# Patient Record
Sex: FEMALE | Race: WHITE | NOT HISPANIC OR LATINO | Employment: OTHER | URBAN - METROPOLITAN AREA
[De-identification: names, ages, dates, MRNs, and addresses within clinical notes are randomized per-mention and may not be internally consistent; named-entity substitution may affect disease eponyms.]

---

## 2017-10-21 ENCOUNTER — APPOINTMENT (OUTPATIENT)
Dept: GENERAL RADIOLOGY | Facility: CLINIC | Age: 82
DRG: 871 | End: 2017-10-21
Attending: EMERGENCY MEDICINE
Payer: COMMERCIAL

## 2017-10-21 ENCOUNTER — HOSPITAL ENCOUNTER (INPATIENT)
Facility: CLINIC | Age: 82
LOS: 3 days | Discharge: HOME | DRG: 871 | End: 2017-10-24
Attending: EMERGENCY MEDICINE | Admitting: INTERNAL MEDICINE
Payer: COMMERCIAL

## 2017-10-21 DIAGNOSIS — E11.9 TYPE 2 DIABETES MELLITUS WITHOUT COMPLICATION, WITHOUT LONG-TERM CURRENT USE OF INSULIN (H): ICD-10-CM

## 2017-10-21 DIAGNOSIS — J18.9 PNEUMONIA: ICD-10-CM

## 2017-10-21 DIAGNOSIS — D50.9 IRON DEFICIENCY ANEMIA, UNSPECIFIED IRON DEFICIENCY ANEMIA TYPE: Primary | ICD-10-CM

## 2017-10-21 DIAGNOSIS — J18.9 COMMUNITY ACQUIRED PNEUMONIA, UNSPECIFIED LATERALITY: ICD-10-CM

## 2017-10-21 LAB
ALBUMIN SERPL-MCNC: 2.9 G/DL (ref 3.4–5)
ALBUMIN UR-MCNC: NEGATIVE MG/DL
ALP SERPL-CCNC: 64 U/L (ref 40–150)
ALT SERPL W P-5'-P-CCNC: 17 U/L (ref 0–50)
ANION GAP SERPL CALCULATED.3IONS-SCNC: 6 MMOL/L (ref 3–14)
APPEARANCE UR: CLEAR
AST SERPL W P-5'-P-CCNC: 22 U/L (ref 0–45)
BASE EXCESS BLDV CALC-SCNC: 0.6 MMOL/L
BASOPHILS # BLD AUTO: 0 10E9/L (ref 0–0.2)
BASOPHILS NFR BLD AUTO: 0 %
BILIRUB SERPL-MCNC: 0.3 MG/DL (ref 0.2–1.3)
BILIRUB UR QL STRIP: NEGATIVE
BUN SERPL-MCNC: 15 MG/DL (ref 7–30)
CALCIUM SERPL-MCNC: 7.9 MG/DL (ref 8.5–10.1)
CHLORIDE SERPL-SCNC: 100 MMOL/L (ref 94–109)
CO2 BLDCOV-SCNC: 21 MMOL/L (ref 21–28)
CO2 BLDCOV-SCNC: 24 MMOL/L (ref 21–28)
CO2 SERPL-SCNC: 26 MMOL/L (ref 20–32)
COLOR UR AUTO: YELLOW
CREAT SERPL-MCNC: 0.91 MG/DL (ref 0.52–1.04)
DIFFERENTIAL METHOD BLD: ABNORMAL
EOSINOPHIL # BLD AUTO: 0 10E9/L (ref 0–0.7)
EOSINOPHIL NFR BLD AUTO: 0.2 %
ERYTHROCYTE [DISTWIDTH] IN BLOOD BY AUTOMATED COUNT: 16 % (ref 10–15)
GFR SERPL CREATININE-BSD FRML MDRD: 59 ML/MIN/1.7M2
GLUCOSE SERPL-MCNC: 102 MG/DL (ref 70–99)
GLUCOSE UR STRIP-MCNC: NEGATIVE MG/DL
HCO3 BLDV-SCNC: 26 MMOL/L (ref 21–28)
HCT VFR BLD AUTO: 25.9 % (ref 35–47)
HGB BLD-MCNC: 7.9 G/DL (ref 11.7–15.7)
HGB UR QL STRIP: ABNORMAL
IMM GRANULOCYTES # BLD: 0 10E9/L (ref 0–0.4)
IMM GRANULOCYTES NFR BLD: 0.6 %
KETONES UR STRIP-MCNC: NEGATIVE MG/DL
LACTATE BLD-SCNC: 0.5 MMOL/L (ref 0.7–2.1)
LACTATE BLD-SCNC: 2 MMOL/L (ref 0.7–2)
LACTATE BLD-SCNC: 2 MMOL/L (ref 0.7–2.1)
LEUKOCYTE ESTERASE UR QL STRIP: NEGATIVE
LYMPHOCYTES # BLD AUTO: 0.4 10E9/L (ref 0.8–5.3)
LYMPHOCYTES NFR BLD AUTO: 7.4 %
MCH RBC QN AUTO: 28.8 PG (ref 26.5–33)
MCHC RBC AUTO-ENTMCNC: 30.5 G/DL (ref 31.5–36.5)
MCV RBC AUTO: 95 FL (ref 78–100)
MONOCYTES # BLD AUTO: 0.5 10E9/L (ref 0–1.3)
MONOCYTES NFR BLD AUTO: 8.7 %
NEUTROPHILS # BLD AUTO: 4.3 10E9/L (ref 1.6–8.3)
NEUTROPHILS NFR BLD AUTO: 83.1 %
NITRATE UR QL: NEGATIVE
NRBC # BLD AUTO: 0 10*3/UL
NRBC BLD AUTO-RTO: 0 /100
O2/TOTAL GAS SETTING VFR VENT: NORMAL %
PCO2 BLDV: 34 MM HG (ref 40–50)
PCO2 BLDV: 42 MM HG (ref 40–50)
PCO2 BLDV: 45 MM HG (ref 40–50)
PH BLDV: 7.37 PH (ref 7.32–7.43)
PH BLDV: 7.37 PH (ref 7.32–7.43)
PH BLDV: 7.41 PH (ref 7.32–7.43)
PH UR STRIP: 7 PH (ref 5–7)
PLATELET # BLD AUTO: 195 10E9/L (ref 150–450)
PO2 BLDV: 20 MM HG (ref 25–47)
PO2 BLDV: 25 MM HG (ref 25–47)
PO2 BLDV: 58 MM HG (ref 25–47)
POTASSIUM SERPL-SCNC: 3.9 MMOL/L (ref 3.4–5.3)
PROT SERPL-MCNC: 9.2 G/DL (ref 6.8–8.8)
RBC # BLD AUTO: 2.74 10E12/L (ref 3.8–5.2)
RBC #/AREA URNS AUTO: 9 /HPF (ref 0–2)
SAO2 % BLDV FROM PO2: 30 %
SAO2 % BLDV FROM PO2: 90 %
SODIUM SERPL-SCNC: 132 MMOL/L (ref 133–144)
SOURCE: ABNORMAL
SP GR UR STRIP: 1.01 (ref 1–1.03)
UROBILINOGEN UR STRIP-MCNC: 0 MG/DL (ref 0–2)
WBC # BLD AUTO: 5.2 10E9/L (ref 4–11)
WBC #/AREA URNS AUTO: 1 /HPF (ref 0–2)

## 2017-10-21 PROCEDURE — 36415 COLL VENOUS BLD VENIPUNCTURE: CPT | Performed by: EMERGENCY MEDICINE

## 2017-10-21 PROCEDURE — 96365 THER/PROPH/DIAG IV INF INIT: CPT

## 2017-10-21 PROCEDURE — 83605 ASSAY OF LACTIC ACID: CPT

## 2017-10-21 PROCEDURE — 82803 BLOOD GASES ANY COMBINATION: CPT

## 2017-10-21 PROCEDURE — 12000000 ZZH R&B MED SURG/OB

## 2017-10-21 PROCEDURE — 99223 1ST HOSP IP/OBS HIGH 75: CPT | Mod: AI | Performed by: INTERNAL MEDICINE

## 2017-10-21 PROCEDURE — 99207 ZZC CDG-CHARGE REQUIRED MANUAL ENTRY: CPT | Performed by: INTERNAL MEDICINE

## 2017-10-21 PROCEDURE — 93005 ELECTROCARDIOGRAM TRACING: CPT

## 2017-10-21 PROCEDURE — 87040 BLOOD CULTURE FOR BACTERIA: CPT | Performed by: EMERGENCY MEDICINE

## 2017-10-21 PROCEDURE — 96361 HYDRATE IV INFUSION ADD-ON: CPT

## 2017-10-21 PROCEDURE — 87086 URINE CULTURE/COLONY COUNT: CPT | Performed by: EMERGENCY MEDICINE

## 2017-10-21 PROCEDURE — 85025 COMPLETE CBC W/AUTO DIFF WBC: CPT | Performed by: EMERGENCY MEDICINE

## 2017-10-21 PROCEDURE — 96367 TX/PROPH/DG ADDL SEQ IV INF: CPT

## 2017-10-21 PROCEDURE — 82803 BLOOD GASES ANY COMBINATION: CPT | Performed by: EMERGENCY MEDICINE

## 2017-10-21 PROCEDURE — 99285 EMERGENCY DEPT VISIT HI MDM: CPT | Mod: 25

## 2017-10-21 PROCEDURE — 71020 XR CHEST 2 VW: CPT

## 2017-10-21 PROCEDURE — 80053 COMPREHEN METABOLIC PANEL: CPT | Performed by: EMERGENCY MEDICINE

## 2017-10-21 PROCEDURE — 25000132 ZZH RX MED GY IP 250 OP 250 PS 637: Performed by: EMERGENCY MEDICINE

## 2017-10-21 PROCEDURE — 81001 URINALYSIS AUTO W/SCOPE: CPT | Performed by: EMERGENCY MEDICINE

## 2017-10-21 PROCEDURE — 83605 ASSAY OF LACTIC ACID: CPT | Performed by: EMERGENCY MEDICINE

## 2017-10-21 PROCEDURE — 25000128 H RX IP 250 OP 636: Performed by: EMERGENCY MEDICINE

## 2017-10-21 RX ORDER — SODIUM CHLORIDE 9 MG/ML
1000 INJECTION, SOLUTION INTRAVENOUS CONTINUOUS
Status: DISCONTINUED | OUTPATIENT
Start: 2017-10-21 | End: 2017-10-22

## 2017-10-21 RX ORDER — ACETAMINOPHEN 325 MG/1
975 TABLET ORAL ONCE
Status: COMPLETED | OUTPATIENT
Start: 2017-10-21 | End: 2017-10-21

## 2017-10-21 RX ORDER — CEFTRIAXONE 1 G/1
1 INJECTION, POWDER, FOR SOLUTION INTRAMUSCULAR; INTRAVENOUS ONCE
Status: COMPLETED | OUTPATIENT
Start: 2017-10-21 | End: 2017-10-21

## 2017-10-21 RX ADMIN — ACETAMINOPHEN 975 MG: 325 TABLET, FILM COATED ORAL at 18:19

## 2017-10-21 RX ADMIN — AZITHROMYCIN MONOHYDRATE 500 MG: 500 INJECTION, POWDER, LYOPHILIZED, FOR SOLUTION INTRAVENOUS at 21:43

## 2017-10-21 RX ADMIN — CEFTRIAXONE SODIUM 1 G: 1 INJECTION, POWDER, FOR SOLUTION INTRAMUSCULAR; INTRAVENOUS at 20:59

## 2017-10-21 RX ADMIN — SODIUM CHLORIDE 1000 ML: 9 INJECTION, SOLUTION INTRAVENOUS at 18:19

## 2017-10-21 ASSESSMENT — ENCOUNTER SYMPTOMS
CHILLS: 1
APPETITE CHANGE: 1
FEVER: 1
COUGH: 1

## 2017-10-21 NOTE — ED NOTES
Bed: ED08  Expected date: 10/21/17  Expected time:   Means of arrival: Ambulance  Comments:  Naomie Lagunas

## 2017-10-21 NOTE — ED PROVIDER NOTES
History     Chief Complaint:  Altered Mental Status and Fatigue    HPI   History limited secondary to language barrier. Information obtained through family as :    Chelly Dave is a 82 year old female with a history of dementia and diabetes who presents to the Emergency Department for evaluation of altered mental status. Per families report, the patient developed a cough two days ago and woke up this morning with altered mental status, fatigue, chills, decreased appetite and a fever as high as 103 F. Family gave the patient Tylenol and cold medication without any relief and presented to the emergency department for further evaluation. Family denies any other concerns.     Allergies:  Lisinopril     Medications:    metoprolol (TOPROL-XL) 25 MG 24 hr tablet  saccharomyces boulardii (FLORASTOR) 250 MG capsule  ACETAMINOPHEN PO  Calcium Carb-Cholecalciferol (CALCIUM 500 +D) 500-400 MG-UNIT TABS  DOXEPIN HCL PO  lidocaine (LIDODERM) 5 % patch  Pilocarpine HCl (SALAGEN PO)  VITAMIN E NATURAL PO  Acetaminophen (TYLENOL PO)  polyethylene glycol 400 (BLINK TEARS) 0.25 % SOLN ophthalmic solution  AzaTHIOprine (IMURAN PO)  cycloSPORINE (RESTASIS) 0.05 % ophthalmic emulsion  FLUOXETINE HCL PO  darbepoetin aravind-polysorbate (ARANESP) 200 MCG/0.4ML injection  diclofenac (FLECTOR) 1.3 % patch  Cholecalciferol (VITAMIN D3 PO)  donepezil (ARICEPT) 5 MG tablet  aspirin 81 MG EC tablet  atorvastatin (LIPITOR) 20 MG tablet  PANtoprazole (PROTONIX) 40 MG enteric coated tablet  loratadine (CLARITIN) 10 MG tablet     Past Medical History:    Arthritis   Cataract   Diabetes mellitus (H)   Gastro-oesophageal reflux disease   Hyperlipidemia   Cellulitis  Syncope  Pneumonia  Chest pain    Past Surgical History:    Colonoscopy  Orthopedic surgery    Family History:    No past pertinent family history.    Social History:  Smoking Status: never smoker  Alcohol Use: no  Marital Status:   [5]     Review of Systems    Constitutional: Positive for appetite change, chills and fever.   Respiratory: Positive for cough.    All other systems reviewed and are negative.    Physical Exam   First Vitals:  BP: 146/83  Heart Rate: 97  Temp: 103.1  F (39.5  C)  Resp: (!) 36  SpO2: 98 %    Physical Exam   Constitutional: She is oriented to person, place, and time.   Elderly frail   HENT:   Head: Normocephalic and atraumatic.   Right Ear: External ear normal.   Mouth/Throat: Oropharynx is clear and moist.   Eyes: Conjunctivae and EOM are normal. Pupils are equal, round, and reactive to light.   Neck: Normal range of motion. Neck supple. No JVD present.   Cardiovascular: Normal rate, regular rhythm and normal heart sounds.    Pulmonary/Chest: Effort normal and breath sounds normal.   Abdominal: Soft. Bowel sounds are normal. She exhibits no distension. There is no tenderness. There is no rebound.   Musculoskeletal: Normal range of motion.   Lymphadenopathy:     She has no cervical adenopathy.   Neurological: She is alert and oriented to person, place, and time. She displays normal reflexes. No cranial nerve deficit. She exhibits normal muscle tone. Coordination normal.   Skin: Skin is warm and dry.   Psychiatric: She has a normal mood and affect. Her behavior is normal. Judgment normal.   Nursing note and vitals reviewed.      Emergency Department Course   ECG:  Indication: altered mental status  Time: 1852  Vent. Rate 97 bpm. WI interval 136. QRS duration 74. QT/QTc 346/439. P-R-T axis 61 2 52.   sinus rhythm. Normal ECG.     Imaging:  Radiographic findings were communicated with the patient who voiced understanding of the findings.    Chest XR:  IMPRESSION: Alveolar infiltrate laterally in the anterior segment of  the right upper lobe consistent with pneumonia. No pleural effusion.  No other infiltrates. Cardiomegaly is unchanged. As per radiology.     Laboratory:  CBC: WBC: 5.2, HGB: 7.9(L), PLT: 195  CMP: Glucose 102(H), (L), Calcium  7.9(L), Albumin 2.9(L), GFR 59(L), Protein total 9.2(H), o/w WNL (creatinine 0.91).    Blood gas venous: negative  1816 ISTAT gases lactate laina POCT: pO2 20(L), o/w WNL.  2106 ISTAT gases lactate laina POCT: pCO2 34(L), O2 58(H), Lactic acid 0.5 (L), o/w WNL.  UA with Microscopic: small urine blood, RBC 9(H), o/w WNL.  Urine culture aerobic bacterial: In process  Lactic acid whole blood: 2.0    Blood culture: No growth after one hour  Blood culture one site: No growth after one hour    Interventions:  1819 NS 1 L IV   Tylenol 975 mg PO  2059 Ceftriaxone 1 g IV    Emergency Department Course:  Nursing notes and vitals reviewed. I performed an exam of the patient as documented above.     The patient was sent for a chest xray while here in the emergency department, findings above.    Blood drawn. This was sent to the lab for further testing, results above.    The patient provided a urine sample here in the emergency department. This was sent for laboratory testing, findings above.    EKG obtained in the ED, see results above.     I reassessed the patient.     2118 I spoke with Dr. Edge regarding this patient.    Findings and plan explained to the Patient and daughter who consents to admission. Discussed the patient with Dr. Edge, who will admit the patient to a medical bed for further monitoring, evaluation, and treatment.    Impression & Plan      Medical Decision Making:  Chelly Dave is a 82 year old female who presents with confusion today and fever. Patient is slightly tachypneic. X-rays confirmed pneumonia. Patients confusion seemed to improve with fever curettement though the patient was still quite weak and family was requesting admission due to her generalized deconditioned state. This is not unreasonable at her age with pneumonia. There are no signs of sepsis or severe sepsis. blood cultures are pending. Due to her living at home community acquired pneumonia antibiotics were offered and admission was  granted through Dr. Edge.     Diagnosis:    ICD-10-CM    1. Pneumonia J18.9 Blood culture ONE site     Blood culture     Urine Culture Aerobic Bacterial     Sputum Culture Aerobic Bacterial     Gram stain     Basic metabolic panel     CBC with platelets     Ferritin     Iron and iron binding capacity       Disposition:  Admit    I, Kristel Guzman, am serving as a scribe on 10/21/2017 at 6:04 PM to personally document services performed by Haris Leigh MD based on my observations and the provider's statements to me.   Kristel Guzman  10/21/2017   [facility] EMERGENCY DEPARTMENT       Haris Leigh MD  10/23/17 0945

## 2017-10-21 NOTE — IP AVS SNAPSHOT
MRN:[id number]                      After Visit Summary   10/21/2017    Chelly Dave    MRN: [id number]           Thank you!     Thank you for choosing [facility] for your care. Our goal is always to provide you with excellent care. Hearing back from our patients is one way we can continue to improve our services. Please take a few minutes to complete the written survey that you may receive in the mail after you visit. If you would like to speak to someone directly about your visit please contact Patient Relations at [phone]. Thank you!          Patient Information     Date Of Birth          1935        Designated Caregiver       Most Recent Value    Caregiver    Will someone help with your care after discharge? yes    Name of designated caregiver Xiomara (daughter) Pt lives with son.     Phone number of caregiver [phone]    Caregiver address same as patients      About your hospital stay     You were admitted on:  October 21, 2017 You last received care in the:  [facility]    You were discharged on:  October 24, 2017        Reason for your hospital stay       You were hospitalized for pneumonia.  This has improved with antibiotics.  You will complete a course of oral antibiotics at home.  Your iron level is low so we have filled a daily supplement for this.                  Who to Call     For medical emergencies, please call 911.  For non-urgent questions about your medical care, please call your primary care provider or clinic, [phone]          Attending Provider     Provider Specialty    Haris Leigh MD Emergency Medicine    Pedro Edge MD Internal Medicine       Primary Care Provider Office Phone # Fax #    Brittany Heredia [phone]      After Care Instructions     Activity       Your activity upon discharge: activity as tolerated            Diet       Follow this diet upon discharge: Orders Placed This  "Encounter      Regular Diet Adult                  Follow-up Appointments     Follow-up and recommended labs and tests        Follow up with primary care provider at your appointment on Friday                  Additional Services     Home care nursing referral       RN skilled nursing visit. RN to assess respiratory and cardiac status and home safety.  RN to teach medication management.    Your provider has ordered home care nursing services. If you have not been contacted within 2 days of your discharge please call the inpatient department phone number at [phone] .                  Pending Results     Date and Time Order Name Status Description    10/21/2017 1759 Blood culture Preliminary     10/21/2017 1744 Blood culture ONE site Preliminary             Statement of Approval     Ordered          10/24/17 1111  I have reviewed and agree with all the recommendations and orders detailed in this document.  EFFECTIVE NOW     Approved and electronically signed by:  Landry Alejo MD             Admission Information     Date & Time Provider Department Dept. Phone    10/21/2017 Pedro Edge MD [facility] [phone]      Your Vitals Were     Blood Pressure Pulse Temperature Respirations Weight Pulse Oximetry    152/79 (BP Location: Right arm) 82 96.1  F (35.6  C) (Axillary) 22 58.1 kg (128 lb) 99%    BMI (Body Mass Index)                   25 kg/m2           MyChart Information     [employer] lets you send messages to your doctor, view your test results, renew your prescriptions, schedule appointments and more. To sign up, go to [email or web address]"t . Click on \"Log in\" on the left side of the screen, which will take you to the Welcome page. Then click on \"Sign up Now\" on the right side of the page.     You will be asked to enter the access code listed below, as well as some personal information. Please follow the directions to create your username and password.     Your access code " is: [email or web address]  Expires: 2018 11:16 AM     Your access code will  in 90 days. If you need help or a new code, please call your [facility] clinic or [phone].        Care EveryWhere ID     This is your Care EveryWhere ID. This could be used by other organizations to access your [facility] medical records  [id number]        Equal Access to Services     SILVINO AYON : Hadii aad ku hadasho Soomaali, waaxda luqadaha, qaybta kaalmada adeegyada, waxay idiin hayellen ademarilyn rossi laNemeavivien . So [facility] [phone].    ATENCIÓN: Si habla espmyla, tiene a dawson disposición servicios gratuitos de asistencia lingüística. Llame al [phone].    We comply with applicable federal civil rights laws and [place] laws. We do not discriminate on the basis of race, color, national origin, age, disability, sex, sexual orientation, or gender identity.               Review of your medicines      START taking        Dose / Directions    azithromycin 250 MG tablet   Commonly known as:  ZITHROMAX   Indication:  Community Acquired Pneumonia        Dose:  250 mg   Take 1 tablet (250 mg) by mouth every 24 hours for 2 days   Quantity:  2 tablet   Refills:  0       benzonatate 100 MG capsule   Commonly known as:  TESSALON        Dose:  100 mg   Take 1 capsule (100 mg) by mouth 3 times daily as needed for cough   Quantity:  21 capsule   Refills:  0       cefdinir 300 MG capsule   Commonly known as:  OMNICEF        Dose:  300 mg   Take 1 capsule (300 mg) by mouth 2 times daily for 5 days   Quantity:  10 capsule   Refills:  0       ferrous sulfate 325 (65 FE) MG tablet   Commonly known as:  IRON   Used for:  Iron deficiency anemia, unspecified iron deficiency anemia type        Dose:  325 mg   Take 1 tablet (325 mg) by mouth daily   Quantity:  100 tablet   Refills:  0         CONTINUE these medicines which may have CHANGED, or have new prescriptions. If we are uncertain of the size of tablets/capsules you have at home, strength may be  listed as something that might have changed.        Dose / Directions    donepezil 5 MG tablet   Commonly known as:  ARIcept   This may have changed:  when to take this   Used for:  Dementia        Dose:  5 mg   Take 1 tablet (5 mg) by mouth daily   Quantity:  30 tablet   Refills:  0         CONTINUE these medicines which have NOT CHANGED        Dose / Directions    aspirin 81 MG EC tablet        Dose:  81 mg   Take 81 mg by mouth daily.   Refills:  0       BLINK TEARS 0.25 % Soln ophthalmic solution   Generic drug:  polyethylene glycol 400        Dose:  1-2 drop   Place 1-2 drops into both eyes every 2 hours as needed for dry eyes   Refills:  0       calcium 500 +D 500-400 MG-UNIT Tabs   Generic drug:  Calcium Carb-Cholecalciferol        Dose:  1 tablet   Take 1 tablet by mouth daily   Refills:  0       cycloSPORINE 0.05 % ophthalmic emulsion   Commonly known as:  RESTASIS        Dose:  1 drop   Place 1 drop into both eyes 2 times daily   Refills:  0       darbepoetin aravind-polysorbate 200 MCG/0.4ML injection   Commonly known as:  ARANESP        Dose:  200 mcg   Inject 200 mcg Subcutaneous Every 2 months   Refills:  0       diclofenac 1.3 % Patch   Commonly known as:  FLECTOR        Dose:  1 patch   Place 1 patch onto the skin 2 times daily as needed for moderate pain   Refills:  0       DOXEPIN HCL PO        Dose:  10 mg   Take 10 mg by mouth At Bedtime   Refills:  0       FLUOXETINE HCL PO        Dose:  20 mg   Take 20 mg by mouth daily (Takes 2 x 10mg for a total of 20mg daily)   Refills:  0       IMURAN PO        Dose:  50 mg   Take 50 mg by mouth 2 times daily AM & 3PM   Refills:  0       lidocaine 5 % Patch   Commonly known as:  LIDODERM        Dose:  1 patch   Place 1 patch onto the skin daily as needed for moderate pain (12 hours on; 12 hours off. Patient applies to knees or back)   Refills:  0       LIPITOR 20 MG tablet   Generic drug:  atorvastatin        Dose:  20 mg   Take 20 mg by mouth every evening  (Takes half of a 40mg tablet for a total of 20mg in the evening)   Refills:  0       loratadine 10 MG tablet   Commonly known as:  CLARITIN        Dose:  10 mg   Take 10 mg by mouth daily.   Refills:  0       pantoprazole 40 MG EC tablet   Commonly known as:  PROTONIX        Dose:  40 mg   Take 40 mg by mouth every morning.   Refills:  0       SALAGEN PO        Dose:  5 mg   Take 5 mg by mouth 3 times daily AM, 1200 & HS   Refills:  0       * TYLENOL PO        Dose:  1000 mg   Take 1,000 mg by mouth 2 times daily   Refills:  0       * ACETAMINOPHEN PO        Dose:  1000 mg   Take 1,000 mg by mouth daily as needed for pain Patient occasionally takes an extra dose in addition to scheduled medication   Refills:  0       VITAMIN D3 PO        Dose:  1000 Units   Take 1,000 Units by mouth daily   Refills:  0       VITAMIN E NATURAL PO        Dose:  1000 Units   Take 1,000 Units by mouth 2 times daily AM & 3PM   Refills:  0       * Notice:  This list has 2 medication(s) that are the same as other medications prescribed for you. Read the directions carefully, and ask your doctor or other care provider to review them with you.         Where to get your medicines      These medications were sent to [facility] [address] [place] [address] [address] AT [facility] [address] [facility] [address]     Phone:  [phone]     azithromycin 250 MG tablet    benzonatate 100 MG capsule    cefdinir 300 MG capsule    ferrous sulfate 325 (65 FE) MG tablet               ANTIBIOTIC INSTRUCTION     You've Been Prescribed an Antibiotic - Now What?  Your healthcare team thinks that you or your loved one might have an infection. Some infections can be treated with antibiotics, which are powerful, life-saving drugs. Like all medications, antibiotics have side effects and should only be used when necessary. There are some important things you should know about your antibiotic treatment.      Your  healthcare team may run tests before you start taking an antibiotic.    Your team may take samples (e.g., from your blood, urine or other areas) to run tests to look for bacteria. These test can be important to determine if you need an antibiotic at all and, if you do, which antibiotic will work best.      Within a few days, your healthcare team might change or even stop your antibiotic.    Your team may start you on an antibiotic while they are working to find out what is making you sick.    Your team might change your antibiotic because test results show that a different antibiotic would be better to treat your infection.    In some cases, once your team has more information, they learn that you do not need an antibiotic at all. They may find out that you don't have an infection, or that the antibiotic you're taking won't work against your infection. For example, an infection caused by a virus can't be treated with antibiotics. Staying on an antibiotic when you don't need it is more likely to be harmful than helpful.      You may experience side effects from your antibiotic.    Like all medications, antibiotics have side effects. Some of these can be serious.    Let you healthcare team know if you have any known allergies when you are admitted to the hospital.    One significant side effect of nearly all antibiotics is the risk of severe and sometimes deadly diarrhea caused by Clostridium difficile (C. Difficile). This occurs when a person takes antibiotics because some good germs are destroyed. Antibiotic use allows C. diificile to take over, putting patients at high risk for this serious infection.    As a patient or caregiver, it is important to understand your or your loved one's antibiotic treatment. It is especially important for caregivers to speak up when patients can't speak for themselves. Here are some important questions to ask your healthcare team.    What infection is this antibiotic treating and how  do you know I have that infection?    What side effects might occur from this antibiotic?    How long will I need to take this antibiotic?    Is it safe to take this antibiotic with other medications or supplements (e.g., vitamins) that I am taking?     Are there any special directions I need to know about taking this antibiotic? For example, should I take it with food?    How will I be monitored to know whether my infection is responding to the antibiotic?    What tests may help to make sure the right antibiotic is prescribed for me?      Information provided by:  www.cdc.gov/getsmart  U.S. Department of Health and Human Services  Centers for disease Control and Prevention  National Center for Emerging and Zoonotic Infectious Diseases  Division of Healthcare Quality Promotion         Protect others around you: Learn how to safely use, store and throw away your medicines at www.disposemymeds.org.             Medication List: This is a list of all your medications and when to take them. Check marks below indicate your daily home schedule. Keep this list as a reference.      Medications           Morning Afternoon Evening Bedtime As Needed    aspirin 81 MG EC tablet   Take 81 mg by mouth daily.   Last time this was given:  81 mg on 10/24/2017  8:35 AM                                azithromycin 250 MG tablet   Commonly known as:  ZITHROMAX   Take 1 tablet (250 mg) by mouth every 24 hours for 2 days   Last time this was given:  250 mg on 10/24/2017  8:35 AM                                benzonatate 100 MG capsule   Commonly known as:  TESSALON   Take 1 capsule (100 mg) by mouth 3 times daily as needed for cough   Last time this was given:  100 mg on 10/23/2017  9:40 AM                                BLINK TEARS 0.25 % Soln ophthalmic solution   Place 1-2 drops into both eyes every 2 hours as needed for dry eyes   Generic drug:  polyethylene glycol 400                                calcium 500 +D 500-400 MG-UNIT Tabs    Take 1 tablet by mouth daily   Generic drug:  Calcium Carb-Cholecalciferol                                cefdinir 300 MG capsule   Commonly known as:  OMNICEF   Take 1 capsule (300 mg) by mouth 2 times daily for 5 days                                cycloSPORINE 0.05 % ophthalmic emulsion   Commonly known as:  RESTASIS   Place 1 drop into both eyes 2 times daily                                darbepoetin aravind-polysorbate 200 MCG/0.4ML injection   Commonly known as:  ARANESP   Inject 200 mcg Subcutaneous Every 2 months                                diclofenac 1.3 % Patch   Commonly known as:  FLECTOR   Place 1 patch onto the skin 2 times daily as needed for moderate pain                                donepezil 5 MG tablet   Commonly known as:  ARIcept   Take 1 tablet (5 mg) by mouth daily                                DOXEPIN HCL PO   Take 10 mg by mouth At Bedtime   Last time this was given:  10 mg on 10/23/2017  8:37 PM                                ferrous sulfate 325 (65 FE) MG tablet   Commonly known as:  IRON   Take 1 tablet (325 mg) by mouth daily   Last time this was given:  325 mg on 10/24/2017  8:35 AM                                FLUOXETINE HCL PO   Take 20 mg by mouth daily (Takes 2 x 10mg for a total of 20mg daily)   Last time this was given:  20 mg on 10/24/2017  8:35 AM                                IMURAN PO   Take 50 mg by mouth 2 times daily AM & 3PM                                lidocaine 5 % Patch   Commonly known as:  LIDODERM   Place 1 patch onto the skin daily as needed for moderate pain (12 hours on; 12 hours off. Patient applies to knees or back)                                LIPITOR 20 MG tablet   Take 20 mg by mouth every evening (Takes half of a 40mg tablet for a total of 20mg in the evening)   Last time this was given:  20 mg on 10/23/2017  8:37 PM   Generic drug:  atorvastatin                                loratadine 10 MG tablet   Commonly known as:  CLARITIN   Take 10 mg  by mouth daily.   Last time this was given:  10 mg on 10/24/2017  8:35 AM                                pantoprazole 40 MG EC tablet   Commonly known as:  PROTONIX   Take 40 mg by mouth every morning.   Last time this was given:  40 mg on 10/24/2017  8:35 AM                                SALAGEN PO   Take 5 mg by mouth 3 times daily AM, 1200 & HS                                * TYLENOL PO   Take 1,000 mg by mouth 2 times daily   Last time this was given:  1,000 mg on 10/24/2017  8:34 AM                                * ACETAMINOPHEN PO   Take 1,000 mg by mouth daily as needed for pain Patient occasionally takes an extra dose in addition to scheduled medication   Last time this was given:  1,000 mg on 10/24/2017  8:34 AM                                VITAMIN D3 PO   Take 1,000 Units by mouth daily                                VITAMIN E NATURAL PO   Take 1,000 Units by mouth 2 times daily AM & 3PM                                * Notice:  This list has 2 medication(s) that are the same as other medications prescribed for you. Read the directions carefully, and ask your doctor or other care provider to review them with you.

## 2017-10-21 NOTE — ED NOTES
Pt arrives to ed via ems after being called by daughter who she lives with for c/o increased confusion and weakness. Cough started Wednesday and got worse by Thursday. Tachy with RR in 40's &  temp of 101.5 via ems, sepsis protocol initiated and iv started with ivfs NS. Pt speaks little english and poor historian, alert to self only. Family on their way to ER. 2 sets of blood cx taken. Temp in er 103.1 oral

## 2017-10-21 NOTE — LETTER
Key Recommendations:  PNA action plan reviewed w/ pt and dtr, advised when to seek care if worsening. Discussed need for additional HC services, requested PT/OT consults while inpatient to assess need. Dtr will call to schedule follow-up appt.     Lillian To    AVS/Discharge Summary is the source of truth; this is a helpful guide for improved communication of patient story

## 2017-10-21 NOTE — LETTER
Transition Communication Hand-off for Care Transitions to Next Level of Care Provider    Name: Chelly Dave  MRN #: [id number]  Primary Care Provider: Brittany Heredia  Primary Care MD Name: Brittany Heredia  Primary Clinic: [facility] [address]  [facility] [address]  Primary Care Clinic Name: [facility]  Reason for Hospitalization:  Pneumonia [J18.9]  Admit Date/Time: 10/21/2017  5:26 PM  Discharge Date: 10/24/17  Payor Source: Payor: MEDICA / Plan: MEDICA [employer]NERS / Product Type: Indemnity /     Readmission Assessment Measure (MARII) Risk Score/category: Average    Reason for Communication Hand-off Referral: Admission diagnoses: PN  Fragility    Discharge Plan: D/c to home w/ [facility]       Concern for non-adherence with plan of care:   No  Discharge Needs Assessment:  Needs       Most Recent Value    Anticipated Changes Related to Illness none    Equipment Currently Used at Home cane, straight    Transportation Available car, family or friend will provide    Current Discharge Risk chronically ill    # of Referrals Placed by [facility], Communication hand-offs to next level of Care Providers          Already enrolled in Tele-monitoring program and name of program:  NA  Follow-up specialty is recommended: No    Follow-up plan:  Future Appointments  Date Time Provider Department Center   10/24/2017 4:00 PM Jennifer Vasquez, PT [facility]   10/25/2017 9:00 AM Dee Schultz, [facility]       Any outstanding tests or procedures:          Key Recommendations: PNA action plan reviewed w/ pt and dtr, advised when to seek care if worsening. No increased need for services in home, plan to continue w/ PCA 6.45 hours daily. Dtr will call to schedule follow-up appt.     Lillian To    AVS/Discharge Summary is the source of truth; this is a helpful guide for improved communication of patient story

## 2017-10-21 NOTE — IP AVS SNAPSHOT
[facility]    [address] [facility] [address]    Phone:  [phone]    Fax:  [phone]                                       After Visit Summary   10/21/2017    Chelly Dave    MRN: [id number]           After Visit Summary Signature Page     I have received my discharge instructions, and my questions have been answered. I have discussed any challenges I see with this plan with the nurse or doctor.    ..........................................................................................................................................  Patient/Patient Representative Signature      ..........................................................................................................................................  Patient Representative Print Name and Relationship to Patient    ..................................................               ................................................  Date                                            Time    ..........................................................................................................................................  Reviewed by Signature/Title    ...................................................              ..............................................  Date                                                            Time

## 2017-10-22 PROBLEM — J18.9 COMMUNITY ACQUIRED PNEUMONIA: Status: ACTIVE | Noted: 2017-10-22

## 2017-10-22 LAB
ANION GAP SERPL CALCULATED.3IONS-SCNC: 6 MMOL/L (ref 3–14)
BUN SERPL-MCNC: 11 MG/DL (ref 7–30)
CALCIUM SERPL-MCNC: 7.1 MG/DL (ref 8.5–10.1)
CHLORIDE SERPL-SCNC: 110 MMOL/L (ref 94–109)
CO2 SERPL-SCNC: 24 MMOL/L (ref 20–32)
CREAT SERPL-MCNC: 0.78 MG/DL (ref 0.52–1.04)
ERYTHROCYTE [DISTWIDTH] IN BLOOD BY AUTOMATED COUNT: 16.1 % (ref 10–15)
GFR SERPL CREATININE-BSD FRML MDRD: 71 ML/MIN/1.7M2
GLUCOSE SERPL-MCNC: 94 MG/DL (ref 70–99)
GRAM STN SPEC: NORMAL
HCT VFR BLD AUTO: 23.8 % (ref 35–47)
HGB BLD-MCNC: 7.5 G/DL (ref 11.7–15.7)
MCH RBC QN AUTO: 29.1 PG (ref 26.5–33)
MCHC RBC AUTO-ENTMCNC: 31.5 G/DL (ref 31.5–36.5)
MCV RBC AUTO: 92 FL (ref 78–100)
PLATELET # BLD AUTO: 186 10E9/L (ref 150–450)
POTASSIUM SERPL-SCNC: 3.8 MMOL/L (ref 3.4–5.3)
RBC # BLD AUTO: 2.58 10E12/L (ref 3.8–5.2)
SODIUM SERPL-SCNC: 140 MMOL/L (ref 133–144)
SPECIMEN SOURCE: NORMAL
WBC # BLD AUTO: 6 10E9/L (ref 4–11)

## 2017-10-22 PROCEDURE — 99233 SBSQ HOSP IP/OBS HIGH 50: CPT | Performed by: INTERNAL MEDICINE

## 2017-10-22 PROCEDURE — 80048 BASIC METABOLIC PNL TOTAL CA: CPT | Performed by: INTERNAL MEDICINE

## 2017-10-22 PROCEDURE — 25000132 ZZH RX MED GY IP 250 OP 250 PS 637: Performed by: INTERNAL MEDICINE

## 2017-10-22 PROCEDURE — 12000000 ZZH R&B MED SURG/OB

## 2017-10-22 PROCEDURE — 36416 COLLJ CAPILLARY BLOOD SPEC: CPT | Performed by: INTERNAL MEDICINE

## 2017-10-22 PROCEDURE — 87205 SMEAR GRAM STAIN: CPT | Performed by: INTERNAL MEDICINE

## 2017-10-22 PROCEDURE — 25000128 H RX IP 250 OP 636: Performed by: INTERNAL MEDICINE

## 2017-10-22 PROCEDURE — 87070 CULTURE OTHR SPECIMN AEROBIC: CPT | Performed by: INTERNAL MEDICINE

## 2017-10-22 PROCEDURE — 25000128 H RX IP 250 OP 636: Performed by: EMERGENCY MEDICINE

## 2017-10-22 PROCEDURE — 85027 COMPLETE CBC AUTOMATED: CPT | Performed by: INTERNAL MEDICINE

## 2017-10-22 RX ORDER — NALOXONE HYDROCHLORIDE 0.4 MG/ML
.1-.4 INJECTION, SOLUTION INTRAMUSCULAR; INTRAVENOUS; SUBCUTANEOUS
Status: DISCONTINUED | OUTPATIENT
Start: 2017-10-22 | End: 2017-10-24 | Stop reason: HOSPADM

## 2017-10-22 RX ORDER — POTASSIUM CHLORIDE 1.5 G/1.58G
20-40 POWDER, FOR SOLUTION ORAL
Status: DISCONTINUED | OUTPATIENT
Start: 2017-10-22 | End: 2017-10-24 | Stop reason: HOSPADM

## 2017-10-22 RX ORDER — POTASSIUM CHLORIDE 29.8 MG/ML
20 INJECTION INTRAVENOUS
Status: DISCONTINUED | OUTPATIENT
Start: 2017-10-22 | End: 2017-10-24 | Stop reason: HOSPADM

## 2017-10-22 RX ORDER — DOXEPIN HYDROCHLORIDE 10 MG/1
10 CAPSULE ORAL AT BEDTIME
Status: DISCONTINUED | OUTPATIENT
Start: 2017-10-22 | End: 2017-10-24 | Stop reason: HOSPADM

## 2017-10-22 RX ORDER — ASPIRIN 81 MG/1
81 TABLET ORAL DAILY
Status: DISCONTINUED | OUTPATIENT
Start: 2017-10-22 | End: 2017-10-24 | Stop reason: HOSPADM

## 2017-10-22 RX ORDER — HYDROMORPHONE HYDROCHLORIDE 1 MG/ML
.3-.5 INJECTION, SOLUTION INTRAMUSCULAR; INTRAVENOUS; SUBCUTANEOUS
Status: DISCONTINUED | OUTPATIENT
Start: 2017-10-22 | End: 2017-10-24 | Stop reason: HOSPADM

## 2017-10-22 RX ORDER — CEFTRIAXONE SODIUM 1 G/50ML
1 INJECTION, SOLUTION INTRAVENOUS ONCE
Status: COMPLETED | OUTPATIENT
Start: 2017-10-22 | End: 2017-10-22

## 2017-10-22 RX ORDER — ONDANSETRON 2 MG/ML
4 INJECTION INTRAMUSCULAR; INTRAVENOUS EVERY 6 HOURS PRN
Status: DISCONTINUED | OUTPATIENT
Start: 2017-10-22 | End: 2017-10-24 | Stop reason: HOSPADM

## 2017-10-22 RX ORDER — CEFTRIAXONE SODIUM 2 G/50ML
2 INJECTION, SOLUTION INTRAVENOUS EVERY 24 HOURS
Status: DISCONTINUED | OUTPATIENT
Start: 2017-10-22 | End: 2017-10-24 | Stop reason: HOSPADM

## 2017-10-22 RX ORDER — SACCHAROMYCES BOULARDII 250 MG
250 CAPSULE ORAL 2 TIMES DAILY
Status: DISCONTINUED | OUTPATIENT
Start: 2017-10-22 | End: 2017-10-22 | Stop reason: CLARIF

## 2017-10-22 RX ORDER — ACETAMINOPHEN 500 MG
1000 TABLET ORAL EVERY 8 HOURS PRN
Status: DISCONTINUED | OUTPATIENT
Start: 2017-10-22 | End: 2017-10-24 | Stop reason: HOSPADM

## 2017-10-22 RX ORDER — METOCLOPRAMIDE HYDROCHLORIDE 5 MG/ML
5 INJECTION INTRAMUSCULAR; INTRAVENOUS EVERY 6 HOURS PRN
Status: DISCONTINUED | OUTPATIENT
Start: 2017-10-22 | End: 2017-10-24 | Stop reason: HOSPADM

## 2017-10-22 RX ORDER — MAGNESIUM SULFATE HEPTAHYDRATE 40 MG/ML
4 INJECTION, SOLUTION INTRAVENOUS EVERY 4 HOURS PRN
Status: DISCONTINUED | OUTPATIENT
Start: 2017-10-22 | End: 2017-10-24 | Stop reason: HOSPADM

## 2017-10-22 RX ORDER — SODIUM CHLORIDE 9 MG/ML
INJECTION, SOLUTION INTRAVENOUS CONTINUOUS
Status: DISCONTINUED | OUTPATIENT
Start: 2017-10-22 | End: 2017-10-23

## 2017-10-22 RX ORDER — ATORVASTATIN CALCIUM 20 MG/1
20 TABLET, FILM COATED ORAL EVERY EVENING
Status: DISCONTINUED | OUTPATIENT
Start: 2017-10-22 | End: 2017-10-24 | Stop reason: HOSPADM

## 2017-10-22 RX ORDER — ACETAMINOPHEN 500 MG
1000 TABLET ORAL 2 TIMES DAILY
Status: DISCONTINUED | OUTPATIENT
Start: 2017-10-22 | End: 2017-10-24 | Stop reason: HOSPADM

## 2017-10-22 RX ORDER — PROCHLORPERAZINE 25 MG
12.5 SUPPOSITORY, RECTAL RECTAL EVERY 12 HOURS PRN
Status: DISCONTINUED | OUTPATIENT
Start: 2017-10-22 | End: 2017-10-24 | Stop reason: HOSPADM

## 2017-10-22 RX ORDER — ONDANSETRON 4 MG/1
4 TABLET, ORALLY DISINTEGRATING ORAL EVERY 6 HOURS PRN
Status: DISCONTINUED | OUTPATIENT
Start: 2017-10-22 | End: 2017-10-24 | Stop reason: HOSPADM

## 2017-10-22 RX ORDER — PROCHLORPERAZINE MALEATE 5 MG
5 TABLET ORAL EVERY 6 HOURS PRN
Status: DISCONTINUED | OUTPATIENT
Start: 2017-10-22 | End: 2017-10-24 | Stop reason: HOSPADM

## 2017-10-22 RX ORDER — AZITHROMYCIN 250 MG/1
250 TABLET, FILM COATED ORAL EVERY 24 HOURS
Status: DISCONTINUED | OUTPATIENT
Start: 2017-10-22 | End: 2017-10-24 | Stop reason: HOSPADM

## 2017-10-22 RX ORDER — PANTOPRAZOLE SODIUM 40 MG/1
40 TABLET, DELAYED RELEASE ORAL EVERY MORNING
Status: DISCONTINUED | OUTPATIENT
Start: 2017-10-22 | End: 2017-10-24 | Stop reason: HOSPADM

## 2017-10-22 RX ORDER — POTASSIUM CL/LIDO/0.9 % NACL 10MEQ/0.1L
10 INTRAVENOUS SOLUTION, PIGGYBACK (ML) INTRAVENOUS
Status: DISCONTINUED | OUTPATIENT
Start: 2017-10-22 | End: 2017-10-24 | Stop reason: HOSPADM

## 2017-10-22 RX ORDER — LORATADINE 10 MG/1
10 TABLET ORAL DAILY
Status: DISCONTINUED | OUTPATIENT
Start: 2017-10-22 | End: 2017-10-24 | Stop reason: HOSPADM

## 2017-10-22 RX ORDER — METOPROLOL SUCCINATE 25 MG/1
25 TABLET, EXTENDED RELEASE ORAL DAILY
Status: DISCONTINUED | OUTPATIENT
Start: 2017-10-22 | End: 2017-10-22 | Stop reason: CLARIF

## 2017-10-22 RX ORDER — METOCLOPRAMIDE 5 MG/1
5 TABLET ORAL EVERY 6 HOURS PRN
Status: DISCONTINUED | OUTPATIENT
Start: 2017-10-22 | End: 2017-10-24 | Stop reason: HOSPADM

## 2017-10-22 RX ORDER — POTASSIUM CHLORIDE 7.45 MG/ML
10 INJECTION INTRAVENOUS
Status: DISCONTINUED | OUTPATIENT
Start: 2017-10-22 | End: 2017-10-24 | Stop reason: HOSPADM

## 2017-10-22 RX ORDER — POTASSIUM CHLORIDE 1500 MG/1
20-40 TABLET, EXTENDED RELEASE ORAL
Status: DISCONTINUED | OUTPATIENT
Start: 2017-10-22 | End: 2017-10-24 | Stop reason: HOSPADM

## 2017-10-22 RX ADMIN — AZITHROMYCIN 250 MG: 250 TABLET, FILM COATED ORAL at 08:54

## 2017-10-22 RX ADMIN — ASPIRIN 81 MG: 81 TABLET, COATED ORAL at 15:03

## 2017-10-22 RX ADMIN — CEFTRIAXONE SODIUM 1 G: 1 INJECTION, SOLUTION INTRAVENOUS at 02:20

## 2017-10-22 RX ADMIN — PANTOPRAZOLE SODIUM 40 MG: 40 TABLET, DELAYED RELEASE ORAL at 08:55

## 2017-10-22 RX ADMIN — CEFTRIAXONE SODIUM 2 G: 2 INJECTION, SOLUTION INTRAVENOUS at 22:02

## 2017-10-22 RX ADMIN — DOXEPIN HYDROCHLORIDE 10 MG: 10 CAPSULE ORAL at 21:56

## 2017-10-22 RX ADMIN — ATORVASTATIN CALCIUM 20 MG: 20 TABLET, FILM COATED ORAL at 19:19

## 2017-10-22 RX ADMIN — ACETAMINOPHEN 1000 MG: 500 TABLET, FILM COATED ORAL at 15:55

## 2017-10-22 RX ADMIN — FLUOXETINE 20 MG: 20 CAPSULE ORAL at 15:03

## 2017-10-22 RX ADMIN — ACETAMINOPHEN 1000 MG: 500 TABLET, FILM COATED ORAL at 08:54

## 2017-10-22 RX ADMIN — SODIUM CHLORIDE: 9 INJECTION, SOLUTION INTRAVENOUS at 19:17

## 2017-10-22 RX ADMIN — LORATADINE 10 MG: 10 TABLET ORAL at 15:03

## 2017-10-22 RX ADMIN — SODIUM CHLORIDE 1000 ML: 9 INJECTION, SOLUTION INTRAVENOUS at 00:56

## 2017-10-22 RX ADMIN — DOXEPIN HYDROCHLORIDE 10 MG: 10 CAPSULE ORAL at 02:20

## 2017-10-22 ASSESSMENT — ACTIVITIES OF DAILY LIVING (ADL)
FALL_HISTORY_WITHIN_LAST_SIX_MONTHS: YES
RETIRED_COMMUNICATION: 2-->DIFFICULTY UNDERSTANDING (NOT RELATED TO LANGUAGE BARRIER)
WHICH_OF_THE_ABOVE_FUNCTIONAL_RISKS_HAD_A_RECENT_ONSET_OR_CHANGE?: AMBULATION;TRANSFERRING
SWALLOWING: 0-->SWALLOWS FOODS/LIQUIDS WITHOUT DIFFICULTY
COGNITION: 1 - ATTENTION OR MEMORY DEFICITS
RETIRED_EATING: 0-->INDEPENDENT

## 2017-10-22 NOTE — PHARMACY-ADMISSION MEDICATION HISTORY
Admission medication history interview status for this patient is complete. See [facility] admission navigator for allergy information, prior to admission medications and immunization status.     Medication history interview source(s):Daughter  Medication history resources (including written lists, pill bottles, clinic record):None  Primary pharmacy:[facility] [address] [place] [address]    Changes made to PTA medication list:  Added:   Deleted: Metoprolol,   Florastor  Changed:   DAUGHTER WILL CALL US WITH NAME OF NEW IRON PILL    Actions taken by pharmacist (provider contacted, etc):CALLED DAUGHTER    Additional medication history information:None    Medication reconciliation/reorder completed by provider prior to medication history? Yes    Do you take OTC medications (eg tylenol, ibuprofen, fish oil, eye/ear drops, etc)? n(Y/N)    For patients on insulin therapy: N (Y/N)  Lantus/levemir/NPH/Mix 70/30 dose:   (Y/N) (see Med list for doses)   Sliding scale Novolog Y/N  If Yes, do you have a baseline novolog pre-meal dose:  units with meals  Patients eat three meals a day:   Y/N    How many episodes of hypoglycemia do you have per week: _______  How many missed doses do you have per week: ______  How many times do you check your blood glucose per day: _______   Any Barriers to therapy - Be specific :  cost of medications, comfortable with giving injections (if applicable), comfortable and confident with current diabetes regimen: Y/N ______________      Prior to Admission medications    Medication Sig Last Dose Taking? Auth Provider   darbepoetin aravind-polysorbate (ARANESP) 200 MCG/0.4ML injection Inject 200 mcg Subcutaneous Every 2 months Past Week at Unknown time Yes Unknown, Entered By History   ACETAMINOPHEN PO Take 1,000 mg by mouth daily as needed for pain Patient occasionally takes an extra dose in addition to scheduled medication Unknown at Unknown time  Unknown, Entered By History   Calcium Carb-Cholecalciferol (CALCIUM 500  +D) 500-400 MG-UNIT TABS Take 1 tablet by mouth daily Unknown at Unknown time  Unknown, Entered By History   DOXEPIN HCL PO Take 10 mg by mouth At Bedtime Unknown at Unknown time  Unknown, Entered By History   lidocaine (LIDODERM) 5 % patch Place 1 patch onto the skin daily as needed for moderate pain (12 hours on; 12 hours off. Patient applies to knees or back) Unknown at Unknown time  Unknown, Entered By History   Pilocarpine HCl (SALAGEN PO) Take 5 mg by mouth 3 times daily AM, 1200 & HS Unknown at Unknown time  Unknown, Entered By History   VITAMIN E NATURAL PO Take 1,000 Units by mouth 2 times daily AM & 3PM Unknown at Unknown time  Unknown, Entered By History   Acetaminophen (TYLENOL PO) Take 1,000 mg by mouth 2 times daily  Unknown at Unknown time  Unknown, Entered By History   polyethylene glycol 400 (BLINK TEARS) 0.25 % SOLN ophthalmic solution Place 1-2 drops into both eyes every 2 hours as needed for dry eyes Unknown at Unknown time  Unknown, Entered By History   AzaTHIOprine (IMURAN PO) Take 50 mg by mouth 2 times daily AM & 3PM Unknown at Unknown time  Unknown, Entered By History   cycloSPORINE (RESTASIS) 0.05 % ophthalmic emulsion Place 1 drop into both eyes 2 times daily Unknown at Unknown time  Unknown, Entered By History   FLUOXETINE HCL PO Take 20 mg by mouth daily (Takes 2 x 10mg for a total of 20mg daily) Unknown at Unknown time  Unknown, Entered By History   diclofenac (FLECTOR) 1.3 % patch Place 1 patch onto the skin 2 times daily as needed for moderate pain Unknown at Unknown time  Unknown, Entered By History   Cholecalciferol (VITAMIN D3 PO) Take 1,000 Units by mouth daily Unknown at Unknown time  Unknown, Entered By History   donepezil (ARICEPT) 5 MG tablet Take 1 tablet (5 mg) by mouth daily  Patient taking differently: Take 5 mg by mouth every evening  Unknown at Unknown time  Carisa Mosley MD   aspirin 81 MG EC tablet Take 81 mg by mouth daily. Unknown at Unknown time     atorvastatin  (LIPITOR) 20 MG tablet Take 20 mg by mouth every evening (Takes half of a 40mg tablet for a total of 20mg in the evening) Unknown at Unknown time  Unknown, Entered By History   PANtoprazole (PROTONIX) 40 MG enteric coated tablet Take 40 mg by mouth every morning. Unknown at Unknown time     loratadine (CLARITIN) 10 MG tablet Take 10 mg by mouth daily. Unknown at Unknown time  Reported, Patient

## 2017-10-22 NOTE — PLAN OF CARE
Problem: Patient Care Overview  Goal: Plan of Care/Patient Progress Review  Outcome: No Change  Pt oriented to self only. Pt calm and accepting of cares. Family reports some incontinence.  Assist of one. Uses cane at home, with assist from family.  Temp. 99.7. Resp 28. Did not Need supplemental O2.   IV fluids at 100 hr. IV antibiotics.   Has 6 children who help/make her decisions. There is not one spokesperson.

## 2017-10-22 NOTE — PLAN OF CARE
"Problem: Patient Care Overview  Goal: Plan of Care/Patient Progress Review  Outcome: Improving  Confused this am, found in room walking around, making bed and had removed IV. Reoriented, stated \"I didn't know I was in the hospital\".  New PIV placed, no-no brace on for protection. Bed alarm on for safety.  Patient has been cooperative since.  LS diminished, productive cough, sputum specimen sent.  O2 sats 100% on RA.  Incontinent of bladder at times, voiding adequate.  Up with cane and SBA.  BM x1.  Tolerating regular diet, good appetite. C/o left knee pain, ES Tylenol given x1.  Up in chair, ambulated in room.  Family visiting off/on.  Continue with IV Rocephin, PO Zithromax.      "

## 2017-10-22 NOTE — ED NOTES
[facility]  ED Nurse Handoff Report    Chelly Dave is a 82 year old female   ED Chief complaint: Altered Mental Status and Fatigue  . ED Diagnosis:   Final diagnoses:   None     Allergies:   Allergies   Allergen Reactions     Lisinopril        Code Status: Full Code  Activity level - Baseline/Home:  Stand with Assist. Activity Level - Current:   Stand with Assist. Lift room needed: No. Bariatric: No   Needed: No   Isolation: No. Infection: Not Applicable.     Vital Signs:   Vitals:    10/21/17 1930 10/21/17 1953 10/21/17 2030 10/21/17 2045   BP:   110/57 124/65   Resp:       Temp:  100.5  F (38.1  C)     TempSrc:  Temporal     SpO2: 98%  97% 98%       Cardiac Rhythm:  ,      Pain level:    Patient confused: No. Patient Falls Risk: Yes.   Elimination Status: Has voided   Patient Report - Initial Complaint: AMS, fatigue. Focused Assessment: Respiratory - Respiratory WDL:  WDL except; rhythm/pattern Rhythm/Pattern, Respiratory: tachypnea Cognitive/Perceptual/Neuro - Cognitive/Neuro/Behavioral WDL:  WDL except; orientation Orientation: disoriented to; time; situation  Tests Performed: labs, imaging. Abnormal Results:   Labs Ordered and Resulted from Time of ED Arrival Up to the Time of Departure from the ED   CBC WITH PLATELETS DIFFERENTIAL - Abnormal; Notable for the following:        Result Value    RBC Count 2.74 (*)     Hemoglobin 7.9 (*)     Hematocrit 25.9 (*)     MCHC 30.5 (*)     RDW 16.0 (*)     Absolute Lymphocytes 0.4 (*)     All other components within normal limits   COMPREHENSIVE METABOLIC PANEL - Abnormal; Notable for the following:     Sodium 132 (*)     Glucose 102 (*)     GFR Estimate 59 (*)     Calcium 7.9 (*)     Albumin 2.9 (*)     Protein Total 9.2 (*)     All other components within normal limits   ROUTINE UA WITH MICROSCOPIC - Abnormal; Notable for the following:     Blood Urine Small (*)     RBC Urine 9 (*)     All other components within normal limits   ISTAT   GASES LACTATE GEN POCT - Abnormal; Notable for the following:     PO2 Venous 20 (*)     All other components within normal limits   ISTAT  GASES LACTATE GEN POCT - Abnormal; Notable for the following:     PCO2 Venous 34 (*)     PO2 Venous 58 (*)     Lactic Acid 0.5 (*)     All other components within normal limits   BLOOD GAS VENOUS   LACTIC ACID WHOLE BLOOD   PULSE OXIMETRY NURSING   CARDIAC CONTINUOUS MONITORING   MEASURE URINE OUTPUT   PATIENT CARE ORDER   BLOOD CULTURE   BLOOD CULTURE   URINE CULTURE AEROBIC BACTERIAL     XR Chest 2 Views   Final Result   IMPRESSION: Alveolar infiltrate laterally in the anterior segment of   the right upper lobe consistent with pneumonia. No pleural effusion.   No other infiltrates. Cardiomegaly is unchanged.      SAMMY LOPEZ MD      .   Treatments provided: IVF, IV ABX  Family Comments: at bedside  OBS brochure/video discussed/provided to patient:  No  ED Medications:   Medications   0.9% sodium chloride BOLUS (1,000 mLs Intravenous New Bag 10/21/17 1819)     Followed by   0.9% sodium chloride infusion (not administered)   cefTRIAXone (ROCEPHIN) 1 g vial to attach to  mL bag for ADULTS or NS 50 mL bag for PEDS (1 g Intravenous New Bag 10/21/17 2059)   azithromycin (ZITHROMAX) 500 mg in NaCl 0.9 % 250 mL intermittent infusion (not administered)   acetaminophen (TYLENOL) tablet 975 mg (975 mg Oral Given 10/21/17 1819)     Drips infusing:  No  For the majority of the shift, the patient's behavior Yellow. Interventions performed were see above.     Severe Sepsis OR Septic Shock Diagnosis Present: No      ED Nurse Name/Phone Number: Maryann Muna,   9:13 PM    RECEIVING UNIT ED HANDOFF REVIEW    Above ED Nurse Handoff Report was reviewed: Yes  Reviewed by: Zainab Nice on October 21, 2017 at 11:44 PM

## 2017-10-22 NOTE — H&P
Hospitalist Admission Note([facility])    Name: Chelly Dave    MRN: [id number]          YOB: 1935    Age: 82 year old  Date of admission: 10/21/2017  Primary care provider: Naomie Ventura              Assessment:       Brief summary of admission assessment:Chelly Dave is a 82 year old  female with a significant past medical history of GERD,HLP,Severe pulmonary HTN Arthris and diabetes who presents with confusion,weakness , cough for the last few days     ED evaluation revealed tachypnea (40),febrile(103.1)and CXR infiltrate     Patient's presentation is consistent with Pneumonia with SIRS.Patient will  be admitted for further inpatient management to care of Hospitalist service.      Admission diagnoses:      #1.Community Acquired Pneumonia with SIRS -Alveolar infiltrate laterally in the anterior segment of the right upper lobe    #2.Mild hyponatremia     #3.Anemia -chronic , etiology unclear B12 and folate normal , iron studies not recorded                Plan/MDM:       > Admission Status: Will admit patient to hospitalist service as inpatient as patient likely need over two mid night stays in the hospital.     >Care plan:    -- iv ceftriaxone and azithromycin   -- culture done in ED , follow result   -- IVF hydration   -- oxygen as needed   -- iron studies , monitor hgb       >Supportive care:IV fluids continued  Oxygen titrated  Pain management: anxiolytics, oral narcotics and IV narcotics  Nausea and vomiting control measures  Respiratory therapy    >Diet:Diet advanced    >Activity:Advance activity as tolerated    >Education/Counseling :Discussed treatment plan with the patient    >Consults:none     >VTE prophylactic measures:prophylaxis against venous thromboembolism    >Therapies:Respiratory therapy      >Additional orders:      --Care plan discussed with the patient/family and agreed to care plan   --Patient will be transferred to care of hospitalist attending for  further evaluation and management as appropriate   --Old medical orders reviewed   --imaging result independently reviewed by me     (See orders placed for this visit by me )     - Home medication reviewed and will be continued as appropriate once pharmacy reconciliation is completed         Code Status/Disposition:     >Code Status:Full Code      >Disposition:anticipate discharge to home and Anticipate discharge in 2-3 days        Disclaimer: This note consists of symbols derived from keyboarding, dictation and/or voice recognition software. As a result, there may be errors in the script that have gone undetected. Please consider this when interpreting information found in this chart.             Chief Complaint:     Confusion and weakness      History is obtained from the patient          History of Present Illness:      This patient is a 82 year old  female with a significant past medical history of GERD,HLP,Severe pulmonary HTN Arthris and diabetes who presents with the following condition requiring a hospital admission:        ROEL Dave is a 82 year old female with a history of dementia and diabetes who presents to the Emergency Department for evaluation of altered mental status. Per families report, the patient developed a cough two days ago and woke up this morning with altered mental status, fatigue, chills, decreased appetite and a fever as high as 103 F. Family gave the patient Tylenol and cold medication without any relief and presented to the emergency department for further evaluation. Family denies any other concerns.   Confusion improved and patient denies chest pain bus has been having cough productive of brownish sputum             Past Medical History:     Past Medical History:   Diagnosis Date     Arthritis      Cataract      Diabetes mellitus (H)      Gastro-oesophageal reflux disease      Hyperlipidemia             Past Surgical History:     Past Surgical History:   Procedure  Laterality Date     COLONOSCOPY       ORTHOPEDIC SURGERY      right knee replacement             Social History:     Social History   Substance Use Topics     Smoking status: Never Smoker     Smokeless tobacco: Not on file     Alcohol use No             Family History:   Reviewed and non contributory        Allergies:     Allergies   Allergen Reactions     Lisinopril              Medications:        Prior to Admission medications    Medication Sig Last Dose Taking? Auth Provider   metoprolol (TOPROL-XL) 25 MG 24 hr tablet Take 1 tablet (25 mg) by mouth daily   Tad Martinez MD   saccharomyces boulardii (FLORASTOR) 250 MG capsule Take 1 capsule (250 mg) by mouth 2 times daily   Tad Martinez MD   ACETAMINOPHEN PO Take 1,000 mg by mouth daily as needed for pain Patient occasionally takes an extra dose in addition to scheduled medication   Unknown, Entered By History   Calcium Carb-Cholecalciferol (CALCIUM 500 +D) 500-400 MG-UNIT TABS Take 1 tablet by mouth daily   Unknown, Entered By History   DOXEPIN HCL PO Take 10 mg by mouth At Bedtime   Unknown, Entered By History   lidocaine (LIDODERM) 5 % patch Place 1 patch onto the skin daily as needed for moderate pain (12 hours on; 12 hours off. Patient applies to knees or back)   Unknown, Entered By History   Pilocarpine HCl (SALAGEN PO) Take 5 mg by mouth 3 times daily AM, 1200 & HS   Unknown, Entered By History   VITAMIN E NATURAL PO Take 1,000 Units by mouth 2 times daily AM & 3PM   Unknown, Entered By History   Acetaminophen (TYLENOL PO) Take 1,000 mg by mouth 2 times daily    Unknown, Entered By History   polyethylene glycol 400 (BLINK TEARS) 0.25 % SOLN ophthalmic solution Place 1-2 drops into both eyes every 2 hours as needed for dry eyes   Unknown, Entered By History   AzaTHIOprine (IMURAN PO) Take 50 mg by mouth 2 times daily AM & 3PM   Unknown, Entered By History   cycloSPORINE (RESTASIS) 0.05 % ophthalmic emulsion Place 1 drop into both eyes 2 times daily    Unknown, Entered By History   FLUOXETINE HCL PO Take 20 mg by mouth daily (Takes 2 x 10mg for a total of 20mg daily)   Unknown, Entered By History   darbepoetin aravind-polysorbate (ARANESP) 200 MCG/0.4ML injection Inject 200 mcg Subcutaneous Every 2 months   Unknown, Entered By History   diclofenac (FLECTOR) 1.3 % patch Place 1 patch onto the skin 2 times daily as needed for moderate pain   Unknown, Entered By History   Cholecalciferol (VITAMIN D3 PO) Take 1,000 Units by mouth daily   Unknown, Entered By History   donepezil (ARICEPT) 5 MG tablet Take 1 tablet (5 mg) by mouth daily  Patient taking differently: Take 5 mg by mouth every evening    Carisa Mosley MD   aspirin 81 MG EC tablet Take 81 mg by mouth daily.      atorvastatin (LIPITOR) 20 MG tablet Take 20 mg by mouth every evening (Takes half of a 40mg tablet for a total of 20mg in the evening)   Unknown, Entered By History   PANtoprazole (PROTONIX) 40 MG enteric coated tablet Take 40 mg by mouth every morning.      loratadine (CLARITIN) 10 MG tablet Take 10 mg by mouth daily.   Reported, Patient          Review of Systems:     A Comprehensive greater than 10 system review of systems was carried out.  Pertinent positives and negatives are noted above in HPI.  Otherwise negative for contributory information.           Physical Exam:     Vital signs were reviewed    Temp:  [100.5  F (38.1  C)-103.1  F (39.5  C)] 100.5  F (38.1  C)  Heart Rate:  [] 85  Resp:  [36] 36  BP: (106-153)/() 106/60  SpO2:  [96 %-98 %] 97 %        GEN: awake, alert, cooperative, no apparent distress, oriented x 3    NECK:Supple ,no mass or thyromegaly     HEENT:  Normocephalic/atraumatic, no scleral icterus, no nasal discharge, mouth moist.    CV: Regular rate and rhythm,systolic murmur , no JVD.  S1 + S2 noted, no S3 or S4.    LUNGS:  Clear to auscultation bilaterally without rales/rhonchi/wheezing/retractions.  Symmetric chest rise on inhalation noted.    ABD:  Active bowel  sounds, soft, non-tender/non-distended.  No rebound/guarding/rigidity.    EXT: No edema.  No cyanosis.  No joint synovitis noted.Lower extremity pulses are normal bilaterally and     LGS: No cervical or axillary lymphadenopathy     SKIN: Dry to touch, warm ,no exanthems noted in the visualized areas.    Neurologic:Grossly intact,non focal . No acute focal neurologic deficit     Psychaitric exam: Mood and affect normal            Data:       All laboratory and imaging data in the past 24 hours reviewed     Results for orders placed or performed during the hospital encounter of 10/21/17   XR Chest 2 Views    Narrative    CHEST TWO VIEWS  10/21/2017 8:25 PM     HISTORY: Fever    COMPARISON: 8/10/2016      Impression    IMPRESSION: Alveolar infiltrate laterally in the anterior segment of  the right upper lobe consistent with pneumonia. No pleural effusion.  No other infiltrates. Cardiomegaly is unchanged.    SAMMY LOPEZ MD   CBC with platelets differential   Result Value Ref Range    WBC 5.2 4.0 - 11.0 10e9/L    RBC Count 2.74 (L) 3.8 - 5.2 10e12/L    Hemoglobin 7.9 (L) 11.7 - 15.7 g/dL    Hematocrit 25.9 (L) 35.0 - 47.0 %    MCV 95 78 - 100 fl    MCH 28.8 26.5 - 33.0 pg    MCHC 30.5 (L) 31.5 - 36.5 g/dL    RDW 16.0 (H) 10.0 - 15.0 %    Platelet Count 195 150 - 450 10e9/L    Diff Method Automated Method     % Neutrophils 83.1 %    % Lymphocytes 7.4 %    % Monocytes 8.7 %    % Eosinophils 0.2 %    % Basophils 0.0 %    % Immature Granulocytes 0.6 %    Nucleated RBCs 0 0 /100    Absolute Neutrophil 4.3 1.6 - 8.3 10e9/L    Absolute Lymphocytes 0.4 (L) 0.8 - 5.3 10e9/L    Absolute Monocytes 0.5 0.0 - 1.3 10e9/L    Absolute Eosinophils 0.0 0.0 - 0.7 10e9/L    Absolute Basophils 0.0 0.0 - 0.2 10e9/L    Abs Immature Granulocytes 0.0 0 - 0.4 10e9/L    Absolute Nucleated RBC 0.0    Comprehensive metabolic panel   Result Value Ref Range    Sodium 132 (L) 133 - 144 mmol/L    Potassium 3.9 3.4 - 5.3 mmol/L    Chloride 100 94 -  109 mmol/L    Carbon Dioxide 26 20 - 32 mmol/L    Anion Gap 6 3 - 14 mmol/L    Glucose 102 (H) 70 - 99 mg/dL    Urea Nitrogen 15 7 - 30 mg/dL    Creatinine 0.91 0.52 - 1.04 mg/dL    GFR Estimate 59 (L) >60 mL/min/1.7m2    GFR Estimate If Black 72 >60 mL/min/1.7m2    Calcium 7.9 (L) 8.5 - 10.1 mg/dL    Bilirubin Total 0.3 0.2 - 1.3 mg/dL    Albumin 2.9 (L) 3.4 - 5.0 g/dL    Protein Total 9.2 (H) 6.8 - 8.8 g/dL    Alkaline Phosphatase 64 40 - 150 U/L    ALT 17 0 - 50 U/L    AST 22 0 - 45 U/L   UA with Microscopic   Result Value Ref Range    Color Urine Yellow     Appearance Urine Clear     Glucose Urine Negative NEG^Negative mg/dL    Bilirubin Urine Negative NEG^Negative    Ketones Urine Negative NEG^Negative mg/dL    Specific Gravity Urine 1.012 1.003 - 1.035    Blood Urine Small (A) NEG^Negative    pH Urine 7.0 5.0 - 7.0 pH    Protein Albumin Urine Negative NEG^Negative mg/dL    Urobilinogen mg/dL 0.0 0.0 - 2.0 mg/dL    Nitrite Urine Negative NEG^Negative    Leukocyte Esterase Urine Negative NEG^Negative    Source Catheterized Urine     WBC Urine 1 0 - 2 /HPF    RBC Urine 9 (H) 0 - 2 /HPF   Blood gas venous   Result Value Ref Range    Ph Venous 7.37 7.32 - 7.43 pH    PCO2 Venous 45 40 - 50 mm Hg    PO2 Venous 25 25 - 47 mm Hg    Bicarbonate Venous 26 21 - 28 mmol/L    Base Excess Venous 0.6 mmol/L    FIO2 Room Air    Lactic acid whole blood   Result Value Ref Range    Lactic Acid 2.0 0.7 - 2.0 mmol/L   EKG 12 lead   Result Value Ref Range    Interpretation ECG Click View Image link to view waveform and result    ISTAT gases lactate laina POCT   Result Value Ref Range    Ph Venous 7.37 7.32 - 7.43 pH    PCO2 Venous 42 40 - 50 mm Hg    PO2 Venous 20 (L) 25 - 47 mm Hg    Bicarbonate Venous 24 21 - 28 mmol/L    O2 Sat Venous 30 %    Lactic Acid 2.0 0.7 - 2.1 mmol/L   ISTAT gases lactate laina POCT   Result Value Ref Range    Ph Venous 7.41 7.32 - 7.43 pH    PCO2 Venous 34 (L) 40 - 50 mm Hg    PO2 Venous 58 (H) 25 - 47 mm Hg     Bicarbonate Venous 21 21 - 28 mmol/L    O2 Sat Venous 90 %    Lactic Acid 0.5 (L) 0.7 - 2.1 mmol/L   Blood culture ONE site   Result Value Ref Range    Specimen Description Blood Right Hand     Special Requests Aerobic and anaerobic bottles received     Culture Micro No growth after 1 hour    Blood culture   Result Value Ref Range    Specimen Description Blood Left PICC     Special Requests Aerobic and anaerobic bottles received     Culture Micro No growth after 1 hour             Recent Results (from the past 48 hour(s))   XR Chest 2 Views    Narrative    CHEST TWO VIEWS  10/21/2017 8:25 PM     HISTORY: Fever    COMPARISON: 8/10/2016      Impression    IMPRESSION: Alveolar infiltrate laterally in the anterior segment of  the right upper lobe consistent with pneumonia. No pleural effusion.  No other infiltrates. Cardiomegaly is unchanged.    SAMMY LOPEZ MD       EKG results: NSR    All imaging studies reviewed by me.         Patient`s old medical records reviewed and case discussed with the ED physician.    ED course-Reviewed

## 2017-10-22 NOTE — PROGRESS NOTES
[facility]    Hospitalist Progress Note    Date of Service (when I saw the patient): 10/22/2017    Assessment & Plan   Brief summary of admission assessment:Chelly Dave is a 82 year old  female with a significant past medical history of GERD,HLP,Severe pulmonary HTN Arthris and diabetes who presents with confusion,weakness , cough for the last few days . ED evaluation revealed tachypnea (40),febrile(103.1)and CXR infiltrate. Patient's presentation is consistent with Pneumonia with SIRS.Patient will  be admitted for further inpatient management to care of Hospitalist service.       Admission diagnoses:       1. Community Acquired Pneumonia with SIRS: Alveolar infiltrate laterally in the anterior segment of the right upper lobe. Will continue Ceftriaxone and Azithromycin.      2. Mild hyponatremia. Will continue IV fluid. Will monitor BMP     3. Anemia -chronic , etiology unclear B12 and folate normal , iron studies not recorded      DVT Prophylaxis: Pneumatic Compression Devices    Code Status: Full Code    Disposition: Expected discharge in 1-2 days if she continues to improve.     Chris Crook MD    Interval History   Patient seen and examined. She stated that she is feeling better. Cough and SOB improving. She has no nausea or vomiting. She has no fever. No abdominal pain.     -Data reviewed today: I reviewed all new labs and imaging results over the last 24 hours. I personally reviewed the chest x-ray image(s) showing Alveolar infiltrate laterally in the anterior segment of.    Physical Exam   Temp: 99.6  F (37.6  C) Temp src: Oral BP: 117/63   Heart Rate: 81 Resp: 24 SpO2: 98 % O2 Device: None (Room air)    Vitals:    10/22/17 0010   Weight: 58.1 kg (128 lb)     Vital Signs with Ranges  Temp:  [98.6  F (37  C)-103.1  F (39.5  C)] 99.6  F (37.6  C)  Heart Rate:  [] 81  Resp:  [22-36] 24  BP: (106-153)/() 117/63  SpO2:  [96 %-100 %] 98 %     GEN:  Alert, oriented x 3, appears  comfortable, NAD.  HEENT:  Normocephalic/atraumatic, no scleral icterus, no nasal discharge, mouth moist.  CV:  Regular rate and rhythm, no murmur or JVD.  S1 + S2 noted, no S3 or S4.  LUNGS:  Clear to auscultation bilaterally without rales/rhonchi/wheezing/retractions.  Symmetric chest rise on inhalation noted.  ABD:  Active bowel sounds, soft, non-tender/non-distended.  No rebound/guarding/rigidity.  EXT:  No edema or cyanosis.  Hands/feet warm to touch with good signs of peripheral perfusion.  No joint synovitis noted.  SKIN:  Dry to touch, no exanthems noted in the visualized areas.  NEURO:  Symmetric muscle strength, sensation to touch grossly intact.  No new focal deficits appreciated.    Medications     NaCl 100 mL/hr at 10/22/17 0345     NaCl 1,000 mL (10/22/17 0056)       acetaminophen (TYLENOL) tablet 1,000 mg  1,000 mg Oral BID     aspirin  81 mg Oral Daily     doxepin (SINEquan) capsule 10 mg  10 mg Oral At Bedtime     FLUoxetine (PROzac) capsule 20 mg  20 mg Oral Daily     loratadine  10 mg Oral Daily     saccharomyces boulardii  250 mg Oral BID     atorvastatin  20 mg Oral QPM     metoprolol  25 mg Oral Daily     pantoprazole  40 mg Oral QAM     cefTRIAXone  2 g Intravenous Q24H     azithromycin  250 mg Oral Q24H       Data     Recent Labs  Lab 10/22/17  0555 10/21/17  1816   WBC 6.0 5.2   HGB 7.5* 7.9*   MCV 92 95    195    132*   POTASSIUM 3.8 3.9   CHLORIDE 110* 100   CO2 24 26   BUN 11 15   CR 0.78 0.91   ANIONGAP 6 6   ROBSON 7.1* 7.9*   GLC 94 102*   ALBUMIN  --  2.9*   PROTTOTAL  --  9.2*   BILITOTAL  --  0.3   ALKPHOS  --  64   ALT  --  17   AST  --  22       Recent Results (from the past 24 hour(s))   XR Chest 2 Views    Narrative    CHEST TWO VIEWS  10/21/2017 8:25 PM     HISTORY: Fever    COMPARISON: 8/10/2016      Impression    IMPRESSION: Alveolar infiltrate laterally in the anterior segment of  the right upper lobe consistent with pneumonia. No pleural effusion.  No other  infiltrates. Cardiomegaly is unchanged.    SAMMY LOPEZ MD

## 2017-10-23 LAB
BACTERIA SPEC CULT: NO GROWTH
FERRITIN SERPL-MCNC: 79 NG/ML (ref 8–252)
HEMOCCULT STL QL: NEGATIVE
INTERPRETATION ECG - MUSE: NORMAL
IRON SATN MFR SERPL: 9 % (ref 15–46)
IRON SERPL-MCNC: 19 UG/DL (ref 35–180)
SPECIMEN SOURCE: NORMAL
TIBC SERPL-MCNC: 202 UG/DL (ref 240–430)

## 2017-10-23 PROCEDURE — 25000128 H RX IP 250 OP 636: Performed by: INTERNAL MEDICINE

## 2017-10-23 PROCEDURE — 83540 ASSAY OF IRON: CPT | Performed by: INTERNAL MEDICINE

## 2017-10-23 PROCEDURE — 99233 SBSQ HOSP IP/OBS HIGH 50: CPT | Performed by: INTERNAL MEDICINE

## 2017-10-23 PROCEDURE — 82728 ASSAY OF FERRITIN: CPT | Performed by: INTERNAL MEDICINE

## 2017-10-23 PROCEDURE — 25000132 ZZH RX MED GY IP 250 OP 250 PS 637: Performed by: INTERNAL MEDICINE

## 2017-10-23 PROCEDURE — 12000000 ZZH R&B MED SURG/OB

## 2017-10-23 PROCEDURE — 83550 IRON BINDING TEST: CPT | Performed by: INTERNAL MEDICINE

## 2017-10-23 PROCEDURE — 36415 COLL VENOUS BLD VENIPUNCTURE: CPT | Performed by: INTERNAL MEDICINE

## 2017-10-23 PROCEDURE — 82272 OCCULT BLD FECES 1-3 TESTS: CPT | Performed by: INTERNAL MEDICINE

## 2017-10-23 RX ORDER — FERROUS SULFATE 325(65) MG
325 TABLET ORAL DAILY
Status: DISCONTINUED | OUTPATIENT
Start: 2017-10-23 | End: 2017-10-24 | Stop reason: HOSPADM

## 2017-10-23 RX ORDER — BENZONATATE 100 MG/1
100 CAPSULE ORAL 3 TIMES DAILY PRN
Status: DISCONTINUED | OUTPATIENT
Start: 2017-10-23 | End: 2017-10-24 | Stop reason: HOSPADM

## 2017-10-23 RX ADMIN — AZITHROMYCIN 250 MG: 250 TABLET, FILM COATED ORAL at 08:07

## 2017-10-23 RX ADMIN — ATORVASTATIN CALCIUM 20 MG: 20 TABLET, FILM COATED ORAL at 20:37

## 2017-10-23 RX ADMIN — PANTOPRAZOLE SODIUM 40 MG: 40 TABLET, DELAYED RELEASE ORAL at 08:07

## 2017-10-23 RX ADMIN — FERROUS SULFATE TAB 325 MG (65 MG ELEMENTAL FE) 325 MG: 325 (65 FE) TAB at 16:16

## 2017-10-23 RX ADMIN — ASPIRIN 81 MG: 81 TABLET, COATED ORAL at 08:07

## 2017-10-23 RX ADMIN — ACETAMINOPHEN 1000 MG: 500 TABLET, FILM COATED ORAL at 08:07

## 2017-10-23 RX ADMIN — DOXEPIN HYDROCHLORIDE 10 MG: 10 CAPSULE ORAL at 20:37

## 2017-10-23 RX ADMIN — CEFTRIAXONE SODIUM 2 G: 2 INJECTION, SOLUTION INTRAVENOUS at 20:37

## 2017-10-23 RX ADMIN — LORATADINE 10 MG: 10 TABLET ORAL at 08:07

## 2017-10-23 RX ADMIN — BENZONATATE 100 MG: 100 CAPSULE ORAL at 09:40

## 2017-10-23 RX ADMIN — FLUOXETINE 20 MG: 20 CAPSULE ORAL at 08:07

## 2017-10-23 RX ADMIN — ACETAMINOPHEN 1000 MG: 500 TABLET, FILM COATED ORAL at 20:36

## 2017-10-23 NOTE — PLAN OF CARE
Problem: Patient Care Overview  Goal: Plan of Care/Patient Progress Review  Outcome: Improving  Alert to self and location.  Forgetful at times, but easily reoriented.  Continues on IV Rocephin and PO Zithromax.  Sputum culture and blood cultures pending.  Up with Ax1 et cane.  Remains on room air.  Afebrile.  Will continue to monitor.

## 2017-10-23 NOTE — PLAN OF CARE
Problem: Patient Care Overview  Goal: Plan of Care/Patient Progress Review  Outcome: Improving  Pt alert and oriented to self, sometimes place. Pleasant. VSS. Denies pain. Declines Tylenol. C/O coughing. Continue with Rocefin and Zithromax.  ml/hr.

## 2017-10-23 NOTE — CONSULTS
Care Transition Initial Assessment - RN    Reason For Consult: care coordination/care conference   Met with: Patient and called Dtr Cee to discuss further.    DATA   Active Problems:    Community acquired pneumonia       Cognitive Status: awake and alert.  Primary Care Clinic Name: [facility]  Primary Care MD Name: Brittany Heredia  Contact information and PCP information verified: Pt sees Brittany Heredia CNP at [facility], chart updated    Lives With: child(tram), adult  Living Arrangements: house     Description of Support System: Supportive, Involved   Who is your support system?: Children         Insurance concerns: No Insurance issues identified    ASSESSMENT  Patient currently receives the following services:  Pt has home care nurse visits (through [facility]) once a month for BP checks and occasional nail trims.         Identified issues/concerns regarding health management: PNA action plan reviewed w/ pt and dtr and copy given. Dtr reports that they assist pt with transportation, toileting, showers, bathing, ADLs, meals and medications. Message sent to Dr. Crook requesting PT/OT consults as pt may benefit from additional homecare services. She does not currently have home O2. She goes to [facility] for Aranesp injections every 2 months.       PLAN  Financial costs for the patient include Not discussed .  Patient given options and choices for discharge Yes .  Patient/family is agreeable to the plan?  Yes  Patient anticipates discharging to home w/ family .        Patient anticipates needs for home equipment: No  Discharge Planner   Discharge Plans in progress: yes  Barriers to discharge plan: weakness, decreased mobility.   Plan/Disposition: Home   Appointments: Dtr will call PCP to schedule hospital follow-up within 7 days of d/c.       Care  (CTS) will continue to follow as needed.    CM will continue to follow patient for any additional discharge needs.     Lillian  Zuleika RN, BSN, CTS   [facility]

## 2017-10-23 NOTE — PLAN OF CARE
Problem: Patient Care Overview  Goal: Plan of Care/Patient Progress Review  Pt is up with standby assist and cane.  Pt states it is her knees that give her problems with her ambulating.  Pt has been alert and oriented. Pt has been using her call light appropriate. Pt has been voiding.  Pt has a infrequent cough.

## 2017-10-23 NOTE — PROGRESS NOTES
[facility]    Hospitalist Progress Note    Date of Service (when I saw the patient): 10/23/2017    Assessment & Plan   Brief summary of admission assessment:Chelly Dave is a 82 year old  female with a significant past medical history of GERD,HLP,Severe pulmonary HTN Arthris and diabetes who presents with confusion,weakness , cough for the last few days . ED evaluation revealed tachypnea (40),febrile(103.1)and CXR infiltrate. Patient's presentation is consistent with Pneumonia with SIRS.Patient will  be admitted for further inpatient management to care of Hospitalist service.       Admission diagnoses:       1. Community Acquired Pneumonia with SIRS:   --Has low grade fever today. Has cough but shortness of breath improving.   --CXR showed alveolar infiltrate laterally in the anterior segment of the right upper lobe. Will continue Ceftriaxone and Azithromycin.      2. Mild hyponatremia. Improved with IV fluid. Will d/c IV fluid today.      3. Iron deficiency anemia -chronic (?nutritional versus GI loss)  --Serum iron 19(low), iron saturation index 202(low). Ferritin normal. Vit B12 and folic acid normal.   --Patient denies stool color change. Will check stool for hemoccult.   --Will check stool for hemoccult. May need outpatient follow up with GI once pneumonia resolves. Will start her on oral iron.      DVT Prophylaxis: Pneumatic Compression Devices    Code Status: Full Code    Disposition: Expected discharge in 1-2 days if she continues to improve.     Chris Crook MD    Interval History   Patient seen and examined. She stated that she is feeling better. Cough and SOB improving. She has no nausea or vomiting. She has no fever. No abdominal pain.     -Data reviewed today: I reviewed all new labs and imaging results over the last 24 hours. I personally reviewed the chest x-ray image(s) showing Alveolar infiltrate laterally in the anterior segment of.    Physical Exam   Temp: 99.5  F (37.5  C)  Temp src: Axillary BP: 165/81   Heart Rate: 88 Resp: 20 SpO2: 97 % O2 Device: None (Room air)    Vitals:    10/22/17 0010   Weight: 58.1 kg (128 lb)     Vital Signs with Ranges  Temp:  [96  F (35.6  C)-99.5  F (37.5  C)] 99.5  F (37.5  C)  Heart Rate:  [79-88] 88  Resp:  [20-22] 20  BP: (125-165)/(56-81) 165/81  SpO2:  [95 %-100 %] 97 %  I/O last 3 completed shifts:  In: 976 [P.O.:720; I.V.:256]  Out: 550 [Urine:550]  GEN:  Alert, oriented x 3, appears comfortable, NAD.  HEENT:  Normocephalic/atraumatic, no scleral icterus, no nasal discharge, mouth moist.  CV:  Regular rate and rhythm, no murmur or JVD.  S1 + S2 noted, no S3 or S4.  LUNGS:  Clear to auscultation bilaterally without rales/rhonchi/wheezing/retractions.  Symmetric chest rise on inhalation noted.  ABD:  Active bowel sounds, soft, non-tender/non-distended.  No rebound/guarding/rigidity.  EXT:  No edema or cyanosis.  Hands/feet warm to touch with good signs of peripheral perfusion.  No joint synovitis noted.  SKIN:  Dry to touch, no exanthems noted in the visualized areas.  NEURO:  Symmetric muscle strength, sensation to touch grossly intact.  No new focal deficits appreciated.    Medications     NaCl 100 mL/hr at 10/22/17 1917       acetaminophen (TYLENOL) tablet 1,000 mg  1,000 mg Oral BID     aspirin  81 mg Oral Daily     doxepin (SINEquan) capsule 10 mg  10 mg Oral At Bedtime     FLUoxetine (PROzac) capsule 20 mg  20 mg Oral Daily     loratadine  10 mg Oral Daily     atorvastatin  20 mg Oral QPM     pantoprazole  40 mg Oral QAM     cefTRIAXone  2 g Intravenous Q24H     azithromycin  250 mg Oral Q24H       Data     Recent Labs  Lab 10/22/17  0508 10/21/17  1816   WBC 6.0 5.2   HGB 7.5* 7.9*   MCV 92 95    195    132*   POTASSIUM 3.8 3.9   CHLORIDE 110* 100   CO2 24 26   BUN 11 15   CR 0.78 0.91   ANIONGAP 6 6   ROBSON 7.1* 7.9*   GLC 94 102*   ALBUMIN  --  2.9*   PROTTOTAL  --  9.2*   BILITOTAL  --  0.3   ALKPHOS  --  64   ALT  --  17   AST   --  22       No results found for this or any previous visit (from the past 24 hour(s)).

## 2017-10-24 ENCOUNTER — APPOINTMENT (OUTPATIENT)
Dept: OCCUPATIONAL THERAPY | Facility: CLINIC | Age: 82
DRG: 871 | End: 2017-10-24
Attending: INTERNAL MEDICINE
Payer: COMMERCIAL

## 2017-10-24 VITALS
OXYGEN SATURATION: 99 % | WEIGHT: 128 LBS | SYSTOLIC BLOOD PRESSURE: 152 MMHG | HEART RATE: 82 BPM | BODY MASS INDEX: 25 KG/M2 | TEMPERATURE: 96.1 F | DIASTOLIC BLOOD PRESSURE: 79 MMHG | RESPIRATION RATE: 22 BRPM

## 2017-10-24 LAB
ANION GAP SERPL CALCULATED.3IONS-SCNC: 7 MMOL/L (ref 3–14)
BACTERIA SPEC CULT: NORMAL
BASOPHILS # BLD AUTO: 0 10E9/L (ref 0–0.2)
BASOPHILS NFR BLD AUTO: 0.3 %
BUN SERPL-MCNC: 7 MG/DL (ref 7–30)
CALCIUM SERPL-MCNC: 8.1 MG/DL (ref 8.5–10.1)
CHLORIDE SERPL-SCNC: 108 MMOL/L (ref 94–109)
CO2 SERPL-SCNC: 25 MMOL/L (ref 20–32)
CREAT SERPL-MCNC: 0.75 MG/DL (ref 0.52–1.04)
DIFFERENTIAL METHOD BLD: ABNORMAL
EOSINOPHIL # BLD AUTO: 0.1 10E9/L (ref 0–0.7)
EOSINOPHIL NFR BLD AUTO: 3.1 %
ERYTHROCYTE [DISTWIDTH] IN BLOOD BY AUTOMATED COUNT: 16 % (ref 10–15)
GFR SERPL CREATININE-BSD FRML MDRD: 74 ML/MIN/1.7M2
GLUCOSE SERPL-MCNC: 93 MG/DL (ref 70–99)
HCT VFR BLD AUTO: 25.5 % (ref 35–47)
HGB BLD-MCNC: 7.9 G/DL (ref 11.7–15.7)
IMM GRANULOCYTES # BLD: 0 10E9/L (ref 0–0.4)
IMM GRANULOCYTES NFR BLD: 0.3 %
LACTATE BLD-SCNC: 0.7 MMOL/L (ref 0.7–2)
LYMPHOCYTES # BLD AUTO: 0.4 10E9/L (ref 0.8–5.3)
LYMPHOCYTES NFR BLD AUTO: 11.1 %
MAGNESIUM SERPL-MCNC: 2.2 MG/DL (ref 1.6–2.3)
MCH RBC QN AUTO: 28.4 PG (ref 26.5–33)
MCHC RBC AUTO-ENTMCNC: 31 G/DL (ref 31.5–36.5)
MCV RBC AUTO: 92 FL (ref 78–100)
MONOCYTES # BLD AUTO: 0.4 10E9/L (ref 0–1.3)
MONOCYTES NFR BLD AUTO: 12.5 %
NEUTROPHILS # BLD AUTO: 2.6 10E9/L (ref 1.6–8.3)
NEUTROPHILS NFR BLD AUTO: 72.7 %
NRBC # BLD AUTO: 0 10*3/UL
NRBC BLD AUTO-RTO: 0 /100
PLATELET # BLD AUTO: 229 10E9/L (ref 150–450)
POTASSIUM SERPL-SCNC: 3.4 MMOL/L (ref 3.4–5.3)
RBC # BLD AUTO: 2.78 10E12/L (ref 3.8–5.2)
SODIUM SERPL-SCNC: 140 MMOL/L (ref 133–144)
SPECIMEN SOURCE: NORMAL
WBC # BLD AUTO: 3.5 10E9/L (ref 4–11)

## 2017-10-24 PROCEDURE — 83735 ASSAY OF MAGNESIUM: CPT | Performed by: INTERNAL MEDICINE

## 2017-10-24 PROCEDURE — 36415 COLL VENOUS BLD VENIPUNCTURE: CPT | Performed by: INTERNAL MEDICINE

## 2017-10-24 PROCEDURE — 99239 HOSP IP/OBS DSCHRG MGMT >30: CPT | Performed by: INTERNAL MEDICINE

## 2017-10-24 PROCEDURE — 85025 COMPLETE CBC W/AUTO DIFF WBC: CPT | Performed by: INTERNAL MEDICINE

## 2017-10-24 PROCEDURE — 97535 SELF CARE MNGMENT TRAINING: CPT | Mod: GO | Performed by: REHABILITATION PRACTITIONER

## 2017-10-24 PROCEDURE — 83605 ASSAY OF LACTIC ACID: CPT | Performed by: INTERNAL MEDICINE

## 2017-10-24 PROCEDURE — 97165 OT EVAL LOW COMPLEX 30 MIN: CPT | Mod: GO | Performed by: REHABILITATION PRACTITIONER

## 2017-10-24 PROCEDURE — 40000133 ZZH STATISTIC OT WARD VISIT: Performed by: REHABILITATION PRACTITIONER

## 2017-10-24 PROCEDURE — 25000132 ZZH RX MED GY IP 250 OP 250 PS 637: Performed by: INTERNAL MEDICINE

## 2017-10-24 PROCEDURE — 80048 BASIC METABOLIC PNL TOTAL CA: CPT | Performed by: INTERNAL MEDICINE

## 2017-10-24 RX ORDER — BENZONATATE 100 MG/1
100 CAPSULE ORAL 3 TIMES DAILY PRN
Qty: 21 CAPSULE | Refills: 0 | Status: SHIPPED | OUTPATIENT
Start: 2017-10-24 | End: 2018-06-20

## 2017-10-24 RX ORDER — AZITHROMYCIN 250 MG/1
250 TABLET, FILM COATED ORAL EVERY 24 HOURS
Qty: 2 TABLET | Refills: 0 | Status: SHIPPED | OUTPATIENT
Start: 2017-10-24 | End: 2017-10-26

## 2017-10-24 RX ORDER — CEFDINIR 300 MG/1
300 CAPSULE ORAL 2 TIMES DAILY
Qty: 10 CAPSULE | Refills: 0 | Status: SHIPPED | OUTPATIENT
Start: 2017-10-24 | End: 2017-10-29

## 2017-10-24 RX ORDER — FERROUS SULFATE 325(65) MG
325 TABLET ORAL DAILY
Qty: 100 TABLET | Refills: 0 | Status: SHIPPED | OUTPATIENT
Start: 2017-10-24

## 2017-10-24 RX ADMIN — ACETAMINOPHEN 1000 MG: 500 TABLET, FILM COATED ORAL at 08:34

## 2017-10-24 RX ADMIN — FLUOXETINE 20 MG: 20 CAPSULE ORAL at 08:35

## 2017-10-24 RX ADMIN — LORATADINE 10 MG: 10 TABLET ORAL at 08:35

## 2017-10-24 RX ADMIN — PANTOPRAZOLE SODIUM 40 MG: 40 TABLET, DELAYED RELEASE ORAL at 08:35

## 2017-10-24 RX ADMIN — FERROUS SULFATE TAB 325 MG (65 MG ELEMENTAL FE) 325 MG: 325 (65 FE) TAB at 08:35

## 2017-10-24 RX ADMIN — AZITHROMYCIN 250 MG: 250 TABLET, FILM COATED ORAL at 08:35

## 2017-10-24 RX ADMIN — ASPIRIN 81 MG: 81 TABLET, COATED ORAL at 08:35

## 2017-10-24 NOTE — PROGRESS NOTES
10/24/17 0918   Quick Adds   Type of Visit Initial Occupational Therapy Evaluation   Living Environment   Lives With child(tram), adult   Living Arrangements house   Home Accessibility stairs to enter home   Number of Stairs to Enter Home 3   Stair Railings at Home inside, present on right side   Transportation Available car;family or friend will provide   Living Environment Comment Pt lives with Son.  She is able to stay on the main level.   Self-Care   Dominant Hand right   Usual Activity Tolerance moderate   Current Activity Tolerance fair   Regular Exercise no   Equipment Currently Used at Home cane, straight   Activity/Exercise/Self-Care Comment Pt is independent in ADLs at baseline.  Family does all cooking, driving and homemaking.   Functional Level Prior   Ambulation 1-->assistive equipment   Transferring 1-->assistive equipment   Toileting 1-->assistive equipment   Bathing 3-->assistive equipment and person   Dressing 2-->assistive person   Eating 0-->independent   Communication 0-->understands/communicates without difficulty   Swallowing 0-->swallows foods/liquids without difficulty   Cognition 1 - attention or memory deficits   Which of the above functional risks had a recent onset or change? ambulation;transferring;bathing;cognition       Present no   General Information   Onset of Illness/Injury or Date of Surgery - Date 10/21/17   Referring Physician Dr. Chris Crook   Patient/Family Goals Statement Return to home.   Additional Occupational Profile Info/Pertinent History of Current Problem Pt is an 82 year old  female with a significant past medical history of GERD,HLP,Severe pulmonary HTN Arthris and diabetes who presents with confusion,weakness , cough for the last few days . ED evaluation revealed tachypnea (40),febrile(103.1)and CXR infiltrate. Patient's presentation is consistent with Pneumonia with SIRS.   Precautions/Limitations fall precautions   Cognitive Status  Examination   Orientation person   Level of Consciousness alert;confused   Able to Follow Commands mild impairment   Personal Safety (Cognitive) decreased insight to deficits;decreased awareness, need for safety;decreased awareness, need for assist   Memory impaired   Attention Sustained attention impaired;Distractible during evaluation   Organization/Problem Solving Sequencing impaired;Problem solving impaired   Executive Function Impulsive;Working memory impaired, decreased storage of information for performing tasks;Cognitive flexibility impaired;Planning ability impaired;Self awareness/monitoring impaired   Sensory Examination   Sensory Quick Adds No deficits were identified   Pain Assessment   Patient Currently in Pain Yes, see Vital Sign flowsheet   Integumentary/Edema   Integumentary/Edema no deficits were identifed   Range of Motion (ROM)   ROM Quick Adds No deficits were identified   ROM Comment B UE AROM WFL   Strength   Manual Muscle Testing Quick Adds Other   Strength Comments B UE strength grossly 4/5.   Hand Strength   Hand Strength Comments WFL   Muscle Tone Assessment   Muscle Tone Quick Adds No deficits were identified   Coordination   Upper Extremity Coordination No deficits were identified   Transfer Skill: Bed to Chair/Chair to Bed   Level of [place]: Bed to Chair contact guard   Physical Assist/Nonphysical Assist: Bed to Chair verbal cues   Assistive Device - Transfer Skill Bed to Chair Chair to Bed Rehab Eval straight cane   Transfer Skill: Sit to Stand   Level of [place]: Sit/Stand contact guard   Physical Assist/Nonphysical Assist: Sit/Stand verbal cues   Assistive Device for Transfer: Sit/Stand straight cane   Transfer Skill: Toilet Transfer   Level of [place]: Toilet contact guard   Physical Assist/Nonphysical Assist: Toilet verbal cues   Assistive Device grab bars;straight cane   Upper Body Dressing   Level of [place]: Dress Upper Body stand-by assist   Physical  "Assist/Nonphysical Assist: Dress Upper Body set-up required   Lower Body Dressing   Level of [place]: Dress Lower Body minimum assist (75% patients effort)   Physical Assist/Nonphysical Assist: Dress Lower Body set-up required   Toileting   Level of [place]: Toilet stand-by assist   Grooming   Level of [place]: Grooming stand-by assist   Physical Assist/Nonphysical Assist: Grooming set-up required   Eating/Self Feeding   Level of [place]: Eating independent   Physical Assist/Nonphysical Assist: Eating set-up required   Activities of Daily Living Analysis   Impairments Contributing to Impaired Activities of Daily Living balance impaired;cognition impaired;pain;strength decreased   General Therapy Interventions   Planned Therapy Interventions ADL retraining;transfer training;cognition   Clinical Impression   Criteria for Skilled Therapeutic Interventions Met yes, treatment indicated   OT Diagnosis decreased ADL performance   Influenced by the following impairments pain, weakness, confusion   Assessment of Occupational Performance 1-3 Performance Deficits   Identified Performance Deficits Pt with decreased ability to manage dressing, bathing, toileting   Clinical Decision Making (Complexity) Low complexity   Therapy Frequency other (see comments)   Predicted Duration of Therapy Intervention (days/wks) 5x/week   Anticipated Discharge Disposition Home with Assist   Risks and Benefits of Treatment have been explained. Yes   Patient, Family & other staff in agreement with plan of care Yes   [facility] [employer]AC TM \"6 Clicks\"   2016, Trustees of [facility], under license to [employer].  All rights reserved.   6 Clicks Short Forms Daily Activity Inpatient Short Form   [facility] AM-PAC  \"6 Clicks\" Daily Activity Inpatient Short Form   1. Putting on and taking off regular lower body clothing? 3 - A Little   2. Bathing (including washing, rinsing, drying)? 3 - A Little   3. Toileting, " which includes using toilet, bedpan or urinal? 3 - A Little   4. Putting on and taking off regular upper body clothing? 4 - None   5. Taking care of personal grooming such as brushing teeth? 4 - None   6. Eating meals? 4 - None   Daily Activity Raw Score (Score out of 24.Lower scores equate to lower levels of function) 21   Total Evaluation Time   Total Evaluation Time (Minutes) 8

## 2017-10-24 NOTE — PROGRESS NOTES
ADAM (information only)    D: Call was received this morning by SW from agency representative from [facility] informing that pt has case management services through this agency, Krista Fraase ([phone]) is . Additionally pt has homemaking services ( 3.5 hrs/wk) and PCA services (6.75/hrs/day) through [facility] ([phone]) as well as [employer] services, and she attends adult day care program at [facility] 2 days/week.

## 2017-10-24 NOTE — PLAN OF CARE
Problem: OT General Care Plan  Goal: Aerobic Activity w/Stable CV Response (OT)  Aerobic Activity with Stable Cardiovascular Response (OT)   OT:  eval complete and treatment initiated. Pt is an 82 year old  female with a significant past medical history of GERD,HLP,Severe pulmonary HTN Arthris and diabetes who presents with confusion,weakness , cough for the last few days . ED evaluation revealed tachypnea (40),febrile(103.1)and CXR infiltrate. Patient's presentation is consistent with Pneumonia with SIRS.  She reported to live with Son in his single level home.  Additionally pt has homemaking services ( 3.5 hrs/wk) and PCA services (6.75/hrs/day) through [facility] ([phone]) as well as [employer] services, and she attends adult day care program at [facility] 2 days/week.      Discharge Planner OT   Patient plan for discharge: home with assist  Current status: Pt sitting up in chair upon arrival.  Pt alert, but oriented to self only and able to follow ~ 75% simple one step commands.  SBA needed for sit<>stand and SBA-CGA for functional mobility during ADLs with cueing for safety using SEC.  Toilet transfer/activity completed with SBA and Grooming/hygiene at sink with set-up and cueing to initiate and follow through.  Pt with decreased insight and self/safety awareness requiring frequent cues and redirection.  Barriers to return to prior living situation: cognition, weakness  Recommendations for discharge: home with 24/7 care  Rationale for recommendations: Pt unsafe to be alone secondary to decreased safety judgment and limited insight into deficits and amount of assist needed.     Occupational Therapy Discharge Summary     Reason for therapy discharge:    Discharged to home.     Progress towards therapy goal(s). See goals on Care Plan in [facility] electronic health record for goal details.  Goals not met.  Barriers to achieving goals:   discharge on same date as initial evaluation.     Therapy  recommendation(s):    No further therapy is recommended.             Entered by: Veronika Butler 10/24/2017 2:13 PM

## 2017-10-24 NOTE — PROGRESS NOTES
Spoke w/ [facility] [phone] to discuss pt services. Pt has a PCA in home 6.45 hrs/day with home making and chores through [facility]. Pt plans to d/c back to home w/ PCA as previous. No new concerns.     Lillian To RN, BSN, CTS   [facility]

## 2017-10-24 NOTE — PROGRESS NOTES
Transition Communication Hand-off for Care Transitions to Next Level of Care Provider    Name: Chelly Dave  MRN #: [id number]  Primary Care Provider: Brittany Heredia  Primary Care MD Name: Brittany Heredia  Primary Clinic: [facility] [address]  [facility] [address]  Primary Care Clinic Name: [facility]  Reason for Hospitalization:  Pneumonia [J18.9]  Admit Date/Time: 10/21/2017  5:26 PM  Discharge Date: 10/24/17  Payor Source: Payor: MEDICA / Plan: MEDICA [employer] NON/[employer] PARTNERS / Product Type: Indemnity /     Readmission Assessment Measure (MARII) Risk Score/category: Average    Reason for Communication Hand-off Referral: Admission diagnoses: PN  Fragility    Discharge Plan: D/c to home w/ HHA       Concern for non-adherence with plan of care:   No  Discharge Needs Assessment:  Needs       Most Recent Value    Anticipated Changes Related to Illness none    Equipment Currently Used at Home cane, straight    Transportation Available car, family or friend will provide    Current Discharge Risk chronically ill    # of Referrals Placed by [facility], Communication hand-offs to next level of Care Providers          Already enrolled in Tele-monitoring program and name of program:  NA  Follow-up specialty is recommended: No    Follow-up plan:  Future Appointments  Date Time Provider Department Center   10/24/2017 4:00 PM Jennifer Vasquez, PT [facility]   10/25/2017 9:00 AM Dee Schultz, [facility]       Any outstanding tests or procedures:          Key Recommendations: PNA action plan reviewed w/ pt and dtr, advised when to seek care if worsening. No increased need for services in home, plan to continue w/ PCA 6.45 hours daily. Dtr will call to schedule follow-up appt.     Lillian To    AVS/Discharge Summary is the source of truth; this is a helpful guide for improved communication of patient story

## 2017-10-24 NOTE — PLAN OF CARE
Problem: Patient Care Overview  Goal: Plan of Care/Patient Progress Review  Outcome: No Change  Lungs---diminished bilaterally, +cough--green/yellow in color, in droplet isolation,. Some dry nonprod coughing as well. On room air, using IS upto 500.    BS---+bs, +flatus, had small pepple of a stool sent down for occult blood, neg blood.  Diet--- on reg diet and tolerating. Only had tea and bread.   ---voiding well.   CMS---numbness in bilateral legs from shin down to feet,as her baseline. +pp, strong dorsi/planter flexion. scds on.   Pain---denies, no pain meds given except scheduled tylenol.   Activity---up with one assist and cane to the bathroom.   Plan---home with family at time of discharge. Care coordinater working with pt.   Pt has set off bed alarm numerous times, trying to get out of bed to use the bathroom. Not using call light despite many reminders. VPm started at 2230. Pt is looking for her family. Stating that they are looking for her and don't know where she is. Pt is knows date/month/year, place. Pt could not recall why she is in the hospital. Reorientation given, reassurance given.

## 2017-10-24 NOTE — PLAN OF CARE
Alert, oriented to self. IV SL. Ax1 w/cane to bathroom. VPM & bed alarm for impulsiveness and confusion. Denies pain or SOB. LS clear, on RA. Plan to DC home today with family.

## 2017-10-24 NOTE — DISCHARGE SUMMARY
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  ED evaluation revealed tachypnea (40),febrile(103.1) and CXR infiltrate. Patient's presentation is consistent with sepsis due to CAP.  She was given ceftriaxone and azithromycin along with IV fluids for a mild hyponatremia.  She had some intermittent confusion on top of her dementia likely from infectious encephalopathy that improved during her stay and was worse at night.  She is not requiring home oxygen.  Fevers have resolved.  She was found to have anemia into 7 range and iron studies showing some mild iron deficiency.  Started daily iron po supplement and can follow up with pcp.  A hemooccult was negative.  Patient will discharge home with family and complete a course of cefdinir and azithromycin.     Discharge Disposition:  Discharged to home     Allergies:  Allergies   Allergen Reactions     Lisinopril         Discharge Medications:   Current Discharge Medication List      START taking these medications    Details   benzonatate (TESSALON) 100 MG capsule Take 1 capsule (100 mg) by mouth 3 times daily as needed for cough  Qty: 21 capsule, Refills: 0    Associated Diagnoses: Community acquired pneumonia, unspecified laterality      ferrous sulfate (IRON) 325 (65  FE) MG tablet Take 1 tablet (325 mg) by mouth daily  Qty: 100 tablet, Refills: 0    Associated Diagnoses: Iron deficiency anemia, unspecified iron deficiency anemia type      azithromycin (ZITHROMAX) 250 MG tablet Take 1 tablet (250 mg) by mouth every 24 hours for 2 days  Qty: 2 tablet, Refills: 0    Associated Diagnoses: Community acquired pneumonia, unspecified laterality      cefdinir (OMNICEF) 300 MG capsule Take 1 capsule (300 mg) by mouth 2 times daily for 5 days  Qty: 10 capsule, Refills: 0    Associated Diagnoses: Community acquired pneumonia, unspecified laterality         CONTINUE these medications which have NOT CHANGED    Details   darbepoetin aravind-polysorbate (ARANESP) 200 MCG/0.4ML injection Inject 200 mcg Subcutaneous Every 2 months      !! ACETAMINOPHEN PO Take 1,000 mg by mouth daily as needed for pain Patient occasionally takes an extra dose in addition to scheduled medication      Calcium Carb-Cholecalciferol (CALCIUM 500 +D) 500-400 MG-UNIT TABS Take 1 tablet by mouth daily      DOXEPIN HCL PO Take 10 mg by mouth At Bedtime      lidocaine (LIDODERM) 5 % patch Place 1 patch onto the skin daily as needed for moderate pain (12 hours on; 12 hours off. Patient applies to knees or back)      Pilocarpine HCl (SALAGEN PO) Take 5 mg by mouth 3 times daily AM, 1200 & HS      VITAMIN E NATURAL PO Take 1,000 Units by mouth 2 times daily AM & 3PM      !! Acetaminophen (TYLENOL PO) Take 1,000 mg by mouth 2 times daily       polyethylene glycol 400 (BLINK TEARS) 0.25 % SOLN ophthalmic solution Place 1-2 drops into both eyes every 2 hours as needed for dry eyes      AzaTHIOprine (IMURAN PO) Take 50 mg by mouth 2 times daily AM & 3PM      cycloSPORINE (RESTASIS) 0.05 % ophthalmic emulsion Place 1 drop into both eyes 2 times daily      FLUOXETINE HCL PO Take 20 mg by mouth daily (Takes 2 x 10mg for a total of 20mg daily)      diclofenac (FLECTOR) 1.3 % patch Place 1 patch onto the skin 2 times daily as needed for  moderate pain      Cholecalciferol (VITAMIN D3 PO) Take 1,000 Units by mouth daily      donepezil (ARICEPT) 5 MG tablet Take 1 tablet (5 mg) by mouth daily  Qty: 30 tablet    Associated Diagnoses: Dementia      aspirin 81 MG EC tablet Take 81 mg by mouth daily.      atorvastatin (LIPITOR) 20 MG tablet Take 20 mg by mouth every evening (Takes half of a 40mg tablet for a total of 20mg in the evening)      PANtoprazole (PROTONIX) 40 MG enteric coated tablet Take 40 mg by mouth every morning.      loratadine (CLARITIN) 10 MG tablet Take 10 mg by mouth daily.       !! - Potential duplicate medications found. Please discuss with provider.           Condition on Discharge:  Discharge condition: Stable   Discharge vitals: Blood pressure 152/79, pulse 82, temperature 96.1  F (35.6  C), temperature source Axillary, resp. rate 22, weight 58.1 kg (128 lb), SpO2 99 %.   Code status on discharge: Full Code     History of Illness:  See detailed admission note for full details.    Physical Exam:  Blood pressure 152/79, pulse 82, temperature 96.1  F (35.6  C), temperature source Axillary, resp. rate 22, weight 58.1 kg (128 lb), SpO2 99 %.  Wt Readings from Last 1 Encounters:   10/22/17 58.1 kg (128 lb)     Constitutional: Awake, NAD  Eyes: sclera white   HEENT:  MMM  Respiratory:   lungs cta bilaterally, no crackles or wheeze  Cardiovascular: RRR.  No murmur   GI: non-tender, not distended, bowel sounds present  Skin: no rash or lesions, acyanotic  Musculoskeletal/extremities: atraumatic, no major deformities. No edema  Neurologic: A&O, speech clear, strength and light touch sensation grossly normal  Psychiatric: calm, cooperative, normal affect    Procedures other than Imaging:  None     Imaging:  Results for orders placed or performed during the hospital encounter of 10/21/17   XR Chest 2 Views    Narrative    CHEST TWO VIEWS  10/21/2017 8:25 PM     HISTORY: Fever    COMPARISON: 8/10/2016      Impression    IMPRESSION: Alveolar  infiltrate laterally in the anterior segment of  the right upper lobe consistent with pneumonia. No pleural effusion.  No other infiltrates. Cardiomegaly is unchanged.    SAMMY LOPEZ MD        Consultations:  No consultations were requested during this admission.       Recent Lab Results:    Recent Labs  Lab 10/21/17  2101 10/21/17  1810 10/21/17  1802   PHV 7.41 7.37 7.37   PO2V 58* 20* 25   PCO2V 34* 42 45   HCO3V 21 24 26       Recent Labs  Lab 10/24/17  0637 10/22/17  0555 10/21/17  1816   WBC 3.5* 6.0 5.2   HGB 7.9* 7.5* 7.9*   HCT 25.5* 23.8* 25.9*   MCV 92 92 95    186 195       Recent Labs  Lab 10/22/17  0940 10/21/17  1837 10/21/17  1810 10/21/17  1802   CULT Light growthNormal mimi No growth No growth after 3 days No growth after 3 days       Recent Labs  Lab 10/24/17  0637 10/22/17  0555 10/21/17  1816    140 132*   POTASSIUM 3.4 3.8 3.9   CHLORIDE 108 110* 100   CO2 25 24 26   ANIONGAP 7 6 6   GLC 93 94 102*   BUN 7 11 15   CR 0.75 0.78 0.91   GFRESTIMATED 74 71 59*   GFRESTBLACK 90 86 72   ROBSON 8.1* 7.1* 7.9*   MAG 2.2  --   --    PROTTOTAL  --   --  9.2*   ALBUMIN  --   --  2.9*   BILITOTAL  --   --  0.3   ALKPHOS  --   --  64   AST  --   --  22   ALT  --   --  17     Iron 19  TIBC 202  Iron sat index 9  Ferritin 79       Pending Results:    Unresulted Labs Ordered in the Past 30 Days of this Admission     Date and Time Order Name Status Description    10/21/2017 1759 Blood culture Preliminary     10/21/2017 1744 Blood culture ONE site Preliminary          These results will be followed up by patient's primary care provider.    Discharge Instructions and Follow-Up:     Discharge Procedure Orders  Home care nursing referral   Referral Type: Home Health Therapies & Aides     Reason for your hospital stay   Order Comments: You were hospitalized for pneumonia.  This has improved with antibiotics.  You will complete a course of oral antibiotics at home.  Your iron level is low so we have  filled a daily supplement for this.     Follow-up and recommended labs and tests    Order Comments: Follow up with primary care provider at your appointment on Friday     Activity   Order Comments: Your activity upon discharge: activity as tolerated   Order Specific Question Answer Comments   Is discharge order? Yes      Full Code     Diet   Order Comments: Follow this diet upon discharge: Orders Placed This Encounter     Regular Diet Adult   Order Specific Question Answer Comments   Is discharge order? Yes          Recommendations:  -finish course of azithromycin and cefdinir  -daily iron   -tessalon pearls prn  -f/u pcp in one week    ILandry, personally saw the patient today and spent greater than 30 minutes discharging this patient.      Landry Alejo MD

## 2017-10-24 NOTE — PLAN OF CARE
Problem: Patient Care Overview  Goal: Plan of Care/Patient Progress Review  Outcome: Adequate for Discharge Date Met:  10/24/17  Reviewed discharge instructions with pt and daughter, no further questions. Pt discharged to home.

## 2017-10-27 LAB
BACTERIA SPEC CULT: NO GROWTH
BACTERIA SPEC CULT: NO GROWTH
Lab: NORMAL
Lab: NORMAL
SPECIMEN SOURCE: NORMAL
SPECIMEN SOURCE: NORMAL

## 2018-06-20 ENCOUNTER — HOSPITAL ENCOUNTER (EMERGENCY)
Facility: CLINIC | Age: 83
Discharge: ANOTHER HOSPITAL | End: 2018-06-21
Attending: EMERGENCY MEDICINE | Admitting: EMERGENCY MEDICINE
Payer: COMMERCIAL

## 2018-06-20 ENCOUNTER — APPOINTMENT (OUTPATIENT)
Dept: CT IMAGING | Facility: CLINIC | Age: 83
End: 2018-06-20
Attending: EMERGENCY MEDICINE
Payer: COMMERCIAL

## 2018-06-20 DIAGNOSIS — K63.89 MESENTERIC MASS: ICD-10-CM

## 2018-06-20 DIAGNOSIS — K56.609 SMALL BOWEL OBSTRUCTION (H): ICD-10-CM

## 2018-06-20 LAB
ALBUMIN SERPL-MCNC: 3.3 G/DL (ref 3.4–5)
ALP SERPL-CCNC: 70 U/L (ref 40–150)
ALT SERPL W P-5'-P-CCNC: 16 U/L (ref 0–50)
ANION GAP SERPL CALCULATED.3IONS-SCNC: 8 MMOL/L (ref 3–14)
AST SERPL W P-5'-P-CCNC: 20 U/L (ref 0–45)
BASOPHILS # BLD AUTO: 0 10E9/L (ref 0–0.2)
BASOPHILS NFR BLD AUTO: 0.2 %
BILIRUB SERPL-MCNC: 0.2 MG/DL (ref 0.2–1.3)
BUN SERPL-MCNC: 23 MG/DL (ref 7–30)
CALCIUM SERPL-MCNC: 8.5 MG/DL (ref 8.5–10.1)
CHLORIDE SERPL-SCNC: 111 MMOL/L (ref 94–109)
CO2 SERPL-SCNC: 19 MMOL/L (ref 20–32)
CREAT SERPL-MCNC: 0.99 MG/DL (ref 0.52–1.04)
DIFFERENTIAL METHOD BLD: ABNORMAL
EOSINOPHIL # BLD AUTO: 0 10E9/L (ref 0–0.7)
EOSINOPHIL NFR BLD AUTO: 0.5 %
ERYTHROCYTE [DISTWIDTH] IN BLOOD BY AUTOMATED COUNT: 14.5 % (ref 10–15)
GFR SERPL CREATININE-BSD FRML MDRD: 54 ML/MIN/1.7M2
GLUCOSE SERPL-MCNC: 137 MG/DL (ref 70–99)
HCT VFR BLD AUTO: 33 % (ref 35–47)
HGB BLD-MCNC: 10.3 G/DL (ref 11.7–15.7)
IMM GRANULOCYTES # BLD: 0 10E9/L (ref 0–0.4)
IMM GRANULOCYTES NFR BLD: 0.3 %
LACTATE BLD-SCNC: 0.8 MMOL/L (ref 0.7–2)
LIPASE SERPL-CCNC: 220 U/L (ref 73–393)
LYMPHOCYTES # BLD AUTO: 0.5 10E9/L (ref 0.8–5.3)
LYMPHOCYTES NFR BLD AUTO: 7.3 %
MCH RBC QN AUTO: 31.6 PG (ref 26.5–33)
MCHC RBC AUTO-ENTMCNC: 31.2 G/DL (ref 31.5–36.5)
MCV RBC AUTO: 101 FL (ref 78–100)
MONOCYTES # BLD AUTO: 0.5 10E9/L (ref 0–1.3)
MONOCYTES NFR BLD AUTO: 7.4 %
NEUTROPHILS # BLD AUTO: 5.6 10E9/L (ref 1.6–8.3)
NEUTROPHILS NFR BLD AUTO: 84.3 %
NRBC # BLD AUTO: 0 10*3/UL
NRBC BLD AUTO-RTO: 0 /100
PLATELET # BLD AUTO: 225 10E9/L (ref 150–450)
POTASSIUM SERPL-SCNC: 3.5 MMOL/L (ref 3.4–5.3)
PROT SERPL-MCNC: 9.1 G/DL (ref 6.8–8.8)
RBC # BLD AUTO: 3.26 10E12/L (ref 3.8–5.2)
SODIUM SERPL-SCNC: 138 MMOL/L (ref 133–144)
TROPONIN I SERPL-MCNC: <0.015 UG/L (ref 0–0.04)
WBC # BLD AUTO: 6.6 10E9/L (ref 4–11)

## 2018-06-20 PROCEDURE — 93005 ELECTROCARDIOGRAM TRACING: CPT

## 2018-06-20 PROCEDURE — 84484 ASSAY OF TROPONIN QUANT: CPT | Performed by: EMERGENCY MEDICINE

## 2018-06-20 PROCEDURE — 99285 EMERGENCY DEPT VISIT HI MDM: CPT | Mod: 25

## 2018-06-20 PROCEDURE — 74177 CT ABD & PELVIS W/CONTRAST: CPT

## 2018-06-20 PROCEDURE — 96376 TX/PRO/DX INJ SAME DRUG ADON: CPT

## 2018-06-20 PROCEDURE — 25000128 H RX IP 250 OP 636: Performed by: EMERGENCY MEDICINE

## 2018-06-20 PROCEDURE — 80053 COMPREHEN METABOLIC PANEL: CPT | Performed by: EMERGENCY MEDICINE

## 2018-06-20 PROCEDURE — 83605 ASSAY OF LACTIC ACID: CPT | Performed by: EMERGENCY MEDICINE

## 2018-06-20 PROCEDURE — 85025 COMPLETE CBC W/AUTO DIFF WBC: CPT | Performed by: EMERGENCY MEDICINE

## 2018-06-20 PROCEDURE — 96374 THER/PROPH/DIAG INJ IV PUSH: CPT

## 2018-06-20 PROCEDURE — 83690 ASSAY OF LIPASE: CPT | Performed by: EMERGENCY MEDICINE

## 2018-06-20 RX ORDER — MEMANTINE HYDROCHLORIDE 5 MG/1
5 TABLET ORAL 2 TIMES DAILY
Status: ON HOLD | COMMUNITY
Start: 2018-05-08 | End: 2024-06-20

## 2018-06-20 RX ORDER — IOPAMIDOL 755 MG/ML
500 INJECTION, SOLUTION INTRAVASCULAR ONCE
Status: COMPLETED | OUTPATIENT
Start: 2018-06-20 | End: 2018-06-20

## 2018-06-20 RX ORDER — LORATADINE 10 MG/1
10 TABLET ORAL DAILY PRN
COMMUNITY
Start: 2018-05-08

## 2018-06-20 RX ORDER — ONDANSETRON 2 MG/ML
4 INJECTION INTRAMUSCULAR; INTRAVENOUS EVERY 30 MIN PRN
Status: DISCONTINUED | OUTPATIENT
Start: 2018-06-20 | End: 2018-06-21 | Stop reason: HOSPADM

## 2018-06-20 RX ORDER — LANCETS
EACH MISCELLANEOUS
COMMUNITY
Start: 2017-06-02

## 2018-06-20 RX ORDER — POLYETHYLENE GLYCOL 3350 17 G/17G
17 POWDER, FOR SOLUTION ORAL DAILY PRN
COMMUNITY
Start: 2018-03-26

## 2018-06-20 RX ORDER — MULTIVIT WITH MINERALS/LUTEIN
1 TABLET ORAL
Status: ON HOLD | COMMUNITY
Start: 2018-03-21 | End: 2024-06-20

## 2018-06-20 RX ORDER — ACETAMINOPHEN 500 MG
2 TABLET ORAL 2 TIMES DAILY
Status: ON HOLD | COMMUNITY
Start: 2016-08-18 | End: 2024-06-24

## 2018-06-20 RX ORDER — PANTOPRAZOLE SODIUM 40 MG/1
40 TABLET, DELAYED RELEASE ORAL DAILY
COMMUNITY
Start: 2018-04-13 | End: 2024-08-20

## 2018-06-20 RX ORDER — ATORVASTATIN CALCIUM 40 MG/1
20 TABLET, FILM COATED ORAL EVERY EVENING
Status: ON HOLD | COMMUNITY
Start: 2018-05-08 | End: 2024-06-25

## 2018-06-20 RX ORDER — INSULIN PUMP SYRINGE, 3 ML
EACH MISCELLANEOUS
COMMUNITY
Start: 2017-07-18

## 2018-06-20 RX ORDER — CYCLOSPORINE 0.5 MG/ML
EMULSION OPHTHALMIC
COMMUNITY
Start: 2016-12-14

## 2018-06-20 RX ADMIN — SODIUM CHLORIDE 57 ML: 9 INJECTION, SOLUTION INTRAVENOUS at 21:05

## 2018-06-20 RX ADMIN — IOPAMIDOL 70 ML: 755 INJECTION, SOLUTION INTRAVENOUS at 21:05

## 2018-06-20 RX ADMIN — ONDANSETRON 4 MG: 2 INJECTION INTRAMUSCULAR; INTRAVENOUS at 20:28

## 2018-06-20 RX ADMIN — ONDANSETRON 4 MG: 2 INJECTION INTRAMUSCULAR; INTRAVENOUS at 22:48

## 2018-06-20 ASSESSMENT — ENCOUNTER SYMPTOMS
ABDOMINAL PAIN: 1
DIARRHEA: 0
NAUSEA: 1
WEAKNESS: 1
VOMITING: 1

## 2018-06-21 ENCOUNTER — APPOINTMENT (OUTPATIENT)
Dept: GENERAL RADIOLOGY | Facility: CLINIC | Age: 83
DRG: 331 | End: 2018-06-21
Attending: SURGERY
Payer: COMMERCIAL

## 2018-06-21 ENCOUNTER — ANESTHESIA EVENT (OUTPATIENT)
Dept: SURGERY | Facility: CLINIC | Age: 83
DRG: 331 | End: 2018-06-21
Payer: COMMERCIAL

## 2018-06-21 ENCOUNTER — HOSPITAL ENCOUNTER (INPATIENT)
Facility: CLINIC | Age: 83
LOS: 8 days | Discharge: HOME HEALTH | DRG: 331 | End: 2018-06-29
Attending: SURGERY | Admitting: SURGERY
Payer: COMMERCIAL

## 2018-06-21 VITALS
SYSTOLIC BLOOD PRESSURE: 130 MMHG | DIASTOLIC BLOOD PRESSURE: 88 MMHG | BODY MASS INDEX: 27.09 KG/M2 | OXYGEN SATURATION: 96 % | HEART RATE: 94 BPM | RESPIRATION RATE: 15 BRPM | TEMPERATURE: 99.6 F | WEIGHT: 138 LBS | HEIGHT: 60 IN

## 2018-06-21 DIAGNOSIS — Z90.49 S/P SMALL BOWEL RESECTION: Primary | ICD-10-CM

## 2018-06-21 PROBLEM — K56.609 BOWEL OBSTRUCTION (H): Status: ACTIVE | Noted: 2018-06-21

## 2018-06-21 LAB
ALBUMIN SERPL-MCNC: 3 G/DL (ref 3.4–5)
ALP SERPL-CCNC: 66 U/L (ref 40–150)
ALT SERPL W P-5'-P-CCNC: 16 U/L (ref 0–50)
ANION GAP SERPL CALCULATED.3IONS-SCNC: 9 MMOL/L (ref 3–14)
AST SERPL W P-5'-P-CCNC: 19 U/L (ref 0–45)
BASOPHILS # BLD AUTO: 0 10E9/L (ref 0–0.2)
BASOPHILS NFR BLD AUTO: 0 %
BILIRUB SERPL-MCNC: 0.2 MG/DL (ref 0.2–1.3)
BUN SERPL-MCNC: 21 MG/DL (ref 7–30)
CALCIUM SERPL-MCNC: 8.4 MG/DL (ref 8.5–10.1)
CHLORIDE SERPL-SCNC: 111 MMOL/L (ref 94–109)
CO2 SERPL-SCNC: 20 MMOL/L (ref 20–32)
CREAT SERPL-MCNC: 0.83 MG/DL (ref 0.52–1.04)
DIFFERENTIAL METHOD BLD: ABNORMAL
EOSINOPHIL # BLD AUTO: 0.1 10E9/L (ref 0–0.7)
EOSINOPHIL NFR BLD AUTO: 2 %
ERYTHROCYTE [DISTWIDTH] IN BLOOD BY AUTOMATED COUNT: 14.9 % (ref 10–15)
GFR SERPL CREATININE-BSD FRML MDRD: 66 ML/MIN/1.7M2
GLUCOSE BLDC GLUCOMTR-MCNC: 107 MG/DL (ref 70–99)
GLUCOSE BLDC GLUCOMTR-MCNC: 115 MG/DL (ref 70–99)
GLUCOSE BLDC GLUCOMTR-MCNC: 140 MG/DL (ref 70–99)
GLUCOSE BLDC GLUCOMTR-MCNC: 62 MG/DL (ref 70–99)
GLUCOSE SERPL-MCNC: 114 MG/DL (ref 70–99)
HCT VFR BLD AUTO: 32 % (ref 35–47)
HGB BLD-MCNC: 10.1 G/DL (ref 11.7–15.7)
IMM GRANULOCYTES # BLD: 0 10E9/L (ref 0–0.4)
IMM GRANULOCYTES NFR BLD: 0 %
INTERPRETATION ECG - MUSE: NORMAL
LYMPHOCYTES # BLD AUTO: 0.4 10E9/L (ref 0.8–5.3)
LYMPHOCYTES NFR BLD AUTO: 16.1 %
MAGNESIUM SERPL-MCNC: 2.2 MG/DL (ref 1.6–2.3)
MCH RBC QN AUTO: 30.1 PG (ref 26.5–33)
MCHC RBC AUTO-ENTMCNC: 31.6 G/DL (ref 31.5–36.5)
MCV RBC AUTO: 96 FL (ref 78–100)
MONOCYTES # BLD AUTO: 0.2 10E9/L (ref 0–1.3)
MONOCYTES NFR BLD AUTO: 8 %
NEUTROPHILS # BLD AUTO: 1.8 10E9/L (ref 1.6–8.3)
NEUTROPHILS NFR BLD AUTO: 73.9 %
NRBC # BLD AUTO: 0 10*3/UL
NRBC BLD AUTO-RTO: 0 /100
PHOSPHATE SERPL-MCNC: 4.5 MG/DL (ref 2.5–4.5)
PLATELET # BLD AUTO: 212 10E9/L (ref 150–450)
POTASSIUM SERPL-SCNC: 4 MMOL/L (ref 3.4–5.3)
PROT SERPL-MCNC: 8.6 G/DL (ref 6.8–8.8)
RBC # BLD AUTO: 3.35 10E12/L (ref 3.8–5.2)
SODIUM SERPL-SCNC: 140 MMOL/L (ref 133–144)
WBC # BLD AUTO: 2.5 10E9/L (ref 4–11)

## 2018-06-21 PROCEDURE — 84100 ASSAY OF PHOSPHORUS: CPT | Performed by: STUDENT IN AN ORGANIZED HEALTH CARE EDUCATION/TRAINING PROGRAM

## 2018-06-21 PROCEDURE — 83735 ASSAY OF MAGNESIUM: CPT | Performed by: STUDENT IN AN ORGANIZED HEALTH CARE EDUCATION/TRAINING PROGRAM

## 2018-06-21 PROCEDURE — 74018 RADEX ABDOMEN 1 VIEW: CPT

## 2018-06-21 PROCEDURE — 40000141 ZZH STATISTIC PERIPHERAL IV START W/O US GUIDANCE

## 2018-06-21 PROCEDURE — 25800025 ZZH RX 258

## 2018-06-21 PROCEDURE — 25000128 H RX IP 250 OP 636: Performed by: STUDENT IN AN ORGANIZED HEALTH CARE EDUCATION/TRAINING PROGRAM

## 2018-06-21 PROCEDURE — 36415 COLL VENOUS BLD VENIPUNCTURE: CPT | Performed by: STUDENT IN AN ORGANIZED HEALTH CARE EDUCATION/TRAINING PROGRAM

## 2018-06-21 PROCEDURE — 80053 COMPREHEN METABOLIC PANEL: CPT | Performed by: STUDENT IN AN ORGANIZED HEALTH CARE EDUCATION/TRAINING PROGRAM

## 2018-06-21 PROCEDURE — 85025 COMPLETE CBC W/AUTO DIFF WBC: CPT | Performed by: STUDENT IN AN ORGANIZED HEALTH CARE EDUCATION/TRAINING PROGRAM

## 2018-06-21 PROCEDURE — 25000125 ZZHC RX 250: Performed by: STUDENT IN AN ORGANIZED HEALTH CARE EDUCATION/TRAINING PROGRAM

## 2018-06-21 PROCEDURE — 12000008 ZZH R&B INTERMEDIATE UMMC

## 2018-06-21 PROCEDURE — 25000132 ZZH RX MED GY IP 250 OP 250 PS 637: Performed by: STUDENT IN AN ORGANIZED HEALTH CARE EDUCATION/TRAINING PROGRAM

## 2018-06-21 PROCEDURE — 00000146 ZZHCL STATISTIC GLUCOSE BY METER IP

## 2018-06-21 PROCEDURE — 40000986 XR ABDOMEN PORT 1 VW

## 2018-06-21 RX ORDER — MAGNESIUM SULFATE HEPTAHYDRATE 40 MG/ML
2 INJECTION, SOLUTION INTRAVENOUS DAILY PRN
Status: DISCONTINUED | OUTPATIENT
Start: 2018-06-21 | End: 2018-06-29 | Stop reason: HOSPADM

## 2018-06-21 RX ORDER — ONDANSETRON 4 MG/1
4 TABLET, ORALLY DISINTEGRATING ORAL EVERY 6 HOURS PRN
Status: DISCONTINUED | OUTPATIENT
Start: 2018-06-21 | End: 2018-06-29 | Stop reason: HOSPADM

## 2018-06-21 RX ORDER — ONDANSETRON 2 MG/ML
4 INJECTION INTRAMUSCULAR; INTRAVENOUS EVERY 6 HOURS PRN
Status: DISCONTINUED | OUTPATIENT
Start: 2018-06-21 | End: 2018-06-29 | Stop reason: HOSPADM

## 2018-06-21 RX ORDER — POTASSIUM CHLORIDE 7.45 MG/ML
10 INJECTION INTRAVENOUS
Status: DISCONTINUED | OUTPATIENT
Start: 2018-06-21 | End: 2018-06-29 | Stop reason: HOSPADM

## 2018-06-21 RX ORDER — NALOXONE HYDROCHLORIDE 0.4 MG/ML
.1-.4 INJECTION, SOLUTION INTRAMUSCULAR; INTRAVENOUS; SUBCUTANEOUS
Status: DISCONTINUED | OUTPATIENT
Start: 2018-06-21 | End: 2018-06-25

## 2018-06-21 RX ORDER — CYCLOSPORINE 0.5 MG/ML
1 EMULSION OPHTHALMIC 2 TIMES DAILY
Status: DISCONTINUED | OUTPATIENT
Start: 2018-06-21 | End: 2018-06-29 | Stop reason: HOSPADM

## 2018-06-21 RX ORDER — DEXTROSE MONOHYDRATE 25 G/50ML
25-50 INJECTION, SOLUTION INTRAVENOUS
Status: DISCONTINUED | OUTPATIENT
Start: 2018-06-21 | End: 2018-06-29 | Stop reason: HOSPADM

## 2018-06-21 RX ORDER — POTASSIUM CL/LIDO/0.9 % NACL 10MEQ/0.1L
10 INTRAVENOUS SOLUTION, PIGGYBACK (ML) INTRAVENOUS
Status: DISCONTINUED | OUTPATIENT
Start: 2018-06-21 | End: 2018-06-29 | Stop reason: HOSPADM

## 2018-06-21 RX ORDER — DEXTROSE MONOHYDRATE 25 G/50ML
INJECTION, SOLUTION INTRAVENOUS
Status: COMPLETED
Start: 2018-06-21 | End: 2018-06-21

## 2018-06-21 RX ORDER — MAGNESIUM SULFATE HEPTAHYDRATE 40 MG/ML
4 INJECTION, SOLUTION INTRAVENOUS EVERY 4 HOURS PRN
Status: DISCONTINUED | OUTPATIENT
Start: 2018-06-21 | End: 2018-06-29 | Stop reason: HOSPADM

## 2018-06-21 RX ORDER — PROCHLORPERAZINE 25 MG
12.5 SUPPOSITORY, RECTAL RECTAL EVERY 12 HOURS PRN
Status: DISCONTINUED | OUTPATIENT
Start: 2018-06-21 | End: 2018-06-29 | Stop reason: HOSPADM

## 2018-06-21 RX ORDER — CEFAZOLIN SODIUM 1 G/3ML
1 INJECTION, POWDER, FOR SOLUTION INTRAMUSCULAR; INTRAVENOUS SEE ADMIN INSTRUCTIONS
Status: DISCONTINUED | OUTPATIENT
Start: 2018-06-21 | End: 2018-06-22 | Stop reason: HOSPADM

## 2018-06-21 RX ORDER — POTASSIUM CHLORIDE 1.5 G/1.58G
20-40 POWDER, FOR SOLUTION ORAL
Status: DISCONTINUED | OUTPATIENT
Start: 2018-06-21 | End: 2018-06-29 | Stop reason: HOSPADM

## 2018-06-21 RX ORDER — CEFAZOLIN SODIUM 2 G/100ML
2 INJECTION, SOLUTION INTRAVENOUS
Status: COMPLETED | OUTPATIENT
Start: 2018-06-21 | End: 2018-06-22

## 2018-06-21 RX ORDER — POTASSIUM CHLORIDE 29.8 MG/ML
20 INJECTION INTRAVENOUS
Status: DISCONTINUED | OUTPATIENT
Start: 2018-06-21 | End: 2018-06-21

## 2018-06-21 RX ORDER — LIDOCAINE 40 MG/G
CREAM TOPICAL
Status: DISCONTINUED | OUTPATIENT
Start: 2018-06-21 | End: 2018-06-29 | Stop reason: HOSPADM

## 2018-06-21 RX ORDER — PROCHLORPERAZINE MALEATE 5 MG
5 TABLET ORAL EVERY 6 HOURS PRN
Status: DISCONTINUED | OUTPATIENT
Start: 2018-06-21 | End: 2018-06-29 | Stop reason: HOSPADM

## 2018-06-21 RX ORDER — SODIUM CHLORIDE, SODIUM LACTATE, POTASSIUM CHLORIDE, CALCIUM CHLORIDE 600; 310; 30; 20 MG/100ML; MG/100ML; MG/100ML; MG/100ML
1000 INJECTION, SOLUTION INTRAVENOUS CONTINUOUS
Status: DISCONTINUED | OUTPATIENT
Start: 2018-06-21 | End: 2018-06-21

## 2018-06-21 RX ORDER — NICOTINE POLACRILEX 4 MG
15-30 LOZENGE BUCCAL
Status: DISCONTINUED | OUTPATIENT
Start: 2018-06-21 | End: 2018-06-29 | Stop reason: HOSPADM

## 2018-06-21 RX ORDER — POTASSIUM CHLORIDE 750 MG/1
20-40 TABLET, EXTENDED RELEASE ORAL
Status: DISCONTINUED | OUTPATIENT
Start: 2018-06-21 | End: 2018-06-29 | Stop reason: HOSPADM

## 2018-06-21 RX ADMIN — FAMOTIDINE 20 MG: 20 INJECTION, SOLUTION INTRAVENOUS at 03:35

## 2018-06-21 RX ADMIN — DEXTROSE MONOHYDRATE 25 ML: 25 INJECTION, SOLUTION INTRAVENOUS at 19:54

## 2018-06-21 RX ADMIN — DEXTROSE AND SODIUM CHLORIDE: 5; 900 INJECTION, SOLUTION INTRAVENOUS at 20:14

## 2018-06-21 RX ADMIN — SODIUM CHLORIDE, POTASSIUM CHLORIDE, SODIUM LACTATE AND CALCIUM CHLORIDE 1000 ML: 600; 310; 30; 20 INJECTION, SOLUTION INTRAVENOUS at 15:18

## 2018-06-21 RX ADMIN — SODIUM CHLORIDE, POTASSIUM CHLORIDE, SODIUM LACTATE AND CALCIUM CHLORIDE 1000 ML: 600; 310; 30; 20 INJECTION, SOLUTION INTRAVENOUS at 03:35

## 2018-06-21 RX ADMIN — CYCLOSPORINE 1 DROP: 0.5 EMULSION OPHTHALMIC at 09:16

## 2018-06-21 RX ADMIN — DEXTROSE MONOHYDRATE 25 ML: 500 INJECTION PARENTERAL at 19:54

## 2018-06-21 RX ADMIN — FLUOXETINE 20 MG: 20 CAPSULE ORAL at 09:15

## 2018-06-21 RX ADMIN — CYCLOSPORINE 1 DROP: 0.5 EMULSION OPHTHALMIC at 20:14

## 2018-06-21 ASSESSMENT — ACTIVITIES OF DAILY LIVING (ADL)
WHICH_OF_THE_ABOVE_FUNCTIONAL_RISKS_HAD_A_RECENT_ONSET_OR_CHANGE?: AMBULATION
RETIRED_EATING: 0-->INDEPENDENT
TOILETING: 0-->INDEPENDENT
AMBULATION: 1-->ASSISTIVE EQUIPMENT
FALL_HISTORY_WITHIN_LAST_SIX_MONTHS: NO
BATHING: 3-->ASSISTIVE EQUIPMENT AND PERSON
DRESS: 2-->ASSISTIVE PERSON
RETIRED_COMMUNICATION: 0-->UNDERSTANDS/COMMUNICATES WITHOUT DIFFICULTY
SWALLOWING: 0-->SWALLOWS FOODS/LIQUIDS WITHOUT DIFFICULTY
TRANSFERRING: 1-->ASSISTIVE EQUIPMENT
COGNITION: 1 - ATTENTION OR MEMORY DEFICITS

## 2018-06-21 NOTE — DISCHARGE SUMMARY
General Surgery Discharge Summary    Chelly Dave MRN# [id number]   YOB: 1935 Age: 83 year old     Date of Admission:  6/21/2018  Date of Discharge::  { :[id number]}  Admitting Physician:  Ahsan Nicolas MD  Discharge Physician:  [unfilled]  Primary Care Physician:        Brittany Heredia          Admission Diagnoses:   Bowel obstruction  Mesenteric Mass           Discharge Diagnosis:   There are no discharge diagnoses documented for the most recent discharge.   ***       Procedures:   6/22- Exploratory Laparotomy with Resection of Mesenteric Mass and associated bowel by Dr. Nicolas        Non-operative procedures:   6/22- Epidural Catheter Placement  6/25- PICC Line Placement          Consultations:   VASCULAR ACCESS CARE ADULT IP CONSULT  VASCULAR ACCESS CARE ADULT IP CONSULT  VASCULAR ACCESS CARE ADULT IP CONSULT  VASCULAR ACCESS CARE ADULT IP CONSULT  SWALLOW EVAL SPEECH PATH AT BEDSIDE IP CONSULT  VASCULAR ACCESS CARE ADULT IP CONSULT  VASCULAR ACCESS CARE ADULT IP CONSULT  VASCULAR ACCESS ADULT IP CONSULT  OCCUPATIONAL THERAPY ADULT IP CONSULT  PHYSICAL THERAPY ADULT IP CONSULT         Medications Prior to Admission:     Prescriptions Prior to Admission   Medication Sig Dispense Refill Last Dose     acetaminophen (TYLENOL) 500 MG tablet Take 2 tablets by mouth   6/20/2018 at Unknown time     aspirin 81 MG EC tablet Take 81 mg by mouth daily.   6/20/2018 at Unknown time     atorvastatin (LIPITOR) 40 MG tablet Take 20 mg by mouth   6/20/2018 at Unknown time     AzaTHIOprine (IMURAN PO) Take 50 mg by mouth 2 times daily AM & 3PM   6/20/2018 at Unknown time     blood glucose monitoring (NO BRAND SPECIFIED) test strip USE TO CHECK BLOOD SUGARS EVERY DAY   6/20/2018 at Unknown time     blood glucose monitoring (SOFTCLIX) lancets USE TO TEST BLOOD SUGARS DAILY   6/20/2018 at Unknown time     Blood Glucose Monitoring Suppl (FIFTY50 GLUCOSE METER 2.0) w/Device KIT Dispense meter, test  strips, lancets covered by pt ins. E11.9 NIDDM type II - Test 1 time/day   6/20/2018 at Unknown time     Calcium Carb-Cholecalciferol (CALCIUM 500 +D) 500-400 MG-UNIT TABS Take 1 tablet by mouth daily   6/20/2018 at Unknown time     calcium carbonate-vitamin D 500-400 MG-UNIT TABS per tablet Take 1 tablet by mouth   6/20/2018 at Unknown time     Cholecalciferol (VITAMIN D3) 1000 units CAPS Take 1,000 Units by mouth   6/20/2018 at Unknown time     cycloSPORINE (RESTASIS) 0.05 % ophthalmic emulsion Instill 1 drop into both eyes every 12 hours.   No further refills will be given unless you come in for your exam.   6/20/2018 at Unknown time     darbepoetin aravind-polysorbate (ARANESP) 200 MCG/0.4ML injection Inject 200 mcg Subcutaneous Every 2 months   6/20/2018 at Unknown time     diclofenac (FLECTOR) 1.3 % Patch Place 1 patch onto the skin   6/20/2018 at Unknown time     DOXEPIN HCL PO Take 10 mg by mouth At Bedtime   6/19/2018 at Unknown time     ferrous sulfate (IRON) 325 (65 FE) MG tablet Take 1 tablet (325 mg) by mouth daily 100 tablet 0 6/20/2018 at Unknown time     FLUoxetine (PROZAC) 20 MG capsule Take 20 mg by mouth   6/20/2018 at Unknown time     lidocaine (LIDODERM) 5 % patch Place 1 patch onto the skin daily as needed for moderate pain (12 hours on; 12 hours off. Patient applies to knees or back)   6/20/2018 at Unknown time     loratadine (CLARITIN) 10 MG tablet Take 10 mg by mouth   6/20/2018 at Unknown time     memantine (NAMENDA) 5 MG tablet Take 5 mg by mouth   6/20/2018 at Unknown time     pantoprazole (PROTONIX) 40 MG EC tablet Take 40 mg by mouth   6/20/2018 at Unknown time     Pilocarpine HCl (SALAGEN PO) Take 5 mg by mouth 3 times daily AM, 1200 & HS   6/20/2018 at Unknown time     polyethylene glycol (MIRALAX/GLYCOLAX) powder    6/20/2018 at Unknown time     polyethylene glycol 400 (BLINK TEARS) 0.25 % SOLN ophthalmic solution Place 1-2 drops into both eyes every 2 hours as needed for dry eyes    Unknown at Unknown time     study - aspirin vs placebo (IDS #5239) 1 tablet tablet Take 81 mg by mouth        vitamin E (TOCOPHEROL) 1000 UNIT capsule Take 1 capsule by mouth   6/20/2018 at Unknown time     VITAMIN E NATURAL PO Take 1,000 Units by mouth 2 times daily AM & 3PM   6/20/2018 at Unknown time            Discharge Medications:     Current Discharge Medication List      CONTINUE these medications which have NOT CHANGED    Details   acetaminophen (TYLENOL) 500 MG tablet Take 2 tablets by mouth      aspirin 81 MG EC tablet Take 81 mg by mouth daily.      atorvastatin (LIPITOR) 40 MG tablet Take 20 mg by mouth      AzaTHIOprine (IMURAN PO) Take 50 mg by mouth 2 times daily AM & 3PM      blood glucose monitoring (NO BRAND SPECIFIED) test strip USE TO CHECK BLOOD SUGARS EVERY DAY      blood glucose monitoring (SOFTCLIX) lancets USE TO TEST BLOOD SUGARS DAILY      Blood Glucose Monitoring Suppl (FIFTY50 GLUCOSE METER 2.0) w/Device KIT Dispense meter, test strips, lancets covered by pt ins. E11.9 NIDDM type II - Test 1 time/day      Calcium Carb-Cholecalciferol (CALCIUM 500 +D) 500-400 MG-UNIT TABS Take 1 tablet by mouth daily      calcium carbonate-vitamin D 500-400 MG-UNIT TABS per tablet Take 1 tablet by mouth      Cholecalciferol (VITAMIN D3) 1000 units CAPS Take 1,000 Units by mouth      cycloSPORINE (RESTASIS) 0.05 % ophthalmic emulsion Instill 1 drop into both eyes every 12 hours.   No further refills will be given unless you come in for your exam.      darbepoetin aravind-polysorbate (ARANESP) 200 MCG/0.4ML injection Inject 200 mcg Subcutaneous Every 2 months      diclofenac (FLECTOR) 1.3 % Patch Place 1 patch onto the skin      DOXEPIN HCL PO Take 10 mg by mouth At Bedtime      ferrous sulfate (IRON) 325 (65 FE) MG tablet Take 1 tablet (325 mg) by mouth daily  Qty: 100 tablet, Refills: 0    Associated Diagnoses: Iron deficiency anemia, unspecified iron deficiency anemia type      FLUoxetine (PROZAC) 20 MG  capsule Take 20 mg by mouth      lidocaine (LIDODERM) 5 % patch Place 1 patch onto the skin daily as needed for moderate pain (12 hours on; 12 hours off. Patient applies to knees or back)      loratadine (CLARITIN) 10 MG tablet Take 10 mg by mouth      memantine (NAMENDA) 5 MG tablet Take 5 mg by mouth      pantoprazole (PROTONIX) 40 MG EC tablet Take 40 mg by mouth      Pilocarpine HCl (SALAGEN PO) Take 5 mg by mouth 3 times daily AM, 1200 & HS      polyethylene glycol (MIRALAX/GLYCOLAX) powder       polyethylene glycol 400 (BLINK TEARS) 0.25 % SOLN ophthalmic solution Place 1-2 drops into both eyes every 2 hours as needed for dry eyes      study - aspirin vs placebo (IDS #5239) 1 tablet tablet Take 81 mg by mouth      !! vitamin E (TOCOPHEROL) 1000 UNIT capsule Take 1 capsule by mouth      !! VITAMIN E NATURAL PO Take 1,000 Units by mouth 2 times daily AM & 3PM       !! - Potential duplicate medications found. Please discuss with provider.                Day of Discharge Exam   /68 (BP Location: Right arm)  Pulse 71  Temp 97.8  F (36.6  C) (Oral)  Resp 18  Ht 1.524 m (5')  Wt 62.6 kg (137 lb 14.4 oz)  SpO2 98%  BMI 26.93 kg/m2    General:  Oriented ***, NAD  Cardio:   RRR  Chest:   Non labored breathing on RA  Abd:   Soft, non-distended, appropriately TTP, incision c/d/i  Ext:   WWP          Brief History of Illness:   Chelly Dave is a 83 year old female with a history of DMT2, Alzheimers, MDD, Sjogren's syndrome, HLD who presented with two days of multiple episodes of emesis, abdominal pain, distension, weakness and inability to tolerate food. She was found to have clinical and imaging consistent with small bowel obstruction. In addition the CT showed mesenteric mass. Patient was admitted for further evaluation and treatment.            Hospital Course:   The patient was admitted and her small bowel obstruction was managed conservatively with NG tube and fluid resuscitation. On HD#2 patient  underwent the above procedure and NG tube was placed. The patient tolerated the procedure well. There were no complications. Postoperatively the patient was transferred to the general floor for further care. On HD#3 patient was febrile and required straight catheterization, but was monitored and had no indications of acute infection, inaddition patient required a sitter due to delirium and frequent removal of lines. On HD#5 patient had still not had return of bowel function and had continued removal of lines requiring PICC placement. On HD#7 patients NG tube was removed due to passing clamp trial. On HD#8 the patient's diet was slowly advanced as bowel function returned. Pain was controlled with oral pain medication and the patient was able to ambulate and void without difficulty. The patient received appropriate education post operatively. On HD#9*** the patient was discharged to home with appropriate instructions and follow up. The patient acknowledged understanding and were in agreement with the plan.         Antibiotics Prescribed at Discharge:   None prescribed         Imaging Studies:     Results for orders placed or performed during the hospital encounter of 06/21/18   XR Abdomen Port 1 View    Narrative    Exam:  XR ABDOMEN PORT 1 VW, 6/21/2018 4:27 AM    History: Eval NG Tube placement;     Comparison:  CT abdomen/pelvis 6/20/2018.    Findings:  Single AP view of the abdomen. Gastric tube tip and  sidehole project over the stomach. Abnormally dilated small bowel in  the midabdomen. No pneumatosis or portal venous gas. Lower lumbar  predominant degenerative change.      Impression    Impression:    1. Gastric tube tip and sidehole project over the stomach body/fundus.  2. Abnormally dilated small bowel in the midabdomen.     I have personally reviewed the examination and initial interpretation  and I agree with the findings.    LAWRENCE MOYA MD   XR Abdomen Port 1 View    Narrative    XR ABDOMEN PORT 1 VW   6/21/2018 10:27 AM      HISTORY: patient removed NG tube and nurse replaced - confirm  placement of NG tube;     COMPARISON: 6/21/2018    FINDINGS: Gastric tube tip and sidehole project over the stomach.  Mildly dilated small bowel loops in the abdomen again noted. No  pneumatosis or portal venous gas. Density within the bladder likely  contrast from prior CT.      Impression    IMPRESSION: Gastric tube tip and sidehole project over the stomach.  Redemonstration of distended small bowel.    I have personally reviewed the examination and initial interpretation  and I agree with the findings.    KERRI GODINEZ MD   XR Abdomen Port 1 View    Narrative    Single view of the abdomen  6/23/2018 5:58 PM      HISTORY: Check NG placement    COMPARISON: 6/21/2018    FINDINGS: Gastric tube tip and sidehole sidehole projecting over the  left upper quadrant, likely body of the stomach. Mildly air distended  small bowel loops. Nonobstructive bowel gas pattern. Post surgical  changes in the right lower abdomen. Bibasilar atelectasis. The most  inferior portions of the abdomen were collimated out of the  field-of-view.      Impression    IMPRESSION:   1. Gastric tube tip and sidehole projecting over the left upper  abdomen, likely body of the stomach.  2. Redemonstration of mildly air distended small bowel loops.    I have personally reviewed the examination and initial interpretation  and I agree with the findings.    MONSERRAT DICKENS MD   XR Abdomen Port 1 View    Narrative    XR ABDOMEN PORT 1 VW  6/24/2018 1:49 AM    History:  verify NG placement; .     Comparison: Abdominal radiograph dated 6/23/2018    Findings:   Supine portable AP chest radiograph. NG tube with the tip and  side-port projects over stomach. Nonobstructive bowel gas pattern.       Impression    IMPRESSION:  NG tube with the tip and side-port projects over stomach.  Nonobstructive bowel gas pattern.    I have personally reviewed the examination and initial  interpretation  and I agree with the findings.    KAMILA MÁRQUEZ MD   XR Chest Port 1 View    Narrative    EXAM: XR CHEST PORT 1 VW  6/25/2018 11:10 AM     HISTORY:  RN placed PICC - verify tip placement;  status post  laparotomy exploratory.     COMPARISON: Chest radiograph 10/21/2017    FINDINGS: Single AP view of chest. Left-sided PICC line tip over the  cavoatrial junction. Gastric tube tip and sidehole over stomach.  Midline trachea. No pneumothorax. Small left pleural effusion. Mild  left basilar streaky opacities. Prominent aortic arch.      Impression    IMPRESSION:   1. Left-sided PICC line tip over the cavoatrial junction.  2. Small left pleural effusion and basilar atelectasis.    I have personally reviewed the examination and initial interpretation  and I agree with the findings.    RANGEL HANCOCK MD   XR Abdomen Port 1 View    Narrative    Exam: XR ABDOMEN PORT 1 VW, 6/25/2018 11:05 PM    Indication: NG Tube placement;     Comparison: Radiograph 6/24/2018    Findings:   Gastric tube projects over the stomach. Tube is looped back in itself  and is prone to kinking in this location. There are a few linear  branching hypodensities projecting over the liver hilum. No dilated  loop of bowel. Surgical sutures in the central abdomen. Degenerative  changes of the lower lumbar spine.      Impression    Impression:   1. Linear branching hyperdensities projecting over the liver may  represent pneumobilia or portal venous gas.  2. Gastric tube is looped back on itself and is prone to kinking in  this location.    [Result: Possible pneumobilia or portal venous gas]    Finding was identified on 6/26/2018 12:11 AM.     Dr. Dominguez was contacted by Dr. Moyer at 6/26/2018 12:16 AM and  verbalized understanding of the urgent finding.     I have personally reviewed the examination and initial interpretation  and I agree with the findings.    LIS RICHTER MD          Final Pathology Result:   Pending at time of  discharge         Discharge Instructions:     - No lifting greater than 15 pounds for 8 weeks after surgery.   - You may shower and get incisions wet starting 48 hrs after surgery but do not scrub incisions or submerge wounds (aka, bath, pool, hot tub, ect.) for 2 weeks. Remove outer dressing (if placed) after 48 hrs from surgery. Please return to clinic for staple removal.  - No driving while taking narcotic pain medications.   - If you develop constipation, take stool softeners such as colace or miralax but stop if you develop diarrhea. Wean yourself off of narcotics.   - Call your primary provider to touch base with them regarding your recent admission.   - If you develop any fever/chills, worsening pain, redness, swelling, or drainage from your wound please call (Clinic [phone]).          Follow-Up:     - Follow up with Dr. Nicolas in clinic in 1-2 week(s) after discharge. You should be called to make an appointment within 3 business days. If you are not contacted, call [phone] to make an appointment        Home Health Care:   {HOME HEALTH CARE:[id number]}           Discharge Disposition:   Discharged to home      Condition at discharge: Stable

## 2018-06-21 NOTE — PLAN OF CARE
"Problem: Patient Care Overview  Goal: Plan of Care/Patient Progress Review  Pt is alert and oriented x4, but forgetul. NG tube ws removed by patient, pt stated that \"  I was not supposed to remove it?\". Pt was reinforced that she needs NG for decompression so pt should not remove NG tube. NG tube gor replaced and placement got confirmed with x-ray, NG tube got connected to LIS with scant  Clear output.. Denied pain. Denied nausea.Up to bathroom with a walker and SBA.Daughter is at bed side.      "

## 2018-06-21 NOTE — ED NOTES
Bed: ED09  Expected date: 6/20/18  Expected time: 7:16 PM  Means of arrival: Ambulance  Comments:  BV3

## 2018-06-21 NOTE — H&P
Surgery Admission History and Physical     Chelly Dave MRN# [id number]   YOB: 1935 Age: 83 year old      Date of Admission:  6/21/2018    CC: Nausea and Vomiting    Assessment: Chelly Dave is an 83 year old year old female that presented with two days of recurrent non-bloody bilious emesis and diffuse abdominal, SBO and mesenteric mass on CT.    Plan:    - NPO  - LR @ 100cc/hr  - NG Tube ordered  - f/u CMP, CBC, AXR ordered    Discussed with [facility] Resident.  -------------------    HPI: Chelly Dave is a(n) 83 year old year old female that has been having multiple episodes of worsening non-bloody and bilious emesis. Patient states that she has been having some abdominal distension, bloating, decreased urination, weakness, flushing, dizziness, and has not been able to keep any fluids or solid food down without throwing up for the past two days. She became weak and called the ambulance and was taken to the ED. Upon further evaluation, CT demonstrated a SBO and unknown mesenteric mass.     ROS:  The remainder of the complete ROS was negative unless noted in the HPI.    Past Medical History:  Past Medical History:   Diagnosis Date     Arthritis      Cataract      Diabetes mellitus (H)      Gastro-oesophageal reflux disease      Hyperlipidemia        Past Surgical History:  Past Surgical History:   Procedure Laterality Date     COLONOSCOPY       ORTHOPEDIC SURGERY      right knee replacement       Allergies:     Allergies   Allergen Reactions     Lisinopril      Oxycodone      Other reaction(s): Confusion     Buffered Aspirin      Other reaction(s): GI Upset  81 mg works well for her       Medications:    Current Facility-Administered Medications on File Prior to Encounter:  [COMPLETED] 0.9% sodium chloride BOLUS   [COMPLETED] iopamidol (ISOVUE-370) solution 500 mL   [DISCONTINUED] ondansetron (ZOFRAN) injection 4 mg     Current Outpatient Prescriptions on File Prior to  Encounter:  acetaminophen (TYLENOL) 500 MG tablet Take 2 tablets by mouth   aspirin 81 MG EC tablet Take 81 mg by mouth daily.   atorvastatin (LIPITOR) 40 MG tablet Take 20 mg by mouth   AzaTHIOprine (IMURAN PO) Take 50 mg by mouth 2 times daily AM & 3PM   blood glucose monitoring (NO BRAND SPECIFIED) test strip USE TO CHECK BLOOD SUGARS EVERY DAY   blood glucose monitoring (SOFTCLIX) lancets USE TO TEST BLOOD SUGARS DAILY   Blood Glucose Monitoring Suppl (FIFTY50 GLUCOSE METER 2.0) w/Device KIT Dispense meter, test strips, lancets covered by pt ins. E11.9 NIDDM type II - Test 1 time/day   Calcium Carb-Cholecalciferol (CALCIUM 500 +D) 500-400 MG-UNIT TABS Take 1 tablet by mouth daily   calcium carbonate-vitamin D 500-400 MG-UNIT TABS per tablet Take 1 tablet by mouth   Cholecalciferol (VITAMIN D3) 1000 units CAPS Take 1,000 Units by mouth   cycloSPORINE (RESTASIS) 0.05 % ophthalmic emulsion Instill 1 drop into both eyes every 12 hours.   No further refills will be given unless you come in for your exam.   darbepoetin aravind-polysorbate (ARANESP) 200 MCG/0.4ML injection Inject 200 mcg Subcutaneous Every 2 months   diclofenac (FLECTOR) 1.3 % Patch Place 1 patch onto the skin   DOXEPIN HCL PO Take 10 mg by mouth At Bedtime   ferrous sulfate (IRON) 325 (65 FE) MG tablet Take 1 tablet (325 mg) by mouth daily   FLUoxetine (PROZAC) 20 MG capsule Take 20 mg by mouth   lidocaine (LIDODERM) 5 % patch Place 1 patch onto the skin daily as needed for moderate pain (12 hours on; 12 hours off. Patient applies to knees or back)   loratadine (CLARITIN) 10 MG tablet Take 10 mg by mouth   memantine (NAMENDA) 5 MG tablet Take 5 mg by mouth   pantoprazole (PROTONIX) 40 MG EC tablet Take 40 mg by mouth   Pilocarpine HCl (SALAGEN PO) Take 5 mg by mouth 3 times daily AM, 1200 & HS   polyethylene glycol (MIRALAX/GLYCOLAX) powder    polyethylene glycol 400 (BLINK TEARS) 0.25 % SOLN ophthalmic solution Place 1-2 drops into both eyes every 2  hours as needed for dry eyes   study - aspirin vs placebo (IDS #5239) 1 tablet tablet Take 81 mg by mouth   vitamin E (TOCOPHEROL) 1000 UNIT capsule Take 1 capsule by mouth   VITAMIN E NATURAL PO Take 1,000 Units by mouth 2 times daily AM & 3PM       Social History:  Social History   Substance Use Topics     Smoking status: Never Smoker     Smokeless tobacco: Never Used     Alcohol use No        Family History:  No family history on file.    Exam:  /77 (BP Location: Left arm)  Temp 96.8  F (36  C) (Oral)  Resp 16  Ht 1.524 m (5')  Wt 62.6 kg (137 lb 14.4 oz)  SpO2 97%  BMI 26.93 kg/m2  General: Alert, interactive, & in NAD  Resp: CTAB, no crackles or wheezes  Cardiac: Regular rate; extremities warm;   Abdomen: Diffusely tender, non-distended, bowel sounds present, no palpable masses  Extremities: No LE edema or obvious joint abnormalities  Skin: Warm and dry, no jaundice or rash    Lactic Acid (Collected at 2209): 0.8   CBC: WBC 6.6, HGB 10.3 (L),   CMP: CO2 19 (L), Cl 111 (H), GFR 54 (L), Protein 9.1 (H), albumin 3.3 (L), Glucose 137 (H) o/w WNL (Creatinine 0.99)  Lipase: 220  Troponin (Collected 2016): <0.015      Imaging:   Recent Results (from the past 24 hour(s))   CT Abdomen Pelvis w Contrast    Narrative    CT ABDOMEN AND PELVIS WITH CONTRAST 6/20/2018 9:13 PM     HISTORY: Upper abdominal pain, vomiting.     TECHNIQUE: Volumetric acquisition through abdomen and pelvis with IV  contrast.  70mL Isovue-370. Radiation dose for this scan was reduced  using automated exposure control, adjustment of the mA and/or kV  according to patient size, or iterative reconstruction technique.    COMPARISON: None.    FINDINGS: 1 cm hypodensity in the posterior aspect of the right  hepatic lobe is too small to accurately characterize, but of doubtful  clinical significance. Slightly contracted gallbladder. Common bile  duct is mildly dilated measuring approximately 1.3 cm. This has not  significantly changed  since a chest CT of 10/17/2011 and is therefore  of doubtful significance. Pancreas, spleen, adrenal glands and kidneys  demonstrate no worrisome findings. Atheromatous calcification of the  aorta without aneurysm.    Stomach is fluid-filled and mildly distended. Several mildly dilated  proximal to mid small bowel loops with an apparent transition in the  right lower abdomen laterally consistent with small bowel obstruction.  Exact cause is not determined. A few small bowel loops at the site of  transition may be slightly edematous. In the adjacent mesentery, there  is a partially calcified mesenteric mass or enlarged lymph node  measuring 3.5 x 1.9 x 4.7 cm. A few additional smaller mesenteric  lymph nodes adjacent to this.    Uterus is present. No suspicious pelvic masses. Colonic diverticula.  Negative appendix. No free air or ascites. Mild degenerative and  hypertrophic changes in the visualized spine. Mild loss of height of  T12, likely chronic.      Impression    IMPRESSION:  1. Mechanical small bowel obstruction.  2. Adjacent to the transition in a few small bowel loops may be  slightly edematous.   3. There is also adjacent indeterminate mesenteric mass which may be  due to adenopathy. Carcinoid, lymphoma or other malignancy cannot be  excluded. Additional smaller mesenteric nodes.  4. Recommend surgical consultation.    STEF BRONSON MD       EKG:  ECG taken at 2016, ECG read at 2020  Normal sinus rhythm  Normal ECG  No significant change from ECG dated 10/21/17.    Rate 80 bpm. IL interval 152. QRS duration 84. QT/QTc 398/459. P-R-T axes 59,-16,47.      Konrad Dominguez MD  General Surgery PGY-1  [phone]    3:01 AM, 6/21/2018

## 2018-06-21 NOTE — IP AVS SNAPSHOT
MRN:[id number]                      After Visit Summary   6/21/2018    Chelly Dave    MRN: [id number]           Thank you!     Thank you for choosing [facility] for your care. Our goal is always to provide you with excellent care. Hearing back from our patients is one way we can continue to improve our services. Please take a few minutes to complete the written survey that you may receive in the mail after you visit with us. Thank you!        Patient Information     Date Of Birth          1935        Designated Caregiver       Most Recent Value    Caregiver    Will someone help with your care after discharge? 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  For non-urgent questions about your medical care, please call your primary care provider or clinic, [phone]  For questions related to your surgery, please call your surgery clinic        Attending Provider     Provider Specialty    Ahsan Nicolas MD General Surgery       Primary Care Provider Office Phone # Fax #    Brittany Heredia [phone]       When to contact your care team       Call the emergency department if you have any of the following: increased pain, dizziness or fainting, vomiting, bloody vomit or stools, drainage from your abdominal wound, or fevers.                  After Care Instructions     Diet       Follow this diet upon discharge: Regular            Discharge Instructions       Activity  - No strenuous exercise for 4  weeks.  - No lifting, pushing, pulling more than 15 pounds for 4 weeks.   - Do not strain with bowel movements.  - Do not drive until you can press the brake pedal quickly and fully without pain.   - Do not operate a motor vehicle while taking narcotic pain medications.     Incisions  - You may shower and get incisions wet starting 48 hrs after surgery.  - Do not scrub incisions or submerge wounds (aka, bath, pool, hot tub, ect.) for 2 weeks.   - Remove wound dressing 48 hours after surgery.   - If purple dermabond glue was used, avoid applying any lotions or ointments.   - If steri-strips were used, they will fall off on their own.   - Leave incision open to air.  Cover with gauze only if needed for comfort or to protect clothing from drainage.     Medications  - Do not take any additional Tylenol (acetaminophen) while using Percocet or Vicodin.  - Do not take more than 4,000mg of Tylenol (acetaminophen) in any 24 hour period, as this can cause liver damage.  - Take stool softeners such as Senna while you are using narcotics, but stop if you develop diarrhea.   - Wean yourself off of narcotic pain medications.     Follow-Up:  - Call your primary care provider to touch base regarding your recent admission - also remember to address questions you had upon discharge such as basic lab tests and management of high blood pressure as outpatient.   - Call or return sooner than your regularly scheduled visit if you develop any of the following:  fever, uncontrolled pain, uncontrolled nausea or vomiting, as well as increased redness, swelling, or drainage from your wound                  Follow-up Appointments     Adult [facility] Follow-up and recommended labs and tests       Follow up with Dr. Nicolas at [facility], general surgery clinic within 1-2 weeks  to evaluate after surgery. No follow up labs or test are needed.    Appointments on [place] and/or [facility] (with [facility] or [facility]  provider or service). Call [phone] if you haven't heard regarding these appointments within 7 days of discharge.                  Additional Services     Home care nursing referral       [facility]  Phone: [phone]  Fax: [phone]    RN skilled nursing visit. RN to assess vital signs and weight, respiratory and cardiac status, pain level and activity tolerance, incision for signs/symptoms of infection, hydration, nutrition and bowel status and home safety.  RN to teach medication management.    PT/OT to eval and treat    Resumption of [facility] services.      Your provider has ordered home care nursing services. If you have not been contacted within 2 days of your discharge please call the inpatient department phone number at [phone] .                  Further instructions from your care team       __________________________________________________________  D/C'ing nurse: Please fax to following agencies at time of patient d/c.  ____________________________________________________________    [facility]  Fax: [phone]    Pending Results     Date and Time Order Name Status Description    6/22/2018 1428 Surgical pathology exam In process             Statement of Approval     Ordered          06/29/18 1242  I have reviewed and agree with all the recommendations and orders detailed in this document.  EFFECTIVE NOW     Approved and electronically signed by:  Veronica Goodman MD             Admission Information     Date & Time Provider Department Dept. Phone    6/21/2018 Ahsan Nicolas MD [address] [facility] [place] [phone]      Your Vitals Were     Blood Pressure Pulse Temperature Respirations Height Weight    166/72 (BP Location: Right arm) 68 98.9  F (37.2  C) (Oral) 16 1.524 m (5') 62.6 kg (137 lb 14.4 oz)    Pulse Oximetry BMI (Body Mass Index)                100% 26.93 kg/m2          MyChart Information     MyChart lets you send messages to your doctor, view  "your test results, renew your prescriptions, schedule appointments and more. To sign up, go to [email or web address] . Click on \"Log in\" on the left side of the screen, which will take you to the Welcome page. Then click on \"Sign up Now\" on the right side of the page.     You will be asked to enter the access code listed below, as well as some personal information. Please follow the directions to create your username and password.     Your access code is: [email or web address]  Expires: 2018 11:34 AM     Your access code will  in 90 days. If you need help or a new code, please call your [facility] clinic or [phone].        Care EveryWhere ID     This is your Care EveryWhere ID. This could be used by other organizations to access your [facility] medical records  [id number]        Equal Access to Services     [facility] : Shaun Zuniga, stephane beckham, evie ku, manfred sol . So [facility] [phone].    ATENCIÓN: Si habla español, tiene a dawson disposición servicios gratuitos de asistencia lingüística. James al [phone].    We comply with applicable federal civil rights laws and [place] laws. We do not discriminate on the basis of race, color, national origin, age, disability, sex, sexual orientation, or gender identity.               Review of your medicines      CONTINUE these medicines which have NOT CHANGED        Dose / Directions    acetaminophen 500 MG tablet   Commonly known as:  TYLENOL        Dose:  2 tablet   Take 2 tablets by mouth   Refills:  0       aspirin 81 MG EC tablet        Dose:  81 mg   Take 81 mg by mouth daily.   Refills:  0       atorvastatin 40 MG tablet   Commonly known as:  LIPITOR        Dose:  20 mg   Take 20 mg by mouth   Refills:  0       BLINK TEARS 0.25 % Soln ophthalmic solution   Generic drug:  polyethylene glycol 400        Dose:  1-2 drop   Place 1-2 drops into both eyes every 2 hours as needed for dry eyes "   Refills:  0       blood glucose monitoring lancets        USE TO TEST BLOOD SUGARS DAILY   Refills:  0       blood glucose monitoring test strip   Commonly known as:  no brand specified        USE TO CHECK BLOOD SUGARS EVERY DAY   Refills:  0       calcium 500 +D 500-400 MG-UNIT Tabs   Generic drug:  Calcium Carb-Cholecalciferol        Dose:  1 tablet   Take 1 tablet by mouth daily   Refills:  0       calcium carbonate-vitamin D 500-400 MG-UNIT Tabs per tablet        Dose:  1 tablet   Take 1 tablet by mouth   Refills:  0       cycloSPORINE 0.05 % ophthalmic emulsion   Commonly known as:  RESTASIS        Instill 1 drop into both eyes every 12 hours.   No further refills will be given unless you come in for your exam.   Refills:  0       darbepoetin aravind-polysorbate 200 MCG/0.4ML injection   Commonly known as:  ARANESP        Dose:  200 mcg   Inject 200 mcg Subcutaneous Every 2 months   Refills:  0       diclofenac 1.3 % Patch   Commonly known as:  FLECTOR        Dose:  1 patch   Place 1 patch onto the skin   Refills:  0       DOXEPIN HCL PO        Dose:  10 mg   Take 10 mg by mouth At Bedtime   Refills:  0       ferrous sulfate 325 (65 Fe) MG tablet   Commonly known as:  IRON   Used for:  Iron deficiency anemia, unspecified iron deficiency anemia type        Dose:  325 mg   Take 1 tablet (325 mg) by mouth daily   Quantity:  100 tablet   Refills:  0       FIFTY50 GLUCOSE METER 2.0 w/Device Kit        Dispense meter, test strips, lancets covered by pt ins. E11.9 NIDDM type II - Test 1 time/day   Refills:  0       FLUoxetine 20 MG capsule   Commonly known as:  PROzac        Dose:  20 mg   Take 20 mg by mouth   Refills:  0       IMURAN PO        Dose:  50 mg   Take 50 mg by mouth 2 times daily AM & 3PM   Refills:  0       lidocaine 5 % Patch   Commonly known as:  LIDODERM        Dose:  1 patch   Place 1 patch onto the skin daily as needed for moderate pain (12 hours on; 12 hours off. Patient applies to knees or back)    Refills:  0       loratadine 10 MG tablet   Commonly known as:  CLARITIN        Dose:  10 mg   Take 10 mg by mouth   Refills:  0       memantine 5 MG tablet   Commonly known as:  NAMENDA        Dose:  5 mg   Take 5 mg by mouth   Refills:  0       pantoprazole 40 MG EC tablet   Commonly known as:  PROTONIX        Dose:  40 mg   Take 40 mg by mouth   Refills:  0       polyethylene glycol powder   Commonly known as:  MIRALAX/GLYCOLAX        Refills:  0       SALAGEN PO        Dose:  5 mg   Take 5 mg by mouth 3 times daily AM, 1200 & HS   Refills:  0       study - aspirin vs placebo 1 tablet tablet   Commonly known as:  IDS #5239        Dose:  81 mg   Take 81 mg by mouth   Refills:  0       vitamin D3 1000 units Caps        Dose:  1000 Units   Take 1,000 Units by mouth   Refills:  0       * VITAMIN E NATURAL PO        Dose:  1000 Units   Take 1,000 Units by mouth 2 times daily AM & 3PM   Refills:  0       * vitamin E 1000 UNIT capsule   Commonly known as:  TOCOPHEROL        Dose:  1 capsule   Take 1 capsule by mouth   Refills:  0       * Notice:  This list has 2 medication(s) that are the same as other medications prescribed for you. Read the directions carefully, and ask your doctor or other care provider to review them with you.             Protect others around you: Learn how to safely use, store and throw away your medicines at www.disposemymeds.org.             Medication List: This is a list of all your medications and when to take them. Check marks below indicate your daily home schedule. Keep this list as a reference.      Medications           Morning Afternoon Evening Bedtime As Needed    acetaminophen 500 MG tablet   Commonly known as:  TYLENOL   Take 2 tablets by mouth                                aspirin 81 MG EC tablet   Take 81 mg by mouth daily.                                atorvastatin 40 MG tablet   Commonly known as:  LIPITOR   Take 20 mg by mouth                                BLINK TEARS 0.25  % Soln ophthalmic solution   Place 1-2 drops into both eyes every 2 hours as needed for dry eyes   Generic drug:  polyethylene glycol 400                                blood glucose monitoring lancets   USE TO TEST BLOOD SUGARS DAILY                                blood glucose monitoring test strip   Commonly known as:  no brand specified   USE TO CHECK BLOOD SUGARS EVERY DAY                                calcium 500 +D 500-400 MG-UNIT Tabs   Take 1 tablet by mouth daily   Generic drug:  Calcium Carb-Cholecalciferol                                calcium carbonate-vitamin D 500-400 MG-UNIT Tabs per tablet   Take 1 tablet by mouth                                cycloSPORINE 0.05 % ophthalmic emulsion   Commonly known as:  RESTASIS   Instill 1 drop into both eyes every 12 hours.   No further refills will be given unless you come in for your exam.   Last time this was given:  1 drop on 6/29/2018  9:08 AM                                darbepoetin aravind-polysorbate 200 MCG/0.4ML injection   Commonly known as:  ARANESP   Inject 200 mcg Subcutaneous Every 2 months   Last time this was given:  200 mcg on 6/28/2018  5:52 PM                                diclofenac 1.3 % Patch   Commonly known as:  FLECTOR   Place 1 patch onto the skin                                DOXEPIN HCL PO   Take 10 mg by mouth At Bedtime                                ferrous sulfate 325 (65 Fe) MG tablet   Commonly known as:  IRON   Take 1 tablet (325 mg) by mouth daily                                FIFTY50 GLUCOSE METER 2.0 w/Device Kit   Dispense meter, test strips, lancets covered by pt ins. E11.9 NIDDM type II - Test 1 time/day                                FLUoxetine 20 MG capsule   Commonly known as:  PROzac   Take 20 mg by mouth   Last time this was given:  20 mg on 6/29/2018  9:08 AM                                IMURAN PO   Take 50 mg by mouth 2 times daily AM & 3PM                                lidocaine 5 % Patch   Commonly known  as:  LIDODERM   Place 1 patch onto the skin daily as needed for moderate pain (12 hours on; 12 hours off. Patient applies to knees or back)                                loratadine 10 MG tablet   Commonly known as:  CLARITIN   Take 10 mg by mouth                                memantine 5 MG tablet   Commonly known as:  NAMENDA   Take 5 mg by mouth                                pantoprazole 40 MG EC tablet   Commonly known as:  PROTONIX   Take 40 mg by mouth                                polyethylene glycol powder   Commonly known as:  MIRALAX/GLYCOLAX                                SALAGEN PO   Take 5 mg by mouth 3 times daily AM, 1200 & HS                                study - aspirin vs placebo 1 tablet tablet   Commonly known as:  IDS #5239   Take 81 mg by mouth                                vitamin D3 1000 units Caps   Take 1,000 Units by mouth                                * VITAMIN E NATURAL PO   Take 1,000 Units by mouth 2 times daily AM & 3PM                                * vitamin E 1000 UNIT capsule   Commonly known as:  TOCOPHEROL   Take 1 capsule by mouth                                * Notice:  This list has 2 medication(s) that are the same as other medications prescribed for you. Read the directions carefully, and ask your doctor or other care provider to review them with you.

## 2018-06-21 NOTE — ED TRIAGE NOTES
Pt presents for evaluation of a 24 hour history of nausea and vomiting. Pt has had 6 episodes of emesis since it started. Family states pt has also been getting weaker since, has Alzheimer's and was starting to forget to walk earlier today, had a near syncopal episode and decreased urine output, prompting them to call 911. Pt also reports 1-2 episodes of diarrhea PTA. Pt c/o epigastric pain.

## 2018-06-21 NOTE — ED PROVIDER NOTES
History     Chief Complaint:  Nausea, Vomiting, & Diarrhea    HPI   Chelly Dave is a 83 year old female who presents to the emergency department today for evaluation of nausea/vomiting since yesterday. Family notes the patient has had 6 episodes of non-bloody vomiting. She has had epigastric abdominal pain and decreased urine output as well. The patient has been getting progressively weaker and had a near-syncopal episode today as well. No recent antibiotic use. No sick contacts of late, though she does go to an adult . The patient denies having any diarrhea.  No prior abdominal surgeries.    Allergies:  Lisinopril  Oxycodone  Buffered Aspirin      Medications:    Atorvastatin  Imuran  Flector  Doxepin  Prozac  Namenda  Protonix  Pilocarpine  Aspirin    Past Medical History:    GERD  Type 2 diabetes mellitus  Arthritis  Hyperlipidemia     Past Surgical History:    Right knee replacement    Family History:    History reviewed. No pertinent family history.      Social History:  The patient was accompanied to the ED by family.  Smoking Status: never  Smokeless Tobacco: never  Alcohol Use: no   Marital Status:   [5]     Review of Systems   Gastrointestinal: Positive for abdominal pain, nausea and vomiting. Negative for diarrhea.   Genitourinary: Positive for decreased urine volume.   Neurological: Positive for weakness.   All other systems reviewed and are negative.    Physical Exam     Patient Vitals for the past 24 hrs:   BP Temp Temp src Pulse Heart Rate Resp SpO2 Height Weight   06/20/18 2209 136/84 - - - 84 18 95 % - -   06/20/18 2026 141/73 - - - 78 17 98 % - -   06/20/18 1933 145/80 99.6  F (37.6  C) Oral 94 - 16 97 % 1.524 m (5') 62.6 kg (138 lb)      Physical Exam  General:                        Well-nourished                        Speaking in full sentences  Eyes:                        Conjunctiva without injection or scleral icterus                        PERRL  ENT:                         Moist mucous membranes                        Posterior oropharynx clear without erythema or exudate                        Nares patent                        Pinnae normal  Neck:                        Full ROM                        No stiffness appreciated  Resp:                        Lungs CTAB                        No crackles, wheezing or audible rubs                        Good air movement  CV:                                        Normal rate, regular rhythm                        S1 and S2 present                        No murmur, gallop or rub  GI:                        BS present                        Abdomen soft without distention                        Mild epigastric tenderness to palpation             No palpable masses                        No guarding or rebound tenderness  Skin:                        Warm, dry, well perfused                        No rashes or open wounds on exposed skin  MSK:                        Moves all extremities                        No focal deformities or swelling  Neuro:                        Alert                        Answers questions appropriately                        Moves all extremities equally                        Gait stable  Psych:                        Normal affect, normal mood         Emergency Department Course     ECG:  ECG taken at 2016, ECG read at 2020  Normal sinus rhythm  Normal ECG  No significant change from ECG dated 10/21/17.    Rate 80 bpm. AZ interval 152. QRS duration 84. QT/QTc 398/459. P-R-T axes 59,-16,47.     Imaging:  Radiology findings were communicated with the patient who voiced understanding of the findings.    CT Abdomen Pelvis w Contrast  1. Mechanical small bowel obstruction.  2. Adjacent to the transition in a few small bowel loops may be  slightly edematous.   3. There is also adjacent indeterminate mesenteric mass which may be  due to adenopathy. Carcinoid, lymphoma or other malignancy cannot be  excluded.  Additional smaller mesenteric nodes.  4. Recommend surgical consultation.  STEF BRONSON MD    Laboratory:  Laboratory findings were communicated with the patient who voiced understanding of the findings.  Lactic Acid (Collected at 2209): 0.8   CBC: WBC 6.6, HGB 10.3 (L),   CMP: CO2 19 (L), Cl 111 (H), GFR 54 (L), Protein 9.1 (H), albumin 3.3 (L), Glucose 137 (H) o/w WNL (Creatinine 0.99)  Lipase: 220  Troponin (Collected 2016): <0.015       Interventions:  2028 Zofran 4mg IV   2248 Zofran 4mg IV      Emergency Department Course:  Nursing notes and vitals reviewed.  I entered the room.  I performed an exam of the patient as documented above.     EKG obtained in the ED, see results above.      IV was inserted and blood was drawn for laboratory testing, results above.    The patient provided a urine sample here in the emergency department. This was sent for laboratory testing, findings above.     The patient was sent for CT Scan while in the emergency department, results above.     The patient received the above intervention(s).     2205 the patient was rechecked and updated the family regarding the results of the laboratory and imaging studies.      2210 I spoke with Dr. Chan of the general surgery service regarding patient's presentation, findings, and plan of care. Suggested transfer to [facility] for general surgery service.     2328 I spoke with Dr. Nicolas of the general surgery service from [facility] regarding patient's presentation, findings, and plan of care.     Rechecked the patient, findings and plan explained to the patient, who consents to transfer and admission. Discussed the patient with Dr. Nicolas at [facility], who will admit the patient to a bed for further monitoring, evaluation, and treatment. The patient was transferred via EMS to [facility].      Impression & Plan      Medical Decision Making:  Chelly Dave is an  83-year-old female presenting to the emergency department accompanied by her daughters for evaluation of nausea and vomiting. VS on presentation reveal elevated BP although otherwise are unremarkable. Given upper abdominal discomfort, advanced imaging was obtained. This is notable for a mechanical small bowel obstruction with a few small bowel loops with mild edema, as well as an adjacent mesenteric mass measuring 3.5 x 1.9 x 4.7 cm. Labs reveal normal WBC count and lactate. Symptoms in the ED are well-controlled with IV fluids and Zofran. Case initially discussed with Dr. Chan of general surgery who felt that given the associated mesenteric mass, patient will be better served at the [facility]. Patient and family members were updated regarding the results of the above studies. She will be transferred to the [facility] under the care of Dr. Nicolas for further treatment and care.      Diagnosis:    ICD-10-CM    1. Small bowel obstruction K56.609 Lactic acid whole blood   2. 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     Scribe Disclosure:  I, Kelvin Gray, am serving as a scribe on 6/20/2018 to document services personally performed by Haris Alex MD, based on my observations and the provider's statements to me.   [facility] EMERGENCY DEPARTMENT       Haris Alex MD  06/21/18 0010

## 2018-06-21 NOTE — LETTER
Transition Communication Hand-off for Care Transitions to Next Level of Care Provider    Name: Chelly Dave  : 1935  MRN #: [id number]  Primary Care Provider: Brittany Heredia     Primary Clinic: [facility] [address]  [facility] [address]     Reason for Hospitalization:  Small bowel obstruction [K56.609]  Admit Date/Time: 2018  1:32 AM  Discharge Date: 2018  Payor Source: Payor: MEDICA / Plan: MEDICA [employer] / Product Type: Indemnity /          Reason for Communication Hand-off Referral: Other continuity of care    Discharge Plan: Discharged to home with resumption of home services and skilled home RN/PT/OT      Discharge Needs Assessment:  Needs       Most Recent Value    Equipment Currently Used at Home cane, straight, walker, rolling, shower chair            Any outstanding tests or procedures:        Referrals     Future Labs/Procedures    Home care nursing referral     Comments:    [facility]  Phone: [phone]  Fax: [phone]    RN skilled nursing visit. RN to assess vital signs and weight, respiratory and cardiac status, pain level and activity tolerance, incision for signs/symptoms of infection, hydration, nutrition and bowel status and home safety.  RN to teach medication management.    PT/OT to eval and treat    Resumption of PCA and Homemaking services.      Your provider has ordered home care nursing services. If you have not been contacted within 2 days of your discharge please call the inpatient department phone number at [phone] .            Destiny Chacon RNCC  [phone]    AVS/Discharge Summary is the source of truth; this is a helpful guide for improved communication of patient story

## 2018-06-21 NOTE — IP AVS SNAPSHOT
[address] [address]    Phone:  [phone]                                       After Visit Summary   6/21/2018    Chelly Dave    MRN: [id number]           After Visit Summary Signature Page     I have received my discharge instructions, and my questions have been answered. I have discussed any challenges I see with this plan with the nurse or doctor.    ..........................................................................................................................................  Patient/Patient Representative Signature      ..........................................................................................................................................  Patient Representative Print Name and Relationship to Patient    ..................................................               ................................................  Date                                            Time    ..........................................................................................................................................  Reviewed by Signature/Title    ...................................................              ..............................................  Date                                                            Time

## 2018-06-21 NOTE — PROGRESS NOTES
"Surgery Progress Note  Chelly Dave  [id number]    S:  Patient is doing well and reports feeling better since last night. Had some intermittent nausea vomiting throughout the past week but none since hospital admission. Patient denies BM since admission, but daughter reports \"small wet diarrhea\" overnight. Daughter also reports same clinical picture and mass discovered in patient's son in 2009.    Per nursing report, patient was seen with NG tube out of nose. NG tube replaced and repeat AXR ordered to confirm position.    O:  Temp:  [96.8  F (36  C)-99.6  F (37.6  C)] 97.9  F (36.6  C)  Pulse:  [94] 94  Heart Rate:  [74-91] 74  Resp:  [15-20] 16  BP: (130-155)/(73-93) 138/76  SpO2:  [95 %-99 %] 98 %    I/O last 3 completed shifts:  In: 313.33 [I.V.:313.33]  Out: 200 [Urine:200]    Gen: A&Ox3, NAD. Interactive and responds appropriately with help from daughter. Appears mildly confused.  Resp: non-labored breathing on room air  Abd: soft, non-tender, non-distended on palpation. No previous surgical scars visible. No masses appreciated.  Ext: warm and well perfused.    BMP  Recent Labs  Lab 06/21/18  0730 06/20/18 2016    138   POTASSIUM 4.0 3.5   CHLORIDE 111* 111*   ROBSON 8.4* 8.5   CO2 20 19*   BUN 21 23   CR 0.83 0.99   * 137*     CBC  Recent Labs  Lab 06/21/18  0730 06/20/18 2016   WBC 2.5* 6.6   RBC 3.35* 3.26*   HGB 10.1* 10.3*   HCT 32.0* 33.0*   MCV 96 101*   MCH 30.1 31.6   MCHC 31.6 31.2*   RDW 14.9 14.5    225     INRNo lab results found in last 7 days.     Imaging  CT abdomen pelvis reviewed: mesenteric mass observed (approximately 3.5cm x 1.9cm x 4.7cm) with adjacent small bowel obstruction visible.    AXR for NG tube placement: tube and side hole appear within stomach and do not cross midline.          A/P: Chelly Dave is a 83 year old female who presented to [facility] with 2 days of recurrent non-bloody bilious emesis and abdominal discomfort with SBO and mesenteric mass " discovered on CT. She is currently well with stable vitals but does report intermittent history of abdominal discomfort and nausea/vomiting. Given the location of the mass and other patient factors, will consider biopsy/surgery in the future and proceed with conservative management at present.  - NPO  - NG tube for stomach decompression  - IV fluids:  mL/hr  - f/u CBC, BMP  - consider biopsy and/or surgery at a later date    Discussed with Dr. Fry, chief resident who will staff with attending Dr. Nicolas.    Veronica Goodman MD   PGY-1 Surgery Resident  Pager: [phone]

## 2018-06-21 NOTE — PLAN OF CARE
"Problem: Patient Care Overview  Goal: Plan of Care/Patient Progress Review  Pt called out to go to bathroom,nursing assistant noticed NG tube was out and notified writer. Pt stated \" I was not supposed to remove it?\". Pt was reinforced that she should not remove her NG tube.Veronica Arrington is notified and Md stated to out NG tube back. Ng tube is placed, awaiting x-ray confirmation.      "

## 2018-06-21 NOTE — PLAN OF CARE
Problem: Patient Care Overview  Goal: Plan of Care/Patient Progress Review  Outcome: No Change  /77 (BP Location: Left arm)  Temp 96.8  F (36  C) (Oral)  Resp 16  Ht 1.524 m (5')  Wt 62.6 kg (137 lb 14.4 oz)  SpO2 97%  BMI 26.93 kg/m2    Transfer from [facility], arrived to unit at 0130. Alert & oriented, forgetful. Daughter at bedside. Oriented to room and admission completed. Afeb, AVSS. O2 sats 97% on RA. Denies SOB. LS clear. Up w/ A-1 w/ cane (walker ordered), unsteady on feet. Denies pain. Voided x1. + BS, + gas, reported BM yesterday. Skin is intact. NG placed at 0400, Xray done, MD paged to verify placement. NG placed to LIS @ 0640 w/ minimal clear output. PIV w/ LR @ 100 mL/h. . Able to make needs known. Continue w/ POC.

## 2018-06-22 ENCOUNTER — ANESTHESIA (OUTPATIENT)
Dept: SURGERY | Facility: CLINIC | Age: 83
DRG: 331 | End: 2018-06-22
Payer: COMMERCIAL

## 2018-06-22 LAB
ABO + RH BLD: NORMAL
ABO + RH BLD: NORMAL
BASE DEFICIT BLDA-SCNC: 2.9 MMOL/L
BLD GP AB SCN SERPL QL: NORMAL
BLD PROD TYP BPU: NORMAL
BLOOD BANK CMNT PATIENT-IMP: NORMAL
CA-I BLD-MCNC: 4.4 MG/DL (ref 4.4–5.2)
GLUCOSE BLD-MCNC: 151 MG/DL (ref 70–99)
GLUCOSE BLDC GLUCOMTR-MCNC: 125 MG/DL (ref 70–99)
GLUCOSE BLDC GLUCOMTR-MCNC: 65 MG/DL (ref 70–99)
GLUCOSE BLDC GLUCOMTR-MCNC: 67 MG/DL (ref 70–99)
GLUCOSE BLDC GLUCOMTR-MCNC: 78 MG/DL (ref 70–99)
GLUCOSE BLDC GLUCOMTR-MCNC: 85 MG/DL (ref 70–99)
GLUCOSE BLDC GLUCOMTR-MCNC: 87 MG/DL (ref 70–99)
GLUCOSE BLDC GLUCOMTR-MCNC: 89 MG/DL (ref 70–99)
HCO3 BLD-SCNC: 21 MMOL/L (ref 21–28)
HGB BLD-MCNC: 9.2 G/DL (ref 11.7–15.7)
LACTATE BLD-SCNC: 0.5 MMOL/L (ref 0.7–2)
NUM BPU REQUESTED: 1
O2/TOTAL GAS SETTING VFR VENT: 76 %
PCO2 BLD: 30 MM HG (ref 35–45)
PH BLD: 7.44 PH (ref 7.35–7.45)
PO2 BLD: 327 MM HG (ref 80–105)
POTASSIUM BLD-SCNC: 3 MMOL/L (ref 3.4–5.3)
SODIUM BLD-SCNC: 140 MMOL/L (ref 133–144)
SPECIMEN EXP DATE BLD: NORMAL

## 2018-06-22 PROCEDURE — 40000014 ZZH STATISTIC ARTERIAL MONITORING DAILY

## 2018-06-22 PROCEDURE — 40000275 ZZH STATISTIC RCP TIME EA 10 MIN

## 2018-06-22 PROCEDURE — 25800025 ZZH RX 258: Performed by: STUDENT IN AN ORGANIZED HEALTH CARE EDUCATION/TRAINING PROGRAM

## 2018-06-22 PROCEDURE — 83605 ASSAY OF LACTIC ACID: CPT | Performed by: ANESTHESIOLOGY

## 2018-06-22 PROCEDURE — 88313 SPECIAL STAINS GROUP 2: CPT | Performed by: SURGERY

## 2018-06-22 PROCEDURE — 86923 COMPATIBILITY TEST ELECTRIC: CPT | Performed by: STUDENT IN AN ORGANIZED HEALTH CARE EDUCATION/TRAINING PROGRAM

## 2018-06-22 PROCEDURE — 88342 IMHCHEM/IMCYTCHM 1ST ANTB: CPT | Performed by: SURGERY

## 2018-06-22 PROCEDURE — 82330 ASSAY OF CALCIUM: CPT | Performed by: ANESTHESIOLOGY

## 2018-06-22 PROCEDURE — 25000128 H RX IP 250 OP 636: Performed by: ANESTHESIOLOGY

## 2018-06-22 PROCEDURE — 25000125 ZZHC RX 250: Performed by: STUDENT IN AN ORGANIZED HEALTH CARE EDUCATION/TRAINING PROGRAM

## 2018-06-22 PROCEDURE — 86900 BLOOD TYPING SEROLOGIC ABO: CPT | Performed by: STUDENT IN AN ORGANIZED HEALTH CARE EDUCATION/TRAINING PROGRAM

## 2018-06-22 PROCEDURE — 88341 IMHCHEM/IMCYTCHM EA ADD ANTB: CPT | Performed by: SURGERY

## 2018-06-22 PROCEDURE — 37000008 ZZH ANESTHESIA TECHNICAL FEE, 1ST 30 MIN: Performed by: SURGERY

## 2018-06-22 PROCEDURE — 37000009 ZZH ANESTHESIA TECHNICAL FEE, EACH ADDTL 15 MIN: Performed by: SURGERY

## 2018-06-22 PROCEDURE — 84132 ASSAY OF SERUM POTASSIUM: CPT | Performed by: ANESTHESIOLOGY

## 2018-06-22 PROCEDURE — 40000141 ZZH STATISTIC PERIPHERAL IV START W/O US GUIDANCE

## 2018-06-22 PROCEDURE — 0DB80ZZ EXCISION OF SMALL INTESTINE, OPEN APPROACH: ICD-10-PCS | Performed by: SURGERY

## 2018-06-22 PROCEDURE — 0DBA0ZZ EXCISION OF JEJUNUM, OPEN APPROACH: ICD-10-PCS | Performed by: SURGERY

## 2018-06-22 PROCEDURE — 36415 COLL VENOUS BLD VENIPUNCTURE: CPT | Performed by: STUDENT IN AN ORGANIZED HEALTH CARE EDUCATION/TRAINING PROGRAM

## 2018-06-22 PROCEDURE — 25000128 H RX IP 250 OP 636: Performed by: NURSE ANESTHETIST, CERTIFIED REGISTERED

## 2018-06-22 PROCEDURE — 25000132 ZZH RX MED GY IP 250 OP 250 PS 637: Performed by: STUDENT IN AN ORGANIZED HEALTH CARE EDUCATION/TRAINING PROGRAM

## 2018-06-22 PROCEDURE — 71000014 ZZH RECOVERY PHASE 1 LEVEL 2 FIRST HR: Performed by: SURGERY

## 2018-06-22 PROCEDURE — 82803 BLOOD GASES ANY COMBINATION: CPT | Performed by: ANESTHESIOLOGY

## 2018-06-22 PROCEDURE — 71000015 ZZH RECOVERY PHASE 1 LEVEL 2 EA ADDTL HR: Performed by: SURGERY

## 2018-06-22 PROCEDURE — 88307 TISSUE EXAM BY PATHOLOGIST: CPT | Performed by: SURGERY

## 2018-06-22 PROCEDURE — 00000146 ZZHCL STATISTIC GLUCOSE BY METER IP

## 2018-06-22 PROCEDURE — 36000057 ZZH SURGERY LEVEL 3 1ST 30 MIN - UMMC: Performed by: SURGERY

## 2018-06-22 PROCEDURE — 84295 ASSAY OF SERUM SODIUM: CPT | Performed by: ANESTHESIOLOGY

## 2018-06-22 PROCEDURE — 25000128 H RX IP 250 OP 636: Performed by: STUDENT IN AN ORGANIZED HEALTH CARE EDUCATION/TRAINING PROGRAM

## 2018-06-22 PROCEDURE — 86901 BLOOD TYPING SEROLOGIC RH(D): CPT | Performed by: STUDENT IN AN ORGANIZED HEALTH CARE EDUCATION/TRAINING PROGRAM

## 2018-06-22 PROCEDURE — 36000059 ZZH SURGERY LEVEL 3 EA 15 ADDTL MIN UMMC: Performed by: SURGERY

## 2018-06-22 PROCEDURE — 82947 ASSAY GLUCOSE BLOOD QUANT: CPT | Performed by: ANESTHESIOLOGY

## 2018-06-22 PROCEDURE — 25000564 ZZH DESFLURANE, EA 15 MIN: Performed by: SURGERY

## 2018-06-22 PROCEDURE — 0DBV0ZZ EXCISION OF MESENTERY, OPEN APPROACH: ICD-10-PCS | Performed by: SURGERY

## 2018-06-22 PROCEDURE — 88309 TISSUE EXAM BY PATHOLOGIST: CPT | Performed by: SURGERY

## 2018-06-22 PROCEDURE — 27210794 ZZH OR GENERAL SUPPLY STERILE: Performed by: SURGERY

## 2018-06-22 PROCEDURE — P9041 ALBUMIN (HUMAN),5%, 50ML: HCPCS | Performed by: NURSE ANESTHETIST, CERTIFIED REGISTERED

## 2018-06-22 PROCEDURE — C9399 UNCLASSIFIED DRUGS OR BIOLOG: HCPCS | Performed by: NURSE ANESTHETIST, CERTIFIED REGISTERED

## 2018-06-22 PROCEDURE — 25000125 ZZHC RX 250: Performed by: NURSE ANESTHETIST, CERTIFIED REGISTERED

## 2018-06-22 PROCEDURE — 12000003 ZZH R&B CRITICAL UMMC

## 2018-06-22 PROCEDURE — 40000171 ZZH STATISTIC PRE-PROCEDURE ASSESSMENT III: Performed by: SURGERY

## 2018-06-22 PROCEDURE — 86850 RBC ANTIBODY SCREEN: CPT | Performed by: STUDENT IN AN ORGANIZED HEALTH CARE EDUCATION/TRAINING PROGRAM

## 2018-06-22 RX ORDER — ALBUMIN, HUMAN INJ 5% 5 %
SOLUTION INTRAVENOUS CONTINUOUS PRN
Status: DISCONTINUED | OUTPATIENT
Start: 2018-06-22 | End: 2018-06-22

## 2018-06-22 RX ORDER — NALOXONE HYDROCHLORIDE 0.4 MG/ML
.1-.4 INJECTION, SOLUTION INTRAMUSCULAR; INTRAVENOUS; SUBCUTANEOUS
Status: DISCONTINUED | OUTPATIENT
Start: 2018-06-22 | End: 2018-06-22 | Stop reason: HOSPADM

## 2018-06-22 RX ORDER — LIDOCAINE HYDROCHLORIDE AND EPINEPHRINE 15; 5 MG/ML; UG/ML
INJECTION, SOLUTION EPIDURAL PRN
Status: DISCONTINUED | OUTPATIENT
Start: 2018-06-22 | End: 2018-06-22

## 2018-06-22 RX ORDER — FENTANYL CITRATE 50 UG/ML
25-50 INJECTION, SOLUTION INTRAMUSCULAR; INTRAVENOUS
Status: DISCONTINUED | OUTPATIENT
Start: 2018-06-22 | End: 2018-06-22 | Stop reason: HOSPADM

## 2018-06-22 RX ORDER — GLYCOPYRROLATE 0.2 MG/ML
INJECTION, SOLUTION INTRAMUSCULAR; INTRAVENOUS PRN
Status: DISCONTINUED | OUTPATIENT
Start: 2018-06-22 | End: 2018-06-22

## 2018-06-22 RX ORDER — NALOXONE HYDROCHLORIDE 0.4 MG/ML
.1-.4 INJECTION, SOLUTION INTRAMUSCULAR; INTRAVENOUS; SUBCUTANEOUS
Status: ACTIVE | OUTPATIENT
Start: 2018-06-22 | End: 2018-06-23

## 2018-06-22 RX ORDER — LIDOCAINE HYDROCHLORIDE 20 MG/ML
INJECTION, SOLUTION INFILTRATION; PERINEURAL PRN
Status: DISCONTINUED | OUTPATIENT
Start: 2018-06-22 | End: 2018-06-22

## 2018-06-22 RX ORDER — FENTANYL CITRATE 50 UG/ML
INJECTION, SOLUTION INTRAMUSCULAR; INTRAVENOUS PRN
Status: DISCONTINUED | OUTPATIENT
Start: 2018-06-22 | End: 2018-06-22

## 2018-06-22 RX ORDER — HYDROMORPHONE HYDROCHLORIDE 1 MG/ML
.3-.5 INJECTION, SOLUTION INTRAMUSCULAR; INTRAVENOUS; SUBCUTANEOUS EVERY 5 MIN PRN
Status: DISCONTINUED | OUTPATIENT
Start: 2018-06-22 | End: 2018-06-22 | Stop reason: HOSPADM

## 2018-06-22 RX ORDER — SODIUM CHLORIDE, SODIUM LACTATE, POTASSIUM CHLORIDE, CALCIUM CHLORIDE 600; 310; 30; 20 MG/100ML; MG/100ML; MG/100ML; MG/100ML
INJECTION, SOLUTION INTRAVENOUS CONTINUOUS PRN
Status: DISCONTINUED | OUTPATIENT
Start: 2018-06-22 | End: 2018-06-22

## 2018-06-22 RX ORDER — SODIUM CHLORIDE, SODIUM LACTATE, POTASSIUM CHLORIDE, CALCIUM CHLORIDE 600; 310; 30; 20 MG/100ML; MG/100ML; MG/100ML; MG/100ML
INJECTION, SOLUTION INTRAVENOUS CONTINUOUS
Status: DISCONTINUED | OUTPATIENT
Start: 2018-06-22 | End: 2018-06-22 | Stop reason: HOSPADM

## 2018-06-22 RX ORDER — NALOXONE HYDROCHLORIDE 0.4 MG/ML
.1-.4 INJECTION, SOLUTION INTRAMUSCULAR; INTRAVENOUS; SUBCUTANEOUS
Status: DISCONTINUED | OUTPATIENT
Start: 2018-06-22 | End: 2018-06-27

## 2018-06-22 RX ORDER — FLUMAZENIL 0.1 MG/ML
0.2 INJECTION, SOLUTION INTRAVENOUS
Status: DISCONTINUED | OUTPATIENT
Start: 2018-06-22 | End: 2018-06-22 | Stop reason: HOSPADM

## 2018-06-22 RX ORDER — ONDANSETRON 2 MG/ML
4 INJECTION INTRAMUSCULAR; INTRAVENOUS EVERY 30 MIN PRN
Status: DISCONTINUED | OUTPATIENT
Start: 2018-06-22 | End: 2018-06-22 | Stop reason: HOSPADM

## 2018-06-22 RX ORDER — NALBUPHINE HYDROCHLORIDE 10 MG/ML
2.5-5 INJECTION, SOLUTION INTRAMUSCULAR; INTRAVENOUS; SUBCUTANEOUS EVERY 6 HOURS PRN
Status: DISCONTINUED | OUTPATIENT
Start: 2018-06-22 | End: 2018-06-25

## 2018-06-22 RX ORDER — METOPROLOL TARTRATE 1 MG/ML
1-2 INJECTION, SOLUTION INTRAVENOUS EVERY 5 MIN PRN
Status: DISCONTINUED | OUTPATIENT
Start: 2018-06-22 | End: 2018-06-22 | Stop reason: HOSPADM

## 2018-06-22 RX ORDER — ONDANSETRON 2 MG/ML
INJECTION INTRAMUSCULAR; INTRAVENOUS PRN
Status: DISCONTINUED | OUTPATIENT
Start: 2018-06-22 | End: 2018-06-22

## 2018-06-22 RX ORDER — HYDRALAZINE HYDROCHLORIDE 20 MG/ML
2.5-5 INJECTION INTRAMUSCULAR; INTRAVENOUS EVERY 10 MIN PRN
Status: DISCONTINUED | OUTPATIENT
Start: 2018-06-22 | End: 2018-06-22 | Stop reason: HOSPADM

## 2018-06-22 RX ORDER — ONDANSETRON 4 MG/1
4 TABLET, ORALLY DISINTEGRATING ORAL EVERY 30 MIN PRN
Status: DISCONTINUED | OUTPATIENT
Start: 2018-06-22 | End: 2018-06-22 | Stop reason: HOSPADM

## 2018-06-22 RX ORDER — PROPOFOL 10 MG/ML
INJECTION, EMULSION INTRAVENOUS PRN
Status: DISCONTINUED | OUTPATIENT
Start: 2018-06-22 | End: 2018-06-22

## 2018-06-22 RX ADMIN — DEXTROSE MONOHYDRATE 25 ML: 25 INJECTION, SOLUTION INTRAVENOUS at 17:14

## 2018-06-22 RX ADMIN — SUGAMMADEX 140 MG: 100 INJECTION, SOLUTION INTRAVENOUS at 15:20

## 2018-06-22 RX ADMIN — FLUOXETINE 20 MG: 20 CAPSULE ORAL at 08:03

## 2018-06-22 RX ADMIN — ROCURONIUM BROMIDE 10 MG: 10 INJECTION INTRAVENOUS at 14:38

## 2018-06-22 RX ADMIN — GLYCOPYRROLATE 0.1 MG: 0.2 INJECTION, SOLUTION INTRAMUSCULAR; INTRAVENOUS at 13:43

## 2018-06-22 RX ADMIN — PHENYLEPHRINE HYDROCHLORIDE 50 MCG: 10 INJECTION, SOLUTION INTRAMUSCULAR; INTRAVENOUS; SUBCUTANEOUS at 14:29

## 2018-06-22 RX ADMIN — BUPIVACAINE HYDROCHLORIDE 8 ML/HR: 7.5 INJECTION, SOLUTION EPIDURAL; RETROBULBAR at 13:50

## 2018-06-22 RX ADMIN — PHENYLEPHRINE HYDROCHLORIDE 50 MCG: 10 INJECTION, SOLUTION INTRAMUSCULAR; INTRAVENOUS; SUBCUTANEOUS at 14:11

## 2018-06-22 RX ADMIN — CEFAZOLIN 1 G: 1 INJECTION, POWDER, FOR SOLUTION INTRAMUSCULAR; INTRAVENOUS at 15:15

## 2018-06-22 RX ADMIN — Medication 100 MG: at 12:57

## 2018-06-22 RX ADMIN — PHENYLEPHRINE HYDROCHLORIDE 100 MCG: 10 INJECTION, SOLUTION INTRAMUSCULAR; INTRAVENOUS; SUBCUTANEOUS at 14:38

## 2018-06-22 RX ADMIN — FENTANYL CITRATE 50 MCG: 50 INJECTION INTRAMUSCULAR; INTRAVENOUS at 16:27

## 2018-06-22 RX ADMIN — PHENYLEPHRINE HYDROCHLORIDE 0.1 MCG/KG/MIN: 10 INJECTION, SOLUTION INTRAMUSCULAR; INTRAVENOUS; SUBCUTANEOUS at 14:48

## 2018-06-22 RX ADMIN — PHENYLEPHRINE HYDROCHLORIDE 200 MCG: 10 INJECTION, SOLUTION INTRAMUSCULAR; INTRAVENOUS; SUBCUTANEOUS at 12:58

## 2018-06-22 RX ADMIN — LIDOCAINE HYDROCHLORIDE,EPINEPHRINE BITARTRATE 3 ML: 15; .005 INJECTION, SOLUTION EPIDURAL; INFILTRATION; INTRACAUDAL; PERINEURAL at 12:20

## 2018-06-22 RX ADMIN — PHENYLEPHRINE HYDROCHLORIDE 50 MCG: 10 INJECTION, SOLUTION INTRAMUSCULAR; INTRAVENOUS; SUBCUTANEOUS at 14:23

## 2018-06-22 RX ADMIN — DEXTROSE AND SODIUM CHLORIDE: 5; 900 INJECTION, SOLUTION INTRAVENOUS at 16:33

## 2018-06-22 RX ADMIN — FENTANYL CITRATE 25 MCG: 50 INJECTION INTRAMUSCULAR; INTRAVENOUS at 16:03

## 2018-06-22 RX ADMIN — FENTANYL CITRATE 25 MCG: 50 INJECTION INTRAMUSCULAR; INTRAVENOUS at 16:09

## 2018-06-22 RX ADMIN — CYCLOSPORINE 1 DROP: 0.5 EMULSION OPHTHALMIC at 08:03

## 2018-06-22 RX ADMIN — LIDOCAINE HYDROCHLORIDE 70 MG: 20 INJECTION, SOLUTION INFILTRATION; PERINEURAL at 12:57

## 2018-06-22 RX ADMIN — PROPOFOL 70 MG: 10 INJECTION, EMULSION INTRAVENOUS at 12:57

## 2018-06-22 RX ADMIN — FENTANYL CITRATE 50 MCG: 50 INJECTION, SOLUTION INTRAMUSCULAR; INTRAVENOUS at 13:19

## 2018-06-22 RX ADMIN — FENTANYL CITRATE 25 MCG: 50 INJECTION INTRAMUSCULAR; INTRAVENOUS at 12:07

## 2018-06-22 RX ADMIN — FAMOTIDINE 20 MG: 20 INJECTION, SOLUTION INTRAVENOUS at 04:16

## 2018-06-22 RX ADMIN — ROCURONIUM BROMIDE 10 MG: 10 INJECTION INTRAVENOUS at 14:00

## 2018-06-22 RX ADMIN — FENTANYL CITRATE 50 MCG: 50 INJECTION, SOLUTION INTRAMUSCULAR; INTRAVENOUS at 12:57

## 2018-06-22 RX ADMIN — ALBUMIN HUMAN: 0.05 INJECTION, SOLUTION INTRAVENOUS at 14:50

## 2018-06-22 RX ADMIN — GLYCOPYRROLATE 0.1 MG: 0.2 INJECTION, SOLUTION INTRAMUSCULAR; INTRAVENOUS at 13:41

## 2018-06-22 RX ADMIN — ONDANSETRON 4 MG: 2 INJECTION INTRAMUSCULAR; INTRAVENOUS at 15:05

## 2018-06-22 RX ADMIN — BUPIVACAINE HYDROCHLORIDE 2 ML: 2.5 INJECTION, SOLUTION EPIDURAL; INFILTRATION; INTRACAUDAL; PERINEURAL at 13:28

## 2018-06-22 RX ADMIN — PHENYLEPHRINE HYDROCHLORIDE 200 MCG: 10 INJECTION, SOLUTION INTRAMUSCULAR; INTRAVENOUS; SUBCUTANEOUS at 13:11

## 2018-06-22 RX ADMIN — CEFAZOLIN SODIUM 2 G: 2 INJECTION, SOLUTION INTRAVENOUS at 13:15

## 2018-06-22 RX ADMIN — SODIUM CHLORIDE, POTASSIUM CHLORIDE, SODIUM LACTATE AND CALCIUM CHLORIDE: 600; 310; 30; 20 INJECTION, SOLUTION INTRAVENOUS at 13:10

## 2018-06-22 RX ADMIN — PHENYLEPHRINE HYDROCHLORIDE 50 MCG: 10 INJECTION, SOLUTION INTRAMUSCULAR; INTRAVENOUS; SUBCUTANEOUS at 13:55

## 2018-06-22 RX ADMIN — ROCURONIUM BROMIDE 30 MG: 10 INJECTION INTRAVENOUS at 13:05

## 2018-06-22 RX ADMIN — BUPIVACAINE HYDROCHLORIDE 2 ML: 2.5 INJECTION, SOLUTION EPIDURAL; INFILTRATION; INTRACAUDAL; PERINEURAL at 13:24

## 2018-06-22 NOTE — ANESTHESIA PROCEDURE NOTES
Arterial Line Procedure Note  Staff:     Anesthesiologist:  LAURA ALLEN  Location: In OR After Induction  Procedure Start/Stop Times:     patient identified, IV checked, site marked, risks and benefits discussed, informed consent, monitors and equipment checked, pre-op evaluation and at physician/surgeon's request      Correct Patient: Yes      Correct Position: Yes      Correct Site: Yes      Correct Procedure: Yes      Correct Laterality:  Yes    Site Marked:  Yes  Line Placement:     Procedure:  Arterial Line    Arterial catheter insertion site: Axillary.    Insertion laterality:  Right    Skin Prep: Chloraprep      Patient Prep: patient draped, mask, sterile gloves, hat and hand hygiene      Local skin infiltration:  None    Ultrasound Guided?: Yes      Artery evaluated via ultrasound confirming patency.   Using realtime imaging, the artery was punctured and the needle was observed entering the artery.      A permanent image is NOT entered into the patient's record.      Catheter size:  20 gauge, 12 cm    Cath secured with: suture      Dressing:  Tegaderm    Complications:  None obvious    Arterial waveform: Yes      IBP within 10% of NIBP: Yes

## 2018-06-22 NOTE — ANESTHESIA PROCEDURE NOTES
Epidural Procedure Note    Staff:     Anesthesiologist:  ISIAH KRISHNAMURTHY    Resident/CRNA:  SEVEN JC    Procedure performed by resident/CRNA in the presence of a teaching physician    Location: Pre-op     Procedure start time:  6/22/2018 12:05 PM     Procedure end time:  6/22/2018 12:25 PM   Pre-procedure checklist:   patient identified, IV checked, site marked, risks and benefits discussed, informed consent, monitors and equipment checked, pre-op evaluation, at physician/surgeon's request and post-op pain management      Correct Patient: Yes      Correct Position: Yes      Correct Site: Yes      Correct Procedure: Yes      Correct Laterality:  Yes    Site Marked:  Yes  Procedure:     Procedure:  Epidural catheter    ASA:  3    Position:  Sitting    Sterile Prep: chloraprep, mask, sterile gloves and patient draped      Insertion site:  T9-10    Local skin infiltration:  1% lidocaine    amount (mL):  2    Approach:  Midline    Needle gauge (G):  17    Needle Length (in):  3.5    Block Needle Type:  Touhy    Injection Technique:  LORT saline    REGI at (cm):  5    Attempts:  1    Redirects:  1    Catheter gauge (G):  19    Catheter threaded easily: Yes      Threaded to cm at skin:  10    Paresthesias:  No    Aspiration negative for Heme or CSF: Yes      Test dose (mL):  2     Local anesthetic:  Lidocaine 1.5% w/ 1:200,000 epinephrine    Test dose time:  12:20    Test dose negative for signs of intravascular, subdural or intrathecal injection: Yes

## 2018-06-22 NOTE — PROGRESS NOTES
Informed Kori CONTRERAS, receiving RN on PCU 7B of blood sugar of 155. NO further questions regarding pt status at this time.

## 2018-06-22 NOTE — OP NOTE
Operative note: 2018   Chelly Dave  MR:[id number]   Pt :1935    Surgeon:Dr. Nicolas  Resident: Dr. Shireen Fry, Dr. Petros Brown,     Preoperative diagnosis:Mesenteric mass and small bowel obstruction.  Postoperative diagnosis: Mesenteric mass    Procedure: Exploratory laparotomy with resection of mesenteric mass and associated small bowel.    Indications:  Chelly Dave is a 83 year old female who presented with signs and symptoms consistent with a small bowel obstruction along with 3-4 months of more chronic abdominal pain.  A CT scan showed a mesenteric mass that is associated with a transition point in the small bowel.  It was determined that the patient would require excision of the mesenteric mass with likely removal of the overlying small bowel in order to relieve her symptoms.  This mass was concerning for malignancy based on its location and radiographic appearance.  Following a discussion of some of the risks and benefits with the patient's daughters (who serve as her medical power of ) she was consented for an exploratory laparotomy with excision of small bowel.    Description of the procedure: The patient was brought to the operating room and positioned supine on the operating room table. After induction of general endotracheal anesthesia the patient's abdomen was prepped and draped in the usual sterile fashion.    A timeout was called and the correct patient, procedure, and surgical site were identified. A 20 cm incision was made in the midline from the supraumbilical to infraumbilical region.  Dissection was carried down through the abdominal wall fascia and the abdomen was entered.  The fascia and peritoneum were opened throughout the course of the incision and there was no bowel injury noted.  We then proceeded to elevate the transverse colon and traced the transverse mesocolon down to the ligament of Treitz.  We ran the small bowel the ligament of Treitz to the  terminal ileum.  In the mid jejunum but well changed in caliber from mildly dilated to fully decompressed.  There were scattered a reas of fibrotic, dense tissue in the bowel wall as well as a large (5 x 8 cm) hyperemic mass at the base of the jejunal mesentery.  We identified proximal and distal margins on the small bowel between which all areas of fibrotic or abnormal bowel were present.  We began dissection by firing an 80 mm DAVID blue load stapler across the proximal bowel.  We then carried dissection down along the mesentery towards the mesenteric mass using a LigaSure.  We intermittently used 3-0 silk stick ties to address any areas of bleeding.     Careful dissection was utilized along the posterior margin of the mesenteric mass as large jejunal vessels were present.  We then proceeded to divide the mesentery to the distal margin which had been previously identified.  We then attempted to come across the bowel along the distal margin however a new staple load had not been placed in our 80 mm DAVID stapler.  When this was fired the bowel was cut but no staples deployed.  We then proceeded to oversew the distal limb of the small bowel with a 3-0 Vicryl suture.  The proximal portion was clamped with a Carmalt clamp.    The specimen including roughly 50 cm of small bowel along with associated mesentery and mesenteric mass were passed off to the circulating nurse.  We then proceeded with small bowel anastomosis.  Enterotomies were made along the antimesenteric side of the proximal and distal bowel.  An 80 mm DAVID's blue load stapler was passed down these enterotomies and fired.  He proceeded to take a reload of the 80 mm DAVID blue load and amputate the enterotomies and associated small bowel.  Prior to this we had taken a rim of mesentery which included a number of discrete masses and an area of small bowel which is devitalized by the prior dissection.  This was included as a new specimen labeled mesentery and new  margins.  We placed a 3-0 silk stitch in the crotch of the new anastomosis and carefully examined the connection for patency and hemostasis.  The bowel was then replaced into the abdomen and we irrigated thoroughly with 1 L of warmed normal saline.  Bowel was viable on final inspection.   We then proceeded to close the mesenteric defect which had been made by a resection with a 4-0 Vicryl suture.  Soft and instrument counts were correct ×2 and we proceeded to close the abdomen with 2 times 0 looped PDS suture.  The incision was irrigated and the skin reapproximated with staples.   The patient was transferred to the post operative recovery unit in stable condition. she tolerated the procedure well and there were no immediate complications noted.    EBL: 25    Specimens sent: jejunum and mesenteric mass, new margins and mesentery sent to pathology.    Drains: No drains left in place.    Urine Output/Fluids: See anesthesia record.    Dr. Nicolas was present or immediately available for the entire procedure.  Shireen Fry   General Surgery PGY6  Pager: [phone]      atteninding attestation:  I was present for entirety of procedure up to and including closure of fascia.

## 2018-06-22 NOTE — PLAN OF CARE
Problem: Bowel Obstruction (Adult)  Goal: Signs and Symptoms of Listed Potential Problems Will be Absent, Minimized or Managed (Bowel Obstruction)  Signs and symptoms of listed potential problems will be absent, minimized or managed by discharge/transition of care (reference Bowel Obstruction (Adult) CPG).  Outcome: No Change  /75 (BP Location: Right arm)  Temp 98  F (36.7  C) (Oral)  Resp 16  Ht 1.524 m (5')  Wt 62.6 kg (137 lb 14.4 oz)  SpO2 96%  BMI 26.93 kg/m2    Cared for pt [id number].  A&Ox4 but very forgetful. VSS on RA. Up with assist of 1 and walker. NG to LIS. Small amount of output. Denies pain or nausea. NPO. BG 62-MD notified. 25 mL IV dextrose given. BG recheck 107. D5NS started at 100 mL/hr through PIV. Soft mitts in place d/t pt forgetting to not touch NG and IV lines/tubes. Easily redirectable. Bed alarm on for safety. Will continue to monitor and follow POC.

## 2018-06-22 NOTE — ANESTHESIA PREPROCEDURE EVALUATION
Anesthesia Evaluation     . Pt has had prior anesthetic. Type of anesthetic: Slow to wake, post operative cognitive dysfunction.    History of anesthetic complications          ROS/MED HX    ENT/Pulmonary:       Neurologic:     (+)dementia,     Cardiovascular: Comment: Severe pulmonary HTN    (+) Dyslipidemia, ----. : . . fainting (syncope). :. . Previous cardiac testing Echodate:8/2016results:Interpretation Summary     The left ventricle is normal in size.  The visual ejection fraction is estimated at 60-65%.  Grade I left ventricular diastolic dysfunction is noted.  No regional wall motion abnormalities noted.  The right ventricle is moderately dilated.  The right ventricular systolic function is normal.  Severe (>55mmHg) pulmonary hypertension is present.  There is moderate (2+) tricuspid regurgitation.  ______________________________________________________________________________           Left Ventricle  The left ventricle is normal in size. There is normal left ventricular wall  thickness. The visual ejection fraction is estimated at 60-65%. Grade I left  ventricular diastolic dysfunction is noted. No regional wall motion  abnormalities noted.     Right Ventricle  The right ventricle is moderately dilated. The right ventricular systolic  function is normal.  Atria  Normal left atrial size. Right atrial size is normal. There is no color  Doppler evidence of an atrial shunt.     Mitral Valve  The mitral valve leaflets are mildly thickened. There is mild mitral annular  calcification. There is mild (1+) mitral regurgitation.     Tricuspid Valve  There is moderate (2+) tricuspid regurgitation. Normal IVC (1.5-2.5cm) with  >50% respiratory collapse; right atrial pressure is estimated at 5-10mmHg.  The right ventricular systolic pressure is approximated at 55.5 mmHg plus the  right atrial pressure. Severe (>55mmHg) pulmonary hypertension is present.     Aortic Valve  There is trivial trileaflet aortic sclerosis.  There is trace aortic  regurgitation.     Pulmonic Valve  There is trace pulmonic valvular regurgitation.     Vessels  Normal size aorta. The aortic root is normal size.  Pericardium  There is no pericardial effusion.date: results:ECG reviewed date: results: date: results:          METS/Exercise Tolerance:  1 - Eating, dressing   Hematologic:         Musculoskeletal:   (+) arthritis, , , -       GI/Hepatic:     (+) GERD Other GI/Hepatic Messenteric mass with bowel obstruction      Renal/Genitourinary:  - ROS Renal section negative       Endo:     (+) type II DM (diet controlled) .      Psychiatric:         Infectious Disease:  - neg infectious disease ROS       Malignancy:   (+) Malignancy History of GI          Other:    - neg other ROS               Procedure: Procedure(s):  Exploratory Laparotomy With Any Other Indicated Procedures   Anesthesia Block - Wound Class: I-Clean    HPI: Chelly Dave is a 83 year old female with GI mass causing obstruction and scheduled for above procedure.    PMHx/PSHx:  Past Medical History:   Diagnosis Date     Arthritis      Cataract      Diabetes mellitus (H)      Gastro-oesophageal reflux disease      Hyperlipidemia        Past Surgical History:   Procedure Laterality Date     COLONOSCOPY       ORTHOPEDIC SURGERY      right knee replacement         No current facility-administered medications on file prior to encounter.   Current Outpatient Prescriptions on File Prior to Encounter:  acetaminophen (TYLENOL) 500 MG tablet Take 2 tablets by mouth   aspirin 81 MG EC tablet Take 81 mg by mouth daily.   atorvastatin (LIPITOR) 40 MG tablet Take 20 mg by mouth   AzaTHIOprine (IMURAN PO) Take 50 mg by mouth 2 times daily AM & 3PM   blood glucose monitoring (NO BRAND SPECIFIED) test strip USE TO CHECK BLOOD SUGARS EVERY DAY   blood glucose monitoring (SOFTCLIX) lancets USE TO TEST BLOOD SUGARS DAILY   Blood Glucose Monitoring Suppl (FIFTY50 GLUCOSE METER 2.0) w/Device KIT Dispense  meter, test strips, lancets covered by pt ins. E11.9 NIDDM type II - Test 1 time/day   Calcium Carb-Cholecalciferol (CALCIUM 500 +D) 500-400 MG-UNIT TABS Take 1 tablet by mouth daily   calcium carbonate-vitamin D 500-400 MG-UNIT TABS per tablet Take 1 tablet by mouth   Cholecalciferol (VITAMIN D3) 1000 units CAPS Take 1,000 Units by mouth   cycloSPORINE (RESTASIS) 0.05 % ophthalmic emulsion Instill 1 drop into both eyes every 12 hours.   No further refills will be given unless you come in for your exam.   darbepoetin aravind-polysorbate (ARANESP) 200 MCG/0.4ML injection Inject 200 mcg Subcutaneous Every 2 months   diclofenac (FLECTOR) 1.3 % Patch Place 1 patch onto the skin   DOXEPIN HCL PO Take 10 mg by mouth At Bedtime   ferrous sulfate (IRON) 325 (65 FE) MG tablet Take 1 tablet (325 mg) by mouth daily   FLUoxetine (PROZAC) 20 MG capsule Take 20 mg by mouth   lidocaine (LIDODERM) 5 % patch Place 1 patch onto the skin daily as needed for moderate pain (12 hours on; 12 hours off. Patient applies to knees or back)   loratadine (CLARITIN) 10 MG tablet Take 10 mg by mouth   memantine (NAMENDA) 5 MG tablet Take 5 mg by mouth   pantoprazole (PROTONIX) 40 MG EC tablet Take 40 mg by mouth   Pilocarpine HCl (SALAGEN PO) Take 5 mg by mouth 3 times daily AM, 1200 & HS   polyethylene glycol (MIRALAX/GLYCOLAX) powder    polyethylene glycol 400 (BLINK TEARS) 0.25 % SOLN ophthalmic solution Place 1-2 drops into both eyes every 2 hours as needed for dry eyes   study - aspirin vs placebo (IDS #5239) 1 tablet tablet Take 81 mg by mouth   vitamin E (TOCOPHEROL) 1000 UNIT capsule Take 1 capsule by mouth   VITAMIN E NATURAL PO Take 1,000 Units by mouth 2 times daily AM & 3PM       Social Hx:   Social History   Substance Use Topics     Smoking status: Never Smoker     Smokeless tobacco: Never Used     Alcohol use No       Allergies:   Allergies   Allergen Reactions     Lisinopril      Oxycodone      Other reaction(s): Confusion      Buffered Aspirin      Other reaction(s): GI Upset  81 mg works well for her         NPO Status: Per ASA Guidelines    Labs:    Blood Bank:  Lab Results   Component Value Date    ABO AB 06/22/2018    RH Pos 06/22/2018    AS Neg 06/22/2018     BMP:  Recent Labs   Lab Test  06/21/18   0730   NA  140   POTASSIUM  4.0   CHLORIDE  111*   CO2  20   BUN  21   CR  0.83   GLC  114*   ROBSON  8.4*     CBC:   Recent Labs   Lab Test  06/21/18   0730   WBC  2.5*   RBC  3.35*   HGB  10.1*   HCT  32.0*   MCV  96   MCH  30.1   MCHC  31.6   RDW  14.9   PLT  212     Coags:  Recent Labs   Lab Test  10/17/11   2145   INR  1.20*         Physical Exam      Airway   Mallampati: III  TM distance: >3 FB  Neck ROM: full    Dental   (+) upper dentures and missing    Cardiovascular   Rhythm and rate: regular and normal      Pulmonary    breath sounds clear to auscultation                    Anesthesia Plan      History & Physical Review  History and physical reviewed and following examination; no interval change.    ASA Status:  3 .    NPO Status:  > 6 hours    Plan for General, ETT and RSI with Intravenous induction. Maintenance will be Balanced.    PONV prophylaxis:  Ondansetron (or other 5HT-3)  Additional equipment: 2nd IV and Arterial Line      Postoperative Care  Postoperative pain management:  IV analgesics and Neuraxial analgesia.      Consents  Anesthetic plan, risks, benefits and alternatives discussed with:  Patient..                History and physical assessed; Patient examined.   Risks and alternatives including post-operative cognitive dysfunction and aspiration presented and discussed. Patient and family agree. All questions answered.      Demario Julio MD  Staff Anesthesiologist  *[phone]

## 2018-06-22 NOTE — ANESTHESIA CARE TRANSFER NOTE
Patient: Chelly Dave    Procedure(s):  Exploratory Laparotomy with Resection of Messenteric Mass and associated small Bowel  Anesthesia Block - Wound Class: III-Contaminated    Diagnosis: Mesenteric Mass   Diagnosis Additional Information: No value filed.    Anesthesia Type:   No value filed.     Note:  Airway :Face Mask  Patient transferred to:PACU  Comments: Anesthesia Care Transfer Note    Patient: Chelly Dave    Transferred to: PACU    Patient vital signs: stable    Airway: none    Monitors placed. VSS. PIVs, cb patent. 8L 02 FM. Patient awake, comfortable. Report given and care transferred to RN.     Dee Costa CRNA   6/22/2018  Handoff Report: Identifed the Patient, Identified the Reponsible Provider, Reviewed the pertinent medical history, Discussed the surgical course, Reviewed Intra-OP anesthesia mangement and issues during anesthesia, Set expectations for post-procedure period and Allowed opportunity for questions and acknowledgement of understanding      Vitals: (Last set prior to Anesthesia Care Transfer)    CRNA VITALS  6/22/2018 1500 - 6/22/2018 1536      6/22/2018             Resp Rate (observed): (!)  7                Electronically Signed By: JAYME Pittman CRNA  June 22, 2018  3:36 PM

## 2018-06-22 NOTE — PROGRESS NOTES
Dr Odell at bedside tested dermatomes level and was T6-T10. Instructed RN to increase epidural to 8ml/hr

## 2018-06-22 NOTE — PROGRESS NOTES
Blood sugar recheck 65. Contacted Dr Cabrera. Wants pt to have half an of D50, recheck blood sugar in 15 min. MDA would like pt's blood sugar above 80. Will continue to assess.

## 2018-06-22 NOTE — PROGRESS NOTES
Patient seen and evaluated in preop.  Questions answered.  She is understanding and agreeable to proceed.

## 2018-06-22 NOTE — PROGRESS NOTES
Dr Cabrera at bedside. Will place sign out in pt's chart. Dermatomes tested again and go from T6-T10. Pain team contacted and discussed epidural status with Melo. OK with epidural going at 8ml/hr

## 2018-06-22 NOTE — PROGRESS NOTES
Alert but pleasantly confused at times. Pt left via stretcher down to the OR. PIV infusing MIVF. Last BG was 87. Had a bed bath this AM. Daughters at bedside.

## 2018-06-22 NOTE — PROVIDER NOTIFICATION
Notified Surg crosscover regarding BG of 62. Pt is NPO with NG to LIS. Awaiting orders to treat hypoglycemia.

## 2018-06-22 NOTE — PROGRESS NOTES
Subjective: Patient pulled out NG tube this morning. Pain well controlled, no complaints. Denies fevers, chills, CP, SOB, and N/V. Patient NPO for surgery. UOP adequate.     Objective:   /79  Temp 97.8  F (36.6  C) (Oral)  Resp 9  Ht 1.524 m (5')  Wt 62.6 kg (137 lb 14.4 oz)  SpO2 100%  BMI 26.93 kg/m2    PE:  Gen: Awake, alert, NAD   Resp: non-labored at rest  Abd: Soft, non-distended, NTTP  Ext: warm and well perfused    I/O last 3 completed shifts:  In: 1626.67 [I.V.:1626.67]  Out: 1225 [Urine:1175; Emesis/NG output:50] - Last 24 hours  NG put out 50 cc on 6/21    Labs/Imaging  Heme:  Recent Labs  Lab 06/22/18  1344 06/21/18  0730 06/20/18 2016   WBC  --  2.5* 6.6   HGB 9.2* 10.1* 10.3*   PLT  --  212 225     Chem:  Recent Labs  Lab 06/22/18  1344 06/21/18  0730 06/20/18 2016   POTASSIUM 3.0* 4.0 3.5   CR  --  0.83 0.99         A/P: Chelly Dave is a 83 year old female with a SBO and mesenteric mass. Plan for OR to evaluate mass.      NEURO Pain well controlled  Changes:  None    CV HDS.    PULM Aggressive pulmonary toilet and I/S.   FEN/GI Continue LR  Continue NPO .    No acute issues, good UOP    HEME Hgb as above.   No transfusions indicated at this time   ID Afebrile, no leukocytosis.    Antibiotics: None    ENDO No issues   ACTIVITY Up as tolerated    PPx SCDs, ambulation   DISPO Anticipate dispo home.       David Tom MD on 6/22/2018 at 2:50 PM  PGY-1, General Surgery

## 2018-06-22 NOTE — PROGRESS NOTES
Family unable to get ahold of in surgery lounge. Writer called 7B and family was seen on the floor but not in pt's room.

## 2018-06-22 NOTE — PROGRESS NOTES
"Pt reports that she feels well enough to go up to the floor. She states, \"that her pain is at at tolerable level and just wants to rest. \"  "

## 2018-06-22 NOTE — PLAN OF CARE
Problem: Bowel Obstruction (Adult)  Goal: Signs and Symptoms of Listed Potential Problems Will be Absent, Minimized or Managed (Bowel Obstruction)  Signs and symptoms of listed potential problems will be absent, minimized or managed by discharge/transition of care (reference Bowel Obstruction (Adult) CPG).   Outcome: No Change  Vitals:    06/21/18 1551 06/21/18 2015 06/21/18 2351 06/22/18 0406   BP: 137/50 152/75 156/67 154/64   BP Location: Right arm Right arm Right arm Right arm   Resp: 16 16 16 16   Temp: 97  F (36.1  C) 98  F (36.7  C) 98  F (36.7  C) 96.1  F (35.6  C)   TempSrc: Oral Oral Oral Oral   SpO2: 97% 96% 97% 99%   Weight:       Height:       Patient alert and oriented  x4 but forgetful.  Found patient at 05 with mitts off and NG tube out (pulled 100cc green drainage prior).  MD notified and are going to hold off putting NG back for now.  Patient denies nausea or pain.  Up to bathroom with assist of 1 and use of walker.  Bed alarm on.  Voiding adequate amts.  Lungs clear.  Has been NPO for OR today.  Glc 85/88.  Continue with POC.

## 2018-06-22 NOTE — PROGRESS NOTES
"CLINICAL NUTRITION SERVICES - ASSESSMENT NOTE     Nutrition Prescription    RECOMMENDATIONS FOR MDs/PROVIDERS TO ORDER:  Diet adv vs nutrition support within 2-3 days    Malnutrition Status:    Unable to determine due to pt in OR    Recommendations already ordered by Registered Dietitian (RD):  None at this time     Future/Additional Recommendations:  1. If in future pt requires nutrition support, rec place FT and start TF.  If TF contraindicated, recommend initaiting TPN  -- If start TF, please consult Registered Dietitian to Assess and Order TF per Medical Nutrition Therapy Protocol  -- If start CPN, please consult Pharmacy/Nutrition to start and manage TPN.  If expected to need TPN temporarily until able to adv diet/obtain enteral access, recommend use [employer] (3-in-1).  Could also consider short term PPN.    2. If able to advance diet, offer supplements and/or scheduled snacks.  If nausea persists consider antiemetics before meal times if appropriate.       REASON FOR ASSESSMENT  Chelly Dave is a/an 83 year old female assessed by the dietitian for Admission Nutrition Risk Screen for reduced oral intake over the last month    NUTRITION HISTORY  Patient admit with nausea/vomiting and abdominal discomfort over the past week.  Found to have SBO and mesenteric mass.    CURRENT NUTRITION ORDERS  Diet: NPO  Intake/Tolerance: NPO x 2 days since admit.      LABS  Labs reviewed    MEDICATIONS  Medications reviewed    ANTHROPOMETRICS  Height: 152.4 cm (5' 0\")  Most Recent Weight: 62.6 kg (137 lb 14.4 oz)    IBW: 45.5 kg   BMI: Overweight BMI 25-29.9  Weight History: Wt trending up over the past 8 months.  Pt wt 61.7 kg on 5/8 per Care Everywhere.  Wt Readings from Last 10 Encounters:   06/21/18 62.6 kg (137 lb 14.4 oz)   06/20/18 62.6 kg (138 lb)   10/22/17 58.1 kg (128 lb)   11/15/16 62.9 kg (138 lb 9.6 oz)   08/13/16 64.3 kg (141 lb 12.1 oz)   05/06/15 64.6 kg (142 lb 6.7 oz)   10/17/11 62.1 kg (137 lb)      Dosing " Weight: 50 kg (adjusted based on admit wt 62.6 kg and IBW)    ASSESSED NUTRITION NEEDS  Estimated Energy Needs: 2065-1642 kcals/day (25 - 30 kcals/kg)  Justification: Maintenance  Estimated Protein Needs: 50-60 grams protein/day (1 - 1.2 grams of pro/kg)  Justification: Hypercatabolism with acute illness  Estimated Fluid Needs: 7720-0254 mL/day (1 mL/kcal)   Justification: Maintenance, or other per provider pending fluid status    PHYSICAL FINDINGS  See malnutrition section below.    MALNUTRITION  % Intake: Unable to assess  % Weight Loss: None noted  Subcutaneous Fat Loss: Unable to assess  Muscle Loss: Unable to assess  Fluid Accumulation/Edema: None noted per chart review  Malnutrition Diagnosis: Unable to determine due to pt in OR    NUTRITION DIAGNOSIS  Inadequate oral intake related to no diet advancement as evidenced by NPO x 2 days     INTERVENTIONS  Implementation  Nutrition Education: Unable to complete due to pt in OR     Goals  Diet adv v nutrition support within 2-3 days.     Monitoring/Evaluation  Progress toward goals will be monitored and evaluated per protocol.     Huma Ramirez RD, LD   7B RD Pager: [phone]

## 2018-06-22 NOTE — PROGRESS NOTES
Epidural procedure complete.  Patient alert and responding appropriately.  Monitor to remain attached until transferred to OR.

## 2018-06-22 NOTE — ANESTHESIA POSTPROCEDURE EVALUATION
Patient: Chelly Garciar    Procedure(s):  Exploratory Laparotomy with Resection of Messenteric Mass and associated small Bowel  Anesthesia Block - Wound Class: III-Contaminated    Diagnosis:Mesenteric Mass   Diagnosis Additional Information: No value filed.    Anesthesia Type:  No value filed.    Note:  Anesthesia Post Evaluation    Patient location during evaluation: PACU  Patient participation: Able to fully participate in evaluation  Level of consciousness: awake and alert  Pain management: adequate  Airway patency: patent  Cardiovascular status: acceptable  Respiratory status: acceptable  Hydration status: acceptable  PONV: none     Anesthetic complications: None          Last vitals:  Vitals:    06/22/18 1730 06/22/18 1745 06/22/18 1751   BP: 135/68 127/77    Resp:  12    Temp:      SpO2: 100% 99% 100%         Electronically Signed By: Arely Cabrera MD  June 22, 2018  6:05 PM

## 2018-06-23 ENCOUNTER — APPOINTMENT (OUTPATIENT)
Dept: GENERAL RADIOLOGY | Facility: CLINIC | Age: 83
DRG: 331 | End: 2018-06-23
Attending: SURGERY
Payer: COMMERCIAL

## 2018-06-23 LAB
ALBUMIN UR-MCNC: 10 MG/DL
ANION GAP SERPL CALCULATED.3IONS-SCNC: 8 MMOL/L (ref 3–14)
APPEARANCE UR: CLEAR
BASOPHILS # BLD AUTO: 0 10E9/L (ref 0–0.2)
BASOPHILS NFR BLD AUTO: 0.2 %
BILIRUB UR QL STRIP: NEGATIVE
BUN SERPL-MCNC: 8 MG/DL (ref 7–30)
CALCIUM SERPL-MCNC: 7.2 MG/DL (ref 8.5–10.1)
CHLORIDE SERPL-SCNC: 113 MMOL/L (ref 94–109)
CO2 SERPL-SCNC: 22 MMOL/L (ref 20–32)
COLOR UR AUTO: ABNORMAL
CREAT SERPL-MCNC: 0.65 MG/DL (ref 0.52–1.04)
DIFFERENTIAL METHOD BLD: ABNORMAL
EOSINOPHIL # BLD AUTO: 0 10E9/L (ref 0–0.7)
EOSINOPHIL NFR BLD AUTO: 0 %
ERYTHROCYTE [DISTWIDTH] IN BLOOD BY AUTOMATED COUNT: 14.5 % (ref 10–15)
ERYTHROCYTE [DISTWIDTH] IN BLOOD BY AUTOMATED COUNT: 14.6 % (ref 10–15)
GFR SERPL CREATININE-BSD FRML MDRD: 88 ML/MIN/1.7M2
GLUCOSE SERPL-MCNC: 148 MG/DL (ref 70–99)
GLUCOSE UR STRIP-MCNC: NEGATIVE MG/DL
HCT VFR BLD AUTO: 27.8 % (ref 35–47)
HCT VFR BLD AUTO: 28.6 % (ref 35–47)
HGB BLD-MCNC: 8.7 G/DL (ref 11.7–15.7)
HGB BLD-MCNC: 9.1 G/DL (ref 11.7–15.7)
HGB UR QL STRIP: ABNORMAL
IMM GRANULOCYTES # BLD: 0 10E9/L (ref 0–0.4)
IMM GRANULOCYTES NFR BLD: 0.2 %
KETONES UR STRIP-MCNC: NEGATIVE MG/DL
LACTATE BLD-SCNC: 2.8 MMOL/L (ref 0.4–1.9)
LEUKOCYTE ESTERASE UR QL STRIP: NEGATIVE
LYMPHOCYTES # BLD AUTO: 0.5 10E9/L (ref 0.8–5.3)
LYMPHOCYTES NFR BLD AUTO: 10.6 %
MAGNESIUM SERPL-MCNC: 1.7 MG/DL (ref 1.6–2.3)
MCH RBC QN AUTO: 30 PG (ref 26.5–33)
MCH RBC QN AUTO: 30.5 PG (ref 26.5–33)
MCHC RBC AUTO-ENTMCNC: 31.3 G/DL (ref 31.5–36.5)
MCHC RBC AUTO-ENTMCNC: 31.8 G/DL (ref 31.5–36.5)
MCV RBC AUTO: 96 FL (ref 78–100)
MCV RBC AUTO: 96 FL (ref 78–100)
MONOCYTES # BLD AUTO: 0.4 10E9/L (ref 0–1.3)
MONOCYTES NFR BLD AUTO: 9 %
MUCOUS THREADS #/AREA URNS LPF: PRESENT /LPF
NEUTROPHILS # BLD AUTO: 3.8 10E9/L (ref 1.6–8.3)
NEUTROPHILS NFR BLD AUTO: 80 %
NITRATE UR QL: NEGATIVE
NRBC # BLD AUTO: 0 10*3/UL
NRBC BLD AUTO-RTO: 0 /100
PH UR STRIP: 6.5 PH (ref 5–7)
PLATELET # BLD AUTO: 146 10E9/L (ref 150–450)
PLATELET # BLD AUTO: 157 10E9/L (ref 150–450)
POTASSIUM SERPL-SCNC: 3 MMOL/L (ref 3.4–5.3)
POTASSIUM SERPL-SCNC: 3.1 MMOL/L (ref 3.4–5.3)
RBC # BLD AUTO: 2.9 10E12/L (ref 3.8–5.2)
RBC # BLD AUTO: 2.98 10E12/L (ref 3.8–5.2)
RBC #/AREA URNS AUTO: 14 /HPF (ref 0–2)
SODIUM SERPL-SCNC: 142 MMOL/L (ref 133–144)
SOURCE: ABNORMAL
SP GR UR STRIP: 1.01 (ref 1–1.03)
UROBILINOGEN UR STRIP-MCNC: NORMAL MG/DL (ref 0–2)
WBC # BLD AUTO: 3.9 10E9/L (ref 4–11)
WBC # BLD AUTO: 4.8 10E9/L (ref 4–11)
WBC #/AREA URNS AUTO: 2 /HPF (ref 0–5)

## 2018-06-23 PROCEDURE — 25000128 H RX IP 250 OP 636: Performed by: STUDENT IN AN ORGANIZED HEALTH CARE EDUCATION/TRAINING PROGRAM

## 2018-06-23 PROCEDURE — 40000986 XR ABDOMEN PORT 1 VW

## 2018-06-23 PROCEDURE — 25000128 H RX IP 250 OP 636: Performed by: SURGERY

## 2018-06-23 PROCEDURE — 87040 BLOOD CULTURE FOR BACTERIA: CPT | Performed by: STUDENT IN AN ORGANIZED HEALTH CARE EDUCATION/TRAINING PROGRAM

## 2018-06-23 PROCEDURE — 80053 COMPREHEN METABOLIC PANEL: CPT | Performed by: STUDENT IN AN ORGANIZED HEALTH CARE EDUCATION/TRAINING PROGRAM

## 2018-06-23 PROCEDURE — 25000128 H RX IP 250 OP 636: Performed by: FAMILY MEDICINE

## 2018-06-23 PROCEDURE — 25000125 ZZHC RX 250: Performed by: STUDENT IN AN ORGANIZED HEALTH CARE EDUCATION/TRAINING PROGRAM

## 2018-06-23 PROCEDURE — 25000132 ZZH RX MED GY IP 250 OP 250 PS 637: Performed by: STUDENT IN AN ORGANIZED HEALTH CARE EDUCATION/TRAINING PROGRAM

## 2018-06-23 PROCEDURE — 83605 ASSAY OF LACTIC ACID: CPT | Performed by: STUDENT IN AN ORGANIZED HEALTH CARE EDUCATION/TRAINING PROGRAM

## 2018-06-23 PROCEDURE — 12000003 ZZH R&B CRITICAL UMMC

## 2018-06-23 PROCEDURE — 36415 COLL VENOUS BLD VENIPUNCTURE: CPT | Performed by: STUDENT IN AN ORGANIZED HEALTH CARE EDUCATION/TRAINING PROGRAM

## 2018-06-23 PROCEDURE — 81001 URINALYSIS AUTO W/SCOPE: CPT | Performed by: STUDENT IN AN ORGANIZED HEALTH CARE EDUCATION/TRAINING PROGRAM

## 2018-06-23 PROCEDURE — 36415 COLL VENOUS BLD VENIPUNCTURE: CPT | Performed by: SURGERY

## 2018-06-23 PROCEDURE — 85027 COMPLETE CBC AUTOMATED: CPT | Performed by: SURGERY

## 2018-06-23 PROCEDURE — 80048 BASIC METABOLIC PNL TOTAL CA: CPT | Performed by: SURGERY

## 2018-06-23 PROCEDURE — 85025 COMPLETE CBC W/AUTO DIFF WBC: CPT | Performed by: STUDENT IN AN ORGANIZED HEALTH CARE EDUCATION/TRAINING PROGRAM

## 2018-06-23 PROCEDURE — 83735 ASSAY OF MAGNESIUM: CPT | Performed by: SURGERY

## 2018-06-23 PROCEDURE — 83605 ASSAY OF LACTIC ACID: CPT | Performed by: SURGERY

## 2018-06-23 PROCEDURE — 84132 ASSAY OF SERUM POTASSIUM: CPT | Performed by: SURGERY

## 2018-06-23 RX ORDER — HYDROMORPHONE HCL/0.9% NACL/PF 0.2MG/0.2
0.2 SYRINGE (ML) INTRAVENOUS
Status: DISCONTINUED | OUTPATIENT
Start: 2018-06-23 | End: 2018-06-29

## 2018-06-23 RX ORDER — HEPARIN SODIUM 5000 [USP'U]/.5ML
5000 INJECTION, SOLUTION INTRAVENOUS; SUBCUTANEOUS EVERY 12 HOURS
Status: DISCONTINUED | OUTPATIENT
Start: 2018-06-23 | End: 2018-06-29 | Stop reason: HOSPADM

## 2018-06-23 RX ADMIN — HEPARIN SODIUM 5000 UNITS: 5000 INJECTION, SOLUTION INTRAVENOUS; SUBCUTANEOUS at 21:36

## 2018-06-23 RX ADMIN — Medication 10 MEQ: at 14:13

## 2018-06-23 RX ADMIN — DEXTROSE AND SODIUM CHLORIDE: 5; 900 INJECTION, SOLUTION INTRAVENOUS at 01:51

## 2018-06-23 RX ADMIN — CYCLOSPORINE 1 DROP: 0.5 EMULSION OPHTHALMIC at 20:10

## 2018-06-23 RX ADMIN — Medication 0.2 MG: at 21:04

## 2018-06-23 RX ADMIN — SODIUM CHLORIDE 1000 ML: 9 INJECTION, SOLUTION INTRAVENOUS at 20:50

## 2018-06-23 RX ADMIN — BUPIVACAINE HYDROCHLORIDE: 7.5 INJECTION, SOLUTION EPIDURAL; RETROBULBAR at 21:55

## 2018-06-23 RX ADMIN — Medication 10 MEQ: at 12:49

## 2018-06-23 RX ADMIN — FAMOTIDINE 20 MG: 20 INJECTION, SOLUTION INTRAVENOUS at 05:35

## 2018-06-23 RX ADMIN — Medication 10 MEQ: at 15:38

## 2018-06-23 RX ADMIN — HEPARIN SODIUM 5000 UNITS: 5000 INJECTION, SOLUTION INTRAVENOUS; SUBCUTANEOUS at 11:12

## 2018-06-23 RX ADMIN — CYCLOSPORINE 1 DROP: 0.5 EMULSION OPHTHALMIC at 11:12

## 2018-06-23 RX ADMIN — FLUOXETINE 20 MG: 20 CAPSULE ORAL at 11:12

## 2018-06-23 RX ADMIN — Medication 10 MEQ: at 11:12

## 2018-06-23 RX ADMIN — MAGNESIUM SULFATE HEPTAHYDRATE 2 G: 40 INJECTION, SOLUTION INTRAVENOUS at 16:23

## 2018-06-23 RX ADMIN — DEXTROSE AND SODIUM CHLORIDE: 5; 900 INJECTION, SOLUTION INTRAVENOUS at 14:37

## 2018-06-23 NOTE — PLAN OF CARE
Pt S/P Exploratory Lap.Arrived at floor at 18:00 per cart.Upon arrival pt VSS,sleepy but easily aroused.No c/o pain.Has Midline abdominal incision,with old blood marked on dressing.No bowel sounds,pt NPO.NG to LIS.Pt has not voided since 1800.Scanned for 277 and st cath for 605.Cureently has a temp of 101.5.Message sent to MD.Pt alert to self,and has tried to pull on NG tube.Has mitts on and bed alarm on.Continue with POC.

## 2018-06-23 NOTE — PLAN OF CARE
Problem: Bowel Obstruction (Adult)  Goal: Signs and Symptoms of Listed Potential Problems Will be Absent, Minimized or Managed (Bowel Obstruction)  Signs and symptoms of listed potential problems will be absent, minimized or managed by discharge/transition of care (reference Bowel Obstruction (Adult) CPG).   Outcome: No Change  Alert and oriented to person only. Complained of pain in the abdomen. Epidural intact at site with small amount of old blood, infusing at 8 ml/hr. Mitts left on as patient is still confused to prevent pulling on tubes. Unable to void when she was got up to [facility]. Bladder scanned for 475 ml and st cath done at 0650 with 450 ml output. NG to LIS. Denies nausea. Dressing with scant old drainage. Capnography IPI 8-9 and EtCO2 35. Bed alarm on.

## 2018-06-23 NOTE — PROGRESS NOTES
Surgery Progress Note  Chelly Dave  [id number]    S:  Required straight cath overnight for 605 mls. Febrile to 101.5. Complaining of pain this morning. No flatus, no BM. Otherwise no acute overnight events. No N/V.    O:  Temp:  [96  F (35.6  C)-101.5  F (38.6  C)] (P) 98.3  F (36.8  C)  Pulse:  [78-80] 80  Heart Rate:  [67-86] (P) 78  Resp:  [9-24] (P) 24  BP: (100-177)/(52-90) (P) 137/59  MAP:  [80 mmHg-94 mmHg] 82 mmHg  Arterial Line BP: (119-146)/(48-59) 119/51  SpO2:  [96 %-100 %] 98 %    I/O last 3 completed shifts:  In: 1750 [I.V.:1500]  Out: 1900 [Urine:1725; Emesis/NG output:150; Blood:25]    Gen: Awake, interactive, NAD   Resp: non-labored breathing on room air  CV: RRR  Abd: soft, non-distended, incision CDI, appropriate TTP   Ext: warm and well perfused    BMP  Recent Labs  Lab 06/23/18  0704 06/22/18  1344 06/21/18  0730 06/20/18 2016    140 140 138   POTASSIUM 3.0* 3.0* 4.0 3.5   CHLORIDE 113*  --  111* 111*   ROBSON 7.2*  --  8.4* 8.5   CO2 22  --  20 19*   BUN 8  --  21 23   CR 0.65  --  0.83 0.99   * 151* 114* 137*     CBC  Recent Labs  Lab 06/23/18  0704 06/22/18  1344 06/21/18  0730 06/20/18 2016   WBC 3.9*  --  2.5* 6.6   RBC 2.90*  --  3.35* 3.26*   HGB 8.7* 9.2* 10.1* 10.3*   HCT 27.8*  --  32.0* 33.0*   MCV 96  --  96 101*   MCH 30.0  --  30.1 31.6   MCHC 31.3*  --  31.6 31.2*   RDW 14.5  --  14.9 14.5     --  212 225     INRNo lab results found in last 7 days.     Imaging  No new           A/P: Chelly Dave is a 83 year old female POD# 1 s/p ex lap and resection of mesenteric mass and 50 cm of small bowel   - continue NPO w/ NGT   - monitor for delerium   - await ROBF   - f/u pathology results   - continue current pain regimen   - start lovenox 30 mg BID if OK w/ anesthesia     Seen with chief resident who will discuss with attending Dr Reid.    Tio Lamb MD on 6/23/2018 at 9:36 AM  PGY-1

## 2018-06-23 NOTE — PLAN OF CARE
Problem: Patient Care Overview  Goal: Plan of Care/Patient Progress Review  Outcome: No Change  Alert and oriented to only person and very confused. Complained of pain this am and pain service gave bolus of pain medication through epidural. Otherwise pt has denied pain, until 1500. MD paged for additional pain medications. Epidural side is intact with a small amount of old blood on dressing, epidural is infusing at 8 ml/hr. Pt has the mitts on as of the start of my shift but mitts were able to be removed due to pt not pulling at tubes. Pt unable to void this shift, bladder scanned 322ml and straight cath 350 mls. Quiroz was placed at 1445. NG to low intermit suction, no out put from ng this shift. Denies N/V. Abdominal midline dressing has a scant amount of dressing. Capno in place.       Potassium and Mag needed to be replaced per protocol. Potassium is almost complete and mag needs to still be replaced. Will pass on to PM shift.

## 2018-06-23 NOTE — PROGRESS NOTES
REGIONAL ANESTHESIA PAIN SERVICE EPIDURAL NOTE  Chelly Dave is a 83 year old female POD #1 s/p LAPAROTOMY EXPLORATORY and placement of T9-10 epidural catheter for pain management.      SUBJECTIVE  Interval History: Overnight events: No acute events overnight. Patient reports poor pain control with epidural infusion and current analgesic medications (see below).  No noted weakness, paresthesias, circumoral numbness, metallic taste or tinnitus.  Patient not yet ambulating.  Currently without nausea.      Clinically Aligned Pain Assessment (CAPA):  Comfort (How is your pain?): Tolerable with discomfort  Change in Pain (Since your last medication/intervention?): About the same  Pain Control (How are your pain treatments working?):  Partially effective pain control  Functioning (Are you able to do activities to get better?) : Pain keeps me from doing most of what I need to do  Sleep (Does your pain management allow you to sleep or rest?): Awake with occasional pain    Pain Intensity using Numerical Rating Scale:    6/10 at rest and 9/10 with activity      Antithrombotic/Thrombolytic Therapy ordered:  Heparin      Medications related to Pain Management (Future)    Start     Dose/Rate Route Frequency Ordered Stop    06/22/18 1245  bupivacaine (MARCAINE) 0.0625 % in sodium chloride 0.9 % 250 mL EPIDURAL Infusion      8 mL/hr  EPIDURAL CONTINUOUS 06/22/18 1232      06/22/18 1231  nalbuphine (NUBAIN) injection 2.5-5 mg      2.5-5 mg Intravenous EVERY 6 HOURS PRN 06/22/18 1232      06/21/18 0252  lidocaine 1 % 1 mL      1 mL Other EVERY 1 HOUR PRN 06/21/18 0300      06/21/18 0252  lidocaine (LMX4) kit       Topical EVERY 1 HOUR PRN 06/21/18 0300             OBJECTIVE:  Lab Results:   Recent Labs   Lab Test  06/22/18   1344  06/21/18   0730   WBC   --   2.5*   RBC   --   3.35*   HGB  9.2*  10.1*   HCT   --   32.0*   MCV   --   96   MCH   --   30.1   MCHC   --   31.6   RDW   --   14.9   PLT   --   212       Lab Results    Component Value Date    INR 1.20 10/17/2011       Vitals:    Temp:  [35.6  C (96  F)-38.6  C (101.5  F)] 37.7  C (99.8  F)  Pulse:  [78-80] 80  Heart Rate:  [65-86] 81  Resp:  [9-24] 23  BP: (100-177)/(52-90) 130/63  MAP:  [80 mmHg-94 mmHg] 82 mmHg  Arterial Line BP: (119-146)/(48-59) 119/51  SpO2:  [96 %-100 %] 98 %  /63 (BP Location: Right arm)  Pulse 80  Temp 37.7  C (99.8  F) (Oral)  Resp 23  Ht 1.524 m (5')  Wt 62.6 kg (137 lb 14.4 oz)  SpO2 98%  BMI 26.93 kg/m2       Exam:   GEN: alert and no distress  NEURO/MSK: Extent of sensory block tested with No noted level: L) No noted level-No noted level, R) No noted level-No noted level  Strength LE 5/5  and overall symmetric  SKIN: Epidural catheter site with dressing c/d/i, no tenderness, erythema, heme, edema       ASSESSMENT/PLAN:    Patient receiving adequate analgesia with current mulitmodal plan including epidural catheter infusion bupivacaine 0.0625% at 8mL/hour.  Motor function adequate and poor adequate sensory block, currently meeting activity goals.  No evidence of adverse side effects related to local anesthetic.  States voiding without difficulty.      - continue current epidural infusion bupivacaine 0.0625% at 8mL/hour     - Bolus at 1000 with 5mL of 0.125% bupivacaine. Monitored with q5min BP for 30min and monitoring per nursing for signs of LAST. No noted signs of LAST within 10min of bolus. May receive bolus q12h per RAPS team with con    - antithrombotic/thrombolytic therapy with Heparin ordered . Plan to continue until day before removal. At that time RAPS team will note plan for catheter removal. Please contact RAPS (#[phone]) prior to any medication changes  - will continue to follow and adjust as needed    - discussed plan with attending anesthesiologist    Roe Etienne MD  [facility]  6/23/2018 6:46 AM    RAPS Contact Info (24 hour job code pager is the last 4 digits) For in-house use only:   [employer]  phone: [place] [phone], [place] [phone], Peds [phone], then enter call-back number.    Text: Use [employer] on the Intranet <Paging/Directory> tab and enter Jobcode ID.   If no call back at any time, contact the hospital  and ask for RAPS attending or backup

## 2018-06-23 NOTE — PLAN OF CARE
Problem: Bowel Obstruction (Adult)  Goal: Signs and Symptoms of Listed Potential Problems Will be Absent, Minimized or Managed (Bowel Obstruction)  Signs and symptoms of listed potential problems will be absent, minimized or managed by discharge/transition of care (reference Bowel Obstruction (Adult) CPG).   Patient AVSS. Confused this afternoon, pulled out iv and trying to pull at tubes. Mitts placed but patient able to remove. Bedside attendant placed. NGT secure in nose but is out 7 cm. NGT advanced 7cm per md verbal order and portable xray ordered to check placement. Quiroz patent with adequate urine output. Midline incision dry and intact. Up to bedside commode x 1 but no bm.

## 2018-06-24 ENCOUNTER — APPOINTMENT (OUTPATIENT)
Dept: GENERAL RADIOLOGY | Facility: CLINIC | Age: 83
DRG: 331 | End: 2018-06-24
Attending: SURGERY
Payer: COMMERCIAL

## 2018-06-24 LAB
ALBUMIN SERPL-MCNC: 2.6 G/DL (ref 3.4–5)
ALP SERPL-CCNC: 50 U/L (ref 40–150)
ALT SERPL W P-5'-P-CCNC: 13 U/L (ref 0–50)
ANION GAP SERPL CALCULATED.3IONS-SCNC: 5 MMOL/L (ref 3–14)
ANION GAP SERPL CALCULATED.3IONS-SCNC: 6 MMOL/L (ref 3–14)
AST SERPL W P-5'-P-CCNC: 22 U/L (ref 0–45)
BILIRUB SERPL-MCNC: 0.3 MG/DL (ref 0.2–1.3)
BUN SERPL-MCNC: 5 MG/DL (ref 7–30)
BUN SERPL-MCNC: 6 MG/DL (ref 7–30)
CALCIUM SERPL-MCNC: 7 MG/DL (ref 8.5–10.1)
CALCIUM SERPL-MCNC: 7.2 MG/DL (ref 8.5–10.1)
CHLORIDE SERPL-SCNC: 112 MMOL/L (ref 94–109)
CHLORIDE SERPL-SCNC: 114 MMOL/L (ref 94–109)
CO2 SERPL-SCNC: 22 MMOL/L (ref 20–32)
CO2 SERPL-SCNC: 24 MMOL/L (ref 20–32)
CREAT SERPL-MCNC: 0.61 MG/DL (ref 0.52–1.04)
CREAT SERPL-MCNC: 0.68 MG/DL (ref 0.52–1.04)
ERYTHROCYTE [DISTWIDTH] IN BLOOD BY AUTOMATED COUNT: 14.5 % (ref 10–15)
GFR SERPL CREATININE-BSD FRML MDRD: 82 ML/MIN/1.7M2
GFR SERPL CREATININE-BSD FRML MDRD: >90 ML/MIN/1.7M2
GLUCOSE SERPL-MCNC: 107 MG/DL (ref 70–99)
GLUCOSE SERPL-MCNC: 99 MG/DL (ref 70–99)
HCT VFR BLD AUTO: 27.4 % (ref 35–47)
HGB BLD-MCNC: 8.9 G/DL (ref 11.7–15.7)
LACTATE BLD-SCNC: 1.2 MMOL/L (ref 0.7–2)
MAGNESIUM SERPL-MCNC: 2.1 MG/DL (ref 1.6–2.3)
MCH RBC QN AUTO: 30.5 PG (ref 26.5–33)
MCHC RBC AUTO-ENTMCNC: 32.5 G/DL (ref 31.5–36.5)
MCV RBC AUTO: 94 FL (ref 78–100)
PLATELET # BLD AUTO: 151 10E9/L (ref 150–450)
POTASSIUM SERPL-SCNC: 3 MMOL/L (ref 3.4–5.3)
POTASSIUM SERPL-SCNC: 3.3 MMOL/L (ref 3.4–5.3)
POTASSIUM SERPL-SCNC: 3.7 MMOL/L (ref 3.4–5.3)
POTASSIUM SERPL-SCNC: 3.8 MMOL/L (ref 3.4–5.3)
PROT SERPL-MCNC: 7.2 G/DL (ref 6.8–8.8)
RBC # BLD AUTO: 2.92 10E12/L (ref 3.8–5.2)
SODIUM SERPL-SCNC: 141 MMOL/L (ref 133–144)
SODIUM SERPL-SCNC: 142 MMOL/L (ref 133–144)
WBC # BLD AUTO: 4.7 10E9/L (ref 4–11)

## 2018-06-24 PROCEDURE — 36415 COLL VENOUS BLD VENIPUNCTURE: CPT | Performed by: SURGERY

## 2018-06-24 PROCEDURE — 85027 COMPLETE CBC AUTOMATED: CPT | Performed by: STUDENT IN AN ORGANIZED HEALTH CARE EDUCATION/TRAINING PROGRAM

## 2018-06-24 PROCEDURE — 84132 ASSAY OF SERUM POTASSIUM: CPT | Performed by: SURGERY

## 2018-06-24 PROCEDURE — 40000556 ZZH STATISTIC PERIPHERAL IV START W US GUIDANCE

## 2018-06-24 PROCEDURE — 25000128 H RX IP 250 OP 636: Performed by: STUDENT IN AN ORGANIZED HEALTH CARE EDUCATION/TRAINING PROGRAM

## 2018-06-24 PROCEDURE — 25000132 ZZH RX MED GY IP 250 OP 250 PS 637: Performed by: STUDENT IN AN ORGANIZED HEALTH CARE EDUCATION/TRAINING PROGRAM

## 2018-06-24 PROCEDURE — 40000895 ZZH STATISTIC SLP IP EVAL DEFER

## 2018-06-24 PROCEDURE — 40000141 ZZH STATISTIC PERIPHERAL IV START W/O US GUIDANCE

## 2018-06-24 PROCEDURE — 25000125 ZZHC RX 250: Performed by: STUDENT IN AN ORGANIZED HEALTH CARE EDUCATION/TRAINING PROGRAM

## 2018-06-24 PROCEDURE — 83735 ASSAY OF MAGNESIUM: CPT | Performed by: SURGERY

## 2018-06-24 PROCEDURE — 25000128 H RX IP 250 OP 636: Performed by: SURGERY

## 2018-06-24 PROCEDURE — 25000128 H RX IP 250 OP 636: Performed by: FAMILY MEDICINE

## 2018-06-24 PROCEDURE — 80048 BASIC METABOLIC PNL TOTAL CA: CPT | Performed by: STUDENT IN AN ORGANIZED HEALTH CARE EDUCATION/TRAINING PROGRAM

## 2018-06-24 PROCEDURE — 12000003 ZZH R&B CRITICAL UMMC

## 2018-06-24 PROCEDURE — 74018 RADEX ABDOMEN 1 VIEW: CPT

## 2018-06-24 RX ORDER — HEPARIN SODIUM,PORCINE 10 UNIT/ML
2-5 VIAL (ML) INTRAVENOUS
Status: DISCONTINUED | OUTPATIENT
Start: 2018-06-24 | End: 2018-06-29 | Stop reason: HOSPADM

## 2018-06-24 RX ORDER — LIDOCAINE 40 MG/G
CREAM TOPICAL
Status: DISCONTINUED | OUTPATIENT
Start: 2018-06-24 | End: 2018-06-29 | Stop reason: HOSPADM

## 2018-06-24 RX ADMIN — Medication 10 MEQ: at 20:00

## 2018-06-24 RX ADMIN — HEPARIN SODIUM 5000 UNITS: 5000 INJECTION, SOLUTION INTRAVENOUS; SUBCUTANEOUS at 10:03

## 2018-06-24 RX ADMIN — Medication 10 MEQ: at 17:07

## 2018-06-24 RX ADMIN — CYCLOSPORINE 1 DROP: 0.5 EMULSION OPHTHALMIC at 19:59

## 2018-06-24 RX ADMIN — Medication 10 MEQ: at 11:34

## 2018-06-24 RX ADMIN — Medication 10 MEQ: at 00:23

## 2018-06-24 RX ADMIN — CYCLOSPORINE 1 DROP: 0.5 EMULSION OPHTHALMIC at 08:25

## 2018-06-24 RX ADMIN — DEXTROSE AND SODIUM CHLORIDE: 5; 900 INJECTION, SOLUTION INTRAVENOUS at 05:53

## 2018-06-24 RX ADMIN — Medication 10 MEQ: at 12:53

## 2018-06-24 RX ADMIN — Medication 0.2 MG: at 20:00

## 2018-06-24 RX ADMIN — Medication 10 MEQ: at 02:33

## 2018-06-24 RX ADMIN — FAMOTIDINE 20 MG: 20 INJECTION, SOLUTION INTRAVENOUS at 03:32

## 2018-06-24 RX ADMIN — HEPARIN SODIUM 5000 UNITS: 5000 INJECTION, SOLUTION INTRAVENOUS; SUBCUTANEOUS at 21:59

## 2018-06-24 RX ADMIN — Medication 10 MEQ: at 03:34

## 2018-06-24 RX ADMIN — Medication 10 MEQ: at 08:25

## 2018-06-24 RX ADMIN — Medication 10 MEQ: at 10:03

## 2018-06-24 RX ADMIN — FLUOXETINE 20 MG: 20 CAPSULE ORAL at 08:25

## 2018-06-24 RX ADMIN — Medication 10 MEQ: at 01:30

## 2018-06-24 RX ADMIN — DEXTROSE AND SODIUM CHLORIDE 1000 ML: 5; 900 INJECTION, SOLUTION INTRAVENOUS at 17:22

## 2018-06-24 NOTE — PROGRESS NOTES
Patient has had frequent piv replacement  7 so far / very difficult to keep in  Right arm  is very edematous from iv infiltrate. Recommend Picc line  Nurse to call patient's doctor

## 2018-06-24 NOTE — PLAN OF CARE
Problem: Patient Care Overview  Goal: Plan of Care/Patient Progress Review  Outcome: No Change  Alert and oriented to only person and very confused. Sitter at bedside due to confusion and pulling at NG and IVs, Mitts also in place. Denies pain at this time. Epidural running at 8ml/hr, site is intact with small amounts of old drainage on dressing.  Adequate [place] output, hypoactive bowel sounds, pt denies passing gas, and no bm this shift. NG to low intermit suction, no out put from ng this shift. Denies N/V. Abdominal midline dressing has a scant amount of dressing. Capno in place. Potassium replaced per protocol from the 0727 lab draw 3.3, still running at this time. Pt resting between cares. Multiple IVs have been pulled by pt or have infiltrated. MD paged for possible PICC orders. IV team not wanting to put in another PIV. New bruise to right antecubital due to infiltration of MIVF (D5 NS).       No Capno in place at this time.

## 2018-06-24 NOTE — PLAN OF CARE
Problem: Patient Care Overview  Goal: Plan of Care/Patient Progress Review  Outcome: No Change  Vital signs:  Temp: 98.6  F (37  C) Temp src: Axillary BP: 169/81   Heart Rate: 88 Resp: 18 SpO2: 94 % O2 Device: None (Room air)   Temp max of 101.5, HTN. Triggered septic protocol. Lactic acid 2.8. Yesenia Goodman notified around 2047, MD came to assess patient at bedside. 1 L NS bolus given. Urine sample collected. Blood cultures drawn. NG to LIS, no output. XR abdomen ordered to check for placement. NPO. MIVF infusing at 100 ml/hr. Potassium replaced per protocol. Abdominal pain managed with IV dilaudid prn x1 w/relief. LS diminished, no SOB, IS encouraged. Pt confused & pulling on lines, bedside attended at bedside. Quiroz with adequate urine output. Continue POC.

## 2018-06-24 NOTE — PROGRESS NOTES
[facility], [place]    Sepsis Evaluation Progress Note    Date of Service: 06/23/2018    I was called to see Chelly Dave due to abnormal vital signs triggering the Sepsis SIRS screening alert. She is not known to have an infection.     Physical Exam    Vital Signs:  Temp: 100  F (37.8  C) Temp src: Oral BP: 170/62 Pulse: 80 Heart Rate: 81 Resp: 20 SpO2: 97 % O2 Device: None (Room air) Oxygen Delivery: 2 LPM    Lab:  Lactic Acid   Date Value Ref Range Status   06/22/2018 0.5 (L) 0.7 - 2.0 mmol/L Final     Lactate for Sepsis Protocol   Date Value Ref Range Status   06/23/2018 2.8 (HH) 0.4 - 1.9 mmol/L Final     Comment:     Critical Value called to and read back by  AMY JHAVERI RN ON 7B AT 2045 ON 06/23/18 BY KS         The patient is at baseline mental status.    The rest of their physical exam is significant for clear lung fields bilaterally on auscultation, only mildly TTP on abdominal exam, warm skin, mentating at baseline.    Assessment and Plan    The SIRS and exam findings are likely due to possible UTI, there is no sign of sepsis at this time.    Disposition: The patient will remain on the current unit. We will continue to monitor this patient closely.    Veronica Goodman MD

## 2018-06-24 NOTE — PROGRESS NOTES
REGIONAL ANESTHESIA PAIN SERVICE EPIDURAL NOTE  Chelly Dave is a 83 year old female POD #3 s/p LAPAROTOMY EXPLORATORY and placement of T9-10 epidural catheter for pain management.       SUBJECTIVE    Interval History:  Patient is sleepy and confused this am.  Patient reports adequate pain control with epidural infusion and current analgesic medications.  She reports pain in her feet which was present prior to surgery.  She is unable to rate pain.  No noted weakness, paresthesias, circumoral numbness, metallic taste or tinnitus.  Currently without nausea and vomiting.                            Clinically Aligned Pain Assessment (CAPA):  Comfort (How is your pain?): Tolerable with discomfort  Change in Pain (Since your last medication/intervention?): About the same  Pain Control (How are your pain treatments working?):  Partially effective pain control  Functioning (Are you able to do activities to get better?) : Pain keeps me from doing most of what I need to do  Sleep (Does your pain management allow you to sleep or rest?): Awake with occasional pain          Antithrombotic/Thrombolytic Therapy ordered:  Heparin 5,000 units q 12 hours     OBJECTIVE:    Temp: 36.6  C (97.9  F) Temp src: Axillary BP: 164/73   Heart Rate: 80 Resp: 18 SpO2: 92 % O2 Device: None (Room air)        Exam:   GEN: alert and no distress  NEURO/MSK: Extent of sensory block tested with No noted level: L) No noted level-No noted level, R) No noted level-No noted level  Strength LE 5/5  and overall symmetric  SKIN: Epidural catheter site with dressing c/d/i, no tenderness, erythema, heme, edema         Lab Results   Component Value Date    WBC 4.7 06/24/2018    WBC 4.8 06/23/2018    WBC 3.9 (L) 06/23/2018    HGB 8.9 (L) 06/24/2018    HGB 9.1 (L) 06/23/2018    HGB 8.7 (L) 06/23/2018    HCT 27.4 (L) 06/24/2018    HCT 28.6 (L) 06/23/2018    HCT 27.8 (L) 06/23/2018     06/24/2018     (L) 06/23/2018     06/23/2018      06/24/2018     06/23/2018     06/23/2018    POTASSIUM 3.7 06/24/2018    POTASSIUM 3.3 (L) 06/24/2018    POTASSIUM 3.0 (L) 06/23/2018    CHLORIDE 114 (H) 06/24/2018    CHLORIDE 112 (H) 06/23/2018    CHLORIDE 113 (H) 06/23/2018    CO2 22 06/24/2018    CO2 24 06/23/2018    CO2 22 06/23/2018    BUN 5 (L) 06/24/2018    BUN 6 (L) 06/23/2018    BUN 8 06/23/2018    CR 0.61 06/24/2018    CR 0.68 06/23/2018    CR 0.65 06/23/2018     (H) 06/24/2018    GLC 99 06/23/2018     (H) 06/23/2018    DD 1.7 (H) 10/17/2011    TROPI <0.015 06/20/2018    TROPI  08/10/2016     <0.015  The 99th percentile for upper reference range is 0.045 ug/L.  Troponin values in   the range of 0.045 - 0.120 ug/L may be associated with risks of adverse   clinical events.      TROPI  05/05/2015     <0.015  The 99th percentile for upper reference range is 0.045 ug/L.  Troponin values in   the range of 0.045 - 0.120 ug/L may be associated with risks of adverse   clinical events.   Effective 7/30/2014, the reference range for this assay has changed to reflect   new instrumentation/methodology.      AST 22 06/23/2018    AST 19 06/21/2018    AST 20 06/20/2018    ALT 13 06/23/2018    ALT 16 06/21/2018    ALT 16 06/20/2018    ALKPHOS 50 06/23/2018    ALKPHOS 66 06/21/2018    ALKPHOS 70 06/20/2018    BILITOTAL 0.3 06/23/2018    BILITOTAL 0.2 06/21/2018    BILITOTAL 0.2 06/20/2018    INR 1.20 (H) 10/17/2011                  ASSESSMENT/PLAN:      Patient is POD #2 with T9-10 Epidural and is receiving adequate analgesia with current mulitmodal plan including epidural catheter infusion bupivacaine 0.0625% at 8mL/hour.  Patient is sleepy and confused this morning so difficulty to obtain hx.  She reports pain in her feet (chronic) and when asked about surgical pain states it is better than yesterday.  She is unable to rate pain.  No evidence of adverse side effects related to local anesthetic.  Denies n/v.      - continue current epidural  infusion bupivacaine 0.0625% at 8mL/hour   - antithrombotic/thrombolytic therapy: Heparin. Please contact RAPS (#[phone]) prior to any medication changes  - will continue to follow and adjust as needed  - to discuss plan with staff    Sharon Messer MD  Regional Anesthesia Pain Service  6/24/18 3:08 PM     RAPS Contact Info (24 hour job code pager is the last 4 digits) For in-house use only:   [employer] phone: [place] [phone], [place] [phone], [facility] [phone], then enter call-back number.    Text: Use [employer] on the Intranet <Paging/Directory> tab and enter Jobcode ID.   If no call back at any time, contact the hospital  and ask for RAPS attending or backup

## 2018-06-24 NOTE — PROGRESS NOTES
Surgery Progress Note  Chelly Dave  [id number]    S:  Sepsis protocol triggered overnight - lactate was 2.8. Given fluid resuscitation. With 1L NS. Cultures and UA/urine culture sent. Three hours later lactate improved to 1.2. Pain is well controlled this morning. No flatus or BM yet.     O:  Temp:  [97.9  F (36.6  C)-101.5  F (38.6  C)] 99.3  F (37.4  C)  Heart Rate:  [81-88] 86  Resp:  [18-20] 18  BP: (144-173)/(62-87) 167/81  SpO2:  [94 %-98 %] 94 %    I/O last 3 completed shifts:  In: 2163.33 [I.V.:1163.33; IV Piggyback:1000]  Out: 2100 [Urine:2100]    Gen: NAD   Resp: non-labored breathing on room air  Abd: soft, non-distended, incision CDI, appropriate TTP   Ext: warm and well perfused    BMP    Recent Labs  Lab 06/24/18  0954 06/24/18  0727 06/23/18  2334 06/23/18  1954 06/23/18  0704 06/22/18  1344 06/21/18  0730     --  142  --  142 140 140   POTASSIUM 3.7 3.3* 3.0* 3.1* 3.0* 3.0* 4.0   CHLORIDE 114*  --  112*  --  113*  --  111*   ROBSON 7.0*  --  7.2*  --  7.2*  --  8.4*   CO2 22  --  24  --  22  --  20   BUN 5*  --  6*  --  8  --  21   CR 0.61  --  0.68  --  0.65  --  0.83   *  --  99  --  148* 151* 114*     Lactic Acid   Date Value Ref Range Status   06/23/2018 1.2 0.7 - 2.0 mmol/L Final   06/22/2018 0.5 (L) 0.7 - 2.0 mmol/L Final   06/20/2018 0.8 0.7 - 2.0 mmol/L Final     Lactate for Sepsis Protocol   Date Value Ref Range Status   06/23/2018 2.8 (HH) 0.4 - 1.9 mmol/L Final     Comment:     Critical Value called to and read back by  AMY JHAVERI RN ON 7B AT 2045 ON 06/23/18 BY KS          CBC    Recent Labs  Lab 06/24/18  0954 06/23/18  2334 06/23/18  0704 06/22/18  1344 06/21/18  0730   WBC 4.7 4.8 3.9*  --  2.5*   RBC 2.92* 2.98* 2.90*  --  3.35*   HGB 8.9* 9.1* 8.7* 9.2* 10.1*   HCT 27.4* 28.6* 27.8*  --  32.0*   MCV 94 96 96  --  96   MCH 30.5 30.5 30.0  --  30.1   MCHC 32.5 31.8 31.3*  --  31.6   RDW 14.5 14.6 14.5  --  14.9    146* 157  --  212     INRNo lab results  found in last 7 days.     Imaging  ABDOMINAL XR 6/24/2018 at 01:49  NG tube with the tip and side-port projects over stomach.  Nonobstructive bowel gas pattern.          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  - Continue NPO w/ NGT   - Bedside swallow evaluation before advancing diet   - Consider pulling NG this afternoon if output continues to be low  - Monitor for delerium   - Await ROBF   - f/u pathology results   - continue current pain regimen   - Heparin 5000U BID   - hypokalemia - replace per protocol    Seen with chief resident who will discuss with attending Dr Reid.    Veronica Goodman MD   PGY-1 Surgery Resident  Pager: [phone]

## 2018-06-24 NOTE — PLAN OF CARE
Problem: Patient Care Overview  Goal: Plan of Care/Patient Progress Review  SLP consult received. Chart reviewed and case discussed with RN/MD. Per MD evaluation is not yet indicated as the patient is not yet appropriate for PO, so SLP will sign off. Please reconsult if/when appropriate.

## 2018-06-25 ENCOUNTER — APPOINTMENT (OUTPATIENT)
Dept: GENERAL RADIOLOGY | Facility: CLINIC | Age: 83
DRG: 331 | End: 2018-06-25
Attending: SURGERY
Payer: COMMERCIAL

## 2018-06-25 LAB
ANION GAP SERPL CALCULATED.3IONS-SCNC: 7 MMOL/L (ref 3–14)
BACTERIA SPEC CULT: NORMAL
BACTERIA SPEC CULT: NORMAL
BUN SERPL-MCNC: 6 MG/DL (ref 7–30)
CALCIUM SERPL-MCNC: 7.3 MG/DL (ref 8.5–10.1)
CHLORIDE SERPL-SCNC: 109 MMOL/L (ref 94–109)
CO2 SERPL-SCNC: 25 MMOL/L (ref 20–32)
CREAT SERPL-MCNC: 0.64 MG/DL (ref 0.52–1.04)
GFR SERPL CREATININE-BSD FRML MDRD: 89 ML/MIN/1.7M2
GLUCOSE BLDC GLUCOMTR-MCNC: 155 MG/DL (ref 70–99)
GLUCOSE SERPL-MCNC: 94 MG/DL (ref 70–99)
MAGNESIUM SERPL-MCNC: 1.9 MG/DL (ref 1.6–2.3)
POTASSIUM SERPL-SCNC: 3.5 MMOL/L (ref 3.4–5.3)
POTASSIUM SERPL-SCNC: 3.6 MMOL/L (ref 3.4–5.3)
SODIUM SERPL-SCNC: 141 MMOL/L (ref 133–144)
SPECIMEN SOURCE: NORMAL
SPECIMEN SOURCE: NORMAL

## 2018-06-25 PROCEDURE — 83735 ASSAY OF MAGNESIUM: CPT | Performed by: STUDENT IN AN ORGANIZED HEALTH CARE EDUCATION/TRAINING PROGRAM

## 2018-06-25 PROCEDURE — 25000132 ZZH RX MED GY IP 250 OP 250 PS 637: Performed by: STUDENT IN AN ORGANIZED HEALTH CARE EDUCATION/TRAINING PROGRAM

## 2018-06-25 PROCEDURE — 25000128 H RX IP 250 OP 636: Performed by: STUDENT IN AN ORGANIZED HEALTH CARE EDUCATION/TRAINING PROGRAM

## 2018-06-25 PROCEDURE — 25000125 ZZHC RX 250: Performed by: STUDENT IN AN ORGANIZED HEALTH CARE EDUCATION/TRAINING PROGRAM

## 2018-06-25 PROCEDURE — 84132 ASSAY OF SERUM POTASSIUM: CPT | Performed by: SURGERY

## 2018-06-25 PROCEDURE — 27210195 ZZH KIT POWER PICC DOUBLE LUMEN

## 2018-06-25 PROCEDURE — 36415 COLL VENOUS BLD VENIPUNCTURE: CPT | Performed by: SURGERY

## 2018-06-25 PROCEDURE — 25000132 ZZH RX MED GY IP 250 OP 250 PS 637: Performed by: SURGERY

## 2018-06-25 PROCEDURE — 80048 BASIC METABOLIC PNL TOTAL CA: CPT | Performed by: STUDENT IN AN ORGANIZED HEALTH CARE EDUCATION/TRAINING PROGRAM

## 2018-06-25 PROCEDURE — 25000128 H RX IP 250 OP 636: Performed by: FAMILY MEDICINE

## 2018-06-25 PROCEDURE — 25000128 H RX IP 250 OP 636: Performed by: SURGERY

## 2018-06-25 PROCEDURE — 74018 RADEX ABDOMEN 1 VIEW: CPT

## 2018-06-25 PROCEDURE — 12000003 ZZH R&B CRITICAL UMMC

## 2018-06-25 PROCEDURE — 36569 INSJ PICC 5 YR+ W/O IMAGING: CPT

## 2018-06-25 PROCEDURE — 36415 COLL VENOUS BLD VENIPUNCTURE: CPT | Performed by: STUDENT IN AN ORGANIZED HEALTH CARE EDUCATION/TRAINING PROGRAM

## 2018-06-25 PROCEDURE — 40000986 XR CHEST PORT 1 VW

## 2018-06-25 RX ADMIN — MAGNESIUM SULFATE HEPTAHYDRATE 2 G: 40 INJECTION, SOLUTION INTRAVENOUS at 22:29

## 2018-06-25 RX ADMIN — CYCLOSPORINE 1 DROP: 0.5 EMULSION OPHTHALMIC at 08:32

## 2018-06-25 RX ADMIN — Medication 0.2 MG: at 00:22

## 2018-06-25 RX ADMIN — LIDOCAINE HYDROCHLORIDE 2 ML: 10 INJECTION, SOLUTION INFILTRATION; PERINEURAL at 10:03

## 2018-06-25 RX ADMIN — HEPARIN SODIUM 5000 UNITS: 5000 INJECTION, SOLUTION INTRAVENOUS; SUBCUTANEOUS at 11:14

## 2018-06-25 RX ADMIN — CYCLOSPORINE 1 DROP: 0.5 EMULSION OPHTHALMIC at 20:24

## 2018-06-25 RX ADMIN — FLUOXETINE 20 MG: 20 CAPSULE ORAL at 08:33

## 2018-06-25 RX ADMIN — ACETAMINOPHEN 975 MG: 325 SOLUTION ORAL at 06:45

## 2018-06-25 RX ADMIN — HEPARIN SODIUM 5000 UNITS: 5000 INJECTION, SOLUTION INTRAVENOUS; SUBCUTANEOUS at 20:24

## 2018-06-25 RX ADMIN — Medication 0.2 MG: at 17:55

## 2018-06-25 RX ADMIN — DEXTROSE AND SODIUM CHLORIDE: 5; 900 INJECTION, SOLUTION INTRAVENOUS at 11:52

## 2018-06-25 RX ADMIN — FAMOTIDINE 20 MG: 20 INJECTION, SOLUTION INTRAVENOUS at 04:36

## 2018-06-25 RX ADMIN — ACETAMINOPHEN 1000 MG: 325 SOLUTION ORAL at 20:23

## 2018-06-25 RX ADMIN — BUPIVACAINE HYDROCHLORIDE: 7.5 INJECTION, SOLUTION EPIDURAL; RETROBULBAR at 04:37

## 2018-06-25 RX ADMIN — POTASSIUM CHLORIDE 20 MEQ: 1.5 POWDER, FOR SOLUTION ORAL at 11:14

## 2018-06-25 NOTE — PROGRESS NOTES
Previous RN from day shift had a PIV infiltrate during their shift to the right AC. When writer came on shift, writer observed previous PIV site with significant bruising, erythema and edema. Warm compress was currently in place. RN then talked with pharmacy to discuss whether MIVF (D5+NS) could cause an extravasation, who states MIVF are not on the list of medications to cause extravasation. Writer then observed the site for the beginning of shift with no improvement, site was then marked and photos taken and placed in chart. Extravasation flow sheet was placed in chart and documented in EPIC. Site is currently +2 edema, with slight warmth to the area, erythema and bruising, pt unable to describe if there is pain, mobility is not affected. Continue to monitor site.

## 2018-06-25 NOTE — PROGRESS NOTES
Subjective: No acute issues overnight. Pain well controlled. Denies fevers, chills, CP, SOB, and N/V. Patient is NPO until BM. Patient c/o mild abdominal discomfort, no other complaints.    Objective:   /71 (BP Location: Left arm)  Pulse 90  Temp 98.8  F (37.1  C) (Oral)  Resp 24  Ht 1.524 m (5')  Wt 62.6 kg (137 lb 14.4 oz)  SpO2 95%  BMI 26.93 kg/m2    PE:  Gen: Awake, alert, NAD   Resp: non-labored at rest  Abd: Soft, non-distended, mild diffuse tenderness  Ext: warm and well perfused  Incision: CDI    I/O last 3 completed shifts:  In: 2905 [I.V.:2905]  Out: 2650 [Urine:2650] - Last 24 hours      Labs/Imaging  Heme:  Recent Labs  Lab 06/24/18  0954 06/23/18  2334 06/23/18  0704 06/22/18  1344 06/21/18  0730   WBC 4.7 4.8 3.9*  --  2.5*   HGB 8.9* 9.1* 8.7* 9.2* 10.1*    146* 157  --  212     Chem:  Recent Labs  Lab 06/25/18  0634 06/24/18  1554 06/24/18  0954 06/24/18  0727 06/23/18  2334  06/23/18  0704  06/21/18  0730   POTASSIUM 3.5 3.8 3.7 3.3* 3.0*  < > 3.0*  < > 4.0   CR  --   --  0.61  --  0.68  --  0.65  --  0.83   < > = values in this interval not displayed.      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       NEURO Pain well controlled on APAP and Dilaudid PRN. She also has an epidural in place. Appreciate anesthesia's help.  Changes:  None    CV HDS.    PULM Aggressive pulmonary toilet and I/S.   FEN/GI mIVF @ 100 ml/hr. Continue NPO     No acute issues, good UOP    HEME Hgb as above.   Continue to monitor.   No transfusions indicated at this time   ID Afebrile, no leukocytosis.    Antibiotics: None    ENDO No issues   ACTIVITY Up as tolerated    PPx Heparin 5000 U TID   DISPO Anticipate d/c home.       Discussed with Dr. Fry, chief resident who will staff with Dr. Magallon.    David Tom MD on 6/25/2018 at 11:47 AM  PGY-1, General Surgery

## 2018-06-25 NOTE — PROGRESS NOTES
Vascular Access Services Notes:    PICC insertion is put on-hold until consent is obtained from family (phone # [phone] & [phone]).        CLAY GuerraN, RN [facility]

## 2018-06-25 NOTE — PROGRESS NOTES
REGIONAL ANESTHESIA PAIN SERVICE EPIDURAL NOTE  Chelly Dave is a 83 year old female with mesenteric mass POD #3 s/p LAPAROTOMY EXPLORATORY and placement of T9-10 epidural catheter for pain management.      SUBJECTIVE  Interval History: Overnight sepsis protocol triggered lactic acid 2.4=>1.2 three hrs later, Cultures and UA/UC sent. Patient just had PICC line placed.  Reports adequate pain control with epidural infusion and current hydromorphone IV PRN (see below), currently rates pain 5/10.  Denies weakness, paresthesias, circumoral numbness, metallic taste or tinnitus.  Patient has not been out of bed today, repositioning in bed with some assistance.  Currently NPO with NG, denies nausea, [place] in place.     Clinically Aligned Pain Assessment (CAPA):  Comfort (How is your pain?): Comfortably manageable  Functioning (Are you able to do activities to get better?) : Can do most things, but pain gets in the way of some           Antithrombotic/Thrombolytic Therapy ordered:    heparin sodium PF injection 5,000 Units 5,000 Units, SC, Q12H Given: 06/24 2159           Medications related to Pain Management (Future)    Start     Dose/Rate Route Frequency Ordered Stop    06/24/18 1540  lidocaine (LMX4) kit       Topical ONCE PRN 06/24/18 1541      06/23/18 1651  acetaminophen (TYLENOL) solution 1,000 mg      1,000 mg Oral EVERY 8 HOURS PRN 06/23/18 1651      06/23/18 1651  HYDROmorphone (DILAUDID) injection 0.2 mg      0.2 mg Intravenous EVERY 2 HOURS PRN 06/23/18 1651      06/22/18 1245  bupivacaine (MARCAINE) 0.0625 % in sodium chloride 0.9 % 250 mL EPIDURAL Infusion      8 mL/hr  EPIDURAL CONTINUOUS 06/22/18 1232      06/22/18 1231  nalbuphine (NUBAIN) injection 2.5-5 mg      2.5-5 mg Intravenous EVERY 6 HOURS PRN 06/22/18 1232      06/21/18 0252  lidocaine 1 % 1 mL      1 mL Other EVERY 1 HOUR PRN 06/21/18 0300      06/21/18 0252  lidocaine (LMX4) kit       Topical EVERY 1 HOUR PRN 06/21/18 0300              OBJECTIVE:  Lab Results:   Recent Labs   Lab Test  06/24/18   0954   WBC  4.7   RBC  2.92*   HGB  8.9*   HCT  27.4*   MCV  94   MCH  30.5   MCHC  32.5   RDW  14.5   PLT  151       Lab Results   Component Value Date    INR 1.20 10/17/2011       Vitals:    Temp:  [97.9  F (36.6  C)-100.2  F (37.9  C)] 98.8  F (37.1  C)  Pulse:  [89-90] 90  Heart Rate:  [56-89] 89  Resp:  [18-24] 24  BP: (148-168)/(65-75) 156/71  SpO2:  [92 %-98 %] 95 %  /71 (BP Location: Left arm)  Pulse 90  Temp 98.8  F (37.1  C) (Oral)  Resp 24  Ht 1.524 m (5')  Wt 62.6 kg (137 lb 14.4 oz)  SpO2 95%  BMI 26.93 kg/m2       Exam:   GEN: no distress and cooperative, answers questions appropriately, follows simple commands  NEURO/MSK:Strength BLE 5/5  and overall symmetric  SKIN: Epidural catheter site with dressing c/d/i, no tenderness, erythema, heme, edema       ASSESSMENT/PLAN:    Patient receiving adequate analgesia with current mulitmodal plan including epidural T9-10 catheter infusion Bupivacaine 0.0625% at 8 mL/hour.  Motor function intact, is meeting activity goals.  No evidence of adverse side effects related to local anesthetic.  Quiroz with adequate urine output. Lactic acid trending down, urine leukocyte esterase negative.     - continue current epidural infusion bupivacaine 0.0625% at 8 mL/hour. Plan to continue epidural infusion until POD #5  - antithrombotic/thrombolytic therapy okay to continue heparin SC as ordered. Please contact RAPS (#[phone]) prior to any medication changes  - will continue to follow and adjust as needed    - discussed plan with attending anesthesiologist    JAYME Swift [facility]  Regional Anesthesia Pain Service  6/25/2018 10:46 AM    RAPS Contact Info (24 hour job code pager is the last 4 digits) For in-house use only:   [employer] phone: [place] [phone], [place] [phone], [employer] [phone], then enter call-back number.    Text: Use [employer] on the Intranet <Paging/Directory> tab and enter Jobcode  ID.   If no call back at any time, contact the hospital  and ask for RAPS attending or backup

## 2018-06-25 NOTE — PLAN OF CARE
Problem: Patient Care Overview  Goal: Plan of Care/Patient Progress Review  Outcome: No Change  /75 (BP Location: Left arm)  Pulse 80  Temp 98.9  F (37.2  C) (Oral)  Resp 18  Ht 1.524 m (5')  Wt 62.6 kg (137 lb 14.4 oz)  SpO2 98%  BMI 26.93 kg/m2     Neuro: Oriented to person time, intermittent confusion, pulling on NG and IVs, bedside attendant  Cardiac: VSS  Respiratory: lung sounds clear bilaterally  GI/: AUOP via [place], no bm this shift  Diet/Appetite: NPO  Skin: swelling of R AC where IV was pulled out, possible infiltration, generalized brusing  LDA: NG tube positional with 0 output  Activity: position changes  Pain: dilaudid 0.2 mg given X 1 for c/o pain in abdomen    K replaced [address]    Continue plan of care

## 2018-06-25 NOTE — PLAN OF CARE
Problem: Patient Care Overview  Goal: Plan of Care/Patient Progress Review  Outcome: No Change  Vitals:    06/24/18 1733 06/24/18 2006 06/24/18 2220 06/25/18 0350   BP:  168/73 158/75 148/69   BP Location:  Left arm Left arm Left arm   Pulse:    89   Resp:  18 18 24   Temp: 99.6  F (37.6  C) 98.7  F (37.1  C) 98.9  F (37.2  C) 99.1  F (37.3  C)   TempSrc: Axillary Oral Oral Axillary   SpO2:  97% 98% 97%   Weight:       Height:         VSS, gave 0.2 mg IV dilaudid x 1 with good relief of abdominal pain, denied nausea and numbness/tingling, epidural @ 8 ml/hr, [place] with good urine output, NG to LIS, bedside attendant at bedside,  has had mitts on most of night to prevent her from pulling out IV and NG, this morning had temp of 100.2 and a slightly reddened area on L arm, continue with plan of care

## 2018-06-25 NOTE — PLAN OF CARE
Problem: Patient Care Overview  Goal: Plan of Care/Patient Progress Review  AVSS, denies pain. Alert but confused re still pulls at IV and NG, sitter present. Remains NPO. Lungs diminished, deep breaths encouraged. Daughter here, gave consent for dbl PICC placed and ok to use, MIV infusing 100 ml/hr while NPO. Old bruise at right elbow arm PIV site healing, left elbow arm site slight redness no increase. Up to recliner this afternoon, moni bunch, commode ordered. Continue plan.

## 2018-06-26 LAB
ANION GAP SERPL CALCULATED.3IONS-SCNC: 8 MMOL/L (ref 3–14)
BLD PROD TYP BPU: NORMAL
BLD UNIT ID BPU: 0
BLOOD PRODUCT CODE: NORMAL
BPU ID: NORMAL
BUN SERPL-MCNC: 6 MG/DL (ref 7–30)
CALCIUM SERPL-MCNC: 7.4 MG/DL (ref 8.5–10.1)
CHLORIDE SERPL-SCNC: 110 MMOL/L (ref 94–109)
CO2 SERPL-SCNC: 23 MMOL/L (ref 20–32)
CREAT SERPL-MCNC: 0.6 MG/DL (ref 0.52–1.04)
ERYTHROCYTE [DISTWIDTH] IN BLOOD BY AUTOMATED COUNT: 14.3 % (ref 10–15)
GFR SERPL CREATININE-BSD FRML MDRD: >90 ML/MIN/1.7M2
GLUCOSE SERPL-MCNC: 111 MG/DL (ref 70–99)
HCT VFR BLD AUTO: 28.4 % (ref 35–47)
HGB BLD-MCNC: 9.2 G/DL (ref 11.7–15.7)
MCH RBC QN AUTO: 30.4 PG (ref 26.5–33)
MCHC RBC AUTO-ENTMCNC: 32.4 G/DL (ref 31.5–36.5)
MCV RBC AUTO: 94 FL (ref 78–100)
PLATELET # BLD AUTO: 158 10E9/L (ref 150–450)
POTASSIUM SERPL-SCNC: 3.3 MMOL/L (ref 3.4–5.3)
POTASSIUM SERPL-SCNC: 4.2 MMOL/L (ref 3.4–5.3)
RBC # BLD AUTO: 3.03 10E12/L (ref 3.8–5.2)
SODIUM SERPL-SCNC: 141 MMOL/L (ref 133–144)
TRANSFUSION STATUS PATIENT QL: NORMAL
TRANSFUSION STATUS PATIENT QL: NORMAL
WBC # BLD AUTO: 3.6 10E9/L (ref 4–11)

## 2018-06-26 PROCEDURE — 25000125 ZZHC RX 250: Performed by: STUDENT IN AN ORGANIZED HEALTH CARE EDUCATION/TRAINING PROGRAM

## 2018-06-26 PROCEDURE — 25000128 H RX IP 250 OP 636: Performed by: SURGERY

## 2018-06-26 PROCEDURE — 36415 COLL VENOUS BLD VENIPUNCTURE: CPT | Performed by: STUDENT IN AN ORGANIZED HEALTH CARE EDUCATION/TRAINING PROGRAM

## 2018-06-26 PROCEDURE — 40000802 ZZH SITE CHECK

## 2018-06-26 PROCEDURE — 36592 COLLECT BLOOD FROM PICC: CPT | Performed by: SURGERY

## 2018-06-26 PROCEDURE — 85027 COMPLETE CBC AUTOMATED: CPT | Performed by: STUDENT IN AN ORGANIZED HEALTH CARE EDUCATION/TRAINING PROGRAM

## 2018-06-26 PROCEDURE — 12000003 ZZH R&B CRITICAL UMMC

## 2018-06-26 PROCEDURE — 25000128 H RX IP 250 OP 636: Performed by: FAMILY MEDICINE

## 2018-06-26 PROCEDURE — 25000132 ZZH RX MED GY IP 250 OP 250 PS 637: Performed by: STUDENT IN AN ORGANIZED HEALTH CARE EDUCATION/TRAINING PROGRAM

## 2018-06-26 PROCEDURE — 84132 ASSAY OF SERUM POTASSIUM: CPT | Performed by: SURGERY

## 2018-06-26 PROCEDURE — 80048 BASIC METABOLIC PNL TOTAL CA: CPT | Performed by: STUDENT IN AN ORGANIZED HEALTH CARE EDUCATION/TRAINING PROGRAM

## 2018-06-26 PROCEDURE — 25000128 H RX IP 250 OP 636: Performed by: STUDENT IN AN ORGANIZED HEALTH CARE EDUCATION/TRAINING PROGRAM

## 2018-06-26 RX ADMIN — HEPARIN SODIUM 5000 UNITS: 5000 INJECTION, SOLUTION INTRAVENOUS; SUBCUTANEOUS at 09:38

## 2018-06-26 RX ADMIN — CYCLOSPORINE 1 DROP: 0.5 EMULSION OPHTHALMIC at 09:38

## 2018-06-26 RX ADMIN — FLUOXETINE 20 MG: 20 CAPSULE ORAL at 09:39

## 2018-06-26 RX ADMIN — POTASSIUM CHLORIDE 40 MEQ: 1.5 POWDER, FOR SOLUTION ORAL at 12:27

## 2018-06-26 RX ADMIN — DEXTROSE AND SODIUM CHLORIDE: 5; 900 INJECTION, SOLUTION INTRAVENOUS at 20:19

## 2018-06-26 RX ADMIN — POTASSIUM CHLORIDE 20 MEQ: 1.5 POWDER, FOR SOLUTION ORAL at 14:01

## 2018-06-26 RX ADMIN — FAMOTIDINE 20 MG: 20 INJECTION, SOLUTION INTRAVENOUS at 03:58

## 2018-06-26 RX ADMIN — Medication 0.2 MG: at 19:57

## 2018-06-26 RX ADMIN — DEXTROSE AND SODIUM CHLORIDE: 5; 900 INJECTION, SOLUTION INTRAVENOUS at 09:55

## 2018-06-26 RX ADMIN — CYCLOSPORINE 1 DROP: 0.5 EMULSION OPHTHALMIC at 19:58

## 2018-06-26 RX ADMIN — HEPARIN SODIUM 5000 UNITS: 5000 INJECTION, SOLUTION INTRAVENOUS; SUBCUTANEOUS at 19:56

## 2018-06-26 RX ADMIN — BUPIVACAINE HYDROCHLORIDE: 7.5 INJECTION, SOLUTION EPIDURAL; RETROBULBAR at 12:27

## 2018-06-26 NOTE — PLAN OF CARE
Problem: Patient Care Overview  Goal: Plan of Care/Patient Progress Review  Outcome: No Change  [id number]    Afebrile, VSS on room air. Sleepy overnight, confused drowsy conversation. Continues to have sitter at bedside. Denied pain or nausea. MIVF infusing via PICC. NG with minimal output. No acute issues overnight. Continue to monitor and follow POC.

## 2018-06-26 NOTE — PROGRESS NOTES
"REGIONAL ANESTHESIA PAIN SERVICE EPIDURAL NOTE  Chelly Dave is a 83 year old female POD #4 s/p LAPAROTOMY EXPLORATORY and placement of T9-10 epidural catheter for pain management.      SUBJECTIVE  Interval History: Overnight no acute events. Sitter present. Patient reports \"tummy is sore\", hurts more with movement, heat helps  Currently has epidural infusion, PRN acetaminophen and Hydromorphone IV (see below).  Denies weakness, paresthesias, circumoral numbness, metallic taste or tinnitus.  Patient sitting up in chair at bedside, sitter present, able to stand and ambulate with standby assistance.  Currently deniese nausea; NPO with meds per NG. Voiding without difficulty.     Antithrombotic/Thrombolytic Therapy ordered:   heparin sodium PF injection 5,000 Units 5,000 Units, SC, Q12H Given: 06/26 0938         Medications related to Pain Management (Future)    Start     Dose/Rate Route Frequency Ordered Stop    06/24/18 1540  lidocaine (LMX4) kit       Topical ONCE PRN 06/24/18 1541      06/23/18 1651  acetaminophen (TYLENOL) solution 1,000 mg      1,000 mg Oral EVERY 8 HOURS PRN 06/23/18 1651      06/23/18 1651  HYDROmorphone (DILAUDID) injection 0.2 mg      0.2 mg Intravenous EVERY 2 HOURS PRN 06/23/18 1651      06/22/18 1245  bupivacaine (MARCAINE) 0.0625 % in sodium chloride 0.9 % 250 mL EPIDURAL Infusion      8 mL/hr  EPIDURAL CONTINUOUS 06/22/18 1232      06/21/18 0252  lidocaine 1 % 1 mL      1 mL Other EVERY 1 HOUR PRN 06/21/18 0300      06/21/18 0252  lidocaine (LMX4) kit       Topical EVERY 1 HOUR PRN 06/21/18 0300             OBJECTIVE:  Lab Results:   Recent Labs   Lab Test  06/26/18   0705   WBC  3.6*   RBC  3.03*   HGB  9.2*   HCT  28.4*   MCV  94   MCH  30.4   MCHC  32.4   RDW  14.3   PLT  158       Lab Results   Component Value Date    INR 1.20 10/17/2011       Vitals:    Temp:  [97.4  F (36.3  C)-99  F (37.2  C)] 99  F (37.2  C)  Pulse:  [78] 78  Heart Rate:  [73-80] 73  Resp:  [18-20] 18  BP: " (152-159)/(72-86) 159/72  SpO2:  [95 %-96 %] 95 %  /72 (BP Location: Right arm)  Pulse 78  Temp 99  F (37.2  C) (Oral)  Resp 18  Ht 1.524 m (5')  Wt 62.6 kg (137 lb 14.4 oz)  SpO2 95%  BMI 26.93 kg/m2       Exam:   GEN: active, no distress and cooperative  NEURO/MSK: Strength BLE 5/5  and overall symmetric  SKIN: Epidural catheter site with dressing c/d/i, no tenderness, erythema, heme, edema       ASSESSMENT/PLAN:    Patient receiving adequate analgesia with current mulitmodal plan including epidural T9-10 catheter infusion Bupivacaine 0.0625% at 8 mL/hour.  Motor function intact and adequate sensory block, is meeting activity goals.  No evidence of adverse side effects related to local anesthetic.  Voiding without difficulty.    - antithrombotic/thrombolytic therapy okay to continue SC heparin Q 12 hrs as ordered. Please contact RAPS (#[phone]) prior to any medication changes  - continue current epidural infusion Bupivacaine 0.0625% at 8 mL/hour until POD #5, 6/27/18  - plan to remove epidural catheter at least 4 hrs after last SC Heparin dose, then resume heparin SC 1 hr after catheter removal  - will continue to follow and adjust as needed    - discussed plan with attending anesthesiologist    JAYME Swift [facility]  Regional Anesthesia Pain Service  6/26/2018 11:58 AM    RAPS Contact Info (24 hour job code pager is the last 4 digits) For in-house use only:   [employer] phone: [place] [phone], [place] [phone], [facility] [phone], then enter call-back number.    Text: Use [employer] on the Intranet <Paging/Directory> tab and enter Jobcode ID.   If no call back at any time, contact the hospital  and ask for RAPS attending or backup

## 2018-06-26 NOTE — PROGRESS NOTES
../72 (BP Location: Right arm)  Pulse 78  Temp 99  F (37.2  C) (Oral)  Resp 18  Ht 1.524 m (5')  Wt 62.6 kg (137 lb 14.4 oz)  SpO2 95%  BMI 26.93 kg/m2  Pt has an attendant for this shift. No c/o pain/n/v  Change new container of her epidural marcaine 8 ml/hr which has kept pt comfortable. NG patent for am meds and KCL re-placements with re-check K+ level set up for 1600. Daughter here visiting. Pt up in chair and to the bathroom with assistance of 1. PTCC patent for IV meds running D5NS @ 100 ml/hr. MD called to state that NG tube needs to be pulled back to the 59 cm arianna as per X-ray and tube is too advanced/coiled. Continue with plan of cares and medicate per orders.

## 2018-06-26 NOTE — PROGRESS NOTES
GENERAL SURGERY PROGRESS NOTE    SUBJECTIVE  Patient continues to have intermittent confusion/delirium. She is still not passing gas or having bowel movements. She denies pain or nausea.     OBJECTIVE  /72 (BP Location: Right arm)  Pulse 78  Temp 99  F (37.2  C) (Oral)  Resp 18  Ht 1.524 m (5')  Wt 62.6 kg (137 lb 14.4 oz)  SpO2 95%  BMI 26.93 kg/m2    I/O last 3 completed shifts:  In: 2153.33 [I.V.:2003.33; NG/GT:150]  Out: 950 [Urine:850; Emesis/NG output:100]    PHYSICAL EXAM  GENERAL- Pleasant elderly woman resting in bed with NGT in place and with sitter at bedside  ABD- soft, non tender, non distended, midline incision bandaged with minimal drainage.     LABS  BMP- K 3.3, Ca 7.4  CBC- WBC 3.6, Hbg, 9.2,     IMAGING  Abd x-ray 6/25  Impression:   1. Linear branching hyperdensities projecting over the liver may  represent pneumobilia or portal venous gas.  2. Gastric tube is looped back on itself and is prone to kinking in  this location.    CXR 6/25  IMPRESSION:   1. Left-sided PICC line tip over the cavoatrial junction.  2. Small left pleural effusion and basilar atelectasis.    A/P    FEN- NPO. IVF D5W w/ NS 100ml/hr. Replace electrolytes PRN  NEURO- Pain- Epidural in place with plan to d/c 6/27, dilaudid PRN. Delirium- continue sitter, encourage orientation, circadian rhythms. Continue home psych meds  CV- HTN- sys -160.  RESP- SELENA  GI- Ileus- continue conservative management with NGT until return of bowel function. NG appears deep this am, will have pulled back 6cm to 59cm at the nose. IV PPI. Morning Abx x-ray with possible pneumobilia but without acute change in abd exam- will continue to monitor closely  /RENAL- s/p SELENA [place]  HEME- SQ heparin  DISPO- Pending evaluation with PT/OT and return of bowel function    Petros Brown MD   Gen Surg PGY-2  P: [phone]

## 2018-06-26 NOTE — PLAN OF CARE
Problem: Patient Care Overview  Goal: Plan of Care/Patient Progress Review  Outcome: No Change  /78 (BP Location: Right arm)  Pulse 78  Temp 97.4  F (36.3  C) (Oral)  Resp 18  Ht 1.524 m (5')  Wt 62.6 kg (137 lb 14.4 oz)  SpO2 96%  BMI 26.93 kg/m2     Neuro: intermittent confusion, bedside attendant in place for pt pulling at NG tubes and IVs  Cardiac: VSS  Respiratory: lung sounds clear bilaterally  GI/: AUOP, no bm  Diet/Appetite: NPO  Skin: generalized bruising, large bruise on R arm marked and not extended  LDA: NG tube to low intermittent suction with minimal output.  NG was noted to be at 57 cm, original placement was 65 cm, NG advanced per provider order.  Minimal output continued.  Provider notified and x-ray ordered.  Activity: up to commode with assist of 1  Pain: c/o abdominal pain, dilaudid 0.2 mg given X 1, epidural running at 8 mL/ hr  Plan: NG tube resolution, pain control

## 2018-06-27 ENCOUNTER — APPOINTMENT (OUTPATIENT)
Dept: PHYSICAL THERAPY | Facility: CLINIC | Age: 83
DRG: 331 | End: 2018-06-27
Attending: SURGERY
Payer: COMMERCIAL

## 2018-06-27 PROCEDURE — 25000128 H RX IP 250 OP 636: Performed by: FAMILY MEDICINE

## 2018-06-27 PROCEDURE — 40000802 ZZH SITE CHECK

## 2018-06-27 PROCEDURE — 25000125 ZZHC RX 250: Performed by: STUDENT IN AN ORGANIZED HEALTH CARE EDUCATION/TRAINING PROGRAM

## 2018-06-27 PROCEDURE — 25000128 H RX IP 250 OP 636: Performed by: STUDENT IN AN ORGANIZED HEALTH CARE EDUCATION/TRAINING PROGRAM

## 2018-06-27 PROCEDURE — 97116 GAIT TRAINING THERAPY: CPT | Mod: GP

## 2018-06-27 PROCEDURE — 25000132 ZZH RX MED GY IP 250 OP 250 PS 637: Performed by: STUDENT IN AN ORGANIZED HEALTH CARE EDUCATION/TRAINING PROGRAM

## 2018-06-27 PROCEDURE — 40000193 ZZH STATISTIC PT WARD VISIT

## 2018-06-27 PROCEDURE — 25000128 H RX IP 250 OP 636: Performed by: SURGERY

## 2018-06-27 PROCEDURE — 12000003 ZZH R&B CRITICAL UMMC

## 2018-06-27 PROCEDURE — 97530 THERAPEUTIC ACTIVITIES: CPT | Mod: GP

## 2018-06-27 PROCEDURE — 97161 PT EVAL LOW COMPLEX 20 MIN: CPT | Mod: GP

## 2018-06-27 RX ADMIN — CYCLOSPORINE 1 DROP: 0.5 EMULSION OPHTHALMIC at 08:57

## 2018-06-27 RX ADMIN — FLUOXETINE 20 MG: 20 CAPSULE ORAL at 08:57

## 2018-06-27 RX ADMIN — DEXTROSE AND SODIUM CHLORIDE: 5; 900 INJECTION, SOLUTION INTRAVENOUS at 07:34

## 2018-06-27 RX ADMIN — DEXTROSE AND SODIUM CHLORIDE: 5; 900 INJECTION, SOLUTION INTRAVENOUS at 19:58

## 2018-06-27 RX ADMIN — HEPARIN SODIUM 5000 UNITS: 5000 INJECTION, SOLUTION INTRAVENOUS; SUBCUTANEOUS at 11:36

## 2018-06-27 RX ADMIN — CYCLOSPORINE 1 DROP: 0.5 EMULSION OPHTHALMIC at 19:59

## 2018-06-27 RX ADMIN — Medication 0.2 MG: at 13:47

## 2018-06-27 RX ADMIN — HEPARIN SODIUM 5000 UNITS: 5000 INJECTION, SOLUTION INTRAVENOUS; SUBCUTANEOUS at 19:58

## 2018-06-27 RX ADMIN — FAMOTIDINE 20 MG: 20 INJECTION, SOLUTION INTRAVENOUS at 04:02

## 2018-06-27 NOTE — PLAN OF CARE
Problem: Patient Care Overview  Goal: Plan of Care/Patient Progress Review  Outcome: No Change  Pt with sitter at bedside. VSS, pain 6/10 in abdomen after removal of the epidural, dilaudid x1 was given and effective. NG clamped this AM with 100 ml/hr residual at 1245.  Ng was removed at 1400, pt tolerated well and diet switched to clears.  Abd incision stapled, no drainage and open to air. Hypo bowel sounds, no bm, +flatus. Voiding adequately with assist of 1. LS clear/diminished. Up ambulating halls with therapy and sitter.  ml/hr running in picc in left upper arm. Family and sitter at bedside

## 2018-06-27 NOTE — PLAN OF CARE
Problem: Patient Care Overview  Goal: Plan of Care/Patient Progress Review  T-.0 during shift. Staples intact to abdomen and area open to area. Some complaints of abdominal pain. Given Dilaudid X 1 with stated relief and noted decrease in agitation. Attendant present. Patient noted to be mostly calm during shift with reminders to not pull on IV but able to be directed. NG to LIS and no output this shift. Voiding adequate amounts. 1 assist to the bathroom.  K went up to 4.2 with 1600 draw. Daughter visiting. Fluids running. Continue with POC.

## 2018-06-27 NOTE — PROGRESS NOTES
"REGIONAL ANESTHESIA PAIN SERVICE EPIDURAL NOTE  Chelly Dave is a 83 year old female POD #5 s/p LAPAROTOMY EXPLORATORY and placement of T9-10 epidural catheter for pain management.      SUBJECTIVE  Interval History: Overnight no acute events, 1:1 sitter at bedside. Patient reports \"tummy is a little sore.\" No grimacing, no pain behaviors while sitting up in chair at bedside. Currently epidural infusion and current PRN analgesic medications (see below).  Denies weakness, paresthesias, circumoral numbness, metallic taste or tinnitus.  Patient ambulating with standby assistance.  Currently NPO with NG tube clamped, denies nausea. Voiding.      Antithrombotic/Thrombolytic Therapy ordered:     heparin sodium PF injection 5,000 Units 5,000 Units, SC, Q12H Given: 06/26 1956           Medications related to Pain Management (Future)    Start     Dose/Rate Route Frequency Ordered Stop    06/24/18 1540  lidocaine (LMX4) kit       Topical ONCE PRN 06/24/18 1541      06/23/18 1651  acetaminophen (TYLENOL) solution 1,000 mg      1,000 mg Oral EVERY 8 HOURS PRN 06/23/18 1651      06/23/18 1651  HYDROmorphone (DILAUDID) injection 0.2 mg      0.2 mg Intravenous EVERY 2 HOURS PRN 06/23/18 1651      06/22/18 1245  bupivacaine (MARCAINE) 0.0625 % in sodium chloride 0.9 % 250 mL EPIDURAL Infusion      8 mL/hr  EPIDURAL CONTINUOUS 06/22/18 1232      06/21/18 0252  lidocaine 1 % 1 mL      1 mL Other EVERY 1 HOUR PRN 06/21/18 0300      06/21/18 0252  lidocaine (LMX4) kit       Topical EVERY 1 HOUR PRN 06/21/18 0300             OBJECTIVE:  Lab Results:   Recent Labs   Lab Test  06/26/18   0705   WBC  3.6*   RBC  3.03*   HGB  9.2*   HCT  28.4*   MCV  94   MCH  30.4   MCHC  32.4   RDW  14.3   PLT  158       Lab Results   Component Value Date    INR 1.20 10/17/2011       Vitals:    Temp:  [97.4  F (36.3  C)-100.3  F (37.9  C)] 98.6  F (37  C)  Pulse:  [81] 81  Heart Rate:  [77-82] 77  Resp:  [18] 18  BP: (131-175)/(73-89) 131/73  SpO2:  " [95 %-97 %] 95 %  /73 (BP Location: Right arm)  Pulse 81  Temp 98.6  F (37  C) (Oral)  Resp 18  Ht 1.524 m (5')  Wt 62.6 kg (137 lb 14.4 oz)  SpO2 95%  BMI 26.93 kg/m2       Exam:   GEN: active, no distress and cooperative  NEURO/MSK: Strength BLE 5/5  and overall symmetric  SKIN: Epidural catheter insertion site c/d/i, no tenderness, erythema, heme.  Slight (approx dime size) edema present directly surrounding insertion site.       ASSESSMENT/PLAN:    Patient receiving adequate analgesia with current mulitmodal plan including epidural catheter infusion bupivacaine 0.0625% at 8mL/hour, PRN IV Dilaudid, acetaminophen and heat packs PRN.  Motor function intact and adequate sensory block, is meeting activity goals.  No evidence of adverse side effects related to local anesthetic.  Voiding spontaneously.      - 0800 Epidural stopped and removed at 1030, dark tip intact.   - antithrombotic/thrombolytic therapy AM dose SC heparin HELD . Plan to resume heparin 1 hr after epidural catheter removed.  - will sign off    - discussed plan with attending anesthesiologist    JAYME [facility]  Regional Anesthesia Pain Service  6/27/2018 9:23 AM    RAPS Contact Info (24 hour job code pager is the last 4 digits) For in-house use only:   AudioName phone: [place] [phone], [place] [employer] [phone], Peds [phone], then enter call-back number.    Text: Use [employer] on the Intranet <Paging/Directory> tab and enter Jobcode ID.   If no call back at any time, contact the hospital  and ask for RAPS attending or backup

## 2018-06-27 NOTE — PROGRESS NOTES
Subjective: No acute issues overnight. Patient reported some abdominal pain overnight that was relieved with Dilauded. UOP adequate.  - BM.    Objective:   /73 (BP Location: Right arm)  Pulse 74  Temp 98.8  F (37.1  C) (Oral)  Resp 18  Ht 1.524 m (5')  Wt 62.6 kg (137 lb 14.4 oz)  SpO2 97%  BMI 26.93 kg/m2    PE:  Gen: Awake, alert, NAD   Resp: non-labored at rest  Abd: Soft, non-distended, NTTP  Incision: C/D/I  Ext: warm and well perfused    I/O last 3 completed shifts:  In: 1562 [I.V.:1372; NG/GT:190]  Out: 2550 [Urine:2450; Emesis/NG output:100] - Last 24 hours      Labs/Imaging  Heme:  Recent Labs  Lab 06/26/18  0705 06/24/18  0954 06/23/18  2334 06/23/18  0704   WBC 3.6* 4.7 4.8 3.9*   HGB 9.2* 8.9* 9.1* 8.7*    151 146* 157     Chem:  Recent Labs  Lab 06/26/18  1600 06/26/18  0705 06/25/18  1132 06/25/18  0634  06/24/18  0954  06/23/18  2334   POTASSIUM 4.2 3.3* 3.6 3.5  < > 3.7  < > 3.0*   CR  --  0.60 0.64  --   --  0.61  --  0.68   < > = values in this interval not displayed.      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        NEURO Pain well controlled on PRN APAP and IV Dilaudid.  Changes:  None    CV HDS.    PULM Aggressive pulmonary toilet and I/S.   FEN/GI D5W w/ 1/2NS @ 100 ml/hr.  Continue conservative treatment until bowel function returns  Clamp trial of NG, measure residuals. Consider progressing to clears if residuals < 400.    No acute issues, good UOP    HEME Hgb as above. Postop anemia expected for this surgery.  Continue to monitor. No transfusions indicated at this time   ID Afebrile, no leukocytosis.    Antibiotics: None    ENDO No issues   ACTIVITY Up as tolerated  PT/OT consulted    PPx Heparin 5000 U TID   DISPO Anticipate d/c home       Discussed with Dr. Fry, chief resident who will staff with Dr. Magallon.    David Tom MD on 6/27/2018 at 12:40 PM  PGY-1,  General Surgery

## 2018-06-27 NOTE — ADDENDUM NOTE
Addendum  created 06/27/18 1249 by José Antonio Shelley MD    Anesthesia Event edited, Procedure Event Log accessed, Sign clinical note

## 2018-06-27 NOTE — PLAN OF CARE
Problem: Patient Care Overview  Goal: Plan of Care/Patient Progress Review  PT 7B:    Discharge Planner PT   Patient plan for discharge: home with assist from family  Current status: Evaluation complete and treatment indicated. Engaged pt in gait training with FWW for ~190ft at SBA to CGA with intermittent unsteadiness and easily distracted, sit <> stand transfers at SBA, and bed mobility SBA to min A per sitter (not observed during session). Pt on RA with AVSS. Pt oriented to self and year, but not to place or situation.   Barriers to return to prior living situation: medical status, cognition, home set up, level of assist family can provide  Recommendations for discharge: home with 24/7 assist and possible home therapy - use of FWW.  Rationale for recommendations: Pt is below baseline for mobility and appears to be below baseline from her already impaired cognitive status. Pt is limited by poor balance, decreased endurance, and weakness. Pt would likely be safe to return home pending the level of assist available at home.       Entered by: Gisela Thibodeaux 06/27/2018 11:06 AM

## 2018-06-27 NOTE — ANESTHESIA POST-OP FOLLOW-UP NOTE
"REGIONAL ANESTHESIA PAIN SERVICE EPIDURAL NOTE  Chelly Dave is a 83 year old female POD #4 s/p LAPAROTOMY EXPLORATORY and placement of T9-10 epidural catheter for pain management.       SUBJECTIVE  Interval History: Overnight no acute events. Sitter present. Patient reports \"tummy is sore\", hurts more with movement, heat helps  Currently has epidural infusion, PRN acetaminophen and Hydromorphone IV (see below).  Denies weakness, paresthesias, circumoral numbness, metallic taste or tinnitus.  Patient sitting up in chair at bedside, sitter present, able to stand and ambulate with standby assistance.  Currently deniese nausea; NPO with meds per NG. Voiding without difficulty.      Antithrombotic/Thrombolytic Therapy ordered:   heparin sodium PF injection 5,000 Units 5,000 Units, SC, Q12H Given: 06/26 0938            Medications related to Pain Management (Future)    Start       Dose/Rate Route Frequency Ordered Stop     06/24/18 1540   lidocaine (LMX4) kit         Topical ONCE PRN 06/24/18 1541        06/23/18 1651   acetaminophen (TYLENOL) solution 1,000 mg       1,000 mg Oral EVERY 8 HOURS PRN 06/23/18 1651        06/23/18 1651   HYDROmorphone (DILAUDID) injection 0.2 mg       0.2 mg Intravenous EVERY 2 HOURS PRN 06/23/18 1651        06/22/18 1245   bupivacaine (MARCAINE) 0.0625 % in sodium chloride 0.9 % 250 mL EPIDURAL Infusion       8 mL/hr  EPIDURAL CONTINUOUS 06/22/18 1232        06/21/18 0252   lidocaine 1 % 1 mL       1 mL Other EVERY 1 HOUR PRN 06/21/18 0300        06/21/18 0252   lidocaine (LMX4) kit         Topical EVERY 1 HOUR PRN 06/21/18 0300                OBJECTIVE:  Lab Results:   Recent Labs   Lab Test  06/26/18   0705   WBC  3.6*   RBC  3.03*   HGB  9.2*   HCT  28.4*   MCV  94   MCH  30.4   MCHC  32.4   RDW  14.3   PLT  158               Lab Results   Component Value Date     INR 1.20 10/17/2011         Vitals:                        Temp:  [97.4  F (36.3  C)-99  F (37.2  C)] 99  F (37.2 "  C)  Pulse:  [78] 78  Heart Rate:  [73-80] 73  Resp:  [18-20] 18  BP: (152-159)/(72-86) 159/72  SpO2:  [95 %-96 %] 95 %  /72 (BP Location: Right arm)  Pulse 78  Temp 99  F (37.2  C) (Oral)  Resp 18  Ht 1.524 m (5')  Wt 62.6 kg (137 lb 14.4 oz)  SpO2 95%  BMI 26.93 kg/m2         Exam:   GEN: active, no distress and cooperative  NEURO/MSK: Strength BLE 5/5  and overall symmetric  SKIN: Epidural catheter site with dressing c/d/i, no tenderness, erythema, heme, edema                    ASSESSMENT/PLAN:    Patient receiving adequate analgesia with current mulitmodal plan including epidural T9-10 catheter infusion Bupivacaine 0.0625% at 8 mL/hour.  Motor function intact and adequate sensory block, is meeting activity goals.  No evidence of adverse side effects related to local anesthetic.  Voiding without difficulty.    - antithrombotic/thrombolytic therapy okay to continue SC heparin Q 12 hrs as ordered. Please contact RAPS (#[phone]) prior to any medication changes  - continue current epidural infusion Bupivacaine 0.0625% at 8 mL/hour until POD #5, 6/27/18  - plan to remove epidural catheter at least 4 hrs after last SC Heparin dose, then resume heparin SC 1 hr after catheter removal  - will continue to follow and adjust as needed    José Antonio Shelley MD  Regional Anesthesia Pain Service  6/26/2018     RAPS Contact Info (24 hour job code pager is the last 4 digits) For in-house use only:   bitmovin phone: [place] [phone], [place] [phone], [facility] [phone], then enter call-back number.    Text: Use [employer] on the Intranet <Paging/Directory> tab and enter Jobcode ID.   If no call back at any time, contact the hospital  and ask for RAPS attending or backup

## 2018-06-27 NOTE — PROGRESS NOTES
06/27/18 1020   Quick Adds   Type of Visit Initial PT Evaluation      Language English   Living Environment   Lives With child(tram), adult  (son)   Living Arrangements apartment   Home Accessibility bed and bath on same level;tub/shower is not walk in;stairs to enter home   Number of Stairs to Enter Home 3   Number of Stairs Within Home (pt reporting elevator to 3rd floor apartment. )   Living Environment Comment Pt reporting living in apartment with son. However, pt also noted living in a house later in the session. She reports she will alternate between places --  not sure how accurate her subjective history is.    Self-Care   Dominant Hand right   Usual Activity Tolerance moderate   Current Activity Tolerance fair   Regular Exercise no   Equipment Currently Used at Home cane, straight;walker, rolling;shower chair   Activity/Exercise/Self-Care Comment Pt reports IND for dressing, MOD I for bathing with use of shower chair, she does grocery shop but does not drive per report.    Functional Level Prior   Ambulation 1-->assistive equipment   Transferring 1-->assistive equipment   Toileting 0-->independent   Bathing 1-->assistive equipment   Dressing 0-->independent   Cognition 1 - attention or memory deficits   Fall history within last six months no   Which of the above functional risks had a recent onset or change? ambulation;transferring;bathing;dressing   Prior Functional Level Comment Pt reports she usually ambulates in the house with a cane and uses a FWW for community mobility. She reports baseline difficulties with her memory and does endorse that her memory today is worse than usual. She reports her son can assist her as needed or her grandchildren if he is at work.    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  - per surgery  note on 6.25   Precautions/Limitations fall precautions;abdominal precautions   General Observations Pt ambulating in hallway with sitter, PIV present, FWW very tall, apperaing pleasant.    General Info Comments Activity: up with assist   Cognitive Status Examination   Orientation person   Level of Consciousness alert   Follows Commands and Answers Questions 100% of the time;able to follow single-step instructions   Personal Safety and Judgment impaired   Memory impaired   Cognitive Comment Pt oriented to self and year. Unable to state the type of place she was in or city despite cues. Also required cues for the month.    Pain Assessment   Patient Currently in Pain (with some movement)   Integumentary/Edema   Integumentary/Edema Comments Mild generalized LE edema   Posture    Posture Forward head position;Protracted shoulders   Range of Motion (ROM)   ROM Comment B UE/LE WFL.    Strength   Strength Comments B UE 4/5 grossly. Hip flex 3/5, knee ext 4/5, knee flex 5/5, ankle DF 4/5.    Bed Mobility   Bed Mobility Comments Not whitnessed - reported at CGA to min A   Transfer Skills   Transfer Comments Pt demo sit <> stand with FWW at CGA.    Gait   Gait Comments Pt ambulating with FWW at CGA with flexed trunk posture, FWW very ant to patient, shuffled gait pattern, and intermittent lateral sway   Balance   Balance Comments Sitting balance good - limited most by pain. Standing static good. Standing dynamic - better with FWW than no AD.    Sensory Examination   Sensory Perception Comments Pt denies N/T in B LE/UE   General Therapy Interventions   Planned Therapy Interventions ADL retraining;balance training;bed mobility training;gait training;neuromuscular re-education;strengthening;stretching;transfer training;risk factor  "education;home program guidelines;progressive activity/exercise   Clinical Impression   Criteria for Skilled Therapeutic Intervention yes, treatment indicated   PT Diagnosis Impaired functional mobility   Influenced by the following impairments Impaired strength, posture, balance, activity tolerance, cognition   Functional limitations due to impairments Impaired mobility, limiting return to community at PLOF.    Clinical Presentation Stable/Uncomplicated   Clinical Presentation Rationale PMhx, current medical management, post op precautions, PLOF, current mobility, cognition, home set up, social support.    Clinical Decision Making (Complexity) Low complexity   Therapy Frequency` 5 times/week   Predicted Duration of Therapy Intervention (days/wks) 1 week   Anticipated Equipment Needs at Discharge (none anticipated at this time. )   Anticipated Discharge Disposition Home with Assist;Home with Home Therapy   Risk & Benefits of therapy have been explained Yes   Patient, Family & other staff in agreement with plan of care Yes   [facility] TM \"6 Clicks\"   2016, Trustees of [facility], under license to [employer].  All rights reserved.   6 Clicks Short Forms Basic Mobility Inpatient Short Form   [facility]  \"6 Clicks\" V.2 Basic Mobility Inpatient Short Form   1. Turning from your back to your side while in a flat bed without using bedrails? 4 - None   2. Moving from lying on your back to sitting on the side of a flat bed without using bedrails? 3 - A Little   3. Moving to and from a bed to a chair (including a wheelchair)? 3 - A Little   4. Standing up from a chair using your arms (e.g., wheelchair, or bedside chair)? 3 - A Little   5. To walk in hospital room? 3 - A Little   6. Climbing 3-5 steps with a railing? 3 - A Little   Basic Mobility Raw Score (Score out of 24.Lower scores equate to lower levels of function) 19   Total Evaluation Time   Total Evaluation Time (Minutes) 10     "

## 2018-06-27 NOTE — PLAN OF CARE
Problem: Bowel Obstruction (Adult)  Goal: Signs and Symptoms of Listed Potential Problems Will be Absent, Minimized or Managed (Bowel Obstruction)  Signs and symptoms of listed potential problems will be absent, minimized or managed by discharge/transition of care (reference Bowel Obstruction (Adult) CPG).   Outcome: No Change  Vitals:    06/26/18 1509 06/26/18 2011 06/26/18 2330 06/27/18 0406   BP: 159/89 169/82 175/74 149/76   BP Location: Right arm Right arm Right arm Left arm   Pulse:       Resp: 18 18 18 18   Temp: 99.2  F (37.3  C) 100.3  F (37.9  C) 99.1  F (37.3  C) 97.4  F (36.3  C)   TempSrc: Oral Oral Oral Oral   SpO2: 97% 97% 97% 97%   Weight:       Height:       Patient slept off and on.  Complaining of throat pain from NG.  Abd with stapled midline incision, CDI.  Hypo bowel sounds, no bm, NG pulled 100cc on LIS.  Voiding adequate amts urine.  Forgetful, sitter present to prevent from pulling tubes.  Epidural with old bloody drainage, drainage intact, denies numbness or tingling.  Lungs clear/diminished.  Ambulated in bello and sat in chair.  Continue with POC.

## 2018-06-27 NOTE — ANESTHESIA POST-OP FOLLOW-UP NOTE
"REGIONAL ANESTHESIA PAIN SERVICE EPIDURAL NOTE  Chelly Dave is a 83 year old female POD #5 s/p LAPAROTOMY EXPLORATORY and placement of T9-10 epidural catheter for pain management.       SUBJECTIVE  Interval History: Overnight no acute events, 1:1 sitter at bedside. Patient reports \"tummy is a little sore.\" No grimacing, no pain behaviors while sitting up in chair at bedside. Currently epidural infusion and current PRN analgesic medications (see below).  Denies weakness, paresthesias, circumoral numbness, metallic taste or tinnitus.  Patient ambulating with standby assistance.  Currently NPO with NG tube clamped, denies nausea. Voiding.        Antithrombotic/Thrombolytic Therapy ordered:      heparin sodium PF injection 5,000 Units 5,000 Units, SC, Q12H Given: 06/26 1956               Medications related to Pain Management (Future)    Start       Dose/Rate Route Frequency Ordered Stop     06/24/18 1540   lidocaine (LMX4) kit         Topical ONCE PRN 06/24/18 1541        06/23/18 1651   acetaminophen (TYLENOL) solution 1,000 mg       1,000 mg Oral EVERY 8 HOURS PRN 06/23/18 1651        06/23/18 1651   HYDROmorphone (DILAUDID) injection 0.2 mg       0.2 mg Intravenous EVERY 2 HOURS PRN 06/23/18 1651        06/22/18 1245   bupivacaine (MARCAINE) 0.0625 % in sodium chloride 0.9 % 250 mL EPIDURAL Infusion       8 mL/hr  EPIDURAL CONTINUOUS 06/22/18 1232        06/21/18 0252   lidocaine 1 % 1 mL       1 mL Other EVERY 1 HOUR PRN 06/21/18 0300        06/21/18 0252   lidocaine (LMX4) kit         Topical EVERY 1 HOUR PRN 06/21/18 0300                OBJECTIVE:  Lab Results:       Recent Labs   Lab Test  06/26/18   0705   WBC  3.6*   RBC  3.03*   HGB  9.2*   HCT  28.4*   MCV  94   MCH  30.4   MCHC  32.4   RDW  14.3   PLT  158               Lab Results   Component Value Date     INR 1.20 10/17/2011         Vitals:                        Temp:  [97.4  F (36.3  C)-100.3  F (37.9  C)] 98.6  F (37  C)  Pulse:  [81] 81  Heart " Rate:  [77-82] 77  Resp:  [18] 18  BP: (131-175)/(73-89) 131/73  SpO2:  [95 %-97 %] 95 %  /73 (BP Location: Right arm)  Pulse 81  Temp 98.6  F (37  C) (Oral)  Resp 18  Ht 1.524 m (5')  Wt 62.6 kg (137 lb 14.4 oz)  SpO2 95%  BMI 26.93 kg/m2         Exam:   GEN: active, no distress and cooperative  NEURO/MSK: Strength BLE 5/5  and overall symmetric  SKIN: Epidural catheter insertion site c/d/i, no tenderness, erythema, heme.  Slight (approx dime size) edema present directly surrounding insertion site.                          ASSESSMENT/PLAN:    Patient receiving adequate analgesia with current mulitmodal plan including epidural catheter infusion bupivacaine 0.0625% at 8mL/hour, PRN IV Dilaudid, acetaminophen and heat packs PRN.  Motor function intact and adequate sensory block, is meeting activity goals.  No evidence of adverse side effects related to local anesthetic.  Voiding spontaneously.       - 0800 Epidural stopped and removed at 1030, dark tip intact.   - antithrombotic/thrombolytic therapy AM dose SC heparin HELD . Plan to resume heparin 1 hr after epidural catheter removed.  - will sign off     José Antonio Shelley MD  [facility]  6/27/2018      RAPS Contact Info (24 hour job code pager is the last 4 digits) For in-house use only:   [employer] phone: [place] [phone], [place] [employer] [phone], [place] [phone], then enter call-back number.    Text: Use [employer] on the Intranet <Paging/Directory> tab and enter Jobcode ID.   If no call back at any time, contact the hospital  and ask for RAPS attending or backup

## 2018-06-27 NOTE — PLAN OF CARE
Problem: Patient Care Overview  Goal: Plan of Care/Patient Progress Review  OT 7B: Cx -- pt with other providers, unable to check back, reschedule.

## 2018-06-27 NOTE — PLAN OF CARE
Problem: Patient Care Overview  Goal: Plan of Care/Patient Progress Review  PT 7B: PT orders acknowledged and appreciated. Visualized pt ambulating in hallway with sitter this morning with FWW at CGA. Upon attempt, pt falling asleep mid-sentence during subjective history assessment. Will check-back as schedule allows.

## 2018-06-28 ENCOUNTER — APPOINTMENT (OUTPATIENT)
Dept: PHYSICAL THERAPY | Facility: CLINIC | Age: 83
DRG: 331 | End: 2018-06-28
Attending: SURGERY
Payer: COMMERCIAL

## 2018-06-28 PROCEDURE — 25000132 ZZH RX MED GY IP 250 OP 250 PS 637: Performed by: STUDENT IN AN ORGANIZED HEALTH CARE EDUCATION/TRAINING PROGRAM

## 2018-06-28 PROCEDURE — 25000132 ZZH RX MED GY IP 250 OP 250 PS 637: Performed by: SURGERY

## 2018-06-28 PROCEDURE — 25000128 H RX IP 250 OP 636: Performed by: STUDENT IN AN ORGANIZED HEALTH CARE EDUCATION/TRAINING PROGRAM

## 2018-06-28 PROCEDURE — 12000003 ZZH R&B CRITICAL UMMC

## 2018-06-28 PROCEDURE — 40000193 ZZH STATISTIC PT WARD VISIT

## 2018-06-28 PROCEDURE — 97530 THERAPEUTIC ACTIVITIES: CPT | Mod: GP

## 2018-06-28 PROCEDURE — 25000128 H RX IP 250 OP 636: Performed by: FAMILY MEDICINE

## 2018-06-28 PROCEDURE — 25000125 ZZHC RX 250: Performed by: STUDENT IN AN ORGANIZED HEALTH CARE EDUCATION/TRAINING PROGRAM

## 2018-06-28 PROCEDURE — 97116 GAIT TRAINING THERAPY: CPT | Mod: GP

## 2018-06-28 RX ADMIN — DEXTROSE AND SODIUM CHLORIDE: 5; 900 INJECTION, SOLUTION INTRAVENOUS at 14:06

## 2018-06-28 RX ADMIN — DARBEPOETIN ALFA 200 MCG: 200 INJECTION, SOLUTION INTRAVENOUS; SUBCUTANEOUS at 17:52

## 2018-06-28 RX ADMIN — CYCLOSPORINE 1 DROP: 0.5 EMULSION OPHTHALMIC at 07:50

## 2018-06-28 RX ADMIN — HEPARIN SODIUM 5000 UNITS: 5000 INJECTION, SOLUTION INTRAVENOUS; SUBCUTANEOUS at 07:49

## 2018-06-28 RX ADMIN — DEXTROSE AND SODIUM CHLORIDE: 5; 900 INJECTION, SOLUTION INTRAVENOUS at 04:45

## 2018-06-28 RX ADMIN — ACETAMINOPHEN 1000 MG: 325 SOLUTION ORAL at 17:12

## 2018-06-28 RX ADMIN — FLUOXETINE 20 MG: 20 CAPSULE ORAL at 07:49

## 2018-06-28 RX ADMIN — CYCLOSPORINE 1 DROP: 0.5 EMULSION OPHTHALMIC at 21:15

## 2018-06-28 RX ADMIN — DEXTROSE AND SODIUM CHLORIDE: 5; 900 INJECTION, SOLUTION INTRAVENOUS at 14:07

## 2018-06-28 RX ADMIN — HEPARIN SODIUM 5000 UNITS: 5000 INJECTION, SOLUTION INTRAVENOUS; SUBCUTANEOUS at 21:15

## 2018-06-28 RX ADMIN — FAMOTIDINE 20 MG: 20 INJECTION, SOLUTION INTRAVENOUS at 04:05

## 2018-06-28 RX ADMIN — MAGNESIUM SULFATE HEPTAHYDRATE 2 G: 40 INJECTION, SOLUTION INTRAVENOUS at 19:21

## 2018-06-28 NOTE — PROGRESS NOTES
GENERAL SURGERY PROGRESS NOTE    SUBJECTIVE  Patient did well overnight. Her pain is much better. She denies nausea, vomiting, fevers, chills. She had a bowel movement this morning confirmed with chart check and is very hungry.     OBJECTIVE  /68 (BP Location: Right arm)  Pulse 71  Temp 97.8  F (36.6  C) (Oral)  Resp 18  Ht 1.524 m (5')  Wt 62.6 kg (137 lb 14.4 oz)  SpO2 98%  BMI 26.93 kg/m2    I/O last 3 completed shifts:  In: 400 [P.O.:325; NG/GT:75]  Out: 2800 [Urine:2400; Other:400]      PHYSICAL EXAM  GENERAL-Pleasant elderly woman resting comfortably, forgetful. Unable to answer several questions.  Cardiac- RRR, bilateral radial pulses 2+  ABD- soft, non-distended, appropriately tender. Vertical midline incision well approximated with staples, no erythema, drainage, or induration.   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    FEN- continue to advance diet, d/c IVF  NEURO- Scheduled tylenol, PRN oxy.  CV- hold home atrovastatin  RESP- SELENA  GI- PO PPI. Miralax daily  /RENAL- SELENA, continue to monitor UOP  ID- none  HEME- SQ heparin. Continue to monitor hemoglobin. Give patient aranesp shot she takes as outpatient - missed last dose with PCP  ENDO- SELENA  RHEUM- hold  home azathioprine  DISPO- Likely 6/29 pending tolerance of PO intake and continued bowel function      Discussed with Dr. Fry, chief resident who staffed with attending Dr. Magallon.      Veronica Goodman MD   PGY-1 Surgery Resident  Pager: [phone]

## 2018-06-28 NOTE — PLAN OF CARE
Problem: Patient Care Overview  Goal: Plan of Care/Patient Progress Review  Outcome: No Change  A&O with confusion, hypertensive-team aware, all other VSS, pt denies pain. Pt tolerated clears but needs to be encouraged to drink them.  Abd incision stapled with dry dressing over incision, no drainage. +BS,  bm+, +flatus. Voiding adequately with assist of 1. LS clear/diminished. Up ambulating halls with therapy and sitter.  ml/hr running in picc in left upper arm. Daughter worried about urine color, nurse unable to visualize due to urine being flushed, team aware and monitoring.

## 2018-06-28 NOTE — PLAN OF CARE
Problem: Patient Care Overview  Goal: Plan of Care/Patient Progress Review  Outcome: No Change  /78 (BP Location: Right arm)  Pulse 72  Temp 99.4  F (37.4  C) (Oral)  Resp 16  Ht 1.524 m (5')  Wt 62.6 kg (137 lb 14.4 oz)  SpO2 98%  BMI 26.93 kg/m2  Patient afebrile, VSS. Patients Abd with stapled midline incision, CDI. bowel sounds active, no bm.Patient  Voiding adequate amts urine. Tolerating clear liquid, appears to rest between cares,  Forgetful, sitter present to prevent  From falls and line pulls. Cont with POC.

## 2018-06-28 NOTE — PLAN OF CARE
Problem: Patient Care Overview  Goal: Plan of Care/Patient Progress Review  Discharge Planner PT   Patient plan for discharge: Home with family assist  Current status: Pt amb 200 ftx2 with FWW and SBA, very slowly and needed VC to navigate through halls with walker but no LOBs. Pt up down 4 stairs with 2 rails and CGA. Pt does not maintain abdominal precautions when getting in/out of bed despite education but able to perform bed mobility with SBA.  Barriers to return to prior living situation: needs at least SBA for all mobility, impaired cognition  Recommendations for discharge: Home with assist from family  Rationale for recommendations: see above       Entered by: Kristel Downing 06/28/2018 11:03 AM

## 2018-06-28 NOTE — PLAN OF CARE
Problem: Patient Care Overview  Goal: Plan of Care/Patient Progress Review  OT: Cancel, Pt refusing therapy upon attempt.  Pt stating that she could not answer questions and wanted her children around.  Therapist attempting to encourage Pt to participate in ADL to increase independence and to provide education on precautions.  Pt refuses at this time, will reschedule evaluation for 6/29.

## 2018-06-28 NOTE — PLAN OF CARE
Problem: Patient Care Overview  Goal: Plan of Care/Patient Progress Review  Patient alert to self,pleasent,reactive with encounters.Tolerated fluids when offered,noted not taking oral intake independently.Voiding,stool episode,small loose X 1.Denied discomfort.No apparent behavior to removed IV or interfere with interventions noted.Continue to monitor.

## 2018-06-28 NOTE — PROGRESS NOTES
Care Coordinator Progress Note    Admission Date/Time:  6/21/2018  Attending MD:  Ahsan Nicolas*    Data  Chart reviewed, discussed with interdisciplinary team.   Patient was admitted for: Data Unavailable.    Concerns with insurance coverage for discharge needs: None.  Current Living Situation: Patient lives with family.  Support System: Supportive and Involved  Services Involved: Housekeeping/ [employer] and PCA, adult day program  Transportation at Discharge: Family or friend will provide  Barriers to Discharge: medical clearance     Assessment  Patient is an 83 year old female who is s/p excision of a mesenteric mass and small bowel resection.  PT/OT is working with the patient and recommending home with 24/7 assist.  Met with patient at bedside to introduce RNCC role and discuss discharge planning.  Patient oriented to self only.  Patient reports she lives with her son.  Patient has an [facility]  Janna Roa(P: [phone]), called Janna to see what services the patient has in place.  Janna is not available so called and spoke with Len(P: [phone]) who is covering.  Patient has PCA and homemaker services through [facility] and goes to an adult day program at [facility] 2 days/week.  Per MD team patient could be ready for discharge to home Sat/Sun pending diet advancement.  Attempted to call patients daughter, [place](Xiomara).  She did not answer and her mailbox was full.  Per bedside RN family was here visiting the patient earlier today.  Will attempt to meet with family tomorrow to verify that they along with current services are able to provide 24/7 care for the patient.  RNCC to continue to follow and assist with discharge planning as needed.             Plan  Anticipated Discharge Date:  Sat/Sun, pending diet advancement  Anticipated Discharge Plan:  Home with resumption of previous home services and family assist    Destiny Chacon, RNCC  [phone]

## 2018-06-29 ENCOUNTER — APPOINTMENT (OUTPATIENT)
Dept: OCCUPATIONAL THERAPY | Facility: CLINIC | Age: 83
DRG: 331 | End: 2018-06-29
Attending: SURGERY
Payer: COMMERCIAL

## 2018-06-29 VITALS
DIASTOLIC BLOOD PRESSURE: 72 MMHG | SYSTOLIC BLOOD PRESSURE: 166 MMHG | HEIGHT: 60 IN | RESPIRATION RATE: 16 BRPM | BODY MASS INDEX: 27.07 KG/M2 | TEMPERATURE: 98.9 F | WEIGHT: 137.9 LBS | OXYGEN SATURATION: 100 % | HEART RATE: 68 BPM

## 2018-06-29 PROCEDURE — 25000132 ZZH RX MED GY IP 250 OP 250 PS 637: Performed by: SURGERY

## 2018-06-29 PROCEDURE — 25000128 H RX IP 250 OP 636: Performed by: FAMILY MEDICINE

## 2018-06-29 PROCEDURE — 40000133 ZZH STATISTIC OT WARD VISIT

## 2018-06-29 PROCEDURE — 25000132 ZZH RX MED GY IP 250 OP 250 PS 637: Performed by: STUDENT IN AN ORGANIZED HEALTH CARE EDUCATION/TRAINING PROGRAM

## 2018-06-29 PROCEDURE — 25000125 ZZHC RX 250: Performed by: STUDENT IN AN ORGANIZED HEALTH CARE EDUCATION/TRAINING PROGRAM

## 2018-06-29 PROCEDURE — 97535 SELF CARE MNGMENT TRAINING: CPT | Mod: GO

## 2018-06-29 PROCEDURE — 40000802 ZZH SITE CHECK

## 2018-06-29 PROCEDURE — 25000128 H RX IP 250 OP 636: Performed by: STUDENT IN AN ORGANIZED HEALTH CARE EDUCATION/TRAINING PROGRAM

## 2018-06-29 PROCEDURE — 97165 OT EVAL LOW COMPLEX 30 MIN: CPT | Mod: GO

## 2018-06-29 PROCEDURE — 97530 THERAPEUTIC ACTIVITIES: CPT | Mod: GO

## 2018-06-29 RX ADMIN — DEXTROSE AND SODIUM CHLORIDE: 5; 900 INJECTION, SOLUTION INTRAVENOUS at 04:08

## 2018-06-29 RX ADMIN — HEPARIN SODIUM 5000 UNITS: 5000 INJECTION, SOLUTION INTRAVENOUS; SUBCUTANEOUS at 09:08

## 2018-06-29 RX ADMIN — CYCLOSPORINE 1 DROP: 0.5 EMULSION OPHTHALMIC at 09:08

## 2018-06-29 RX ADMIN — FLUOXETINE 20 MG: 20 CAPSULE ORAL at 09:08

## 2018-06-29 RX ADMIN — FAMOTIDINE 20 MG: 20 INJECTION, SOLUTION INTRAVENOUS at 03:56

## 2018-06-29 RX ADMIN — ACETAMINOPHEN 1000 MG: 325 SOLUTION ORAL at 04:07

## 2018-06-29 ASSESSMENT — ACTIVITIES OF DAILY LIVING (ADL): PREVIOUS_RESPONSIBILITIES: MEAL PREP;HOUSEKEEPING;SHOPPING

## 2018-06-29 NOTE — PROGRESS NOTES
Care Coordinator - Discharge Planning    Admission Date/Time:  6/21/2018  Attending MD:  Ahsan Nicolas*     Data  Date of initial CC assessment:  6/29/2018  Chart reviewed, discussed with interdisciplinary team.   Patient was admitted for:   1. S/P small bowel resection         Assessment   Full assessment completed in previous note    Coordination of Care and Referrals: Provided patient/family with options for Home Care.    Per MD team patient could be ready for discharge to home later today vs tomorrow.  Met with patient and daughter, Xiomara, at bedside to discuss discharge planning.  Xiomara reports that the patient has 24/7 assist between PCA caregivers and family.  Patient rotates between living at her home with her son, Elizabeth, and living with her daugther, Xiomara.  At time of discharge patient will be discharging to her home with her son, as it does not have as many stairs to navigate as Xiomara's home.  Xiomara interested in patient getting set up with skilled RN/PT/OT at time of discharge.  Patient gets PCA and Homemaker services with [facility](P: [phone], F: [phone]) and would like skilled RN/PT/OT arranged with them.  Called and made a referral for RN/PT/OT.  Orders placed and faxed.  Family will provide transport to home when medically ready for discharge.  RNCC to continue to follow and assist with discharge planning as needed.        Plan  Anticipated Discharge Date:  6/30   Anticipated Discharge Plan:  Home with resumption of home services and skilled RN/PT/OT through [facility]     CTS Handoff completed:  YES    Destiny Chacon, RNCC  [phone]

## 2018-06-29 NOTE — PLAN OF CARE
Problem: Patient Care Overview  Goal: Plan of Care/Patient Progress Review  Discharge Planner OT   Patient plan for discharge: Home w/A  Current status: Pt disoriented but pleasant. Pt cued for log roll and able to complete bed mobility with Step by step cues and min A to scoot hips forward. Pt ambulated into BR with FWW and close SBA, v/c for FWW positioning. Pt toileted with SBA, min-mod A to change brief. Pt stood at sink x 10' while washing face, UB, max A to wash chest, brushed teeth SBA. Issued pt/family handout on abdominal precautions with education to family.   Barriers to return to prior living situation: Medical status, post surgical precautions.   Recommendations for discharge: home with family A (daughter provides PCA services)   Rationale for recommendations: Assist for ADLs, maintenance of abdominal precautions, safety 2/2 reduced cognition       Entered by: Farnaz Wright 06/29/2018 9:43 AM     OT 7B

## 2018-06-29 NOTE — PROGRESS NOTES
06/29/18 0857   Quick Adds   Type of Visit Initial Occupational Therapy Evaluation   Living Environment   Lives With child(tram), adult  (son + daughter)   Living Arrangements apartment   Home Accessibility tub/shower is not walk in   Number of Stairs to Enter Home 3   Number of Stairs Within Home (elevator)   Living Environment Comment Lives with daughter or son, has 24/7 assist at all times.    Self-Care   Dominant Hand right   Usual Activity Tolerance moderate   Current Activity Tolerance fair   Regular Exercise no   Equipment Currently Used at Home cane, straight;walker, standard;shower chair;grab bar   Activity/Exercise/Self-Care Comment Pt going to adult day center 2 days a week.    Functional Level Prior   Ambulation 1-->assistive equipment   Transferring 1-->assistive equipment   Toileting 0-->independent   Bathing 3-->assistive equipment and person   Dressing 0-->independent   Eating 0-->independent   Communication 0-->understands/communicates without difficulty   Swallowing 0-->swallows foods/liquids without difficulty   Cognition 1 - attention or memory deficits   Prior Functional Level Comment Pt states she gets assist with LB dressing at baseline though daughter in law states she was independent.    General Information   Onset of Illness/Injury or Date of Surgery - Date 06/21/18   Referring Physician Veronica Goodman MD   Patient/Family Goals Statement Not stated.    Additional Occupational Profile Info/Pertinent History of Current Problem Chelly Dave is an 83 year-old female with history of dementia and arthritis who [facility] to the [facility]18 with non-bloody bilious emesis and diffuse abdominal pain. CT on admission showed SBO and associated mesenteric mass. She was admitted, and surgery was consulted for resection of mass.   Precautions/Limitations fall precautions;abdominal precautions   Weight-Bearing Status - LUE (10# restictions)   Weight-Bearing  Status - RUE (10# restictions)   Cognitive Status Examination   Orientation person   Level of Consciousness alert;confused   Able to Follow Commands mild impairment;success, 1-step commands   Personal Safety (Cognitive) decreased awareness, need for assist;decreased awareness, need for safety;decreased insight to deficits   Cognitive Comment Believes she is at the market. H/o dementia.   Sensory Examination   Sensory Comments reports some neuropathy in feet.    Integumentary/Edema   Integumentary/Edema no deficits were identifed   Range of Motion (ROM)   ROM Comment B UE WNL   Strength   Strength Comments strength WFL   Instrumental Activities of Daily Living (IADL)   Previous Responsibilities meal prep;housekeeping;shopping   IADL Comments Pt assists with folding laundry and other house hold tasks though has 24/7 supervision.    Activities of Daily Living Analysis   Impairments Contributing to Impaired Activities of Daily Living cognition impaired;pain;post surgical precautions;strength decreased   General Therapy Interventions   Planned Therapy Interventions ADL retraining;bed mobility training;cognition;strengthening;transfer training;home program guidelines;progressive activity/exercise;risk factor education   Clinical Impression   Criteria for Skilled Therapeutic Interventions Met yes, treatment indicated   OT Diagnosis Decreased ADL I   Influenced by the following impairments pain, post surgical precautions, cognitive impairment   Assessment of Occupational Performance 3-5 Performance Deficits   Identified Performance Deficits dressing, toileting, bathing, functional transfers   Clinical Decision Making (Complexity) Low complexity   Therapy Frequency 5 times/wk   Predicted Duration of Therapy Intervention (days/wks) 1 week   Anticipated Discharge Disposition Home with Assist   Risks and Benefits of Treatment have been explained. Yes   Patient, Family & other staff in agreement with plan of care Yes   Clinical  "Impression Comments Pt to benefit from skilled OT to address above deficits. See daily flowsheet for details regarding tx provided.    [facility] AM-PAC  \"6 Clicks\" Daily Activity Inpatient Short Form   1. Putting on and taking off regular lower body clothing? 2 - A Lot   2. Bathing (including washing, rinsing, drying)? 2 - A Lot   3. Toileting, which includes using toilet, bedpan or urinal? 3 - A Little   4. Putting on and taking off regular upper body clothing? 3 - A Little   5. Taking care of personal grooming such as brushing teeth? 3 - A Little   6. Eating meals? 4 - None   Daily Activity Raw Score (Score out of 24.Lower scores equate to lower levels of function) 17   Total Evaluation Time   Total Evaluation Time (Minutes) 5     "

## 2018-06-29 NOTE — PLAN OF CARE
Problem: Patient Care Overview  Goal: Plan of Care/Patient Progress Review  Physical Therapy Discharge Summary    Reason for therapy discharge:    Discharged to home.    Progress towards therapy goal(s). See goals on Care Plan in Psychiatric electronic health record for goal details.  Goals partially met.  Barriers to achieving goals:   discharge from facility.    Therapy recommendation(s):    Continue home exercise program.

## 2018-06-29 NOTE — PLAN OF CARE
Problem: Patient Care Overview  Goal: Plan of Care/Patient Progress Review  Outcome: Adequate for Discharge Date Met: 06/29/18  VSS. Pt D/C at 1330. Pt up with SBA. Pt tolerating regular diet. L PICC removed. Daughter helped with care and signed AVS. Home care services set up.

## 2018-06-29 NOTE — DISCHARGE SUMMARY
General Surgery Discharge Summary    Chelly Dave MRN# [id number]   YOB: 1935 Age: 83 year old     Date of Admission:  6/21/2018  Date of Discharge::  6/29/2018  Admitting Physician:  Ahsan Nicolas MD  Discharge Physician:  Johnny Magallon MD  Primary Care Physician:        Brittany Heredia          Admission Diagnoses:   Small bowel obstruction [K56.609]          Discharge Diagnosis:   Small bowel obstruction [K56.609]  Mesenteric Mass         Procedures:   Exploratory laparotomy with resection of mesenteric mass and associated small bowel by Dr. Nicolas        Non-operative procedures:   None performed          Consultations:   VASCULAR ACCESS CARE ADULT IP CONSULT  VASCULAR ACCESS CARE ADULT IP CONSULT  VASCULAR ACCESS CARE ADULT IP CONSULT  VASCULAR ACCESS CARE ADULT IP CONSULT  SWALLOW EVAL SPEECH PATH AT BEDSIDE IP CONSULT  VASCULAR ACCESS CARE ADULT IP CONSULT  VASCULAR ACCESS CARE ADULT IP CONSULT  VASCULAR ACCESS ADULT IP CONSULT  OCCUPATIONAL THERAPY ADULT IP CONSULT  PHYSICAL THERAPY ADULT IP CONSULT           Medications Prior to Admission:     Prescriptions Prior to Admission   Medication Sig Dispense Refill Last Dose     acetaminophen (TYLENOL) 500 MG tablet Take 2 tablets by mouth   6/20/2018 at Unknown time     aspirin 81 MG EC tablet Take 81 mg by mouth daily.   6/20/2018 at Unknown time     atorvastatin (LIPITOR) 40 MG tablet Take 20 mg by mouth   6/20/2018 at Unknown time     AzaTHIOprine (IMURAN PO) Take 50 mg by mouth 2 times daily AM & 3PM   6/20/2018 at Unknown time     blood glucose monitoring (NO BRAND SPECIFIED) test strip USE TO CHECK BLOOD SUGARS EVERY DAY   6/20/2018 at Unknown time     blood glucose monitoring (SOFTCLIX) lancets USE TO TEST BLOOD SUGARS DAILY   6/20/2018 at Unknown time     Blood Glucose Monitoring Suppl (FIFTY50 GLUCOSE METER 2.0) w/Device KIT Dispense meter, test strips, lancets covered by pt ins. E11.9 NIDDM type II - Test 1  time/day   6/20/2018 at Unknown time     Calcium Carb-Cholecalciferol (CALCIUM 500 +D) 500-400 MG-UNIT TABS Take 1 tablet by mouth daily   6/20/2018 at Unknown time     calcium carbonate-vitamin D 500-400 MG-UNIT TABS per tablet Take 1 tablet by mouth   6/20/2018 at Unknown time     Cholecalciferol (VITAMIN D3) 1000 units CAPS Take 1,000 Units by mouth   6/20/2018 at Unknown time     cycloSPORINE (RESTASIS) 0.05 % ophthalmic emulsion Instill 1 drop into both eyes every 12 hours.   No further refills will be given unless you come in for your exam.   6/20/2018 at Unknown time     darbepoetin aravind-polysorbate (ARANESP) 200 MCG/0.4ML injection Inject 200 mcg Subcutaneous Every 2 months   6/20/2018 at Unknown time     diclofenac (FLECTOR) 1.3 % Patch Place 1 patch onto the skin   6/20/2018 at Unknown time     DOXEPIN HCL PO Take 10 mg by mouth At Bedtime   6/19/2018 at Unknown time     ferrous sulfate (IRON) 325 (65 FE) MG tablet Take 1 tablet (325 mg) by mouth daily 100 tablet 0 6/20/2018 at Unknown time     FLUoxetine (PROZAC) 20 MG capsule Take 20 mg by mouth   6/20/2018 at Unknown time     lidocaine (LIDODERM) 5 % patch Place 1 patch onto the skin daily as needed for moderate pain (12 hours on; 12 hours off. Patient applies to knees or back)   6/20/2018 at Unknown time     loratadine (CLARITIN) 10 MG tablet Take 10 mg by mouth   6/20/2018 at Unknown time     memantine (NAMENDA) 5 MG tablet Take 5 mg by mouth   6/20/2018 at Unknown time     pantoprazole (PROTONIX) 40 MG EC tablet Take 40 mg by mouth   6/20/2018 at Unknown time     Pilocarpine HCl (SALAGEN PO) Take 5 mg by mouth 3 times daily AM, 1200 & HS   6/20/2018 at Unknown time     polyethylene glycol (MIRALAX/GLYCOLAX) powder    6/20/2018 at Unknown time     polyethylene glycol 400 (BLINK TEARS) 0.25 % SOLN ophthalmic solution Place 1-2 drops into both eyes every 2 hours as needed for dry eyes   Unknown at Unknown time     study - aspirin vs placebo (IDS #5239)  1 tablet tablet Take 81 mg by mouth        vitamin E (TOCOPHEROL) 1000 UNIT capsule Take 1 capsule by mouth   6/20/2018 at Unknown time     VITAMIN E NATURAL PO Take 1,000 Units by mouth 2 times daily AM & 3PM   6/20/2018 at Unknown time            Discharge Medications:     Current Discharge Medication List      CONTINUE these medications which have NOT CHANGED    Details   acetaminophen (TYLENOL) 500 MG tablet Take 2 tablets by mouth      aspirin 81 MG EC tablet Take 81 mg by mouth daily.      atorvastatin (LIPITOR) 40 MG tablet Take 20 mg by mouth      AzaTHIOprine (IMURAN PO) Take 50 mg by mouth 2 times daily AM & 3PM      blood glucose monitoring (NO BRAND SPECIFIED) test strip USE TO CHECK BLOOD SUGARS EVERY DAY      blood glucose monitoring (SOFTCLIX) lancets USE TO TEST BLOOD SUGARS DAILY      Blood Glucose Monitoring Suppl (FIFTY50 GLUCOSE METER 2.0) w/Device KIT Dispense meter, test strips, lancets covered by pt ins. E11.9 NIDDM type II - Test 1 time/day      Calcium Carb-Cholecalciferol (CALCIUM 500 +D) 500-400 MG-UNIT TABS Take 1 tablet by mouth daily      calcium carbonate-vitamin D 500-400 MG-UNIT TABS per tablet Take 1 tablet by mouth      Cholecalciferol (VITAMIN D3) 1000 units CAPS Take 1,000 Units by mouth      cycloSPORINE (RESTASIS) 0.05 % ophthalmic emulsion Instill 1 drop into both eyes every 12 hours.   No further refills will be given unless you come in for your exam.      darbepoetin aravind-polysorbate (ARANESP) 200 MCG/0.4ML injection Inject 200 mcg Subcutaneous Every 2 months      diclofenac (FLECTOR) 1.3 % Patch Place 1 patch onto the skin      DOXEPIN HCL PO Take 10 mg by mouth At Bedtime      ferrous sulfate (IRON) 325 (65 FE) MG tablet Take 1 tablet (325 mg) by mouth daily  Qty: 100 tablet, Refills: 0    Associated Diagnoses: Iron deficiency anemia, unspecified iron deficiency anemia type      FLUoxetine (PROZAC) 20 MG capsule Take 20 mg by mouth      lidocaine (LIDODERM) 5 % patch Place  1 patch onto the skin daily as needed for moderate pain (12 hours on; 12 hours off. Patient applies to knees or back)      loratadine (CLARITIN) 10 MG tablet Take 10 mg by mouth      memantine (NAMENDA) 5 MG tablet Take 5 mg by mouth      pantoprazole (PROTONIX) 40 MG EC tablet Take 40 mg by mouth      Pilocarpine HCl (SALAGEN PO) Take 5 mg by mouth 3 times daily AM, 1200 & HS      polyethylene glycol (MIRALAX/GLYCOLAX) powder       polyethylene glycol 400 (BLINK TEARS) 0.25 % SOLN ophthalmic solution Place 1-2 drops into both eyes every 2 hours as needed for dry eyes      study - aspirin vs placebo (IDS #5239) 1 tablet tablet Take 81 mg by mouth      !! vitamin E (TOCOPHEROL) 1000 UNIT capsule Take 1 capsule by mouth      !! VITAMIN E NATURAL PO Take 1,000 Units by mouth 2 times daily AM & 3PM       !! - Potential duplicate medications found. Please discuss with provider.                Day of Discharge Exam   /75 (BP Location: Right arm)  Pulse 68  Temp 97.3  F (36.3  C) (Oral)  Resp 16  Ht 1.524 m (5')  Wt 62.6 kg (137 lb 14.4 oz)  SpO2 98%  BMI 26.93 kg/m2    General:  Pacing room during exam, appears worried and confused. Cooperative with exam.  Cardio:   RRR, radial pulses 2+ bilaterally  Chest:   Non labored breathing on RA  Abd:   Soft, non-distended, appropriately TTP, incision c/d/i with only mild sridhar-incisional erythema  Ext:   WWP, no peripheral edema.          Brief History of Illness:   Chelly Dave is an 83 year-old female with history of dementia and arthritis who presented to the [facility] on 6/21/18 with non-bloody bilious emesis and diffuse abdominal pain. CT on admission showed SBO and associated mesenteric mass. She was admitted, and surgery was consulted for resection of mass.            Hospital Course:   The patient was admitted and underwent the above procedure. The patient tolerated the procedure well. There were no complications.  Postoperatively the patient was transferred to the general floor for further care. The patient's diet was slowly advanced as bowel function returned. Pain was controlled with oral pain medication and the patient was able to ambulate and void without difficulty. The patient received appropriate education post operatively. On POD 7 the patient was discharged to home, with family care, with appropriate instructions and follow up. The patient acknowledged understanding and were in agreement with the plan.         Antibiotics Prescribed at Discharge:   None prescribed         Imaging Studies:     Results for orders placed or performed during the hospital encounter of 06/21/18   XR Abdomen Port 1 View    Narrative    Exam:  XR ABDOMEN PORT 1 VW, 6/21/2018 4:27 AM    History: Eval NG Tube placement;     Comparison:  CT abdomen/pelvis 6/20/2018.    Findings:  Single AP view of the abdomen. Gastric tube tip and  sidehole project over the stomach. Abnormally dilated small bowel in  the midabdomen. No pneumatosis or portal venous gas. Lower lumbar  predominant degenerative change.      Impression    Impression:    1. Gastric tube tip and sidehole project over the stomach body/fundus.  2. Abnormally dilated small bowel in the midabdomen.     I have personally reviewed the examination and initial interpretation  and I agree with the findings.    LAWRENCE MOYA MD   XR Abdomen Port 1 View    Narrative    XR ABDOMEN PORT 1 VW  6/21/2018 10:27 AM      HISTORY: patient removed NG tube and nurse replaced - confirm  placement of NG tube;     COMPARISON: 6/21/2018    FINDINGS: Gastric tube tip and sidehole project over the stomach.  Mildly dilated small bowel loops in the abdomen again noted. No  pneumatosis or portal venous gas. Density within the bladder likely  contrast from prior CT.      Impression    IMPRESSION: Gastric tube tip and sidehole project over the stomach.  Redemonstration of distended small bowel.    I have  personally reviewed the examination and initial interpretation  and I agree with the findings.    KERRI GODINEZ MD   XR Abdomen Port 1 View    Narrative    Single view of the abdomen  6/23/2018 5:58 PM      HISTORY: Check NG placement    COMPARISON: 6/21/2018    FINDINGS: Gastric tube tip and sidehole sidehole projecting over the  left upper quadrant, likely body of the stomach. Mildly air distended  small bowel loops. Nonobstructive bowel gas pattern. Post surgical  changes in the right lower abdomen. Bibasilar atelectasis. The most  inferior portions of the abdomen were collimated out of the  field-of-view.      Impression    IMPRESSION:   1. Gastric tube tip and sidehole projecting over the left upper  abdomen, likely body of the stomach.  2. Redemonstration of mildly air distended small bowel loops.    I have personally reviewed the examination and initial interpretation  and I agree with the findings.    MONSERRAT DICKENS MD   XR Abdomen Port 1 View    Narrative    XR ABDOMEN PORT 1 VW  6/24/2018 1:49 AM    History:  verify NG placement; .     Comparison: Abdominal radiograph dated 6/23/2018    Findings:   Supine portable AP chest radiograph. NG tube with the tip and  side-port projects over stomach. Nonobstructive bowel gas pattern.       Impression    IMPRESSION:  NG tube with the tip and side-port projects over stomach.  Nonobstructive bowel gas pattern.    I have personally reviewed the examination and initial interpretation  and I agree with the findings.    KAMILA MÁRQUEZ MD   XR Chest Port 1 View    Narrative    EXAM: XR CHEST PORT 1 VW  6/25/2018 11:10 AM     HISTORY:  RN placed PICC - verify tip placement;  status post  laparotomy exploratory.     COMPARISON: Chest radiograph 10/21/2017    FINDINGS: Single AP view of chest. Left-sided PICC line tip over the  cavoatrial junction. Gastric tube tip and sidehole over stomach.  Midline trachea. No pneumothorax. Small left pleural effusion. Mild  left  basilar streaky opacities. Prominent aortic arch.      Impression    IMPRESSION:   1. Left-sided PICC line tip over the cavoatrial junction.  2. Small left pleural effusion and basilar atelectasis.    I have personally reviewed the examination and initial interpretation  and I agree with the findings.    RANGEL HANCOCK MD   XR Abdomen Port 1 View    Narrative    Exam: XR ABDOMEN PORT 1 VW, 6/25/2018 11:05 PM    Indication: NG Tube placement;     Comparison: Radiograph 6/24/2018    Findings:   Gastric tube projects over the stomach. Tube is looped back in itself  and is prone to kinking in this location. There are a few linear  branching hypodensities projecting over the liver hilum. No dilated  loop of bowel. Surgical sutures in the central abdomen. Degenerative  changes of the lower lumbar spine.      Impression    Impression:   1. Linear branching hyperdensities projecting over the liver may  represent pneumobilia or portal venous gas.  2. Gastric tube is looped back on itself and is prone to kinking in  this location.    [Result: Possible pneumobilia or portal venous gas]    Finding was identified on 6/26/2018 12:11 AM.     Dr. Dominguez was contacted by Dr. Moyer at 6/26/2018 12:16 AM and  verbalized understanding of the urgent finding.     I have personally reviewed the examination and initial interpretation  and I agree with the findings.    LIS RICHTER MD            Final Pathology Result:   Pending at time of discharge - preliminary result not concerning for malignancy per pathology.         Discharge Instructions:     - No lifting greater than 15 pounds for 8 weeks after surgery.   - You may shower and get incisions wet starting 48 hrs after surgery but do not scrub incisions or submerge wounds (aka, bath, pool, hot tub, ect.) for 2 weeks. Remove outer dressing (if placed) after 48 hrs from surgery. If dermabond was used, avoid applying any lotions or ointments. If steri-strips were used, they will fall  off on their own. You may leave open to air or cover with dry gauze if needed for comfort.  - No driving while taking narcotic pain medications.   - If you develop constipation, take stool softeners such as colace or miralax but stop if you develop diarrhea. Wean yourself off of narcotics.   - Call your primary provider to touch base with them regarding your recent admission.   - If you develop any fever/chills, worsening pain, redness, swelling, or drainage from your wound please call (Clinic [phone]).          Follow-Up:     - Follow up with Dr. Nicolas in clinic in 1-2 week(s) after discharge. You should be called to make an appointment within 3 business days. If you are not contacted, call [phone] to make an appointment        Home Health Care:   Arranged - patient to resume home health services per PTA schedule           Discharge Disposition:   Discharged to home with family      Condition at discharge: Francisco Goodman MD on 6/29/2018 at 8:58 AM  PGY-1, General Surgery

## 2018-06-29 NOTE — DISCHARGE INSTRUCTIONS
__________________________________________________________  D/C'ing nurse: Please fax to following agencies at time of patient d/c.  ____________________________________________________________    [facility]  Fax: [phone]

## 2018-06-29 NOTE — PLAN OF CARE
Problem: Patient Care Overview  Goal: Plan of Care/Patient Progress Review  Outcome: No Change  /84 (BP Location: Right arm)  Pulse 68  Temp 97.6  F (36.4  C) (Oral)  Resp 16  Ht 1.524 m (5')  Wt 62.6 kg (137 lb 14.4 oz)  SpO2 99%  BMI 26.93 kg/m2  Patient afebrile, VSS. Patients A&O with confusion, hypertensive-team aware, c/o abd pain controlled with tylenole. Pt tolerated clears but needs to be encouraged to drink them.  Abd incision stapled open to air clean dry and intact. +BS,  bm+, +flatus. Voiding adequately with assist of 1. LS clear/diminished.  ml/hr running in picc in left upper arm. Appears to rest between cares. Cont with POC.

## 2018-06-30 NOTE — PLAN OF CARE
Problem: Patient Care Overview  Goal: Plan of Care/Patient Progress Review  Occupational Therapy Discharge Summary    Reason for therapy discharge:    Discharged to home.    Progress towards therapy goal(s). See goals on [facility] Plan in [facility] record for goal details.  Goals partially met.  Barriers to achieving goals:   discharge from facility.    Therapy recommendation(s):    Continued therapy is recommended.  Rationale/Recommendations:  Pt will benefit from continued OT services to increase independence and safety with ADL.

## 2018-07-02 ENCOUNTER — CARE COORDINATION (OUTPATIENT)
Dept: SURGERY | Facility: CLINIC | Age: 83
End: 2018-07-02

## 2018-07-02 NOTE — PROGRESS NOTES
RN Post-Op/Post-Discharge Care Coordination Note    Ms. Chelly Dave is a 83 year old female who underwent Exploratory laparotomy with resection of mesenteric mass and associated small bowel on 6/22 with  Dr. Ahsan Nicolas.  Spoke with daughter.    Support  Family member assisting with care     Health Status  Fevers/chills: Patient denies any fever or chills.  Nausea/Vomiting: Patient denies nausea/vomiting.  Eating/drinking: Patient is able to eat and drink without any complaints.  Bowel habits: Patient reports having a normal bowel movement.  Drains (PATIENCE): N/A  Incisions: Patient denies any signs and symptoms of infection..  Wound closure:  Daughter states staples were removed. She said that patient keeps removing the dressing. Discussed that if there is no drainage that the incision doesn't need to be covered.   Pain: she is having some soreness and has been taking Tylenol. Discussed she can also use Ibuprofen Q6H prn.  New Medications:  na    Activity/Restrictions  No lifting in excess of 15-20 pounds for 3-4 weeks    Equipment  None    Pathology reviewed with patient:  No: pending    All of her questions were answered including reviewing restrictions, and wound care.  She will call this office if she has any further questions and/or concerns.      Patient has been scheduled to see Dr. Nicolas in the SOC clinic 7/13 at 10:30 for a wound check and to review pathology.    Whom and When to Call  Patient acknowledges understanding of how to manage any medication changes and   when to seek medical care.     Patient advised that if after hour medical concerns arise to please call [phone] and choose option 4 to speak to the physician on call.     A copy of this note was routed to the primary surgeon.

## 2018-07-11 ENCOUNTER — OFFICE VISIT (OUTPATIENT)
Dept: SURGERY | Facility: CLINIC | Age: 83
End: 2018-07-11
Attending: SURGERY
Payer: COMMERCIAL

## 2018-07-11 VITALS
SYSTOLIC BLOOD PRESSURE: 138 MMHG | DIASTOLIC BLOOD PRESSURE: 73 MMHG | HEART RATE: 88 BPM | RESPIRATION RATE: 16 BRPM | OXYGEN SATURATION: 96 %

## 2018-07-11 DIAGNOSIS — Z09 EXAMINATION FOR, FOLLOW-UP: Primary | ICD-10-CM

## 2018-07-11 LAB — COPATH REPORT: NORMAL

## 2018-07-11 NOTE — LETTER
Date:July 12, 2018      Patient was self referred, no letter generated. Do not send.        [facility] Information

## 2018-07-11 NOTE — PROGRESS NOTES
Patient seen in clinic.  Doing well.  No new issues.  Tolerating diet.  bm ok.  Minimal pain.  On exam, her incision is healing well.    There was no path report during patients visit.  I had called pathology and gotten a verbal that there was no cancer.  Discussed with patient and daughter that there is no further f/u needed.  About an hour later, I discussed again with patholgy confirming everything and the report is finalized.  Called patient's daughter notifying her of confirmation of path report showing no cancer.  No further f/u needed, unless new issues arise.

## 2018-07-11 NOTE — NURSING NOTE
1035: Pt admitted to SOC Rm: [phone] for consult visit.  Pt is accompanied today by daughter. Ambulated without assistance.     See VS, Allergy review et initial assessment.      Total initial assessment time spent with pt: 5 minutes.     Dr. Vasquez paged regarding pt arrival.  1045: Dr. QUINTANA in room with patient for assessment/treatment.   1058: Dr. QUINTANA out of room following including assessment,   Discharge education complete by Kiera Mark RN.  1059: Pt discharged from SOC to home via ambulatory, accompanied by Daughter.    Total additional time spent with pt 13 minutes.     Supplies used: none    DIANE BROOKS  [facility]

## 2018-07-11 NOTE — LETTER
7/11/2018       RE: Chelly Dave  [address]  [place] [address]     Dear Colleague,    Thank you for referring your patient, Chelly Dave, to the [facility]PAT[facility] at [facility]. Please see a copy of my visit note below.    Patient seen in clinic.  Doing well.  No new issues.  Tolerating diet.  bm ok.  Minimal pain.  On exam, her incision is healing well.    There was no path report during patients visit.  I had called pathology and gotten a verbal that there was no cancer.  Discussed with patient and daughter that there is no further f/u needed.  About an hour later, I discussed again with patholgy confirming everything and the report is finalized.  Called patient's daughter notifying her of confirmation of path report showing no cancer.  No further f/u needed, unless new issues arise.    Again, thank you for allowing me to participate in the care of your patient.      Sincerely,    SOC PROVIDER

## 2018-11-29 ENCOUNTER — HOSPITAL ENCOUNTER (EMERGENCY)
Facility: CLINIC | Age: 83
Discharge: HOME | End: 2018-11-29
Attending: EMERGENCY MEDICINE | Admitting: EMERGENCY MEDICINE
Payer: COMMERCIAL

## 2018-11-29 ENCOUNTER — APPOINTMENT (OUTPATIENT)
Dept: GENERAL RADIOLOGY | Facility: CLINIC | Age: 83
End: 2018-11-29
Attending: EMERGENCY MEDICINE
Payer: COMMERCIAL

## 2018-11-29 VITALS
RESPIRATION RATE: 18 BRPM | WEIGHT: 150 LBS | BODY MASS INDEX: 29.29 KG/M2 | TEMPERATURE: 97.9 F | DIASTOLIC BLOOD PRESSURE: 61 MMHG | SYSTOLIC BLOOD PRESSURE: 121 MMHG | OXYGEN SATURATION: 97 %

## 2018-11-29 DIAGNOSIS — G89.18 ACUTE POST-OPERATIVE PAIN: ICD-10-CM

## 2018-11-29 PROCEDURE — 99283 EMERGENCY DEPT VISIT LOW MDM: CPT

## 2018-11-29 PROCEDURE — 73562 X-RAY EXAM OF KNEE 3: CPT | Mod: LT

## 2018-11-29 PROCEDURE — 25000132 ZZH RX MED GY IP 250 OP 250 PS 637: Performed by: EMERGENCY MEDICINE

## 2018-11-29 RX ORDER — IBUPROFEN 200 MG
400 TABLET ORAL ONCE
Status: COMPLETED | OUTPATIENT
Start: 2018-11-29 | End: 2018-11-29

## 2018-11-29 RX ORDER — ACETAMINOPHEN 500 MG
500 TABLET ORAL ONCE
Status: COMPLETED | OUTPATIENT
Start: 2018-11-29 | End: 2018-11-29

## 2018-11-29 RX ORDER — ACETAMINOPHEN 500 MG
1000 TABLET ORAL ONCE
Status: DISCONTINUED | OUTPATIENT
Start: 2018-11-29 | End: 2018-11-29

## 2018-11-29 RX ORDER — HYDROCODONE BITARTRATE AND ACETAMINOPHEN 5; 325 MG/1; MG/1
1 TABLET ORAL ONCE
Status: COMPLETED | OUTPATIENT
Start: 2018-11-29 | End: 2018-11-29

## 2018-11-29 RX ADMIN — ACETAMINOPHEN 500 MG: 500 TABLET, FILM COATED ORAL at 07:09

## 2018-11-29 RX ADMIN — HYDROCODONE BITARTRATE AND ACETAMINOPHEN 1 TABLET: 5; 325 TABLET ORAL at 07:09

## 2018-11-29 RX ADMIN — IBUPROFEN 400 MG: 200 TABLET, FILM COATED ORAL at 07:09

## 2018-11-29 NOTE — ED PROVIDER NOTES
History     Chief Complaint:  Knee Pain      HPI   The patient's HPI is limited secondary to the patient's dementia status.     Chelly Dave is a 83 year old female status post right knee replacement and with a history of arthritis and DM type 2 who presents with knee pain. Patient had a torn left meniscus and yesterday had arthroscopic knee surgery performed at [facility]. She reports to have got up to go to the bathroom this morning and suddenly developed intense right knee pain. She was found sitting on the floor by her son, prompting her presentation by EMS. She describes her non radiative pain as located to the left knee. Patient denies right knee pain. Patient ambulates with walker at baseline. No tylenol was taken this morning.      Allergies:  Lisinopril   Oxycodone   Buffered Aspirin     Medications:    acetaminophen (TYLENOL) 500 MG tablet  aspirin 81 MG EC tablet  atorvastatin (LIPITOR) 40 MG tablet  Azathioprine (IMURAN PO)  Calcium Carb-Cholecalciferol (CALCIUM 500 +D) 500-400 MG-UNIT TABS  calcium carbonate-vitamin D 500-400 MG-UNIT TABS per tablet  Cholecalciferol (VITAMIN D3) 1000 units CAPS  darbepoetin aravind-polysorbate (ARANESP) 200 MCG/0.4ML injection  DOXEPIN HCL PO  ferrous sulfate (IRON) 325 (65 FE) MG tablet  Fluoxetine (PROZAC) 20 MG capsule  loratadine (CLARITIN) 10 MG tablet  Memantine (NAMENDA) 5 MG tablet  pantoprazole (PROTONIX) 40 MG EC tablet  Pilocarpine HCl (SALAGEN PO)  polyethylene glycol (MIRALAX/GLYCOLAX) powder  study - aspirin vs placebo (IDS #5239) 1 tablet  vitamin E (TOCOPHEROL) 1000 UNIT capsule  VITAMIN E NATURAL PO     Past Medical History:    Arthritis   Cataract   Gastro-esophageal reflux disease   Hyperlipidemia   Bowel obstruction (H)   Community acquired pneumonia   Cellulitis   Syncope   Pneumonia   Diabetes mellitus, type 2 (H)     Past Surgical History:    COLONOSCOPY    Exploratory Laparotomy with Resection of Mesenteric Mass and associated small  Bowel  ORTHOPEDIC SURGERY: right knee replacement  PICC INSERTION: Left: 06/25/2018    Family History:    History reviewed. No pertinent family history.     Social History:  Marital Status:   [5]  Tobacco Status: Never Used  Alcohol Use: No  Presents: By Ambulance; Alone    Lives with son    Review of Systems   Unable to perform ROS: Dementia     Physical Exam     Patient Vitals for the past 24 hrs:   BP Temp Temp src Heart Rate Resp SpO2 Weight   11/29/18 0835 121/61 - - 67 - 97 % -   11/29/18 0730 - - - - - 93 % -   11/29/18 0715 - - - - - 98 % -   11/29/18 0712 119/69 - - 74 18 98 % -   11/29/18 0631 - 97.9  F (36.6  C) Oral - - - 68 kg (150 lb)       Physical Exam     HEENT:  mmm  Neck: supple  CV: ppi, regular   Resp: speaking in full sentences with any resp distress  Abd: abdomen is soft without significant tenderness, masses, organomegaly or guarding  Ext: Lower extremity is normal.  Left lower extremity there are 3 incisions from a standard knee arthroscopy the lateral incision is open without a suture in place mild ongoing oozing.  Compartments are soft throughout she has intact toe flexion and extension, EHL, plantar flexion dorsiflexion at the ankle.  Limited range of motion about the knee secondary to pain.  She has a tenderness over the medial lateral joint line and over the patella.  No pain at the hip with internal or external rotation.  Negative Homans sign no palpable cords along the posterior lower leg.  Skin: warm dry well perfused  Neuro: Alert, no gross motor or sensory deficits,     Emergency Department Course     Imaging:  Radiographic findings were communicated with the patient and daughter who voiced understanding of the findings.  X-ray Left Knee, 3 views:  IMPRESSION: No fracture.  Result per radiology.     Interventions:  0709: Ibuprofen 400 mg Tablet Oral  0709: Norco 5-325 mg per Tablet, 1 Tablet Oral  0709: Tylenol 500 mg Tablet Oral    Emergency Department Course:  The patient  arrived in the emergency department via EMS.  Past medical records, nursing notes, and vitals reviewed.  0651: I performed an exam of the patient and obtained history, as documented above.   The patient was placed on continuous pulse oximetry and blood pressure monitoring.  The patient was sent for a Left Knee X-ray while in the emergency department, findings above.  0844: I rechecked the patient. Findings and plan explained to the patient and daughter. Patient discharged home with instructions regarding supportive care, medications, and reasons to return. The importance of close follow-up was reviewed.     Impression & Plan      Medical Decision Makin-year-old female with dementia one day postop from a left knee arthroscopy and meniscectomy presenting with left knee pain which is likely due to her postoperative state.  Given her dementia and unclear events last night, it is unknown if she had a secondary fall.  There is no signs of trauma to her head, and the rest of her skeletal survey is unremarkable.  Low suspicion given her examination and time course that this is a deep vein thrombosis.  We will give her simple pain medication here, given x-ray of the need to make sure there is no evidence of an acute bony injury.  Assuming this is negative, she will need a road test to see if she can safely transition home or not.    Update: X-ray negative for acute bony injury. Stayed around with ambulation trial here. Discharged home with her daughter. Steri strips placed on lateral wound.      Critical Care time:  none    Diagnosis:    ICD-10-CM    1. Acute post-operative pain G89.18        Disposition:  discharged to home with daughter    Discharge Medications:  New Prescriptions    No medications on file         Marcos Wren  2018   [facility] EMERGENCY DEPARTMENT  Marcos MEDEL am serving as a scribe at 6:51 AM on 2018 to document services personally performed by Juancho Oviedo MD  based on my observations and the provider's statements to me.         Juancho Oviedo MD  11/29/18 1943

## 2018-11-29 NOTE — ED AVS SNAPSHOT
[facility] Emergency Department    [address]    [facility] [address]    Phone:  [phone]    Fax:  [phone]                                       Chelly Dave   MRN: [id number]    Department:  [facility] Emergency Department   Date of Visit:  11/29/2018           After Visit Summary Signature Page     I have received my discharge instructions, and my questions have been answered. I have discussed any challenges I see with this plan with the nurse or doctor.    ..........................................................................................................................................  Patient/Patient Representative Signature      ..........................................................................................................................................  Patient Representative Print Name and Relationship to Patient    ..................................................               ................................................  Date                                   Time    ..........................................................................................................................................  Reviewed by Signature/Title    ...................................................              ..............................................  Date                                               Time          22EPIC Rev 08/18

## 2018-11-29 NOTE — DISCHARGE INSTRUCTIONS
Please make an appointment to follow up with your primary care provider in 2-3 days if not improving.        Managing Post-Op Pain at Home  Pain is expected after surgery. Know that you have a right to have this pain controlled. Managing pain helps you recover faster. Less pain means you can be active sooner. It also means less stress on the body and mind, which will help your body heal. When you go home after surgery, you will take charge of your pain management.  What is post-op pain?  Pain after surgery (post-op pain) is normal. How much pain you feel depends on the surgery. Your use of pain medicines and your sensitivity to pain are also factors. Each person feels pain differently. So try not to compare your pain with someone else s. Your healthcare team will need to know how you are feeling. Be honest. If you are in pain, say so.  Measuring your pain  A pain scale helps you rate pain intensity. In the scale, 0 means no pain, and 10 is the worst pain possible. Pain scales are not used to compare your pain with another person's pain. A pain scale is used only to measure how your pain changes for you. You should rate your pain every few hours. You may feel some pain even with medicines. It is important to tell your healthcare provider if medicines don't reduce the pain. Be sure to mention if the pain suddenly increases or changes.     Your recovery  Your first hours at home, you may feel groggy or tired from the medicines given during surgery. As pain management methods used during surgery wear off, pain may increase. So be sure not to skip a dose of prescribed medicine. In fact, set an alarm or have someone remind you when it s time to take your medicine. During this time, try to rest, even if you feel pretty good. Within the first 24 hours, a nurse or other healthcare provider is likely to call and ask how you re doing.   Medicines for pain  Medicines can help to block pain, limit swelling and control related  problems. You may be given more than one medicine to treat your pain. Medicines may be changed as you feel better, or if they cause side effects.   Pain medicine can be given in several ways: by injection, by mouth, as a patch, or as a suppository.  Medicines What they do Possible side effects   Non-steroidal anti-inflammatory drugs (NSAIDs) Reduce mild to moderate pain. They also help reduce swelling. Nausea, stomach and digestive problems, and kidney and liver problems. Certain NSAIDs may increase the risk for cardiovascular disease in some people.   Opioids (morphine and similar medicines often called narcotics) Reduce moderate to severe pain Nausea, vomiting, itching, drowsiness, constipation, slowed or shallow breathing   Other pain relievers (analgesics) Reduce mild to severe pain Constipation, nausea, dizziness, drowsiness, kidney and liver problems   Anti-vomiting medicine (antiemetics) Manage nausea Dizziness, drowsiness, muscle spasms   Seizure medicines (anticonvulsants) Manage nerve-related pain Drowsiness, dizziness, liver problems   Medicines for depression (antidepressants) Manage chronic pain Dry mouth, drowsiness, dizziness, constipation   Non-medicine pain relief  Medicines are not the only way to manage pain after surgery.   You can use ice to help reduce swelling and pain. Use a cold pack or bag of ice cubes wrapped in a thin cloth. Never put ice directly on your skin. Use the ice for up to 20 minutes at a time every 3 to 4 hours.   You can also reduce swelling and pain by keeping the operated area above the level of your heart if you can. This helps blood and other fluids drain from the area.   You can also use relaxation to reduce pain. Try these techniques:    Visualization or guided imagery. You picture yourself in a quiet, peaceful place to help take your mind off the pain.    Progressive body relaxation. Starting at your feet, you clench and release your muscles. Work up your body slowly  until you reach your neck and face.    Deep breathing. Breathe in deeply and hold your breath for a few seconds. Then exhale slowly to help relax your body.   Tips for controlling pain    Give pain medicine time to work. Most pain relievers taken by mouth need at least 20 to 30 minutes to take effect. They may not reach their maximum effect for close to an hour.     Take pain medicine at regular times as directed. Don t wait until the pain gets bad to take it.    Get plenty of rest. Taking your medicine at night may help you get a good night s rest.    If pain lessens, try taking your medicine less often or in smaller doses.  Safety tips for taking pain medicines    Ask your pharmacist if you need to take the medicine with food or milk to avoid an upset stomach.    Don t break, crush, or cut in half any long-acting medicines. This could be harmful.    Don t take more medicine than directed. If your pain isn t relieved, call your healthcare provider.    Try to time your medicine so that you take it before starting an activity, such as dressing or sitting at the table for dinner.    Constipation is a common side effect with some pain medicines. Eating fruit, vegetables, and other foods high in fiber can help. Also drink plenty of fluids.    Don t drink alcohol while you are taking pain medicine. This can make you dizzy and slow your breathing. It can even be fatal.    Don t drive if you are taking opioid medicines.    Don t take opioids in combination with benzodiazepines. Doing so can cause serious health problems. These include extreme sleepiness, slowed breathing, and death. Let your healthcare provider know if you are taking benzodiazepines.     When to call your healthcare provider  Call your healthcare provider right away if:    Your pain is not relieved or if it gets worse    You can't take your pain medicines as prescribed    You have severe side effects like breathing problems, trouble waking up, dizziness,  confusion, or severe constipation   Date Last Reviewed: 12/1/2017 2000-2018 [employer]. [address] [place] [address], [place] [address]. All rights reserved. This information is not intended as a substitute for professional medical care. Always follow your healthcare professional's instructions.              Discharge Instructions for Knee Arthroscopy  You had knee arthroscopy. This surgical procedure uses small incisions to locate, identify, and treat problems inside the knee. These problems include loose bodies, meniscal tears, bone spurs, osteochondritis dissecans (OCD), and synovitis. Below are tips to help speed your recovery from surgery.  Activity    Don t drive until your doctor says it s OK. And never drive while taking opioid pain medicine.    Remember to take pain medicines as directed; don t wait for the pain to get bad. And don't drink alcohol while taking pain medicines.    Follow weight-bearing instructions given by your doctor. He or she may require you to use crutches to keep weight off your knee.    Unless your doctor tells you otherwise, start using the affected knee 3 days after surgery.    Slowly bend and straighten your affected leg as far as you can, unless your doctor tells you otherwise. Do this several times a day.    Rest your knee by lying down and putting pillows under it for the first 3 days after surgery. Keep your ankle elevated above the level of your heart. This helps keep swelling down.    Follow your doctor s instructions about wearing and caring for a brace, immobilizer, or elastic dressing.    Point and flex your foot, and rotate your ankle as much as possible during the first few weeks following surgery. Also, wiggle your toes as much as possible.  Incision care    Check your incision daily for redness, tenderness, or drainage.    Don t be alarmed if there is some bruising, slight swelling of the knee, or a small amount of blood on the bandage.    Adjust the bandage  or brace as needed. It should feel supportive on your knee, but not too tight.     Don t soak your incision in water (no hot tubs, bathtubs, swimming pools) until your doctor says it s OK.    Wait 2 day(s) after your surgery to start showering. Then shower as needed. Cover your knee with plastic and seal with tape to keep the dressing or brace dry. Once your dressing is removed, follow your doctor s instructions for care of the wound. And sit on a shower stool so that you don t fall while showering.    Use an ice pack or bag of frozen peas--or something similar--wrapped in a thin towel to reduce the swelling. Keep the foot elevated while you ice the knee. Apply the ice pack for 20 minutes; then remove it for 20 minutes. Repeat as needed. Icing helps reduce swelling.  Other precautions    Arrange your household to keep the items you need within reach.    Remove throw rugs, electrical cords, and anything else that may cause you to fall.    Use nonslip bath mats, grab bars, an elevated toilet seat, and a shower chair in your bathroom.    Use a cane, crutches, a walker, or handrails until your balance, flexibility, and strength improve, and you can put weight on your leg. And remember to ask for help from others when you need it.    Free up your hands so that you can use them to keep balance. Use a edd pack, apron, or pockets to carry things.  Follow-up  Make a follow-up appointment, or as directed by your doctor.  Call 911  Call 911 right away if you have any of the following:    Chest pain    Shortness of breath    Severe nausea or vomiting  When to call your healthcare provider  Call your healthcare provider right away if you have any of the following:    Pain that is not relieved by medicine or rest    Calf pain, redness or swelling    Continued bleeding through the bandage    Tingling, numbness, or coldness in your foot or leg    Fever of 100.4 F (38 C) or higher, or as directed by your healthcare  provider    Shaking chills    Excessive swelling, increased redness, or any drainage around the incision    Swelling, tenderness, or pain in your leg   Date Last Reviewed: 5/1/2018 2000-2018 [employer]. [address] [place] [address], [place] [address]. All rights reserved. This information is not intended as a substitute for professional medical care. Always follow your healthcare professional's instructions.

## 2018-11-29 NOTE — ED NOTES
Pt ambulated in room. Gait steady. No complaints of dizziness or SOB. Pt complained of pain and soreness while walking. Pt reported short jolts of pain occasionally while ambulating.

## 2018-11-29 NOTE — ED TRIAGE NOTES
Arrives via ambulance, lives with family, hx of dementia.  Had some sort of arthroscopic knee surgery yesterday at [facility].  Got up to bathroom this morning and pain was so severe, pt sat on the floor and called 911.  Pt does not remember having surgery

## 2018-11-29 NOTE — ED AVS SNAPSHOT
[facility] Emergency Department    [address] [place] [address]    Phone:  [phone]    Fax:  [phone]                                       Chelly Dave   MRN: [id number]    Department:  [facility] Emergency Department   Date of Visit:  11/29/2018           Patient Information     Date Of Birth          1935        Your diagnoses for this visit were:     Acute post-operative pain        You were seen by Juancho Oviedo MD.        Discharge Instructions       Please make an appointment to follow up with your primary care provider in 2-3 days if not improving.        Managing Post-Op Pain at Home  Pain is expected after surgery. Know that you have a right to have this pain controlled. Managing pain helps you recover faster. Less pain means you can be active sooner. It also means less stress on the body and mind, which will help your body heal. When you go home after surgery, you will take charge of your pain management.  What is post-op pain?  Pain after surgery (post-op pain) is normal. How much pain you feel depends on the surgery. Your use of pain medicines and your sensitivity to pain are also factors. Each person feels pain differently. So try not to compare your pain with someone else s. Your healthcare team will need to know how you are feeling. Be honest. If you are in pain, say so.  Measuring your pain  A pain scale helps you rate pain intensity. In the scale, 0 means no pain, and 10 is the worst pain possible. Pain scales are not used to compare your pain with another person's pain. A pain scale is used only to measure how your pain changes for you. You should rate your pain every few hours. You may feel some pain even with medicines. It is important to tell your healthcare provider if medicines don't reduce the pain. Be sure to mention if the pain suddenly increases or changes.     Your recovery  Your first hours at home, you may feel  groggy or tired from the medicines given during surgery. As pain management methods used during surgery wear off, pain may increase. So be sure not to skip a dose of prescribed medicine. In fact, set an alarm or have someone remind you when it s time to take your medicine. During this time, try to rest, even if you feel pretty good. Within the first 24 hours, a nurse or other healthcare provider is likely to call and ask how you re doing.   Medicines for pain  Medicines can help to block pain, limit swelling and control related problems. You may be given more than one medicine to treat your pain. Medicines may be changed as you feel better, or if they cause side effects.   Pain medicine can be given in several ways: by injection, by mouth, as a patch, or as a suppository.  Medicines What they do Possible side effects   Non-steroidal anti-inflammatory drugs (NSAIDs) Reduce mild to moderate pain. They also help reduce swelling. Nausea, stomach and digestive problems, and kidney and liver problems. Certain NSAIDs may increase the risk for cardiovascular disease in some people.   Opioids (morphine and similar medicines often called narcotics) Reduce moderate to severe pain Nausea, vomiting, itching, drowsiness, constipation, slowed or shallow breathing   Other pain relievers (analgesics) Reduce mild to severe pain Constipation, nausea, dizziness, drowsiness, kidney and liver problems   Anti-vomiting medicine (antiemetics) Manage nausea Dizziness, drowsiness, muscle spasms   Seizure medicines (anticonvulsants) Manage nerve-related pain Drowsiness, dizziness, liver problems   Medicines for depression (antidepressants) Manage chronic pain Dry mouth, drowsiness, dizziness, constipation   Non-medicine pain relief  Medicines are not the only way to manage pain after surgery.   You can use ice to help reduce swelling and pain. Use a cold pack or bag of ice cubes wrapped in a thin cloth. Never put ice directly on your skin. Use  the ice for up to 20 minutes at a time every 3 to 4 hours.   You can also reduce swelling and pain by keeping the operated area above the level of your heart if you can. This helps blood and other fluids drain from the area.   You can also use relaxation to reduce pain. Try these techniques:    Visualization or guided imagery. You picture yourself in a quiet, peaceful place to help take your mind off the pain.    Progressive body relaxation. Starting at your feet, you clench and release your muscles. Work up your body slowly until you reach your neck and face.    Deep breathing. Breathe in deeply and hold your breath for a few seconds. Then exhale slowly to help relax your body.   Tips for controlling pain    Give pain medicine time to work. Most pain relievers taken by mouth need at least 20 to 30 minutes to take effect. They may not reach their maximum effect for close to an hour.     Take pain medicine at regular times as directed. Don t wait until the pain gets bad to take it.    Get plenty of rest. Taking your medicine at night may help you get a good night s rest.    If pain lessens, try taking your medicine less often or in smaller doses.  Safety tips for taking pain medicines    Ask your pharmacist if you need to take the medicine with food or milk to avoid an upset stomach.    Don t break, crush, or cut in half any long-acting medicines. This could be harmful.    Don t take more medicine than directed. If your pain isn t relieved, call your healthcare provider.    Try to time your medicine so that you take it before starting an activity, such as dressing or sitting at the table for dinner.    Constipation is a common side effect with some pain medicines. Eating fruit, vegetables, and other foods high in fiber can help. Also drink plenty of fluids.    Don t drink alcohol while you are taking pain medicine. This can make you dizzy and slow your breathing. It can even be fatal.    Don t drive if you are taking  opioid medicines.    Don t take opioids in combination with benzodiazepines. Doing so can cause serious health problems. These include extreme sleepiness, slowed breathing, and death. Let your healthcare provider know if you are taking benzodiazepines.     When to call your healthcare provider  Call your healthcare provider right away if:    Your pain is not relieved or if it gets worse    You can't take your pain medicines as prescribed    You have severe side effects like breathing problems, trouble waking up, dizziness, confusion, or severe constipation   Date Last Reviewed: 12/1/2017 2000-2018 [employer] [address] [place] [address]. All rights reserved. This information is not intended as a substitute for professional medical care. Always follow your healthcare professional's instructions.              Discharge Instructions for Knee Arthroscopy  You had knee arthroscopy. This surgical procedure uses small incisions to locate, identify, and treat problems inside the knee. These problems include loose bodies, meniscal tears, bone spurs, osteochondritis dissecans (OCD), and synovitis. Below are tips to help speed your recovery from surgery.  Activity    Don t drive until your doctor says it s OK. And never drive while taking opioid pain medicine.    Remember to take pain medicines as directed; don t wait for the pain to get bad. And don't drink alcohol while taking pain medicines.    Follow weight-bearing instructions given by your doctor. He or she may require you to use crutches to keep weight off your knee.    Unless your doctor tells you otherwise, start using the affected knee 3 days after surgery.    Slowly bend and straighten your affected leg as far as you can, unless your doctor tells you otherwise. Do this several times a day.    Rest your knee by lying down and putting pillows under it for the first 3 days after surgery. Keep your ankle elevated above the level of your  heart. This helps keep swelling down.    Follow your doctor s instructions about wearing and caring for a brace, immobilizer, or elastic dressing.    Point and flex your foot, and rotate your ankle as much as possible during the first few weeks following surgery. Also, wiggle your toes as much as possible.  Incision care    Check your incision daily for redness, tenderness, or drainage.    Don t be alarmed if there is some bruising, slight swelling of the knee, or a small amount of blood on the bandage.    Adjust the bandage or brace as needed. It should feel supportive on your knee, but not too tight.     Don t soak your incision in water (no hot tubs, bathtubs, swimming pools) until your doctor says it s OK.    Wait 2 day(s) after your surgery to start showering. Then shower as needed. Cover your knee with plastic and seal with tape to keep the dressing or brace dry. Once your dressing is removed, follow your doctor s instructions for care of the wound. And sit on a shower stool so that you don t fall while showering.    Use an ice pack or bag of frozen peas--or something similar--wrapped in a thin towel to reduce the swelling. Keep the foot elevated while you ice the knee. Apply the ice pack for 20 minutes; then remove it for 20 minutes. Repeat as needed. Icing helps reduce swelling.  Other precautions    Arrange your household to keep the items you need within reach.    Remove throw rugs, electrical cords, and anything else that may cause you to fall.    Use nonslip bath mats, grab bars, an elevated toilet seat, and a shower chair in your bathroom.    Use a cane, crutches, a walker, or handrails until your balance, flexibility, and strength improve, and you can put weight on your leg. And remember to ask for help from others when you need it.    Free up your hands so that you can use them to keep balance. Use a edd pack, apron, or pockets to carry things.  Follow-up  Make a follow-up appointment, or as  directed by your doctor.  Call 911  Call 911 right away if you have any of the following:    Chest pain    Shortness of breath    Severe nausea or vomiting  When to call your healthcare provider  Call your healthcare provider right away if you have any of the following:    Pain that is not relieved by medicine or rest    Calf pain, redness or swelling    Continued bleeding through the bandage    Tingling, numbness, or coldness in your foot or leg    Fever of 100.4 F (38 C) or higher, or as directed by your healthcare provider    Shaking chills    Excessive swelling, increased redness, or any drainage around the incision    Swelling, tenderness, or pain in your leg   Date Last Reviewed: 5/1/2018 2000-2018 [employer] [address] [place] [address], [place] [address]. All rights reserved. This information is not intended as a substitute for professional medical care. Always follow your healthcare professional's instructions.             24 Hour Appointment Hotline       To make an appointment at any [facility], call [phone] ([phone]). If you don't have a family doctor or clinic, we will help you find one. [place] clinics are conveniently located to serve the needs of you and your family.             Review of your medicines      Our records show that you are taking the medicines listed below. If these are incorrect, please call your family doctor or clinic.        Dose / Directions Last dose taken    acetaminophen 500 MG tablet   Commonly known as:  TYLENOL   Dose:  2 tablet        Take 2 tablets by mouth   Refills:  0        aspirin 81 MG EC tablet   Commonly known as:  ASA   Dose:  81 mg        Take 81 mg by mouth daily.   Refills:  0        atorvastatin 40 MG tablet   Commonly known as:  LIPITOR   Dose:  20 mg        Take 20 mg by mouth   Refills:  0        BLINK TEARS 0.25 % Soln ophthalmic solution   Dose:  1-2 drop   Generic drug:  polyethylene glycol 400        Place 1-2 drops into  both eyes every 2 hours as needed for dry eyes   Refills:  0        blood glucose monitoring lancets        USE TO TEST BLOOD SUGARS DAILY   Refills:  0        blood glucose monitoring test strip   Commonly known as:  NO BRAND SPECIFIED        USE TO CHECK BLOOD SUGARS EVERY DAY   Refills:  0        calcium 500 +D 500-400 MG-UNIT Tabs   Dose:  1 tablet   Generic drug:  Calcium Carb-Cholecalciferol        Take 1 tablet by mouth daily   Refills:  0        calcium carbonate-vitamin D 500-400 MG-UNIT tablet   Commonly known as:  OS-ROBSON   Dose:  1 tablet        Take 1 tablet by mouth   Refills:  0        cycloSPORINE 0.05 % ophthalmic emulsion   Commonly known as:  RESTASIS        Instill 1 drop into both eyes every 12 hours.   No further refills will be given unless you come in for your exam.   Refills:  0        darbepoetin aravind-polysorbate 200 MCG/0.4ML injection   Commonly known as:  ARANESP   Dose:  200 mcg        Inject 200 mcg Subcutaneous Every 2 months   Refills:  0        diclofenac 1.3 % patch   Commonly known as:  FLECTOR   Dose:  1 patch        Place 1 patch onto the skin   Refills:  0        DOXEPIN HCL PO   Dose:  10 mg        Take 10 mg by mouth At Bedtime   Refills:  0        ferrous sulfate 325 (65 Fe) MG tablet   Commonly known as:  FEROSUL   Dose:  325 mg   Quantity:  100 tablet        Take 1 tablet (325 mg) by mouth daily   Refills:  0        FIFTY50 GLUCOSE METER 2.0 w/Device Kit        Dispense meter, test strips, lancets covered by pt ins. 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 Patient applies to knees or  back)   Refills:  0        loratadine 10 MG tablet   Commonly known as:  CLARITIN   Dose:  10 mg        Take 10 mg by mouth   Refills:  0        memantine 5 MG tablet   Commonly known as:  NAMENDA   Dose:  5 mg        Take 5 mg by mouth   Refills:  0        pantoprazole 40 MG EC tablet   Commonly known as:  PROTONIX   Dose:  40 mg        Take 40 mg by mouth   Refills:  0        polyethylene glycol powder   Commonly known as:  MIRALAX/GLYCOLAX        Refills:  0        SALAGEN PO   Dose:  5 mg        Take 5 mg by mouth 3 times daily AM, 1200 & HS   Refills:  0        study - aspirin vs placebo 1 tablet tablet   Commonly known as:  IDS #5239   Dose:  81 mg        Take 81 mg by mouth   Refills:  0        vitamin D3 1000 units Caps   Dose:  1000 Units        Take 1,000 Units by mouth   Refills:  0        * VITAMIN E NATURAL PO   Dose:  1000 Units        Take 1,000 Units by mouth 2 times daily AM & 3PM   Refills:  0        * vitamin E 1000 units (450 mg) capsule   Commonly known as:  TOCOPHEROL   Dose:  1 capsule        Take 1 capsule by mouth   Refills:  0        * Notice:  This list has 2 medication(s) that are the same as other medications prescribed for you. Read the directions carefully, and ask your doctor or other care provider to review them with you.            Procedures and tests performed during your visit     XR Knee Left 3 Views      Orders Needing Specimen Collection     None      Pending Results     No orders found from 11/27/2018 to 11/30/2018.            Pending Culture Results     No orders found from 11/27/2018 to 11/30/2018.            Pending Results Instructions     If you had any lab results that were not finalized at the time of your Discharge, you can call the ED Lab Result RN at [phone]. You will be contacted by this team for any positive Lab results or changes in treatment. The nurses are available 7 days a week from 10A to 6:30P.  You can leave a message 24 hours per day and they will  return your call.        Test Results From Your Hospital Stay        11/29/2018  8:12 AM      Narrative     XR KNEE LT 3 VW 11/29/2018 8:00 AM    HISTORY: Pain after fall.    COMPARISON: None.    FINDINGS: No fracture or malalignment. Joint spaces appear preserved.  Small tricompartmental marginal osteophytes. Small joint effusion.        Impression     IMPRESSION: No fracture.    KENIA MELVIN MD                Clinical Quality Measure: Blood Pressure Screening     Your blood pressure was checked while you were in the emergency department today. The last reading we obtained was  BP: 121/61 . Please read the guidelines below about what these numbers mean and what you should do about them.  If your systolic blood pressure (the top number) is less than 120 and your diastolic blood pressure (the bottom number) is less than 80, then your blood pressure is normal. There is nothing more that you need to do about it.  If your systolic blood pressure (the top number) is 120-139 or your diastolic blood pressure (the bottom number) is 80-89, your blood pressure may be higher than it should be. You should have your blood pressure rechecked within a year by a primary care provider.  If your systolic blood pressure (the top number) is 140 or greater or your diastolic blood pressure (the bottom number) is 90 or greater, you may have high blood pressure. High blood pressure is treatable, but if left untreated over time it can put you at risk for heart attack, stroke, or kidney failure. You should have your blood pressure rechecked by a primary care provider within the next 4 weeks.  If your provider in the emergency department today gave you specific instructions to follow-up with your doctor or provider even sooner than that, you should follow that instruction and not wait for up to 4 weeks for your follow-up visit.        Thank you for choosing [facility]       Thank you for choosing [facility] for your care. Our goal is always to  "provide you with excellent care. Hearing back from our patients is one way we can continue to improve our services. Please take a few minutes to complete the written survey that you may receive in the mail after you visit with us. Thank you!        [email or web address] Information     [email or web address] lets you send messages to your doctor, view your test results, renew your prescriptions, schedule appointments and more. To sign up, go to [email or web address] . Click on \"Log in\" on the left side of the screen, which will take you to the Welcome page. Then click on \"Sign up Now\" on the right side of the page.     You will be asked to enter the access code listed below, as well as some personal information. Please follow the directions to create your username and password.     Your access code is: [email or web address]  Expires: 2019  8:46 AM     Your access code will  in 90 days. If you need help or a new code, please call your [place] clinic or [phone].        Care EveryWhere ID     This is your Care EveryWhere ID. This could be used by other organizations to access your [place] medical records  [id number]        Equal Access to Services     JULIUS AYON : Hadii lencho Zuniga, stephane beckham, evie ku, manfred brar. So [facility] [phone].    ATENCIÓN: Si habla español, tiene a dawson disposición servicios gratuitos de asistencia lingüística. James al [phone].    We comply with applicable federal civil rights laws and [place] laws. We do not discriminate on the basis of race, color, national origin, age, disability, sex, sexual orientation, or gender identity.            After Visit Summary       This is your record. Keep this with you and show to your community pharmacist(s) and doctor(s) at your next visit.                  "

## 2019-09-13 ENCOUNTER — HOSPITAL ENCOUNTER (EMERGENCY)
Facility: CLINIC | Age: 84
Discharge: HOME | End: 2019-09-13
Attending: NURSE PRACTITIONER | Admitting: NURSE PRACTITIONER
Payer: COMMERCIAL

## 2019-09-13 ENCOUNTER — APPOINTMENT (OUTPATIENT)
Dept: GENERAL RADIOLOGY | Facility: CLINIC | Age: 84
End: 2019-09-13
Attending: NURSE PRACTITIONER
Payer: COMMERCIAL

## 2019-09-13 VITALS
SYSTOLIC BLOOD PRESSURE: 139 MMHG | OXYGEN SATURATION: 100 % | HEART RATE: 69 BPM | DIASTOLIC BLOOD PRESSURE: 72 MMHG | TEMPERATURE: 98.6 F | WEIGHT: 140 LBS | BODY MASS INDEX: 27.48 KG/M2 | RESPIRATION RATE: 20 BRPM | HEIGHT: 60 IN

## 2019-09-13 DIAGNOSIS — S20.212A RIB CONTUSION, LEFT, INITIAL ENCOUNTER: ICD-10-CM

## 2019-09-13 PROCEDURE — 99283 EMERGENCY DEPT VISIT LOW MDM: CPT

## 2019-09-13 PROCEDURE — 25000132 ZZH RX MED GY IP 250 OP 250 PS 637: Performed by: NURSE PRACTITIONER

## 2019-09-13 PROCEDURE — 71101 X-RAY EXAM UNILAT RIBS/CHEST: CPT | Mod: LT

## 2019-09-13 RX ORDER — ACETAMINOPHEN 500 MG
1000 TABLET ORAL ONCE
Status: COMPLETED | OUTPATIENT
Start: 2019-09-13 | End: 2019-09-13

## 2019-09-13 RX ORDER — LIDOCAINE 4 G/G
1 PATCH TOPICAL ONCE
Status: DISCONTINUED | OUTPATIENT
Start: 2019-09-13 | End: 2019-09-13 | Stop reason: HOSPADM

## 2019-09-13 RX ADMIN — LIDOCAINE 1 PATCH: 560 PATCH PERCUTANEOUS; TOPICAL; TRANSDERMAL at 14:09

## 2019-09-13 RX ADMIN — ACETAMINOPHEN 1000 MG: 500 TABLET, FILM COATED ORAL at 14:09

## 2019-09-13 ASSESSMENT — ENCOUNTER SYMPTOMS
DYSURIA: 0
FEVER: 0
CHILLS: 0
WEAKNESS: 0
SHORTNESS OF BREATH: 0
NUMBNESS: 0
BACK PAIN: 1
COUGH: 0
ABDOMINAL PAIN: 0
FREQUENCY: 0

## 2019-09-13 ASSESSMENT — MIFFLIN-ST. JEOR: SCORE: 1011.54

## 2019-09-13 NOTE — DISCHARGE INSTRUCTIONS
Tylenol  and Lidocaine patches as needed for pain.  Use incentive spirometry 3 times daily for 15 breaths x 1-2 weeks or until pain resolved.

## 2019-09-13 NOTE — ED AVS SNAPSHOT
[facility] Emergency Department  [address]  [facility] [address]  Phone:  [phone]  Fax:  [phone]                                    Chelly Dave   MRN: [id number]    Department:  [facility] Emergency Department   Date of Visit:  9/13/2019           After Visit Summary Signature Page    I have received my discharge instructions, and my questions have been answered. I have discussed any challenges I see with this plan with the nurse or doctor.    ..........................................................................................................................................  Patient/Patient Representative Signature      ..........................................................................................................................................  Patient Representative Print Name and Relationship to Patient    ..................................................               ................................................  Date                                   Time    ..........................................................................................................................................  Reviewed by Signature/Title    ...................................................              ..............................................  Date                                               Time          22EPIC Rev 08/18

## 2019-09-13 NOTE — ED PROVIDER NOTES
History     Chief Complaint:  Back Pain and Fall    HPI   Chelly Dave is a 84 year old female, diabetes, syncope, and hypertension, who presents to the ED for evaluation of back pain following a fall. The patient's daughter states that the patient was at adult day care 2 days ago when she fell while using the bathroom. The patient's daughter states that the patient had an accident, slipped, hit her head on the wall and fell on her butt. The patient's daughter reports taking her to Urgent Care for her head, but everything was normal including head CT. She states the patient has been complaining about left-sided posterior rib pain, and pain with breathing. The patient's daughter states the patient took 2 Tylenol at 1000 today. The patient's family deny any history of kidney problems.    Allergies:  Lisinopril  Oxycodone, confusion  Buffered Aspirin, GI upset     Medications:    81 mg Aspirin  Lipitor  Imuran  Tylenol  Calcium carbonate-Vitamin D  Vitamin D3  Doxepin HCl  Prozac  Claritin  Namenda  Protonix  Salagen  Miralax  Tocopherol    Past Medical History:    Arthritis  Cataract   Diabetes, controlled  GERD  Hyperlipidemia  Pneumonia   Syncope  Cellulitis  Bowel obstruction    Past Surgical History:    Laparotomy exploratory  Right knee replacement  PICC insertion    Family History:    No past pertinent family history.    Social History:  Negative for tobacco use.  Negative for alcohol use.  Negative for drug use.  Marital Status:   [5]     Review of Systems   Constitutional: Negative for chills and fever.   Respiratory: Negative for cough and shortness of breath.    Cardiovascular: Negative for chest pain.   Gastrointestinal: Negative for abdominal pain.   Genitourinary: Negative for dysuria and frequency.   Musculoskeletal: Positive for back pain.        Left posterior rib pain   Neurological: Negative for weakness and numbness.   All other systems reviewed and are negative.      Physical Exam      Patient Vitals for the past 24 hrs:   BP Temp Temp src Pulse Resp SpO2 Height Weight   09/13/19 1530 139/72 -- -- 69 -- 100 % -- --   09/13/19 1500 (!) 140/85 -- -- 70 -- 99 % -- --   09/13/19 1254 (!) 135/101 98.6  F (37  C) Oral 81 20 99 % 1.524 m (5') 63.5 kg (140 lb)     Physical Exam  General: Alert, No obvious discomfort, well kept  Eyes: PERRL, conjunctivae pink no scleral icterus or conjunctival injection, non-tender orbits and zygomatic arch, normal EOM  HENT:  Moist mucus membranes, posterior oropharynx clear without erythema or exudates, No lymphadenopathy, Normal voice. No saucedo's sign, No hemotympanum, No scalp hematoma  Resp:  Lungs clear to auscultation bilaterally, no crackles/rubs/wheezes. Good air movement, left posterior rib tenderness without crepitus or deformity.  Pain with deep breathing exactly reproducible.  Pain with compression of ribs.  CV:  Normal rate and rhythm, no murmurs/rubs/gallops  GI:  Abdomen soft and non-distended.  Normoactive BS.  No tenderness, guarding or rebound, No masses, no bruising, non tender CVA  Skin:  Warm, dry.  Approximately 6 cm diameter bruising over left lower posterior ribs in the area of tenderness.  Musculoskeletal: No peripheral edema or calf tenderness, Normal gross ROM of all extremities, stable pelvis, Normal gross ROM No midline tenderness of the cervical/thoracic/lumbar spine, No tenderness/crepitus/bony stepoffs over the clavicles, arms or legs.  Neuro: Alert and oriented.  At baseline status.  History of dementia.  Psychiatric: Normal affect, cooperative, good eye contact    Emergency Department Course   Imaging:  Radiographic findings were communicated with the patient who voiced understanding of the findings.  XR Ribs, unliat, 3 views + PA chest:   Unremarkable chest and left ribs as per radiology.     Interventions:  1409 Tylenol tablet 1000 mg PO   Lidocaine 4% patch, 1 patch transdermal    Emergency Department Course:  Nursing notes and  vitals reviewed. (1359) I performed an exam of the patient as documented above.     Medicine administered as documented above.    The patient was sent for a ribs x-ray while in the emergency department, findings above.     1517 I rechecked the patient and discussed the results of her workup thus far.     Findings and plan explained to the Patient. Patient discharged home with instructions regarding supportive care, medications, and reasons to return. The importance of close follow-up was reviewed.    Impression & Plan    Medical Decision Making:  Chelly Dave is a 84 year old female who presents today for evaluation of left sided back pain.  Patient had a fall 3 days ago had been evaluated at [facility] including a head CT without acute findings.  She has a bruise on her back and today it seems to have been bothering her significantly more daughter had a hard time getting her off the toilet to therefore presented for evaluation today.  Her head to toe examination shows tenderness in the area of bruising only.  There is no crepitus or deformity.  No spinal tenderness.  Patient otherwise appears to be at her baseline status.  Rib detail x-ray was obtained without indication for fracture, malalignment, or dislocation.  There is no evidence of pneumothorax or pneumonia.  Patient feels improved with the above treatment.  She does not handle opiates as well per family therefore we have used none opiate pain management and this does not seem to provide her acceptable relief.  She states she feels much improved.  We discussed continued use of lidocaine patches, incentive spirometry, and Tylenol for discomfort.  At this point time she appears to be safe and appropriate for outpatient management follow-up and is discharged home.  Strict return protocols were discussed.      Diagnosis:    ICD-10-CM   1. Rib contusion, left, initial encounter S20.212A       Disposition:  The patient was discharged to  home.    Scribe Disclosure:  I, Peggy Jin, am serving as a scribe on 9/13/2019 at 1:59 PM to personally document services performed by Emmanuel Jacob APRN* based on my observations and the provider's statements to me.     Peggy Jin  9/13/2019   [facility] EMERGENCY DEPARTMENT       Emmanuel Jacob APRN CNP  09/13/19 8180

## 2019-09-13 NOTE — ED TRIAGE NOTES
Patient had a fall on Wednesday. Hit her head and was seen at .   No imaging done of back.  Since then has been complaining of increasing left flank pain and per daughter had bruising to the site.      No blood in urine but very dark per daughter.      Has been taking Tylenol with no relief.      ABCs intact.  Alert and oriented x 3.

## 2020-02-13 ENCOUNTER — APPOINTMENT (OUTPATIENT)
Dept: CT IMAGING | Facility: CLINIC | Age: 85
End: 2020-02-13
Attending: EMERGENCY MEDICINE
Payer: COMMERCIAL

## 2020-02-13 ENCOUNTER — APPOINTMENT (OUTPATIENT)
Dept: GENERAL RADIOLOGY | Facility: CLINIC | Age: 85
End: 2020-02-13
Attending: EMERGENCY MEDICINE
Payer: COMMERCIAL

## 2020-02-13 ENCOUNTER — HOSPITAL ENCOUNTER (EMERGENCY)
Facility: CLINIC | Age: 85
Discharge: HOME | End: 2020-02-13
Attending: EMERGENCY MEDICINE | Admitting: EMERGENCY MEDICINE
Payer: COMMERCIAL

## 2020-02-13 VITALS
RESPIRATION RATE: 16 BRPM | TEMPERATURE: 98.2 F | DIASTOLIC BLOOD PRESSURE: 82 MMHG | SYSTOLIC BLOOD PRESSURE: 164 MMHG | OXYGEN SATURATION: 98 % | HEART RATE: 77 BPM

## 2020-02-13 DIAGNOSIS — D72.819 LEUKOPENIA, UNSPECIFIED TYPE: ICD-10-CM

## 2020-02-13 DIAGNOSIS — S00.01XA ABRASION OF SCALP, INITIAL ENCOUNTER: ICD-10-CM

## 2020-02-13 DIAGNOSIS — R29.6 UNWITNESSED FALL: ICD-10-CM

## 2020-02-13 LAB
ALBUMIN UR-MCNC: NEGATIVE MG/DL
ANION GAP SERPL CALCULATED.3IONS-SCNC: 3 MMOL/L (ref 3–14)
APPEARANCE UR: CLEAR
BASOPHILS # BLD AUTO: 0 10E9/L (ref 0–0.2)
BASOPHILS NFR BLD AUTO: 0.4 %
BILIRUB UR QL STRIP: NEGATIVE
BUN SERPL-MCNC: 17 MG/DL (ref 7–30)
CALCIUM SERPL-MCNC: 8.5 MG/DL (ref 8.5–10.1)
CHLORIDE SERPL-SCNC: 105 MMOL/L (ref 94–109)
CO2 SERPL-SCNC: 29 MMOL/L (ref 20–32)
COLOR UR AUTO: ABNORMAL
CREAT SERPL-MCNC: 0.8 MG/DL (ref 0.52–1.04)
DIFFERENTIAL METHOD BLD: ABNORMAL
EOSINOPHIL # BLD AUTO: 0.1 10E9/L (ref 0–0.7)
EOSINOPHIL NFR BLD AUTO: 2.3 %
ERYTHROCYTE [DISTWIDTH] IN BLOOD BY AUTOMATED COUNT: 14.2 % (ref 10–15)
GFR SERPL CREATININE-BSD FRML MDRD: 68 ML/MIN/{1.73_M2}
GLUCOSE SERPL-MCNC: 87 MG/DL (ref 70–99)
GLUCOSE UR STRIP-MCNC: NEGATIVE MG/DL
HCT VFR BLD AUTO: 33.2 % (ref 35–47)
HGB BLD-MCNC: 10.1 G/DL (ref 11.7–15.7)
HGB UR QL STRIP: NEGATIVE
IMM GRANULOCYTES # BLD: 0 10E9/L (ref 0–0.4)
IMM GRANULOCYTES NFR BLD: 0.4 %
KETONES UR STRIP-MCNC: NEGATIVE MG/DL
LEUKOCYTE ESTERASE UR QL STRIP: ABNORMAL
LYMPHOCYTES # BLD AUTO: 0.4 10E9/L (ref 0.8–5.3)
LYMPHOCYTES NFR BLD AUTO: 13.5 %
MCH RBC QN AUTO: 30.6 PG (ref 26.5–33)
MCHC RBC AUTO-ENTMCNC: 30.4 G/DL (ref 31.5–36.5)
MCV RBC AUTO: 101 FL (ref 78–100)
MONOCYTES # BLD AUTO: 0.3 10E9/L (ref 0–1.3)
MONOCYTES NFR BLD AUTO: 11.6 %
MUCOUS THREADS #/AREA URNS LPF: PRESENT /LPF
NEUTROPHILS # BLD AUTO: 1.9 10E9/L (ref 1.6–8.3)
NEUTROPHILS NFR BLD AUTO: 71.8 %
NITRATE UR QL: NEGATIVE
NRBC # BLD AUTO: 0 10*3/UL
NRBC BLD AUTO-RTO: 0 /100
PH UR STRIP: 7 PH (ref 5–7)
PLATELET # BLD AUTO: 171 10E9/L (ref 150–450)
POTASSIUM SERPL-SCNC: 4.1 MMOL/L (ref 3.4–5.3)
RBC # BLD AUTO: 3.3 10E12/L (ref 3.8–5.2)
RBC #/AREA URNS AUTO: 1 /HPF (ref 0–2)
SODIUM SERPL-SCNC: 137 MMOL/L (ref 133–144)
SOURCE: ABNORMAL
SP GR UR STRIP: 1.01 (ref 1–1.03)
SQUAMOUS #/AREA URNS AUTO: <1 /HPF (ref 0–1)
TROPONIN I SERPL-MCNC: <0.015 UG/L (ref 0–0.04)
UROBILINOGEN UR STRIP-MCNC: NORMAL MG/DL (ref 0–2)
WBC # BLD AUTO: 2.6 10E9/L (ref 4–11)
WBC #/AREA URNS AUTO: 2 /HPF (ref 0–5)

## 2020-02-13 PROCEDURE — 25000132 ZZH RX MED GY IP 250 OP 250 PS 637: Performed by: EMERGENCY MEDICINE

## 2020-02-13 PROCEDURE — 71046 X-RAY EXAM CHEST 2 VIEWS: CPT

## 2020-02-13 PROCEDURE — 81001 URINALYSIS AUTO W/SCOPE: CPT | Performed by: EMERGENCY MEDICINE

## 2020-02-13 PROCEDURE — 84484 ASSAY OF TROPONIN QUANT: CPT | Performed by: EMERGENCY MEDICINE

## 2020-02-13 PROCEDURE — 25000128 H RX IP 250 OP 636: Performed by: EMERGENCY MEDICINE

## 2020-02-13 PROCEDURE — 85025 COMPLETE CBC W/AUTO DIFF WBC: CPT | Performed by: EMERGENCY MEDICINE

## 2020-02-13 PROCEDURE — 90471 IMMUNIZATION ADMIN: CPT

## 2020-02-13 PROCEDURE — 90715 TDAP VACCINE 7 YRS/> IM: CPT | Performed by: EMERGENCY MEDICINE

## 2020-02-13 PROCEDURE — 25800030 ZZH RX IP 258 OP 636: Performed by: EMERGENCY MEDICINE

## 2020-02-13 PROCEDURE — 96361 HYDRATE IV INFUSION ADD-ON: CPT

## 2020-02-13 PROCEDURE — 96360 HYDRATION IV INFUSION INIT: CPT

## 2020-02-13 PROCEDURE — 70450 CT HEAD/BRAIN W/O DYE: CPT

## 2020-02-13 PROCEDURE — 72125 CT NECK SPINE W/O DYE: CPT

## 2020-02-13 PROCEDURE — 80048 BASIC METABOLIC PNL TOTAL CA: CPT | Performed by: EMERGENCY MEDICINE

## 2020-02-13 PROCEDURE — 93005 ELECTROCARDIOGRAM TRACING: CPT

## 2020-02-13 PROCEDURE — 99285 EMERGENCY DEPT VISIT HI MDM: CPT | Mod: 25

## 2020-02-13 RX ORDER — ACETAMINOPHEN 325 MG/1
650 TABLET ORAL ONCE
Status: COMPLETED | OUTPATIENT
Start: 2020-02-13 | End: 2020-02-13

## 2020-02-13 RX ADMIN — SODIUM CHLORIDE 500 ML: 9 INJECTION, SOLUTION INTRAVENOUS at 18:34

## 2020-02-13 RX ADMIN — CLOSTRIDIUM TETANI TOXOID ANTIGEN (FORMALDEHYDE INACTIVATED), CORYNEBACTERIUM DIPHTHERIAE TOXOID ANTIGEN (FORMALDEHYDE INACTIVATED), BORDETELLA PERTUSSIS TOXOID ANTIGEN (GLUTARALDEHYDE INACTIVATED), BORDETELLA PERTUSSIS FILAMENTOUS HEMAGGLUTININ ANTIGEN (FORMALDEHYDE INACTIVATED), BORDETELLA PERTUSSIS PERTACTIN ANTIGEN, AND BORDETELLA PERTUSSIS FIMBRIAE 2/3 ANTIGEN 0.5 ML: 5; 2; 2.5; 5; 3; 5 INJECTION, SUSPENSION INTRAMUSCULAR at 18:35

## 2020-02-13 RX ADMIN — ACETAMINOPHEN 650 MG: 325 TABLET, FILM COATED ORAL at 18:34

## 2020-02-13 ASSESSMENT — ENCOUNTER SYMPTOMS
HEADACHES: 1
WOUND: 1
WEAKNESS: 1
FEVER: 0
SHORTNESS OF BREATH: 0
DYSURIA: 0
COUGH: 1
DIARRHEA: 0
NECK PAIN: 1

## 2020-02-13 NOTE — ED AVS SNAPSHOT
[facility] Emergency Department  [address]  [facility] [address]  Phone:  [phone]  Fax:  [phone]                                    Chelly Dave   MRN: [id number]    Department:  [facility] Emergency Department   Date of Visit:  2/13/2020           After Visit Summary Signature Page    I have received my discharge instructions, and my questions have been answered. I have discussed any challenges I see with this plan with the nurse or doctor.    ..........................................................................................................................................  Patient/Patient Representative Signature      ..........................................................................................................................................  Patient Representative Print Name and Relationship to Patient    ..................................................               ................................................  Date                                   Time    ..........................................................................................................................................  Reviewed by Signature/Title    ...................................................              ..............................................  Date                                               Time          22EPIC Rev 08/18

## 2020-02-14 LAB — INTERPRETATION ECG - MUSE: NORMAL

## 2020-02-14 NOTE — ED PROVIDER NOTES
History   Chief Complaint:  Fall     The history is provided by a relative and the patient. The history is limited by the condition of the patient.      Chelly Dave is an 84 year old female with history of dementia, hyperlipidemia, and Sjogren's syndrome on pilocarpine and Imuran who presents after an unwitnessed fall. Chelly was at home in her closet and had an unwitnessed fall. It is presumed she fell backwards and hit the back of her head on a dresser as she sustained a wound here. Her heard the fall and family was quickly at her side. She endorses pain at the area of injury and ill defined neck pain. She does have a history of knee problems which her family thinks contributed to her falls but her daughter does feel she was somewhat weaker today. Her daughter denies fever, vomiting, diarrhea, or other recent symptoms aside from a mild cough. The patient denies dysuria, chest pain, or shortness of breath.     Per review of the patient's immunization records, her last Tdap was in 2012.     Allergies:  Lisinopril  Oxycodone  Buffered Aspirin    Medications:   Aspirin 81 mg   Atorvastatin  Imuran  Doxepin  Iron  Prozac  Pilocarpine  Memantine  Miralax     Past Medical History:    Arthritis   Sjogren's  Cataract  Diabetes mellitus  GERD  Hyperlipidemia  Bowel obstruction  Community acquired pneumonia   Cellulitis    Dementia   Arthritis left knee    Past Surgical History:    Laparotomy exploratory   Right knee surgery   PICC insertion      Family History:   History reviewed. No pertinent family history.    Social History:  The patient was accompanied to the ED by family members.  Smoking Status: Never Smoker  Smokeless Tobacco: Never Used  Alcohol Use: No  Drug Use: No  PCP: Brittany Heredia     Review of Systems   Unable to perform ROS: Dementia   Constitutional: Negative for fever.   Respiratory: Positive for cough. Negative for shortness of breath.    Cardiovascular: Negative for chest pain.    Gastrointestinal: Negative for diarrhea and vomiting.   Genitourinary: Negative for dysuria.   Musculoskeletal: Positive for neck pain.   Skin: Positive for wound.   Neurological: Positive for weakness and headaches.     Physical Exam     Patient Vitals for the past 24 hrs:   BP Temp Temp src Pulse Heart Rate Resp SpO2   02/13/20 2028 -- -- -- -- -- 16 98 %   02/13/20 2015 (!) 164/82 98.2  F (36.8  C) Oral -- 72 16 99 %   02/13/20 1955 -- -- -- -- 70 17 98 %   02/13/20 1820 (!) 151/85 -- -- 77 76 19 100 %   02/13/20 1811 (!) 180/91 -- Oral 84 84 26 96 %       Physical Exam  General: Well-developed and well-nourished. Well appearing elderly woman. Cooperative.  Head:  Left occipital hematoma with a superficial abrasion with slight oozing.  No palpable step-offs.  Eyes:  Conjunctivae, lids, and sclerae are normal.  ENT:    Normal nose. Dry mucous membranes.  Neck:  Supple. Normal range of motion.  No reproducible midline tenderness.  CV:  Regular rate and rhythm. Normal heart sounds with no murmurs, rubs, or gallops detected.  Resp:  No respiratory distress. Clear to auscultation bilaterally without decreased breath sounds, wheezing, rales, or rhonchi.  GI:  Soft. Non-distended. Non-tender.    MS:  Normal ROM. No bilateral lower extremity edema.  Skin:  Warm. Non-diaphoretic. No pallor.  Neuro: Awake and alert. Normal strength.    Strength 5/5 bilateral upper and lower extremities.    No facial droop. No dysarthria.    PERRL.   Psych: Normal mood and affect. Normal speech.  Vitals reviewed.    Emergency Department Course   ECG:  ECG taken at 1825, ECG read at 1827  Rate 78 bpm. MI interval 150 ms. QRS duration 76 ms. QT/QTc 392/446 ms.   Normal sinus rhythm  Normal ECG  No acute ST changes.  No significant change when compared to EKG dated 6/20/18.     Imaging:  Radiology findings were communicated with the family who voiced understanding of the findings.    Cervical spine CT w/o contrast  1.  No fracture or  posttraumatic subluxation.  2.  No high-grade spinal canal or neural foraminal stenosis.  3.  Degenerative changes.  Reading per radiology    CT Head w/o Contrast  1.  No evidence of acute trauma.  2.  Atrophy of the brain and white matter changes consistent with sequelae of small vessel ischemic disease in this age patient.  Reading per radiology    XR Chest 2 Views  Heart size borderline enlarged. Mildly prominent central vasculature, but no tremaine heart failure. No airspace consolidation. Calcified right midlung granuloma. No visible pneumothorax or pleural effusion.  Reading per radiology     Laboratory:  Laboratory findings were communicated with the family who voiced understanding of the findings.    CBC: WBC 2.6(L), HGB 10.1(L),   BMP: WNL (Creatinine 0.80)  Troponin (Collected 1824): <0.015     UA with microscopic: leukocyte esterase small, mucous present o/w WNL    Interventions:  1834  ml IV  1834 Tylenol 650 mg Oral  1835 Tdap 0.5 mL IM    Emergency Department Course:  Nursing notes and vitals reviewed.    1809 I performed an exam of the patient as documented above.     IV was inserted and blood was drawn for laboratory testing, results above.    The patient provided a urine sample here in the emergency department. This was sent for laboratory testing, findings above.     The patient was sent for a CT Head and CT Cervical Spine while in the emergency department, results above.      The patient was sent for a XR Chest while in the emergency department, results above.      1952 I rechecked the patient. I reexamined her head after it was cleaned. There is an abrasion, but no laceration. Explained findings to the family.    Findings and plan explained to the family. Patient discharged home with instructions regarding supportive care, medications, and reasons to return. The importance of close follow-up was reviewed.     Impression & Plan    Medical Decision Making:  Chelly is an 84 year old  woman with dementia who had an unwitnessed fall just prior to arrival.  She does have a scalp hematoma and abrasion which prompted her family's concern.  They remark overall she has been well but she has recently had a cough.  She has no other external evidence of trauma though she does remark she has some neck pain.  Because this fall was unwitnessed and it is unclear if she had syncope, weakness, or simply mechanical fall, a broad work-up was undertaken.  Fortunately, it is unremarkable including EKG without acute ST changes or arrhythmias and an undetectable troponin.  She does not remember having any chest pain or shortness of breath and my suspicion for syncope is overall low.  Her laboratory studies are similarly reassuring although she does have leukopenia to 2.6 of uncertain etiology although I do note she has had a degree of leukopenia in the past and it may be related to her Imuran.  Anemia to 10.1 is near her baseline.  There is no evidence of pneumonia or urinary tract infection and, fortunately, traumatic work-up including head CT and cervical spine CT are without acute findings including intracranial hemorrhage, fracture, or malalignment.  She was given Tylenol and IV fluids and her wound was cleaned with her tetanus updated.  However, it is simply an abrasion and does not require repair.  At this point she is appropriate for discharge though I discussed wound care with the patient's daughter including return precautions for infection.  She also understands she should return should the patient have worsening symptoms, recurrent fall, or new concerns.  Otherwise, she should be seen by primary care next week to ensure she is still doing well.  All questions answered.  Amenable to discharge.    Diagnosis:    ICD-10-CM    1. Abrasion of scalp, initial encounter S00.01XA    2. Unwitnessed fall R29.6    3. Leukopenia, unspecified type D72.819        Disposition:   The patient was discharged home.    Philippe  Disclosure:  I, Tigist Danielle, am serving as a scribe at 6:04 PM on 2/13/2020 to document services personally performed by Pamella Parikh MD based on my observations and the provider's statements to me.   [facility] EMERGENCY DEPARTMENT       Pamella Parikh MD  02/15/20 0837

## 2020-02-14 NOTE — DISCHARGE INSTRUCTIONS
Keep wound clean and dry.  May shower in 24 hours then pat dry.  Check occasionally to ensure there are no signs of infection.  Return if there are signs of infection or if there are any other new or concerning symptoms.  Otherwise, follow-up with primary care next week.

## 2020-02-14 NOTE — ED TRIAGE NOTES
Pt arrives from home w/ complaints of unwitnessed fall. Family reports pt was in her closet when she fell backwards hitting her head on a dresser. Pt has a bump on the L backside of her head w/ a cut across it. Appears to be bleeding a little at time of arrival. Hx of dementia.

## 2020-02-15 ASSESSMENT — ENCOUNTER SYMPTOMS: VOMITING: 0

## 2021-05-19 NOTE — ANESTHESIA POST-OP FOLLOW-UP NOTE
[facility] EPIDURAL NOTE  Chelly Dave is a 83 year old female with mesenteric mass POD #3 s/p LAPAROTOMY EXPLORATORY and placement of T9-10 epidural catheter for pain management.       SUBJECTIVE  Interval History: Overnight sepsis protocol triggered lactic acid 2.4=>1.2 three hrs later, Cultures and UA/UC sent. Patient just had PICC line placed.  Reports adequate pain control with epidural infusion and current hydromorphone IV PRN (see below), currently rates pain 5/10.  Denies weakness, paresthesias, circumoral numbness, metallic taste or tinnitus.  Patient has not been out of bed today, repositioning in bed with some assistance.  Currently NPO with NG, denies nausea, mont[place] in place.                           Clinically Aligned Pain Assessment (CAPA):  Comfort (How is your pain?): Comfortably manageable  Functioning (Are you able to do activities to get better?) : Can do most things, but pain gets in the way of some               Antithrombotic/Thrombolytic Therapy ordered:    heparin sodium PF injection 5,000 Units 5,000 Units, SC, Q12H Given: 06/24 2159               Medications related to Pain Management (Future)    Start       Dose/Rate Route Frequency Ordered Stop     06/24/18 1540   lidocaine (LMX4) kit         Topical ONCE PRN 06/24/18 1541        06/23/18 1651   acetaminophen (TYLENOL) solution 1,000 mg       1,000 mg Oral EVERY 8 HOURS PRN 06/23/18 1651        06/23/18 1651   HYDROmorphone (DILAUDID) injection 0.2 mg       0.2 mg Intravenous EVERY 2 HOURS PRN 06/23/18 1651        06/22/18 1245   bupivacaine (MARCAINE) 0.0625 % in sodium chloride 0.9 % 250 mL EPIDURAL Infusion       8 mL/hr  EPIDURAL CONTINUOUS 06/22/18 1232        06/22/18 1231   nalbuphine (NUBAIN) injection 2.5-5 mg       2.5-5 mg Intravenous EVERY 6 HOURS PRN 06/22/18 1232        06/21/18 0252   lidocaine 1 % 1 mL       1 mL Other EVERY 1 HOUR PRN 06/21/18 0300        06/21/18 0252   lidocaine (LMX4) kit          Topical EVERY 1 HOUR PRN 06/21/18 0300                OBJECTIVE:  Lab Results:       Recent Labs   Lab Test  06/24/18   0954   WBC  4.7   RBC  2.92*   HGB  8.9*   HCT  27.4*   MCV  94   MCH  30.5   MCHC  32.5   RDW  14.5   PLT  151               Lab Results   Component Value Date     INR 1.20 10/17/2011         Vitals:                        Temp:  [97.9  F (36.6  C)-100.2  F (37.9  C)] 98.8  F (37.1  C)  Pulse:  [89-90] 90  Heart Rate:  [56-89] 89  Resp:  [18-24] 24  BP: (148-168)/(65-75) 156/71  SpO2:  [92 %-98 %] 95 %  /71 (BP Location: Left arm)  Pulse 90  Temp 98.8  F (37.1  C) (Oral)  Resp 24  Ht 1.524 m (5')  Wt 62.6 kg (137 lb 14.4 oz)  SpO2 95%  BMI 26.93 kg/m2         Exam:   GEN: no distress and cooperative, answers questions appropriately, follows simple commands  NEURO/MSK:Strength BLE 5/5  and overall symmetric  SKIN: Epidural catheter site with dressing c/d/i, no tenderness, erythema, heme, edema                    ASSESSMENT/PLAN:    Patient receiving adequate analgesia with current mulitmodal plan including epidural T9-10 catheter infusion Bupivacaine 0.0625% at 8 mL/hour.  Motor function intact, is meeting activity goals.  No evidence of adverse side effects related to local anesthetic.  Quiroz with adequate urine output. Lactic acid trending down, urine leukocyte esterase negative.      - continue current epidural infusion bupivacaine 0.0625% at 8 mL/hour. Plan to continue epidural infusion until POD #5  - antithrombotic/thrombolytic therapy okay to continue heparin SC as ordered. Please contact [facility] (#[phone]) prior to any medication changes  - will continue to follow and adjust as needed      José Antonio Shelley MD  [facility]  6/25/2018     [facility] Contact Info (24 hour job code pager is the last 4 digits) For in-house use only:   [employer] phone: [place] [phone], [place] [phone], [place] [phone], then enter call-back number.    Text: Use [employer] on the Intranet  <Paging/Directory> tab and enter Jobcode ID.   If no call back at any time, contact the hospital  and ask for [facility] attending or backup         no

## 2021-11-18 ENCOUNTER — ALLIED HEALTH/NURSE VISIT (OUTPATIENT)
Dept: FAMILY MEDICINE | Facility: CLINIC | Age: 86
End: 2021-11-18
Payer: COMMERCIAL

## 2021-11-18 DIAGNOSIS — Z23 NEED FOR COVID-19 VACCINE: Primary | ICD-10-CM

## 2021-11-18 PROCEDURE — 0004A PR COVID VAC PFIZER DIL RECON 30 MCG/0.3 ML IM: CPT

## 2021-11-18 PROCEDURE — 91300 PR COVID VAC PFIZER DIL RECON 30 MCG/0.3 ML IM: CPT

## 2021-11-18 PROCEDURE — 99207 PR NO CHARGE NURSE ONLY: CPT

## 2022-07-07 ENCOUNTER — IMMUNIZATION (OUTPATIENT)
Dept: FAMILY MEDICINE | Facility: CLINIC | Age: 87
End: 2022-07-07
Payer: COMMERCIAL

## 2022-07-07 DIAGNOSIS — Z23 HIGH PRIORITY FOR 2019-NCOV VACCINE: Primary | ICD-10-CM

## 2022-07-07 PROCEDURE — 91305 COVID-19,PF,PFIZER (12+ YRS): CPT

## 2022-07-07 PROCEDURE — 99207 PR NO CHARGE NURSE ONLY: CPT

## 2022-07-07 PROCEDURE — 0054A COVID-19,PF,PFIZER (12+ YRS): CPT

## 2024-06-19 ENCOUNTER — HOSPITAL ENCOUNTER (EMERGENCY)
Facility: CLINIC | Age: 89
Discharge: STILL ADMITTED OR ADMINISTRATIVE | End: 2024-06-19

## 2024-06-19 ENCOUNTER — APPOINTMENT (OUTPATIENT)
Dept: CT IMAGING | Facility: CLINIC | Age: 89
DRG: 464 | End: 2024-06-19
Attending: STUDENT IN AN ORGANIZED HEALTH CARE EDUCATION/TRAINING PROGRAM
Payer: COMMERCIAL

## 2024-06-19 ENCOUNTER — HOSPITAL ENCOUNTER (INPATIENT)
Facility: CLINIC | Age: 89
LOS: 7 days | Discharge: NURSING, REHAB OR LONG-TERM CARE | DRG: 464 | End: 2024-06-26
Attending: STUDENT IN AN ORGANIZED HEALTH CARE EDUCATION/TRAINING PROGRAM | Admitting: STUDENT IN AN ORGANIZED HEALTH CARE EDUCATION/TRAINING PROGRAM
Payer: COMMERCIAL

## 2024-06-19 DIAGNOSIS — M00.9 PYOGENIC ARTHRITIS OF RIGHT KNEE JOINT, DUE TO UNSPECIFIED ORGANISM (H): Primary | ICD-10-CM

## 2024-06-19 DIAGNOSIS — M79.604 RIGHT LEG PAIN: ICD-10-CM

## 2024-06-19 LAB
ACANTHOCYTES BLD QL SMEAR: NORMAL
ANION GAP SERPL CALCULATED.3IONS-SCNC: 11 MMOL/L (ref 7–15)
AUER BODIES BLD QL SMEAR: NORMAL
BASO STIPL BLD QL SMEAR: NORMAL
BASOPHILS # BLD AUTO: 0 10E3/UL (ref 0–0.2)
BASOPHILS NFR BLD AUTO: 1 %
BITE CELLS BLD QL SMEAR: NORMAL
BLISTER CELLS BLD QL SMEAR: NORMAL
BUN SERPL-MCNC: 16.2 MG/DL (ref 8–23)
BURR CELLS BLD QL SMEAR: NORMAL
CALCIUM SERPL-MCNC: 8.1 MG/DL (ref 8.8–10.2)
CHLORIDE SERPL-SCNC: 103 MMOL/L (ref 98–107)
CREAT SERPL-MCNC: 0.74 MG/DL (ref 0.51–0.95)
DACRYOCYTES BLD QL SMEAR: NORMAL
DEPRECATED HCO3 PLAS-SCNC: 24 MMOL/L (ref 22–29)
EGFRCR SERPLBLD CKD-EPI 2021: 77 ML/MIN/1.73M2
ELLIPTOCYTES BLD QL SMEAR: NORMAL
EOSINOPHIL # BLD AUTO: 0.2 10E3/UL (ref 0–0.7)
EOSINOPHIL NFR BLD AUTO: 4 %
ERYTHROCYTE [DISTWIDTH] IN BLOOD BY AUTOMATED COUNT: 12.7 % (ref 10–15)
FRAGMENTS BLD QL SMEAR: NORMAL
GIANT PLATELETS BLD QL SMEAR: NORMAL
GLUCOSE SERPL-MCNC: 138 MG/DL (ref 70–99)
HCT VFR BLD AUTO: 33.6 % (ref 35–47)
HGB BLD-MCNC: 10.3 G/DL (ref 11.7–15.7)
HGB C CRYSTALS: NORMAL
HOWELL-JOLLY BOD BLD QL SMEAR: NORMAL
IMM GRANULOCYTES # BLD: 0 10E3/UL
IMM GRANULOCYTES NFR BLD: 1 %
LYMPHOCYTES # BLD AUTO: 0.6 10E3/UL (ref 0.8–5.3)
LYMPHOCYTES NFR BLD AUTO: 14 %
MCH RBC QN AUTO: 31.1 PG (ref 26.5–33)
MCHC RBC AUTO-ENTMCNC: 30.7 G/DL (ref 31.5–36.5)
MCV RBC AUTO: 102 FL (ref 78–100)
MONOCYTES # BLD AUTO: 0.3 10E3/UL (ref 0–1.3)
MONOCYTES NFR BLD AUTO: 8 %
NEUTROPHILS # BLD AUTO: 2.8 10E3/UL (ref 1.6–8.3)
NEUTROPHILS NFR BLD AUTO: 72 %
NEUTS HYPERSEG BLD QL SMEAR: NORMAL
NRBC # BLD AUTO: 0 10E3/UL
NRBC BLD AUTO-RTO: 0 /100
PATH REV: NORMAL
PLAT MORPH BLD: NORMAL
PLATELET # BLD AUTO: 195 10E3/UL (ref 150–450)
POLYCHROMASIA BLD QL SMEAR: NORMAL
POTASSIUM SERPL-SCNC: 4.4 MMOL/L (ref 3.4–5.3)
RBC # BLD AUTO: 3.31 10E6/UL (ref 3.8–5.2)
RBC AGGLUT BLD QL: NORMAL
RBC MORPH BLD: NORMAL
ROULEAUX BLD QL SMEAR: NORMAL
SICKLE CELLS BLD QL SMEAR: NORMAL
SMUDGE CELLS BLD QL SMEAR: NORMAL
SODIUM SERPL-SCNC: 138 MMOL/L (ref 135–145)
SPHEROCYTES BLD QL SMEAR: NORMAL
STOMATOCYTES BLD QL SMEAR: NORMAL
TARGETS BLD QL SMEAR: NORMAL
TOXIC GRANULES BLD QL SMEAR: NORMAL
VARIANT LYMPHS BLD QL SMEAR: NORMAL
WBC # BLD AUTO: 3.9 10E3/UL (ref 4–11)

## 2024-06-19 PROCEDURE — 20610 DRAIN/INJ JOINT/BURSA W/O US: CPT | Mod: RT

## 2024-06-19 PROCEDURE — 82945 GLUCOSE OTHER FLUID: CPT | Performed by: STUDENT IN AN ORGANIZED HEALTH CARE EDUCATION/TRAINING PROGRAM

## 2024-06-19 PROCEDURE — 85025 COMPLETE CBC W/AUTO DIFF WBC: CPT | Performed by: STUDENT IN AN ORGANIZED HEALTH CARE EDUCATION/TRAINING PROGRAM

## 2024-06-19 PROCEDURE — 87186 SC STD MICRODIL/AGAR DIL: CPT | Performed by: STUDENT IN AN ORGANIZED HEALTH CARE EDUCATION/TRAINING PROGRAM

## 2024-06-19 PROCEDURE — 0S9C3ZX DRAINAGE OF RIGHT KNEE JOINT, PERCUTANEOUS APPROACH, DIAGNOSTIC: ICD-10-PCS | Performed by: EMERGENCY MEDICINE

## 2024-06-19 PROCEDURE — 73700 CT LOWER EXTREMITY W/O DYE: CPT | Mod: RT

## 2024-06-19 PROCEDURE — 87205 SMEAR GRAM STAIN: CPT | Performed by: STUDENT IN AN ORGANIZED HEALTH CARE EDUCATION/TRAINING PROGRAM

## 2024-06-19 PROCEDURE — 99285 EMERGENCY DEPT VISIT HI MDM: CPT | Mod: 25

## 2024-06-19 PROCEDURE — 250N000013 HC RX MED GY IP 250 OP 250 PS 637: Performed by: STUDENT IN AN ORGANIZED HEALTH CARE EDUCATION/TRAINING PROGRAM

## 2024-06-19 PROCEDURE — G0378 HOSPITAL OBSERVATION PER HR: HCPCS

## 2024-06-19 PROCEDURE — 84157 ASSAY OF PROTEIN OTHER: CPT | Performed by: STUDENT IN AN ORGANIZED HEALTH CARE EDUCATION/TRAINING PROGRAM

## 2024-06-19 PROCEDURE — 89050 BODY FLUID CELL COUNT: CPT | Performed by: STUDENT IN AN ORGANIZED HEALTH CARE EDUCATION/TRAINING PROGRAM

## 2024-06-19 PROCEDURE — 89060 EXAM SYNOVIAL FLUID CRYSTALS: CPT | Performed by: STUDENT IN AN ORGANIZED HEALTH CARE EDUCATION/TRAINING PROGRAM

## 2024-06-19 PROCEDURE — 120N000001 HC R&B MED SURG/OB

## 2024-06-19 PROCEDURE — 36415 COLL VENOUS BLD VENIPUNCTURE: CPT | Performed by: STUDENT IN AN ORGANIZED HEALTH CARE EDUCATION/TRAINING PROGRAM

## 2024-06-19 PROCEDURE — 99223 1ST HOSP IP/OBS HIGH 75: CPT | Performed by: STUDENT IN AN ORGANIZED HEALTH CARE EDUCATION/TRAINING PROGRAM

## 2024-06-19 PROCEDURE — 80048 BASIC METABOLIC PNL TOTAL CA: CPT | Performed by: STUDENT IN AN ORGANIZED HEALTH CARE EDUCATION/TRAINING PROGRAM

## 2024-06-19 RX ORDER — IBUPROFEN 600 MG/1
600 TABLET, FILM COATED ORAL ONCE
Status: DISCONTINUED | OUTPATIENT
Start: 2024-06-19 | End: 2024-06-19

## 2024-06-19 RX ORDER — MEMANTINE HYDROCHLORIDE 5 MG/1
5 TABLET ORAL ONCE
Status: COMPLETED | OUTPATIENT
Start: 2024-06-19 | End: 2024-06-19

## 2024-06-19 RX ORDER — MEMANTINE HYDROCHLORIDE 5 MG/1
5 TABLET ORAL DAILY
Status: DISCONTINUED | OUTPATIENT
Start: 2024-06-20 | End: 2024-06-19

## 2024-06-19 RX ORDER — LIDOCAINE HYDROCHLORIDE 10 MG/ML
INJECTION, SOLUTION EPIDURAL; INFILTRATION; INTRACAUDAL; PERINEURAL
Status: DISCONTINUED
Start: 2024-06-19 | End: 2024-06-20 | Stop reason: HOSPADM

## 2024-06-19 RX ORDER — QUETIAPINE FUMARATE 25 MG/1
25 TABLET, FILM COATED ORAL 2 TIMES DAILY
COMMUNITY
Start: 2024-03-20

## 2024-06-19 RX ORDER — TRAZODONE HYDROCHLORIDE 50 MG/1
25 TABLET, FILM COATED ORAL AT BEDTIME
Status: ON HOLD | COMMUNITY
Start: 2024-05-21 | End: 2024-06-20

## 2024-06-19 RX ORDER — QUETIAPINE FUMARATE 25 MG/1
25 TABLET, FILM COATED ORAL ONCE
Status: DISCONTINUED | OUTPATIENT
Start: 2024-06-19 | End: 2024-06-19

## 2024-06-19 RX ORDER — LIDOCAINE 4 G/G
1 PATCH TOPICAL ONCE
Status: DISCONTINUED | OUTPATIENT
Start: 2024-06-19 | End: 2024-06-20

## 2024-06-19 RX ORDER — DOXEPIN HYDROCHLORIDE 10 MG/1
10 CAPSULE ORAL AT BEDTIME
Status: DISCONTINUED | OUTPATIENT
Start: 2024-06-19 | End: 2024-06-26 | Stop reason: HOSPADM

## 2024-06-19 RX ORDER — ACETAMINOPHEN 500 MG
1000 TABLET ORAL ONCE
Status: DISCONTINUED | OUTPATIENT
Start: 2024-06-19 | End: 2024-06-19

## 2024-06-19 RX ADMIN — MEMANTINE 5 MG: 5 TABLET ORAL at 22:47

## 2024-06-19 RX ADMIN — LIDOCAINE 1 PATCH: 4 PATCH TOPICAL at 21:30

## 2024-06-19 RX ADMIN — TRAZODONE HYDROCHLORIDE 25 MG: 50 TABLET ORAL at 21:29

## 2024-06-19 RX ADMIN — QUETIAPINE FUMARATE 25 MG: 25 TABLET ORAL at 21:30

## 2024-06-19 RX ADMIN — ACETAMINOPHEN 1000 MG: 500 TABLET, FILM COATED ORAL at 22:48

## 2024-06-19 RX ADMIN — DOXEPIN HYDROCHLORIDE 10 MG: 10 CAPSULE ORAL at 22:47

## 2024-06-19 RX ADMIN — IBUPROFEN 600 MG: 600 TABLET ORAL at 21:29

## 2024-06-19 ASSESSMENT — ACTIVITIES OF DAILY LIVING (ADL)
ADLS_ACUITY_SCORE: 19.75
ADLS_ACUITY_SCORE: 35
ADLS_ACUITY_SCORE: 35
ADLS_ACUITY_SCORE: 19.75
ADLS_ACUITY_SCORE: 19.75
ADLS_ACUITY_SCORE: 35
ADLS_ACUITY_SCORE: 35
ADLS_ACUITY_SCORE: 19.75
ADLS_ACUITY_SCORE: 35
ADLS_ACUITY_SCORE: 19.75

## 2024-06-19 ASSESSMENT — COLUMBIA-SUICIDE SEVERITY RATING SCALE - C-SSRS
2. HAVE YOU ACTUALLY HAD ANY THOUGHTS OF KILLING YOURSELF IN THE PAST MONTH?: NO
1. IN THE PAST MONTH, HAVE YOU WISHED YOU WERE DEAD OR WISHED YOU COULD GO TO SLEEP AND NOT WAKE UP?: NO
6. HAVE YOU EVER DONE ANYTHING, STARTED TO DO ANYTHING, OR PREPARED TO DO ANYTHING TO END YOUR LIFE?: NO

## 2024-06-19 NOTE — ED TRIAGE NOTES
Pt arrives via EMS with daughter from [facility]. Pt has not been able to bear weight on R leg since the weekend (3-5 days getting progressively worse) Pt did not fall but had knee replacement in 2008 and they are unsure if hardware is failing. Urgent care did US today but no bloodwork. Sent for further eval. VSS     Triage Assessment (Adult)       Row Name 06/19/24 6645          Triage Assessment    Airway WDL WDL        Respiratory WDL    Respiratory WDL WDL        Skin Circulation/Temperature WDL    Skin Circulation/Temperature WDL WDL        Cardiac WDL    Cardiac WDL WDL        Peripheral/Neurovascular WDL    Peripheral Neurovascular WDL WDL        Cognitive/Neuro/Behavioral WDL    Cognitive/Neuro/Behavioral WDL WDL

## 2024-06-20 LAB
ABO/RH(D): NORMAL
ACANTHOCYTES BLD QL SMEAR: NORMAL
ANION GAP SERPL CALCULATED.3IONS-SCNC: 9 MMOL/L (ref 7–15)
ANTIBODY SCREEN: NEGATIVE
APPEARANCE FLD: ABNORMAL
AUER BODIES BLD QL SMEAR: NORMAL
BASO STIPL BLD QL SMEAR: NORMAL
BASOPHILS # BLD AUTO: 0 10E3/UL (ref 0–0.2)
BASOPHILS NFR BLD AUTO: 1 %
BITE CELLS BLD QL SMEAR: NORMAL
BLISTER CELLS BLD QL SMEAR: NORMAL
BUN SERPL-MCNC: 21.7 MG/DL (ref 8–23)
BURR CELLS BLD QL SMEAR: NORMAL
CALCIUM SERPL-MCNC: 7.7 MG/DL (ref 8.8–10.2)
CELL COUNT BODY FLUID SOURCE: ABNORMAL
CHLORIDE SERPL-SCNC: 106 MMOL/L (ref 98–107)
COLOR FLD: YELLOW
CREAT SERPL-MCNC: 0.93 MG/DL (ref 0.51–0.95)
CRP SERPL-MCNC: 22.32 MG/L
CRYSTALS SNV MICRO: NORMAL
DACRYOCYTES BLD QL SMEAR: NORMAL
DEPRECATED HCO3 PLAS-SCNC: 24 MMOL/L (ref 22–29)
EGFRCR SERPLBLD CKD-EPI 2021: 59 ML/MIN/1.73M2
ELLIPTOCYTES BLD QL SMEAR: NORMAL
EOSINOPHIL # BLD AUTO: 0.2 10E3/UL (ref 0–0.7)
EOSINOPHIL NFR BLD AUTO: 6 %
ERYTHROCYTE [DISTWIDTH] IN BLOOD BY AUTOMATED COUNT: 12.8 % (ref 10–15)
FRAGMENTS BLD QL SMEAR: NORMAL
GIANT PLATELETS BLD QL SMEAR: NORMAL
GLUCOSE BODY FLUID SOURCE: NORMAL
GLUCOSE FLD-MCNC: 89 MG/DL
GLUCOSE SERPL-MCNC: 97 MG/DL (ref 70–99)
HCT VFR BLD AUTO: 29.5 % (ref 35–47)
HGB BLD-MCNC: 9.1 G/DL (ref 11.7–15.7)
HGB C CRYSTALS: NORMAL
HOWELL-JOLLY BOD BLD QL SMEAR: NORMAL
IMM GRANULOCYTES # BLD: 0 10E3/UL
IMM GRANULOCYTES NFR BLD: 1 %
LYMPHOCYTES # BLD AUTO: 0.6 10E3/UL (ref 0.8–5.3)
LYMPHOCYTES NFR BLD AUTO: 18 %
LYMPHOCYTES NFR FLD MANUAL: 1 %
MCH RBC QN AUTO: 31.3 PG (ref 26.5–33)
MCHC RBC AUTO-ENTMCNC: 30.8 G/DL (ref 31.5–36.5)
MCV RBC AUTO: 101 FL (ref 78–100)
MONOCYTES # BLD AUTO: 0.5 10E3/UL (ref 0–1.3)
MONOCYTES NFR BLD AUTO: 14 %
MONOS+MACROS NFR FLD MANUAL: 4 %
NEUTROPHILS # BLD AUTO: 2.2 10E3/UL (ref 1.6–8.3)
NEUTROPHILS NFR BLD AUTO: 61 %
NEUTS BAND NFR FLD MANUAL: 95 %
NEUTS HYPERSEG BLD QL SMEAR: NORMAL
NRBC # BLD AUTO: 0 10E3/UL
NRBC BLD AUTO-RTO: 0 /100
PATH REV: NORMAL
PLAT MORPH BLD: NORMAL
PLATELET # BLD AUTO: 177 10E3/UL (ref 150–450)
POLYCHROMASIA BLD QL SMEAR: NORMAL
POTASSIUM SERPL-SCNC: 4.4 MMOL/L (ref 3.4–5.3)
PROCALCITONIN SERPL IA-MCNC: 0.07 NG/ML
PROT FLD-MCNC: 3.6 G/DL
PROTEIN BODY FLUID SOURCE: NORMAL
RBC # BLD AUTO: 2.91 10E6/UL (ref 3.8–5.2)
RBC AGGLUT BLD QL: NORMAL
RBC MORPH BLD: NORMAL
ROULEAUX BLD QL SMEAR: NORMAL
SICKLE CELLS BLD QL SMEAR: NORMAL
SMUDGE CELLS BLD QL SMEAR: NORMAL
SODIUM SERPL-SCNC: 139 MMOL/L (ref 135–145)
SPECIMEN EXPIRATION DATE: NORMAL
SPHEROCYTES BLD QL SMEAR: NORMAL
STOMATOCYTES BLD QL SMEAR: NORMAL
TARGETS BLD QL SMEAR: NORMAL
TOXIC GRANULES BLD QL SMEAR: NORMAL
VARIANT LYMPHS BLD QL SMEAR: NORMAL
WBC # BLD AUTO: 3.6 10E3/UL (ref 4–11)
WBC # FLD AUTO: 9159 /UL

## 2024-06-20 PROCEDURE — 36415 COLL VENOUS BLD VENIPUNCTURE: CPT | Performed by: STUDENT IN AN ORGANIZED HEALTH CARE EDUCATION/TRAINING PROGRAM

## 2024-06-20 PROCEDURE — 36415 COLL VENOUS BLD VENIPUNCTURE: CPT | Performed by: PHYSICIAN ASSISTANT

## 2024-06-20 PROCEDURE — 99232 SBSQ HOSP IP/OBS MODERATE 35: CPT | Performed by: STUDENT IN AN ORGANIZED HEALTH CARE EDUCATION/TRAINING PROGRAM

## 2024-06-20 PROCEDURE — 87040 BLOOD CULTURE FOR BACTERIA: CPT | Performed by: STUDENT IN AN ORGANIZED HEALTH CARE EDUCATION/TRAINING PROGRAM

## 2024-06-20 PROCEDURE — 86900 BLOOD TYPING SEROLOGIC ABO: CPT | Performed by: PHYSICIAN ASSISTANT

## 2024-06-20 PROCEDURE — 250N000011 HC RX IP 250 OP 636: Mod: JZ | Performed by: INTERNAL MEDICINE

## 2024-06-20 PROCEDURE — 86140 C-REACTIVE PROTEIN: CPT | Performed by: PHYSICIAN ASSISTANT

## 2024-06-20 PROCEDURE — 84145 PROCALCITONIN (PCT): CPT | Performed by: PHYSICIAN ASSISTANT

## 2024-06-20 PROCEDURE — 250N000011 HC RX IP 250 OP 636: Mod: JZ | Performed by: STUDENT IN AN ORGANIZED HEALTH CARE EDUCATION/TRAINING PROGRAM

## 2024-06-20 PROCEDURE — 80048 BASIC METABOLIC PNL TOTAL CA: CPT | Performed by: STUDENT IN AN ORGANIZED HEALTH CARE EDUCATION/TRAINING PROGRAM

## 2024-06-20 PROCEDURE — 250N000012 HC RX MED GY IP 250 OP 636 PS 637: Performed by: STUDENT IN AN ORGANIZED HEALTH CARE EDUCATION/TRAINING PROGRAM

## 2024-06-20 PROCEDURE — 250N000013 HC RX MED GY IP 250 OP 250 PS 637: Performed by: STUDENT IN AN ORGANIZED HEALTH CARE EDUCATION/TRAINING PROGRAM

## 2024-06-20 PROCEDURE — 120N000001 HC R&B MED SURG/OB

## 2024-06-20 PROCEDURE — 258N000003 HC RX IP 258 OP 636: Mod: JZ | Performed by: INTERNAL MEDICINE

## 2024-06-20 PROCEDURE — 85049 AUTOMATED PLATELET COUNT: CPT | Performed by: STUDENT IN AN ORGANIZED HEALTH CARE EDUCATION/TRAINING PROGRAM

## 2024-06-20 RX ORDER — PANTOPRAZOLE SODIUM 40 MG/1
40 TABLET, DELAYED RELEASE ORAL DAILY
Status: DISCONTINUED | OUTPATIENT
Start: 2024-06-20 | End: 2024-06-26 | Stop reason: HOSPADM

## 2024-06-20 RX ORDER — POLYETHYLENE GLYCOL 3350 17 G/17G
17 POWDER, FOR SOLUTION ORAL DAILY PRN
Status: DISCONTINUED | OUTPATIENT
Start: 2024-06-20 | End: 2024-06-26 | Stop reason: HOSPADM

## 2024-06-20 RX ORDER — HYDROMORPHONE HCL IN WATER/PF 6 MG/30 ML
0.2 PATIENT CONTROLLED ANALGESIA SYRINGE INTRAVENOUS
Status: DISCONTINUED | OUTPATIENT
Start: 2024-06-20 | End: 2024-06-21

## 2024-06-20 RX ORDER — GLIPIZIDE 10 MG/1
1-2 TABLET ORAL
Status: DISCONTINUED | OUTPATIENT
Start: 2024-06-20 | End: 2024-06-26 | Stop reason: HOSPADM

## 2024-06-20 RX ORDER — PROCHLORPERAZINE 25 MG
12.5 SUPPOSITORY, RECTAL RECTAL EVERY 12 HOURS PRN
Status: DISCONTINUED | OUTPATIENT
Start: 2024-06-20 | End: 2024-06-21

## 2024-06-20 RX ORDER — CARBOXYMETHYLCELLULOSE SODIUM 5 MG/ML
1 SOLUTION/ DROPS OPHTHALMIC 2 TIMES DAILY
Status: DISCONTINUED | OUTPATIENT
Start: 2024-06-20 | End: 2024-06-26 | Stop reason: HOSPADM

## 2024-06-20 RX ORDER — NALOXONE HYDROCHLORIDE 0.4 MG/ML
0.4 INJECTION, SOLUTION INTRAMUSCULAR; INTRAVENOUS; SUBCUTANEOUS
Status: DISCONTINUED | OUTPATIENT
Start: 2024-06-20 | End: 2024-06-26 | Stop reason: HOSPADM

## 2024-06-20 RX ORDER — AMOXICILLIN 250 MG
1 CAPSULE ORAL 2 TIMES DAILY PRN
Status: DISCONTINUED | OUTPATIENT
Start: 2024-06-20 | End: 2024-06-25

## 2024-06-20 RX ORDER — ACETAMINOPHEN 500 MG
1000 TABLET ORAL DAILY PRN
Status: ON HOLD | COMMUNITY
End: 2024-06-24

## 2024-06-20 RX ORDER — ACETAMINOPHEN 325 MG/1
650 TABLET ORAL 4 TIMES DAILY
Status: DISCONTINUED | OUTPATIENT
Start: 2024-06-20 | End: 2024-06-21

## 2024-06-20 RX ORDER — VANCOMYCIN HYDROCHLORIDE 1 G/200ML
1000 INJECTION, SOLUTION INTRAVENOUS EVERY 24 HOURS
Status: DISCONTINUED | OUTPATIENT
Start: 2024-06-20 | End: 2024-06-22

## 2024-06-20 RX ORDER — ONDANSETRON 2 MG/ML
4 INJECTION INTRAMUSCULAR; INTRAVENOUS EVERY 6 HOURS PRN
Status: DISCONTINUED | OUTPATIENT
Start: 2024-06-20 | End: 2024-06-21

## 2024-06-20 RX ORDER — QUETIAPINE FUMARATE 25 MG/1
25 TABLET, FILM COATED ORAL 2 TIMES DAILY
Status: DISCONTINUED | OUTPATIENT
Start: 2024-06-20 | End: 2024-06-26 | Stop reason: HOSPADM

## 2024-06-20 RX ORDER — ATORVASTATIN CALCIUM 20 MG/1
20 TABLET, FILM COATED ORAL EVERY EVENING
Status: DISCONTINUED | OUTPATIENT
Start: 2024-06-20 | End: 2024-06-24

## 2024-06-20 RX ORDER — ACETAMINOPHEN 325 MG/1
650 TABLET ORAL EVERY 4 HOURS PRN
Status: DISCONTINUED | OUTPATIENT
Start: 2024-06-20 | End: 2024-06-21

## 2024-06-20 RX ORDER — CALCIUM CARBONATE 500 MG/1
1000 TABLET, CHEWABLE ORAL 4 TIMES DAILY PRN
Status: DISCONTINUED | OUTPATIENT
Start: 2024-06-20 | End: 2024-06-26 | Stop reason: HOSPADM

## 2024-06-20 RX ORDER — ONDANSETRON 4 MG/1
4 TABLET, ORALLY DISINTEGRATING ORAL EVERY 6 HOURS PRN
Status: DISCONTINUED | OUTPATIENT
Start: 2024-06-20 | End: 2024-06-21

## 2024-06-20 RX ORDER — NALOXONE HYDROCHLORIDE 0.4 MG/ML
0.2 INJECTION, SOLUTION INTRAMUSCULAR; INTRAVENOUS; SUBCUTANEOUS
Status: DISCONTINUED | OUTPATIENT
Start: 2024-06-20 | End: 2024-06-26 | Stop reason: HOSPADM

## 2024-06-20 RX ORDER — AMOXICILLIN 250 MG
2 CAPSULE ORAL 2 TIMES DAILY PRN
Status: DISCONTINUED | OUTPATIENT
Start: 2024-06-20 | End: 2024-06-25

## 2024-06-20 RX ORDER — MEMANTINE HYDROCHLORIDE 5 MG/1
5 TABLET ORAL DAILY
Status: DISCONTINUED | OUTPATIENT
Start: 2024-06-20 | End: 2024-06-20

## 2024-06-20 RX ORDER — LIDOCAINE 40 MG/G
CREAM TOPICAL
Status: DISCONTINUED | OUTPATIENT
Start: 2024-06-20 | End: 2024-06-26

## 2024-06-20 RX ORDER — PILOCARPINE HYDROCHLORIDE 5 MG/1
5 TABLET, FILM COATED ORAL 3 TIMES DAILY
Status: DISCONTINUED | OUTPATIENT
Start: 2024-06-20 | End: 2024-06-26 | Stop reason: HOSPADM

## 2024-06-20 RX ORDER — MEMANTINE HYDROCHLORIDE 5 MG/1
5 TABLET ORAL 2 TIMES DAILY
Status: DISCONTINUED | OUTPATIENT
Start: 2024-06-20 | End: 2024-06-26 | Stop reason: HOSPADM

## 2024-06-20 RX ORDER — ENOXAPARIN SODIUM 100 MG/ML
30 INJECTION SUBCUTANEOUS EVERY 24 HOURS
Status: DISCONTINUED | OUTPATIENT
Start: 2024-06-20 | End: 2024-06-23

## 2024-06-20 RX ORDER — CEFTRIAXONE 2 G/1
2 INJECTION, POWDER, FOR SOLUTION INTRAMUSCULAR; INTRAVENOUS EVERY 24 HOURS
Status: DISCONTINUED | OUTPATIENT
Start: 2024-06-20 | End: 2024-06-25

## 2024-06-20 RX ORDER — AZATHIOPRINE 50 MG/1
50 TABLET ORAL DAILY
Status: DISCONTINUED | OUTPATIENT
Start: 2024-06-20 | End: 2024-06-26 | Stop reason: HOSPADM

## 2024-06-20 RX ORDER — ALBUTEROL SULFATE 0.83 MG/ML
2.5 SOLUTION RESPIRATORY (INHALATION) EVERY 6 HOURS PRN
COMMUNITY

## 2024-06-20 RX ORDER — ALBUTEROL SULFATE 0.83 MG/ML
2.5 SOLUTION RESPIRATORY (INHALATION) EVERY 6 HOURS PRN
Status: DISCONTINUED | OUTPATIENT
Start: 2024-06-20 | End: 2024-06-26 | Stop reason: HOSPADM

## 2024-06-20 RX ORDER — MEMANTINE HYDROCHLORIDE 5 MG/1
5 TABLET ORAL 2 TIMES DAILY
COMMUNITY

## 2024-06-20 RX ORDER — TRAZODONE HYDROCHLORIDE 50 MG/1
25 TABLET, FILM COATED ORAL
Status: ON HOLD | COMMUNITY
End: 2024-06-25

## 2024-06-20 RX ORDER — PROCHLORPERAZINE MALEATE 5 MG
5 TABLET ORAL EVERY 6 HOURS PRN
Status: DISCONTINUED | OUTPATIENT
Start: 2024-06-20 | End: 2024-06-21

## 2024-06-20 RX ORDER — HYDROMORPHONE HYDROCHLORIDE 2 MG/1
2 TABLET ORAL EVERY 4 HOURS PRN
Status: DISCONTINUED | OUTPATIENT
Start: 2024-06-20 | End: 2024-06-21

## 2024-06-20 RX ORDER — HYDROMORPHONE HCL IN WATER/PF 6 MG/30 ML
0.4 PATIENT CONTROLLED ANALGESIA SYRINGE INTRAVENOUS
Status: DISCONTINUED | OUTPATIENT
Start: 2024-06-20 | End: 2024-06-21

## 2024-06-20 RX ORDER — ACETAMINOPHEN 650 MG/1
650 SUPPOSITORY RECTAL EVERY 4 HOURS PRN
Status: DISCONTINUED | OUTPATIENT
Start: 2024-06-20 | End: 2024-06-21

## 2024-06-20 RX ADMIN — QUETIAPINE FUMARATE 25 MG: 25 TABLET ORAL at 21:18

## 2024-06-20 RX ADMIN — CARBOXYMETHYLCELLULOSE SODIUM 1 DROP: 5 SOLUTION/ DROPS OPHTHALMIC at 21:16

## 2024-06-20 RX ADMIN — AZATHIOPRINE 50 MG: 50 TABLET ORAL at 13:00

## 2024-06-20 RX ADMIN — ACETAMINOPHEN 650 MG: 325 TABLET, FILM COATED ORAL at 16:42

## 2024-06-20 RX ADMIN — CEFTRIAXONE 2 G: 2 INJECTION, POWDER, FOR SOLUTION INTRAMUSCULAR; INTRAVENOUS at 01:58

## 2024-06-20 RX ADMIN — ATORVASTATIN CALCIUM 20 MG: 20 TABLET, FILM COATED ORAL at 21:18

## 2024-06-20 RX ADMIN — TRAZODONE HYDROCHLORIDE 25 MG: 50 TABLET ORAL at 21:18

## 2024-06-20 RX ADMIN — MEMANTINE 5 MG: 5 TABLET ORAL at 11:23

## 2024-06-20 RX ADMIN — ACETAMINOPHEN 650 MG: 325 TABLET, FILM COATED ORAL at 21:18

## 2024-06-20 RX ADMIN — DOXEPIN HYDROCHLORIDE 10 MG: 10 CAPSULE ORAL at 21:18

## 2024-06-20 RX ADMIN — PILOCARPINE HYDROCHLORIDE 5 MG: 5 TABLET, FILM COATED ORAL at 13:00

## 2024-06-20 RX ADMIN — PANTOPRAZOLE SODIUM 40 MG: 40 TABLET, DELAYED RELEASE ORAL at 12:59

## 2024-06-20 RX ADMIN — FLUOXETINE 20 MG: 20 CAPSULE ORAL at 10:30

## 2024-06-20 RX ADMIN — ENOXAPARIN SODIUM 30 MG: 30 INJECTION SUBCUTANEOUS at 10:40

## 2024-06-20 RX ADMIN — PILOCARPINE HYDROCHLORIDE 5 MG: 5 TABLET, FILM COATED ORAL at 21:18

## 2024-06-20 RX ADMIN — MEMANTINE 5 MG: 5 TABLET ORAL at 21:18

## 2024-06-20 RX ADMIN — ACETAMINOPHEN 650 MG: 325 TABLET, FILM COATED ORAL at 10:31

## 2024-06-20 RX ADMIN — QUETIAPINE FUMARATE 25 MG: 25 TABLET ORAL at 10:31

## 2024-06-20 RX ADMIN — VANCOMYCIN HYDROCHLORIDE 1000 MG: 1 INJECTION, SOLUTION INTRAVENOUS at 16:33

## 2024-06-20 RX ADMIN — VANCOMYCIN HYDROCHLORIDE 1250 MG: 10 INJECTION, POWDER, LYOPHILIZED, FOR SOLUTION INTRAVENOUS at 03:04

## 2024-06-20 ASSESSMENT — ACTIVITIES OF DAILY LIVING (ADL)
ADLS_ACUITY_SCORE: 33.75
ADLS_ACUITY_SCORE: 32.25
ADLS_ACUITY_SCORE: 34.75
NUMBER_OF_TIMES_PATIENT_HAS_FALLEN_WITHIN_LAST_SIX_MONTHS: 1
ADLS_ACUITY_SCORE: 33.75
ADLS_ACUITY_SCORE: 34.75
EATING/SWALLOWING: SWALLOWING SOLID FOOD
ADLS_ACUITY_SCORE: 33.75
FALL_HISTORY_WITHIN_LAST_SIX_MONTHS: YES
ADLS_ACUITY_SCORE: 33.75
CHANGE_IN_FUNCTIONAL_STATUS_SINCE_ONSET_OF_CURRENT_ILLNESS/INJURY: YES
DOING_ERRANDS_INDEPENDENTLY_DIFFICULTY: YES
WALKING_OR_CLIMBING_STAIRS_DIFFICULTY: YES
ADLS_ACUITY_SCORE: 33.75
ADLS_ACUITY_SCORE: 34.75
DRESSING/BATHING: DRESSING DIFFICULTY, DEPENDENT
ADLS_ACUITY_SCORE: 35
ADLS_ACUITY_SCORE: 23.75
ADLS_ACUITY_SCORE: 32.75
ADLS_ACUITY_SCORE: 33.75
ADLS_ACUITY_SCORE: 32.75
HEARING_DIFFICULTY_OR_DEAF: NO
VISION_MANAGEMENT: GLASSES
ADLS_ACUITY_SCORE: 34.75
TOILETING_ISSUES: YES
CONCENTRATING,_REMEMBERING_OR_MAKING_DECISIONS_DIFFICULTY: YES
ADLS_ACUITY_SCORE: 34.75
TOILETING: 2-->COMPLETELY DEPENDENT (NOT DEVELOPMENTALLY APPROPRIATE)
TOILETING: 2-->COMPLETELY DEPENDENT
DIFFICULTY_EATING/SWALLOWING: YES
ADLS_ACUITY_SCORE: 35
ADLS_ACUITY_SCORE: 33.75
ADLS_ACUITY_SCORE: 35
ADLS_ACUITY_SCORE: 35
ADLS_ACUITY_SCORE: 34.75
WEAR_GLASSES_OR_BLIND: YES
ADLS_ACUITY_SCORE: 34.25
TOILETING_ASSISTANCE: TOILETING DIFFICULTY, DEPENDENT
EQUIPMENT_CURRENTLY_USED_AT_HOME: CANE, STRAIGHT
ADLS_ACUITY_SCORE: 33.75
DRESSING/BATHING_DIFFICULTY: YES
ADLS_ACUITY_SCORE: 33.75
ADLS_ACUITY_SCORE: 34.75

## 2024-06-20 NOTE — PLAN OF CARE
"Goal Outcome Evaluation:      Plan of Care Reviewed With: patient    Overall Patient Progress: no changeOverall Patient Progress: no change    Outcome Evaluation: Patient on bedrest, surgery planned for tomorrow. Patient alert to self. VSS on room air. Pain intermittent, managed with scheduled tylenol. Incontinent of urine - purewick in place. Total feed, family helping with cares. Family requested not to use  because of her confusion and stated she can understand broken english, staff found this to be true. Family helped with communication with patient.          Problem: Adult Inpatient Plan of Care  Goal: Plan of Care Review  Description: The Plan of Care Review/Shift note should be completed every shift.  The Outcome Evaluation is a brief statement about your assessment that the patient is improving, declining, or no change.  This information will be displayed automatically on your shift  note.  Outcome: Progressing  Flowsheets (Taken 6/20/2024 1412)  Outcome Evaluation: Patient on bedrest, surgery planned for tomorrow.  Plan of Care Reviewed With: patient  Overall Patient Progress: no change  Goal: Patient-Specific Goal (Individualized)  Description: You can add care plan individualizations to a care plan. Examples of Individualization might be:  \"Parent requests to be called daily at 9am for status\", \"I have a hard time hearing out of my right ear\", or \"Do not touch me to wake me up as it startles  me\".  Outcome: Progressing  Goal: Absence of Hospital-Acquired Illness or Injury  Outcome: Progressing  Intervention: Identify and Manage Fall Risk  Recent Flowsheet Documentation  Taken 6/20/2024 1100 by Rose Mary Lopez RN  Safety Promotion/Fall Prevention:   activity supervised   assistive device/personal items within reach   clutter free environment maintained   patient and family education   safety round/check completed  Intervention: Prevent Skin Injury  Recent Flowsheet Documentation  Taken " 6/20/2024 1100 by Rose Mary Lopez RN  Body Position: supine, head elevated  Skin Protection: incontinence pads utilized  Device Skin Pressure Protection:   absorbent pad utilized/changed   adhesive use limited  Intervention: Prevent Infection  Recent Flowsheet Documentation  Taken 6/20/2024 1100 by Rose Mary Lopez RN  Infection Prevention:   rest/sleep promoted   single patient room provided  Goal: Optimal Comfort and Wellbeing  Outcome: Progressing  Goal: Readiness for Transition of Care  Outcome: Progressing     Problem: Pain Acute  Goal: Optimal Pain Control and Function  Outcome: Progressing  Intervention: Prevent or Manage Pain  Recent Flowsheet Documentation  Taken 6/20/2024 1100 by Rose Mary Lopez RN  Sensory Stimulation Regulation: care clustered  Medication Review/Management: medications reviewed  Intervention: Optimize Psychosocial Wellbeing  Recent Flowsheet Documentation  Taken 6/20/2024 1100 by Rose Mary Lopez RN  Supportive Measures: active listening utilized     Problem: Infection  Goal: Absence of Infection Signs and Symptoms  Outcome: Progressing

## 2024-06-20 NOTE — ED PROVIDER NOTES
Arthrocentesis     Procedure: Arthrocentesis    Indication: Joint Swelling    Consent: Verbal from Family    Universal Protocol: Universal protocol was followed and time out conducted just prior to starting procedure, confirming patient identity, site/side, procedure, patient position, and availability of correct equipment and implants.     Location: Right Knee    Preparation: Povidone-iodine    Anesthesia/Sedation: Lidocaine - 1%    Procedure Detail: The site was prepped and draped in the usual fashion.  A 18 gauge needle was used via the lateral approach.  2mL appearing joint fluid was withdrawn. The fluid was clear.      Patient Status: The patient tolerated the procedure well: Yes. There were no complications.          Kristen Sepulveda MD  06/20/24 0022

## 2024-06-20 NOTE — ED NOTES
[facility]  ED Nurse Handoff Report    ED Chief complaint: Knee Pain  . ED Diagnosis:   Final diagnoses:   Right leg pain       Allergies:   Allergies   Allergen Reactions    Lisinopril     Oxycodone      Other reaction(s): Confusion    Aspirin Buf(Cacarb-Mgcarb-Mgo)      Other reaction(s): GI Upset  81 mg works well for her       Code Status: DNR / DNI    Activity level - Baseline/Home:  assist of 1.  Activity Level - Current:   assist of 2.   Lift room needed: No.   Bariatric: No   Needed: No   Isolation: No.   Infection: Not Applicable.     Respiratory status: Room air    Vital Signs (within 30 minutes):   Vitals:    06/19/24 1839   BP: 138/85   Pulse: 78   Resp: 18   Temp: 98.4  F (36.9  C)   TempSrc: Oral   SpO2: 93%       Cardiac Rhythm:  ,      Pain level:    Patient confused: Yes. Severe dementia. Pt's family does not believe she will try to get out of bed due to pain. She will not be able to use call light on her own. They will come up while she is getting settled to answer any questions.  Patient Falls Risk: patient and family education and toileting schedule implemented.   Elimination Status: Has voided, pt wears briefs and has been changed in ER    Patient Report - Initial Complaint: Knee pain.   Focused Assessment: Pt arrives via EMS with daughter from [facility]. Pt has not been able to bear weight on R leg since the weekend (3-5 days getting progressively worse) Pt did not fall but had knee replacement in 2008 and they are unsure if hardware is failing. Urgent care did US today but no bloodwork. Sent for further eval. VSS     Abnormal Results:   Labs Ordered and Resulted from Time of ED Arrival to Time of ED Departure   BASIC METABOLIC PANEL - Abnormal       Result Value    Sodium 138      Potassium 4.4      Chloride 103      Carbon Dioxide (CO2) 24      Anion Gap 11      Urea Nitrogen 16.2      Creatinine 0.74      GFR Estimate 77      Calcium 8.1 (*)      Glucose 138 (*)    CBC WITH PLATELETS AND DIFFERENTIAL - Abnormal    WBC Count 3.9 (*)     RBC Count 3.31 (*)     Hemoglobin 10.3 (*)     Hematocrit 33.6 (*)      (*)     MCH 31.1      MCHC 30.7 (*)     RDW 12.7      Platelet Count 195      % Neutrophils 72      % Lymphocytes 14      % Monocytes 8      % Eosinophils 4      % Basophils 1      % Immature Granulocytes 1      NRBCs per 100 WBC 0      Absolute Neutrophils 2.8      Absolute Lymphocytes 0.6 (*)     Absolute Monocytes 0.3      Absolute Eosinophils 0.2      Absolute Basophils 0.0      Absolute Immature Granulocytes 0.0      Absolute NRBCs 0.0     RBC AND PLATELET MORPHOLOGY    RBC Morphology Confirmed RBC Indices      Platelet Assessment        Value: Automated Count Confirmed. Platelet morphology is normal.    Giant Platelets        Acanthocytes        Elaine Rods        Basophilic Stippling        Bite Cells        Blister Cells        Vanessa Cells        Elliptocytes        Hgb C Crystals        Lentz-Jolly Bodies        Hypersegmented Neutrophils        Polychromasia        RBC agglutination        RBC Fragments        Reactive Lymphocytes        Rouleaux        Sickle Cells        Smudge Cells        Spherocytes        Stomatocytes        Target Cells        Teardrop Cells        Toxic Neutrophils        Pathologist Review Comments (Blood)       CRYSTAL ID SYNOVIAL FLUID   GLUCOSE FLUID   PROTEIN FLUID   CELL COUNT BODY FLUID   AEROBIC BACTERIAL CULTURE ROUTINE   BLOOD CULTURE   BLOOD CULTURE   CELL COUNT WITH DIFFERENTIAL FLUID        CT Knee Right w/o Contrast   Final Result   IMPRESSION:   1.  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      2.  Small knee joint effusion.             Treatments provided: see MAR  Family Comments: family at bedside and have been very helpful  OBS brochure/video discussed/provided to patient:  No  ED Medications:   Medications   Lidocaine (LIDOCARE) 4 % Patch 1 patch (1 patch  Transdermal $Patch/Med Applied 6/19/24 2130)   doxepin (SINEquan) capsule 10 mg (10 mg Oral $Given 6/19/24 2247)   lidocaine (PF) (XYLOCAINE) 1 % injection (has no administration in time range)   QUEtiapine (SEROquel) tablet 25 mg (25 mg Oral $Given 6/19/24 2130)   traZODone (DESYREL) half-tab 25 mg (25 mg Oral $Given 6/19/24 2129)   ibuprofen (ADVIL/MOTRIN) tablet 600 mg (600 mg Oral $Given 6/19/24 2129)   memantine (NAMENDA) tablet 5 mg (5 mg Oral $Given 6/19/24 2247)   acetaminophen (TYLENOL) tablet 1,000 mg (1,000 mg Oral $Given 6/19/24 2248)       Drips infusing:  No  For the majority of the shift this patient was Green. Pt's family thinks she may try to remove IV so coban and a no-no were placed over L elbow. Pt has not seemed to note or be bothered by IV yet.  Interventions performed were N/a.    Sepsis treatment initiated: No    Cares/treatment/interventions/medications to be completed following ED care: Needs second set of cultures still    ED Nurse Name: Carin Kerr RN  12:07 AM      RECEIVING UNIT ED HANDOFF REVIEW    Above ED Nurse Handoff Report was reviewed: Yes  Reviewed by: Serene Lagos RN on June 20, 2024 at 12:20 AM   I Vocera called the ED to inform them the note was read: Yes

## 2024-06-20 NOTE — PROGRESS NOTES
Cross cover following up at the request of admitting provider.  Patient was able to undergo arthrocentesis of the right knee due to concern for septic arthritis.  Antibiotics have not been ordered until arthrocentesis could be completed.  There are 9159 total nucleated cells which could be inflammatory or perhaps septic arthritis, the more concerning features 95% neutrophils and cloudy appearance.  2 sets of blood cultures already obtained.  -Ordered IV vancomycin pharmacy to dose and IV ceftriaxone 2 g every 24 hours stat per recommendation from admitting provider

## 2024-06-20 NOTE — CONSULTS
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 Dutch Meza ([facility])  Dementia      Plan:   The patient's history and clinical/diagnostic findings were reviewed with the oncoming on-call orthopedic trauma surgeon, Dr. Billy Tracy. The patient is notably s/p right TKA performed in 2008 at an outside facility. She has had episode right knee pain for several years, but had a dramatic worsening of pain with associated effusion, erythema, and inability to weight bear beginning 6/14/24. There was no associated known trauma or recent illnesses. Patient underwent a right knee arthrocentesis on 6/19/24 while in the [facility] ED with cell count of 9K (95% neutrophils), aerobic culture with NGTD, and no crystals. Patient has no known history of gout or pseudogout. WBC 3.9 (consistently leukopenic), CRP 22.32, and procalcitonin 0.07. Radiographs from outside facility from 2021 and 6/19/24 were reviewed as well as a CT from 6/19/24. CT shows no signs of loosening or periprosthetic fracture. No evidence of systemic infection.     Discussed with patient's daughter at bedside given the elevated cell count in a prosthetic joint, as well as no crystals, there is concern for a right septic TKA and that surgical intervention is recommended. Given the acuteness of her symptoms, no obvious signs of subsidence on CT, age, and dementia, a right TKA I&D with polyethylene exchange is recommended. We did review the potential  risks of anesthesia/surgery including infection, bleeding, nerve damage, heart attack, stroke, blood clots, death, worsening or no improvement of symptoms, hardware complications, fractures, and delirium given her underlying dementia. We also discussed the potential need for a second procedure, possibly to include a right TKA explant and placement of an antibiotic spacer. Daughter agrees it is best to avoid a larger procedure if possible given her comorbid conditions. Dr. Tracy will further discuss the risks, benefits, and outcomes of surgery while obtaining consent.     -NPO at midnight. Okay for diet today.  -Continue with IV antibiotics. Monitor cultures. Advise ID consult.  -NWB/bedrest until postop.  -Continue pain regimen per medicine team.  -Hold any PTA anticoagulation (PTA ASA 81 mg daily; started on Lovenox on 6/20 for DVT prophylaxis).   -Vitamin D deficiency lab ordered.    Please contact orthopedic trauma team if any questions or concerns arise.           Chief Complaint:   Right knee pain and swelling         History of Present Illness:   Medical history obtained via chart review and discussion with the patient and her daughter at bedside. Patient speaks broken English, but her first language is Greek. Daughter provides the majority of the history. Chelly Dave is an 89 year old female with past medical history of advanced dementia, HLD, diabetes, depression, and anxiety who was admitted on 6/19/24 for a possible right septic TKA. Patient is s/p right TKA (DOS: 12/9/2008) with Dr. Dutch Meza. She has had off and on right knee discomfort for years, as well as left knee issues. On 6/14/24, the patient had an acute onset of right knee pain, swelling, and faint redness. Symptoms progressively worsened over the next couple of days and she was unable to weight bear or flex her knee due to pain. Patient's daughter denies any obvious fever, chills, or night sweats since the onset. There was no known  "trauma or activity that preceded the onset of her symptoms. No known history of gout or pseudogout. Patient's daughter took her to an [facility] urgent care on 6/19/24 where radiographs and a Doppler US (negative for DVT) were completed. Patient was sent to [facility] ED as there was concern for a right septic TKA given the effusion noted on radiographs. Here, she underwent an arthrocentesis that returned with a 9K cell count and no crystals. She was admitted for antibiotics and possible surgical intervention. Patient has since been on ceftriaxone and vancomycin. Currently, the patient denies pain at rest to the right knee, but palpation and attempts at ROM increase her pain. Endorses significant stiffness. Denies numbness and tingling to the RLE. Patient lives with her daughter in a single-family home. She uses a walker or cane at all times while ambulating depending on the activity/duration. PTA ASA 81 mg daily. No known history of complications with anesthesia. No known bleeding or clotting disorders. No recent illnesses, but patient has acted \"more down\" lately per her daughter. Previously tolerating PO, but currently NPO.          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          Immunizations:     VACCINE/DOSE   Diptheria   DPT   DTAP   HBIG   Hepatitis A   Hepatitis B   HIB   Influenza   Measles   Meningococcal   MMR   Mumps   Pneumococcal   Polio   Rubella   Small Pox   TDAP   Varicella   Zoster             Allergies:     Allergies   Allergen Reactions    Lisinopril     Oxycodone      Other reaction(s): Confusion    Aspirin Buf(Cacarb-Mgcarb-Mgo)      Other reaction(s): GI Upset  81 mg works well for her             Medications:     Current Facility-Administered Medications   Medication Dose Route Frequency Provider Last Rate Last Admin    acetaminophen (TYLENOL) tablet 650 mg  650 mg Oral Q4H PRN Jair Nassar MD        Or    acetaminophen (TYLENOL) Suppository 650 mg  650 mg Rectal Q4H PRN Jair Nassar MD        acetaminophen (TYLENOL) tablet 650 mg  650 mg Oral 4x Daily Jair Nassar MD   650 mg at 06/20/24 1031    albuterol (PROVENTIL) neb solution 2.5 mg  2.5 mg Nebulization Q6H PRN Amilcar Bashir MD        artificial tears (GENTEAL) 0.1-0.2-0.3 % ophthalmic solution 1-2 drop  1-2 drop Both Eyes Q2H PRN Jair Nassar MD        atorvastatin (LIPITOR) tablet 20 mg  20 mg Oral QPM Amilcar Bashir MD        azaTHIOprine (IMURAN) tablet 50 mg  50 mg Oral Daily Amilcar Bashir MD        calcium carbonate (TUMS) chewable tablet 1,000 mg  1,000 mg Oral 4x Daily PRN Jair Nassar MD        cefTRIAXone (ROCEPHIN) 2 g vial to attach to  ml bag for ADULTS or NS 50 ml bag for PEDS  2 g Intravenous Q24H Landry Alejo MD   2 g at 06/20/24 0158    cycloSPORINE (RESTASIS) 0.05 % ophthalmic emulsion 1 drop  1 drop Both Eyes BID Jair Nassar MD        doxepin (SINEquan) capsule 10 mg  10 mg Oral At Bedtime Amilcar Bashir MD   10 mg at 06/19/24 2247    enoxaparin ANTICOAGULANT (LOVENOX) injection 30 mg  30 mg Subcutaneous Q24H Jair Nassar MD   30 mg at 06/20/24 1040    FLUoxetine (PROzac) capsule 20 mg  20  mg Oral Daily Jair Nassar MD   20 mg at 06/20/24 1030    HYDROmorphone (DILAUDID) half-tab 1 mg  1 mg Oral Q4H PRN Jair Nassar MD        HYDROmorphone (DILAUDID) injection 0.2 mg  0.2 mg Intravenous Q2H PRN Jair Nassar MD        HYDROmorphone (DILAUDID) injection 0.4 mg  0.4 mg Intravenous Q2H PRN Jair Nassar MD        HYDROmorphone (DILAUDID) tablet 2 mg  2 mg Oral Q4H PRN Jair Nassar MD        lidocaine (LMX4) cream   Topical Q1H PRN Jair Nassar MD        lidocaine 1 % 0.1-1 mL  0.1-1 mL Other Q1H PRN Jair Nassar MD        melatonin tablet 1 mg  1 mg Oral At Bedtime PRN Jair Nassar MD        memantine (NAMENDA) tablet 5 mg  5 mg Oral BID Amilcar Bashir MD   5 mg at 06/20/24 1123    naloxone (NARCAN) injection 0.2 mg  0.2 mg Intravenous Q2 Min PRN Jair Nassar MD        Or    naloxone (NARCAN) injection 0.4 mg  0.4 mg Intravenous Q2 Min PRN Jair Nassar MD        Or    naloxone (NARCAN) injection 0.2 mg  0.2 mg Intramuscular Q2 Min PRN Jair Nassar MD        Or    naloxone (NARCAN) injection 0.4 mg  0.4 mg Intramuscular Q2 Min PRN Jair Nassar MD        ondansetron (ZOFRAN ODT) ODT tab 4 mg  4 mg Oral Q6H PRN Jair Nassar MD        Or    ondansetron (ZOFRAN) injection 4 mg  4 mg Intravenous Q6H PRN Jair Nassar MD        pantoprazole (PROTONIX) EC tablet 40 mg  40 mg Oral Daily Amilcar Bashir MD        pilocarpine (SALAGEN) tablet 5 mg  5 mg Oral TID Amilcar Bashir MD        polyethylene glycol (MIRALAX) Packet 17 g  17 g Oral Daily PRN Jair Nassar MD        prochlorperazine (COMPAZINE) injection 5 mg  5 mg Intravenous Q6H PRN Jair Nassar MD        Or    prochlorperazine (COMPAZINE) tablet 5 mg  5 mg Oral Q6H PRN Jair Nassar MD        Or    prochlorperazine (COMPAZINE) suppository 12.5 mg  12.5 mg Rectal Q12H PRN Jair Nassar MD        QUEtiapine (SEROquel) tablet 25 mg  25 mg Oral BID Jair Nassar MD   25 mg at 06/20/24 1031    senna-docusate (SENOKOT-S/PERICOLACE) 8.6-50 MG per  "tablet 1 tablet  1 tablet Oral BID PRN Jair Nassar MD        Or    senna-docusate (SENOKOT-S/PERICOLACE) 8.6-50 MG per tablet 2 tablet  2 tablet Oral BID PRN Jair Nassar MD        sodium chloride (PF) 0.9% PF flush 3 mL  3 mL Intracatheter Q8H Jair Nassar MD   3 mL at 06/20/24 1034    sodium chloride (PF) 0.9% PF flush 3 mL  3 mL Intracatheter q1 min prn Jair Nassar MD        traZODone (DESYREL) half-tab 25 mg  25 mg Oral At Bedtime Jair Nassar MD        vancomycin (VANCOCIN) 1,000 mg in 200 mL dextrose intermittent infusion  1,000 mg Intravenous Q24H MeloLandry MD                 Review of Systems:   Unable to obtain reliable ROS due to patient's dementia and language barrier.          Physical Exam:   All vitals have been reviewed  Patient Vitals for the past 24 hrs:   BP Temp Temp src Pulse Resp SpO2 Height Weight   06/20/24 0900 (!) 140/68 97.4  F (36.3  C) Temporal 77 20 97 % -- --   06/20/24 0217 -- -- -- -- -- -- 1.56 m (5' 1.42\") --   06/20/24 0039 -- -- -- -- -- -- -- 61.5 kg (135 lb 9.3 oz)   06/20/24 0032 129/53 97.9  F (36.6  C) Temporal -- 16 95 % -- --   06/19/24 1839 138/85 98.4  F (36.9  C) Oral 78 18 93 % -- --     No intake or output data in the 24 hours ending 06/20/24 1213    Constitutional: Pleasant, alert, following most commands, baseline dementia. NAD. Non-toxic appearing.  HEENT: Head atraumatic normocephalic. Pupils equal round and reactive.  Respiratory: Unlabored breathing no audible wheeze  Cardiovascular: Regular rate and rhythm per pulses.  GI: Abdomen is non-distended.  Lymph/Hematologic: No lymphadenopathy in areas examined.  Genitourinary: No [place]  Skin: No rashes, no cyanosis, no edema.  Musculoskeletal: Right lower extremity: Tiny puncture from arthrocentesis on 6/19/24. Well-healed surgical site over the anterior knee. No other obvious skin tears or wounds to the examined areas. Onychomycosis. Faint erythema to the anterior aspect of the knee diffusely. No " ecchymosis. Small right knee effusion. Thigh and lower leg compartments are soft and compressible. Minimally tender to the medial and lateral joint lines of the knee. Nontender over the anterior hip joint line, greater trochanteric region, remainder of the thigh, patella, prepatellar bursa, popliteal fossa, calf, and ankle/foot diffusely. Right knee PROM 10-60 degrees limited by pain and stiffness. 5/5 EHL/FHL, TA, and gastrocnemius. Able to engage the quadriceps. DP pulse palpable. Toes are warm and well-perfused. SILT L3-S1 distributions.  Neurologic: GCS 15, alert and oriented to self          Data:   All laboratory data reviewed  Results for orders placed or performed during the hospital encounter of 06/19/24   CT Knee Right w/o Contrast     Status: None    Narrative    EXAM: CT KNEE RIGHT W/O CONTRAST  LOCATION: [facility]  DATE: 6/19/2024    INDICATION: Right knee pain. Prior total knee arthroplasty. Difficulty weightbearing.  COMPARISON: None.  TECHNIQUE: Noncontrast. Axial, sagittal and coronal thin-section reconstruction. Dose reduction techniques were used.     FINDINGS:     BONES:  -Right total knee arthroplasty with patellar resurfacing. No findings to suggest loosening. No evidence of a fracture.    SOFT TISSUES:  -Small effusion. No muscle atrophy. No soft tissue edema.      Impression    IMPRESSION:  1.  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    2.  Small knee joint effusion.     Basic metabolic panel (BMP)     Status: Abnormal   Result Value Ref Range    Sodium 138 135 - 145 mmol/L    Potassium 4.4 3.4 - 5.3 mmol/L    Chloride 103 98 - 107 mmol/L    Carbon Dioxide (CO2) 24 22 - 29 mmol/L    Anion Gap 11 7 - 15 mmol/L    Urea Nitrogen 16.2 8.0 - 23.0 mg/dL    Creatinine 0.74 0.51 - 0.95 mg/dL    GFR Estimate 77 >60 mL/min/1.73m2    Calcium 8.1 (L) 8.8 - 10.2 mg/dL    Glucose 138 (H) 70 - 99 mg/dL   CBC with platelets and differential      Status: Abnormal   Result Value Ref Range    WBC Count 3.9 (L) 4.0 - 11.0 10e3/uL    RBC Count 3.31 (L) 3.80 - 5.20 10e6/uL    Hemoglobin 10.3 (L) 11.7 - 15.7 g/dL    Hematocrit 33.6 (L) 35.0 - 47.0 %     (H) 78 - 100 fL    MCH 31.1 26.5 - 33.0 pg    MCHC 30.7 (L) 31.5 - 36.5 g/dL    RDW 12.7 10.0 - 15.0 %    Platelet Count 195 150 - 450 10e3/uL    % Neutrophils 72 %    % Lymphocytes 14 %    % Monocytes 8 %    % Eosinophils 4 %    % Basophils 1 %    % Immature Granulocytes 1 %    NRBCs per 100 WBC 0 <1 /100    Absolute Neutrophils 2.8 1.6 - 8.3 10e3/uL    Absolute Lymphocytes 0.6 (L) 0.8 - 5.3 10e3/uL    Absolute Monocytes 0.3 0.0 - 1.3 10e3/uL    Absolute Eosinophils 0.2 0.0 - 0.7 10e3/uL    Absolute Basophils 0.0 0.0 - 0.2 10e3/uL    Absolute Immature Granulocytes 0.0 <=0.4 10e3/uL    Absolute NRBCs 0.0 10e3/uL   Crystal ID Synovial Fluid     Status: Normal   Result Value Ref Range    Crystals Analysis No clinically significant crystals seen. No clinically significant crystals seen.   Glucose fluid     Status: None   Result Value Ref Range    Glucose Fluid Source Knee, Right     Glucose fluid 89 mg/dL    Narrative    No reference ranges have been established. This result should be interpreted in the context of the patient's clinical condition and compared to simultaneous measurement in the patient's blood. This is a lab developed test. It has not been cleared or approved by the FDA. FDA clearance is not required for clinical use.   Protein fluid     Status: None   Result Value Ref Range    Protein Fluid Source Knee, Right     Protein Total Fluid 3.6 g/dL    Narrative    No reference ranges have been established. This result should be interpreted in the context of the patient's clinical condition and compared to simultaneous measurement in the patient's blood. This is a lab developed test. It has not been cleared or approved by the FDA. FDA clearance is not required for clinical use.   RBC and Platelet  Morphology     Status: None   Result Value Ref Range    RBC Morphology Confirmed RBC Indices     Platelet Assessment  Automated Count Confirmed. Platelet morphology is normal.     Automated Count Confirmed. Platelet morphology is normal.    Giant Platelets      Acanthocytes      Elaine Rods      Basophilic Stippling      Bite Cells      Blister Cells      [place] Cells      Elliptocytes      Hgb C Crystals      Lentz-Jolly Bodies      Hypersegmented Neutrophils      Polychromasia      RBC agglutination      RBC Fragments      Reactive Lymphocytes      Rouleaux      Sickle Cells      Smudge Cells      Spherocytes      Stomatocytes      Target Cells      Teardrop Cells      Toxic Neutrophils      Pathologist Review Comments (Blood)     Cell Count Body Fluid     Status: Abnormal   Result Value Ref Range    Color Yellow Colorless, Yellow    Clarity Cloudy (A) Clear    Cell Count Fluid Source Knee, Right     Total Nucleated Cells 9,159 /uL    Narrative    No reference ranges have been established.  This result  should be interpreted in the context of the patient's clinical condition and   compared to simultaneous measurement in the patient's blood.         Differential Body Fluid     Status: None   Result Value Ref Range    % Neutrophils 95 %    % Lymphocytes 1 %    % Monocyte/Macrophages 4 %    Narrative    No reference ranges have been established. This result should be interpreted in the context of the patient's clinical condition and compared to simultaneous measurement in the patient's blood.   Basic metabolic panel     Status: Abnormal   Result Value Ref Range    Sodium 139 135 - 145 mmol/L    Potassium 4.4 3.4 - 5.3 mmol/L    Chloride 106 98 - 107 mmol/L    Carbon Dioxide (CO2) 24 22 - 29 mmol/L    Anion Gap 9 7 - 15 mmol/L    Urea Nitrogen 21.7 8.0 - 23.0 mg/dL    Creatinine 0.93 0.51 - 0.95 mg/dL    GFR Estimate 59 (L) >60 mL/min/1.73m2    Calcium 7.7 (L) 8.8 - 10.2 mg/dL    Glucose 97 70 - 99 mg/dL   CBC with  platelets and differential     Status: Abnormal   Result Value Ref Range    WBC Count 3.6 (L) 4.0 - 11.0 10e3/uL    RBC Count 2.91 (L) 3.80 - 5.20 10e6/uL    Hemoglobin 9.1 (L) 11.7 - 15.7 g/dL    Hematocrit 29.5 (L) 35.0 - 47.0 %     (H) 78 - 100 fL    MCH 31.3 26.5 - 33.0 pg    MCHC 30.8 (L) 31.5 - 36.5 g/dL    RDW 12.8 10.0 - 15.0 %    Platelet Count 177 150 - 450 10e3/uL    % Neutrophils 61 %    % Lymphocytes 18 %    % Monocytes 14 %    % Eosinophils 6 %    % Basophils 1 %    % Immature Granulocytes 1 %    NRBCs per 100 WBC 0 <1 /100    Absolute Neutrophils 2.2 1.6 - 8.3 10e3/uL    Absolute Lymphocytes 0.6 (L) 0.8 - 5.3 10e3/uL    Absolute Monocytes 0.5 0.0 - 1.3 10e3/uL    Absolute Eosinophils 0.2 0.0 - 0.7 10e3/uL    Absolute Basophils 0.0 0.0 - 0.2 10e3/uL    Absolute Immature Granulocytes 0.0 <=0.4 10e3/uL    Absolute NRBCs 0.0 10e3/uL   RBC and Platelet Morphology     Status: None   Result Value Ref Range    RBC Morphology Confirmed RBC Indices     Platelet Assessment  Automated Count Confirmed. Platelet morphology is normal.     Automated Count Confirmed. Platelet morphology is normal.    Giant Platelets      Acanthocytes      Elaine Rods      Basophilic Stippling      Bite Cells      Blister Cells      Vanessa Cells      Elliptocytes      Hgb C Crystals      Lentz-Jolly Bodies      Hypersegmented Neutrophils      Polychromasia      RBC agglutination      RBC Fragments      Reactive Lymphocytes      Rouleaux      Sickle Cells      Smudge Cells      Spherocytes      Stomatocytes      Target Cells      Teardrop Cells      Toxic Neutrophils      Pathologist Review Comments (Blood)     CRP inflammation     Status: Abnormal   Result Value Ref Range    CRP Inflammation 22.32 (H) <5.00 mg/L   Procalcitonin     Status: Normal   Result Value Ref Range    Procalcitonin 0.07 <0.50 ng/mL   Adult Type and Screen     Status: None   Result Value Ref Range    ABO/RH(D) AB POS     Antibody Screen Negative Negative     SPECIMEN EXPIRATION DATE [id number]    Synovial fluid Aerobic Bacterial Culture Routine With Gram Stain     Status: None (Preliminary result)    Specimen: Knee, Right; Synovial fluid   Result Value Ref Range    Culture No growth, less than 1 day     Gram Stain Result No organisms seen     Gram Stain Result 4+ WBC seen    CBC + differential     Status: Abnormal    Narrative    The following orders were created for panel order CBC + differential.  Procedure                               Abnormality         Status                     ---------                               -----------         ------                     CBC with platelets and d...[[id number]]  Abnormal            Final result               RBC and Platelet Morphology[[id number]]                      Final result                 Please view results for these tests on the individual orders.   Cell count with differential fluid     Status: Abnormal    Narrative    The following orders were created for panel order Cell count with differential fluid.  Procedure                               Abnormality         Status                     ---------                               -----------         ------                     Cell Count Body Fluid[[id number]]        Abnormal            Final result               Differential Body Fluid[[id number]]                          Final result                 Please view results for these tests on the individual orders.   CBC with Platelets & Differential     Status: Abnormal    Narrative    The following orders were created for panel order CBC with Platelets & Differential.  Procedure                               Abnormality         Status                     ---------                               -----------         ------                     CBC with platelets and d...[[id number]]  Abnormal            Final result               RBC and Platelet Morphology[[id number]]                      Final result                  Please view results for these tests on the individual orders.   ABO/Rh type and screen     Status: None    Narrative    The following orders were created for panel order ABO/Rh type and screen.  Procedure                               Abnormality         Status                     ---------                               -----------         ------                     Adult Type and Screen[[id number]]                            Final result                 Please view results for these tests on the individual orders.          Attestation:  I have reviewed today's vital signs, notes, medications, labs and imaging with Dr. Billy Tracy.  Amount of time performed on this consult: 60 minutes.    Jenni Abrams PA-C  [facility]

## 2024-06-20 NOTE — PHARMACY-ADMISSION MEDICATION HISTORY
Pharmacist Admission Medication History    Admission medication history is complete. The information provided in this note is only as accurate as the sources available at the time of the update.    Information Source(s): Family member, daughter Cee, [facility], hospital record via in-person    Pertinent Information: pt currently taking 1/2 of lipitor 40mg tab qpm.  When this script is gone, daughter will start giving pt 1/2 of lipitro 10mg tab qpm    Changes made to PTA medication list:  Added: scheduled and prn tylenol, albuterol nebs  Deleted: dup calcium + D, aranesp 200 mcg [id number], diclofenac 1.3% patch, vitamin E x 2 entries, study-aspirin vs placebol (IDS #[id number]) from 2018 entry  Changed: imuran from bid to daily per daughter, added freq to vitamin d, added freq to prozac, added freq to loratadine, memantine, miralax.  Trazodone to prn per daughter    Allergies reviewed with patient and updates made in EHR: yes    Medication History Completed By: María Gaona [facility] 6/20/2024 9:18 AM    PTA Med List   Medication Sig Last Dose    acetaminophen (TYLENOL) 500 MG tablet Take 1,000 mg by mouth daily as needed for mild pain Past Week at ?    acetaminophen (TYLENOL) 500 MG tablet Take 2 tablets by mouth 2 times daily 6/19/2024 at am    albuterol (PROVENTIL) (2.5 MG/3ML) 0.083% neb solution Take 2.5 mg by nebulization every 6 hours as needed for shortness of breath, wheezing or cough Past Week at ?    aspirin 81 MG EC tablet Take 81 mg by mouth daily. 6/19/2024 at am    atorvastatin (LIPITOR) 40 MG tablet Take 20 mg by mouth every evening 6/18/2024 at pm    AzaTHIOprine (IMURAN PO) Take 50 mg by mouth daily  6/19/2024 at am    Calcium Carb-Cholecalciferol (CALCIUM 500 +D) 500-400 MG-UNIT TABS Take 1 tablet by mouth 2 times daily 6/19/2024 at am    Cholecalciferol (VITAMIN D3) 1000 units CAPS Take 1,000 Units by mouth daily 6/19/2024 at am    cycloSPORINE (RESTASIS) 0.05 % ophthalmic emulsion Instill 1 drop into  both eyes every 12 hours.   No further refills will be given unless you come in for your exam. 6/19/2024 at am    DOXEPIN HCL PO Take 10 mg by mouth At Bedtime 6/18/2024 at pm    ferrous sulfate (IRON) 325 (65 FE) MG tablet Take 1 tablet (325 mg) by mouth daily 6/19/2024 at am    FLUoxetine (PROZAC) 20 MG capsule Take 20 mg by mouth daily 6/19/2024 at am    lidocaine (LIDODERM) 5 % patch Place 1 patch onto the skin daily as needed for moderate pain (12 hours on; 12 hours off. Patient applies to knees or back) Unknown at ?    loratadine (CLARITIN) 10 MG tablet Take 10 mg by mouth daily as needed Past Month at ?    memantine (NAMENDA) 5 MG tablet Take 5 mg by mouth 2 times daily 6/19/2024 at am    pantoprazole (PROTONIX) 40 MG EC tablet Take 40 mg by mouth daily 6/19/2024 at am    Pilocarpine HCl (SALAGEN PO) Take 5 mg by mouth 3 times daily AM, 1200 & HS 6/19/2024 at am    polyethylene glycol (MIRALAX/GLYCOLAX) powder Take 17 g by mouth daily as needed Past Month at ?    polyethylene glycol 400 (BLINK TEARS) 0.25 % SOLN ophthalmic solution Place 1-2 drops into both eyes every 2 hours as needed for dry eyes Past Week at ?    QUEtiapine (SEROQUEL) 25 MG tablet Take 25 mg by mouth 2 times daily 6/19/2024 at am    traZODone (DESYREL) 50 MG tablet Take 25 mg by mouth at bedtime as needed 6/18/2024 at pm

## 2024-06-20 NOTE — ED PROVIDER NOTES
Emergency Department Note      History of Present Illness     Chief Complaint  Knee Pain    HPI  Chelly Dave is a 89 year old female who presents with daughter for evaluation of right knee pain.  She has had right knee pain for the past 4 days making it now difficult for her to walk.  Family feels her knee is significantly swollen.  Went to urgent care where she had negative x-rays and ultrasound.  No fevers, chills, rashes.  No witnessed falls or injuries.  Distant history of total knee replacement    Independent Historian  Daughter as detailed above.    Review of External Notes  None  Past Medical History   Medical History and Problem List  Past Medical History:   Diagnosis Date    Arthritis     Cataract     Diabetes mellitus (H)     Gastro-oesophageal reflux disease     Hyperlipidemia        Medications  No current outpatient medications on file.      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 and associated small Bowel  Anesthesia Block;  Surgeon: Ahsan Nicolas MD;  Location: [facility] OR    ORTHOPEDIC SURGERY      right knee replacement    PICC INSERTION Left 06/25/2018    5Fr - 46cm (5cm external), basilic vein, low SVC     Physical Exam   Patient Vitals for the past 24 hrs:   BP Temp Temp src Pulse Resp SpO2   06/20/24 0032 129/53 97.9  F (36.6  C) Temporal -- 16 95 %   06/19/24 1839 138/85 98.4  F (36.9  C) Oral 78 18 93 %     Physical Exam  General: Awake, alert, in no acute distress   HEENT: Atraumatic   EOM normal   External ears normal   Trachea midline  Neck: Supple, normal ROM  CV: Regular rate, regular rhythm   No lower extremity edema  2+ radial and PT pulses  PULM: Breath sounds normal bilaterally  No wheezes or rales  ABD: Soft, non-tender, non-distended  Normal bowel sounds   No rebound or guarding    MSK: Right knee with healed total knee replacement scar.  Diffuse tenderness, small suprapatellar effusion,limited active flexion, can flex to 45 degrees passively.  Good strength in the ankles.  NEURO: Alert, no focal deficits  Skin: Warm, dry and intact    Diagnostics   Lab Results   Labs Ordered and Resulted from Time of ED Arrival to Time of ED Departure   BASIC METABOLIC PANEL - Abnormal       Result Value    Sodium 138      Potassium 4.4      Chloride 103      Carbon Dioxide (CO2) 24      Anion Gap 11      Urea Nitrogen 16.2      Creatinine 0.74      GFR Estimate 77      Calcium 8.1 (*)     Glucose 138 (*)    CBC WITH PLATELETS AND DIFFERENTIAL - Abnormal    WBC Count 3.9 (*)     RBC Count 3.31 (*)     Hemoglobin 10.3 (*)     Hematocrit 33.6 (*)      (*)     MCH 31.1      MCHC 30.7 (*)     RDW 12.7      Platelet Count 195      % Neutrophils 72      % Lymphocytes 14      % Monocytes 8      % Eosinophils 4      % Basophils 1      % Immature Granulocytes 1      NRBCs per 100 WBC 0      Absolute Neutrophils 2.8      Absolute Lymphocytes 0.6 (*)     Absolute Monocytes 0.3      Absolute Eosinophils 0.2      Absolute Basophils 0.0      Absolute Immature Granulocytes 0.0      Absolute NRBCs 0.0     RBC AND PLATELET MORPHOLOGY    RBC Morphology Confirmed RBC Indices      Platelet Assessment        Value: Automated Count Confirmed. Platelet morphology is normal.    Giant Platelets        Acanthocytes        Elaine Rods        Basophilic Stippling        Bite Cells        Blister Cells        Sumner Cells        Elliptocytes        Hgb C Crystals        Lentz-Jolly Bodies        Hypersegmented Neutrophils        Polychromasia        RBC agglutination        RBC Fragments        Reactive Lymphocytes        Rouleaux        Sickle Cells        Smudge Cells        Spherocytes        Stomatocytes        Target Cells        Teardrop Cells        Toxic Neutrophils        Pathologist Review Comments (Blood)       CRYSTAL  ID SYNOVIAL FLUID   GLUCOSE FLUID   PROTEIN FLUID   CELL COUNT BODY FLUID   DIFERENTIAL BODY FLUID   AEROBIC BACTERIAL CULTURE ROUTINE   BLOOD CULTURE   BLOOD CULTURE   CELL COUNT WITH DIFFERENTIAL FLUID       Imaging  CT Knee Right w/o Contrast   Final Result   IMPRESSION:   1.  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      2.  Small knee joint effusion.             Independent Interpretation  None  ED Course    Medications Administered  Medications   Lidocaine (LIDOCARE) 4 % Patch 1 patch (1 patch Transdermal $Patch/Med Applied 6/19/24 2130)   doxepin (SINEquan) capsule 10 mg (10 mg Oral $Given 6/19/24 2247)   QUEtiapine (SEROquel) tablet 25 mg (25 mg Oral $Given 6/19/24 2130)   traZODone (DESYREL) half-tab 25 mg (25 mg Oral $Given 6/19/24 2129)   ibuprofen (ADVIL/MOTRIN) tablet 600 mg (600 mg Oral $Given 6/19/24 2129)   memantine (NAMENDA) tablet 5 mg (5 mg Oral $Given 6/19/24 2247)   acetaminophen (TYLENOL) tablet 1,000 mg (1,000 mg Oral $Given 6/19/24 2248)       Procedures  Procedures     Discussion of Management  Admitting Hospitalist, Dr. Nassar    Social Determinants of Health adding to complexity of care  None    ED Course  ED Course as of 06/20/24 0039   Wed Jun 19, 2024 2112 Recheck. Scant effusion on US. Discussed plan with family   2215 Spoke to Dr. Nassar [facility] medicine   2336 Arthrocentesis by Dr. Sepulveda     Medical Decision Making / Diagnosis   CMS Diagnoses: None    MIPS     None    [facility]  Chelly Dave is a 89 year old female who presents with knee pain.  X-rays and ultrasound negative for fracture and DVT at urgent care.  CT scan shows a small joint effusion, no periprosthetic fractures.  Bedside ultrasound performed by myself shows only a small amount of joint effusion.  Patient is feeling a road test due to pain.  My partner was fortunately able to collect a small amount of synovial fluid --I called the lab to ask him to prioritize culture, cell counts,  crystal analysis before protein, glucose.  Please see Dr. Sepulveda's procedure note.  Will admit pending studies.  Family in agreement with plan.  All questions answered.    Disposition  The patient was admitted to the hospital.     ICD-10 Codes:    ICD-10-CM    1. Right leg pain  M79.604                DO Ezekiel Bar Ellen, DO  06/20/24 0039

## 2024-06-20 NOTE — PROGRESS NOTES
[facility]    Medicine Progress Note - Hospitalist Service    Date of Admission:  6/19/2024    Assessment & Plan   Chelly Dave is a 89 year old female admitted on 6/19/2024.  Patient was seen with 2 daughters at the bedside who provided translation.  Communication with the patient is limited due to language barrier (speaks Trinidadian) and severe dementia.     She has history of severe dementia and lives with her daughter.  Over the last 3 to 4 days she has been having worsening right knee pain and swelling.  She is unable to bear weight on it.  No reported fever but she has been more confused over the last week.     Vitals on arrival: Temperature 98.4  F, heart rate 78/min, blood pressure 130/85, respiratory rate 18 and oxygen saturation of 93% on room air.     Was seen in urgent care where x-ray of bilateral knee was done.  Right knee post replacement with moderate knee effusion.  Left knee showed osteoarthritis.  Ultrasound of the right lower extremity was negative for DVT.      CT of the right knee without contrast showed knee arthroplasty without evidence of loosening or fracture and small knee joint effusion.  Hospitalization was requested for ongoing knee pain and inability to bear weight.        Right knee pain  Concern for septic arthritis  High suspicion of septic joint.  Other causes of inflammatory arthritis as gout or pseudogout are also possible.  On azathioprine for Sjogren's disease, would increase the risk of infection.  Arthrocentesis yielded cloudy synovial fluid with cell count 9000, but 95% neutrophilic predominance.  Started on vancomycin and ceftriaxone for now.  Orthopedic surgery consulted, options would potentially include full I&D and replacement of prior arthroplasty hardware, however family seemed hesitant about such a large surgery.  Could also consider I&D but leave hardware in place and treat with antibiotics.  Family can discuss options further with  "orthopedic surgery.  - Orthopedic surgery consulted, appreciate assistance  - Continue ceftriaxone, vancomycin for now  - Follow-up blood cultures  - Pain well-controlled, as needed pain relief    Depression/anxiety: Resume Seroquel, trazodone and fluoxetine.  Dementia: Prior to admission on Namenda, continued.  Hyperlipidemia: Can stop Lipitor as it is of no benefit at her age.  Diabetes: Does not appear to be on any diabetes medication.    Sjogren's disease: Continue PTA azathioprine, pilocarpine.        Diet: NPO per Anesthesia Guidelines for Procedure/Surgery Except for: Meds, Ice Chips  Regular Diet Adult    DVT Prophylaxis: Enoxaparin (Lovenox) SQ  Quiroz Catheter: Not present  Lines: None     Cardiac Monitoring: None  Code Status: No CPR- Do NOT Intubate      Clinically Significant Risk Factors Present on Admission                # Drug Induced Platelet Defect: home medication list includes an antiplatelet medication      # Anemia: based on hgb <11           # Overweight: Estimated body mass index is 25.27 kg/m  as calculated from the following:    Height as of this encounter: 1.56 m (5' 1.42\").    Weight as of this encounter: 61.5 kg (135 lb 9.3 oz).              Disposition Plan     Medically Ready for Discharge: Anticipated in 2-4 Days             Amilcar Bashir MD  Hospitalist Service  [facility]  Securely message with [facility] (more info)  Text page via [employer] Paging/Directory   ______________________________________________________________________    Interval History   Admitted overnight with knee pain, joint aspirate concerning for possible septic arthritis.  Pain well-controlled, did not sleep much overnight and sleeping at time of my exam this morning.  Appeared comfortable.  Discussed antibiotic management, orthopedic surgery involvement for management of septic joint.  Patient's family understanding.    Physical Exam   Vital Signs: Temp: 97.4  F (36.3  C) Temp src: Temporal BP: " (!) 140/68 Pulse: 77   Resp: 20 SpO2: 97 % O2 Device: None (Room air)    Weight: 135 lbs 9.33 oz    General Appearance: Lying in bed, asleep at time of my exam, no acute distress.  Respiratory: Clear to auscultation.  Normal work of breathing.  Cardiovascular: S1 and S2 was normal.  RRR.  GI: Soft and nontender  Skin: No rash  Other:  Right knee has effusion that is warm.      Medical Decision Making       45 MINUTES SPENT BY ME on the date of service doing chart review, history, exam, documentation & further activities per the note.      Data     I have personally reviewed the following data over the past 24 hrs:    3.6 (L)  \   9.1 (L)   / 177     139 106 21.7 /  97   4.4 24 0.93 \     Procal: 0.07 CRP: 22.32 (H) Lactic Acid: N/A         Imaging results reviewed over the past 24 hrs:   Recent Results (from the past 24 hour(s))   XR Knee Bilateral 1/2 Views    Narrative    For Patients:  As a result of the 21st Century Cures Act, medical imaging exams and procedure reports are released immediately into your electronic medical record.  You may view this report before your referring provider.  If you have questions, please contact your health care provider.      Indication:  Chronic bilateral knee pain     Technique:  Bilateral knee 3 views, 4 films     Comparison:  07/26/2021    Findings:  Right knee: Patient is status post right total knee replacement. No dislocation with moderate right knee effusion noted. Atherosclerosis.    Left knee: No evidence of fracture. Medial and patellofemoral osteophytes with small left knee effusion. Atherosclerosis.     Impression:  1. Status post right total knee replacement with moderate right knee effusion.  2. Mild-to-moderate left knee osteoarthritis which is mildly progressive from the prior exam. Small left knee effusion.        Dictated by Shashi Valverde MD @ 6/19/2024 3:28:11 PM    (Electronically Signed)    VENOUS LOWER EXTREMITY RIGHT    Narrative    For Patients:  As a  result of the 21st Century Cures Act, medical imaging exams and procedure reports are released immediately into your electronic medical record.  You may view this report before your referring provider.  If you have questions, please contact your health care provider.      Indication:  Right lower extremity swelling    Technique:  Right lower extremity venous ultrasound utilizing grayscale, color flow and duplex Doppler techniques    Comparison:  None    Findings:  Sonographic evaluation of the right lower extremity venous system from the common femoral, partially visualized greater saphenous, through the femoral, popliteal and calf veins demonstrates no echogenic debris, normal compressibility, normal color flow and normal augmented duplex Doppler waveforms. No popliteal cyst.    Impression:  No right lower extremity DVT.        Dictated by Dalton hCu MD @ 6/19/2024 3:37:36 PM    (Electronically Signed)   CT Knee Right w/o Contrast    Narrative    EXAM: CT KNEE RIGHT W/O CONTRAST  LOCATION: [facility]  DATE: 6/19/2024    INDICATION: Right knee pain. Prior total knee arthroplasty. Difficulty weightbearing.  COMPARISON: None.  TECHNIQUE: Noncontrast. Axial, sagittal and coronal thin-section reconstruction. Dose reduction techniques were used.     FINDINGS:     BONES:  -Right total knee arthroplasty with patellar resurfacing. No findings to suggest loosening. No evidence of a fracture.    SOFT TISSUES:  -Small effusion. No muscle atrophy. No soft tissue edema.      Impression    IMPRESSION:  1.  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    2.  Small knee joint effusion.

## 2024-06-20 NOTE — PHARMACY-VANCOMYCIN DOSING SERVICE
Pharmacy Vancomycin Initial Note  Date of Service 2024  Patient's  1935  89 year old, female    Indication: Bone and Joint Infection    Current estimated CrCl = Estimated Creatinine Clearance: 35.6 mL/min (based on SCr of 0.93 mg/dL).    Creatinine for last 3 days  2024: 10:21 PM Creatinine 0.74 mg/dL  2024:  5:04 AM Creatinine 0.93 mg/dL    Recent Vancomycin Level(s) for last 3 days  No results found for requested labs within last 3 days.      Vancomycin IV Administrations (past 72 hours)                     vancomycin (VANCOCIN) 1,250 mg in 0.9% NaCl 250 mL intermittent infusion (mg) 1,250 mg New Bag 24 0304                    Nephrotoxins and other renal medications (From now, onward)      Start     Dose/Rate Route Frequency Ordered Stop    24 1500  vancomycin (VANCOCIN) 1,000 mg in 200 mL dextrose intermittent infusion         1,000 mg  200 mL/hr over 1 Hours Intravenous EVERY 24 HOURS 24 0602              Contrast Orders - past 72 hours (72h ago, onward)      None            [employer] Prediction of Planned Initial Vancomycin Regimen  Loading dose: 1250 mg  Regimen: 1000 mg IV every 24 hours.  Regiment Start time: 15:04 on 2024  Exposure target: AUC24 (range)400-600 mg/L.hr   AUC24,ss: 514 mg/L.hr  Probability of AUC24 > 400: 77 %  Ctrough,ss: 15.9 mg/L  Probability of Ctrough,ss > 20: 29 %  Probability of nephrotoxicity (Lodise MARISOL ): 11 %        Plan:  Start vancomycin 1000 mg IV q24h  after loading dose.    Vancomycin monitoring method: AUC  Vancomycin therapeutic monitoring goal: 400-600 mg*h/L  Pharmacy will check vancomycin levels as appropriate in 1-3 Days.    Serum creatinine levels will be ordered daily for the first week of therapy and at least twice weekly for subsequent weeks.      Edinson Sears [facility]

## 2024-06-20 NOTE — H&P
[facility]    History and Physical - Hospitalist Service       Date of Admission:  6/19/2024    Assessment & Plan      Chelly Dave is a 89 year old female admitted on 6/19/2024.  Patient was seen with 2 daughters at the bedside who provided translation.  Communication with the patient is limited due to language barrier (speaks [place] citizen) and severe dementia.    She has history of severe dementia and lives with her daughter.  Over the last 3 to 4 days she has been having worsening right knee pain and swelling.  She is unable to bear weight on it.  No reported fever but she has been more confused over the last week.    Vitals on arrival: Temperature 98.4  F, heart rate 78/min, blood pressure 130/85, respiratory rate 18 and oxygen saturation of 93% on room air.    Was seen in urgent care where x-ray of bilateral knee was done.  Right knee post replacement with moderate knee effusion.  Left knee showed osteoarthritis.  Ultrasound of the right lower extremity was negative for DVT.     CT of the right knee without contrast showed knee arthroplasty without evidence of loosening or fracture and small knee joint effusion.  Hospitalization was requested for ongoing knee pain and inability to bear weight.      Right knee pain  High suspicion of septic joint.  Other causes of inflammatory arthritis as gout or pseudogout are also possible.  Unclear why she is on azathioprine but that would increasethe risk of infection.  Discussed with the ER physician to try to do arthrocentesis.  Will sign out to night team to start antibiotics after arthrocentesis unless the synovial fluid analysis is pristine.    If unable to do arthrocentesis in ER, it is reasonable to wait to start antibiotics as patient is not septic and will get IR/Ortho to do it in the morning.  I would prefer to get a synovial fluid sample before starting antibiotics unless the patient starts becoming septic overnight.  I advised the patient  and the family that if it is infected, ideally she would need the knee joint explanted, get a spacer device and long-term IV antibiotics.  But given her severe dementia and advanced age, they do not want her to go to surgery but would want to discuss with orthopedics to see what options they have, may agree for knee washout.  They are also open to doing just antibiotics versus not doing antibiotics and going with hospice/comfort care route.  Will consult palliative.  Change to inpatient.  If the knee is not infected, will need PT/OT consult ordered.    Chronic medical conditions  Depression/anxiety: Resume Seroquel, trazodone and fluoxetine.  Dementia: Prior to admission on Namenda, continued.  Hyperlipidemia: Can stop Lipitor as it is of no benefit at her age.  Diabetes: Does not appear to be on any diabetes medication.      Medication reconciliation pending.          Diet:  Regular diet.  Keep n.p.o. at midnight in case she needs to go for knee washout.  DVT Prophylaxis: Enoxaparin (Lovenox) SQ  Quiroz Catheter: Not present  Lines: None     Cardiac Monitoring: None  Code Status:  DNR/DNI    Clinically Significant Risk Factors Present on Admission                # Drug Induced Platelet Defect: home medication list includes an antiplatelet medication                          Disposition Plan     Medically Ready for Discharge: Anticipated in 2-4 Days           Jair Nassar MD  Hospitalist Service  [facility]  Securely message with [employer] (more info)  Text page via [employer] Paging/Directory     ______________________________________________________________________    Chief Complaint   Right knee pain    History is obtained from the patient's daughter at bedside    History of Present Illness   Chelly Dave is a 89 year old female admitted on 6/19/2024.  Patient was seen with 2 daughters at the bedside who provided translation.  Communication with the patient is limited due to language barrier  (speaks [place]) and severe dementia.    She has history of severe dementia and lives with her daughter.  Over the last 3 to 4 days she has been having worsening right knee pain and swelling.  She is unable to bear weight on it.  No reported fever but she has been more confused over the last week.    Vitals on arrival: Temperature 98.4  F, heart rate 78/min, blood pressure 130/85, respiratory rate 18 and oxygen saturation of 93% on room air.    Was seen in urgent care where x-ray of bilateral knee was done.  Right knee post replacement with moderate knee effusion.  Left knee showed osteoarthritis.  Ultrasound of the right lower extremity was negative for DVT.     CT of the right knee without contrast showed knee arthroplasty without evidence of loosening or fracture and small knee joint effusion.  Hospitalization was requested for ongoing knee pain and inability to bear weight.        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 Taking?   AzaTHIOprine (IMURAN PO)  Pharmacy Yes No   Sig: Take 50 mg by mouth 2 times daily AM & 3PM   Blood Glucose Monitoring Suppl (FIFTY50 GLUCOSE METER 2.0) w/Device KIT   Yes No   Sig: Dispense meter, test strips, lancets covered by pt ins. E11.9 NIDDM type II - Test 1  time/day   Calcium Carb-Cholecalciferol (CALCIUM 500 +D) 500-400 MG-UNIT TABS  Pharmacy Yes No   Sig: Take 1 tablet by mouth daily   Cholecalciferol (VITAMIN D3) 1000 units CAPS   Yes No   Sig: Take 1,000 Units by mouth   DOXEPIN HCL PO  Pharmacy Yes No   Sig: Take 10 mg by mouth At Bedtime   FLUoxetine (PROZAC) 20 MG capsule   Yes No   Sig: Take 20 mg by mouth   Pilocarpine HCl (SALAGEN PO)  Pharmacy Yes No   Sig: Take 5 mg by mouth 3 times daily AM, 1200 & HS   QUEtiapine (SEROQUEL) 25 MG tablet   Yes Yes   Sig: Take 25 mg by mouth 2 times daily   VITAMIN E NATURAL PO  Pharmacy Yes No   Sig: Take 1,000 Units by mouth 2 times daily AM & 3PM   acetaminophen (TYLENOL) 500 MG tablet   Yes No   Sig: Take 2 tablets by mouth   aspirin 81 MG EC tablet  Daughter Yes No   Sig: Take 81 mg by mouth daily.   atorvastatin (LIPITOR) 40 MG tablet   Yes No   Sig: Take 20 mg by mouth   blood glucose monitoring (NO BRAND SPECIFIED) test strip   Yes No   Sig: USE TO CHECK BLOOD SUGARS EVERY DAY   blood glucose monitoring (SOFTCLIX) lancets   Yes No   Sig: USE TO TEST BLOOD SUGARS DAILY   calcium carbonate-vitamin D 500-400 MG-UNIT TABS per tablet   Yes No   Sig: Take 1 tablet by mouth   cycloSPORINE (RESTASIS) 0.05 % ophthalmic emulsion   Yes No   Sig: Instill 1 drop into both eyes every 12 hours.   No further refills will be given unless you come in for your exam.   darbepoetin aravind-polysorbate (ARANESP) 200 MCG/0.4ML injection  Daughter Yes No   Sig: Inject 200 mcg Subcutaneous Every 2 months   diclofenac (FLECTOR) 1.3 % Patch   Yes No   Sig: Place 1 patch onto the skin   ferrous sulfate (IRON) 325 (65 FE) MG tablet   No No   Sig: Take 1 tablet (325 mg) by mouth daily   lidocaine (LIDODERM) 5 % patch  Daughter Yes No   Sig: Place 1 patch onto the skin daily as needed for moderate pain (12 hours on; 12 hours off. Patient applies to knees or back)   loratadine (CLARITIN) 10 MG tablet   Yes No   Sig: Take 10 mg by mouth   memantine  (NAMENDA) 5 MG tablet   Yes No   Sig: Take 5 mg by mouth   pantoprazole (PROTONIX) 40 MG EC tablet   Yes No   Sig: Take 40 mg by mouth   polyethylene glycol (MIRALAX/GLYCOLAX) powder   Yes No   polyethylene glycol 400 (BLINK TEARS) 0.25 % SOLN ophthalmic solution  Daughter Yes No   Sig: Place 1-2 drops into both eyes every 2 hours as needed for dry eyes   study - aspirin vs placebo (IDS #5239) 1 tablet tablet   Yes No   Sig: Take 81 mg by mouth   traZODone (DESYREL) 50 MG tablet   Yes Yes   Sig: Take 25 mg by mouth at bedtime   vitamin E (TOCOPHEROL) 1000 UNIT capsule   Yes No   Sig: Take 1 capsule by mouth      Facility-Administered Medications: None        Review of Systems    The 10 point Review of Systems is negative other than noted in the HPI or here.      Physical Exam   Vital Signs: Temp: 98.4  F (36.9  C) Temp src: Oral BP: 138/85 Pulse: 78   Resp: 18 SpO2: 93 % O2 Device: None (Room air)    Weight: 0 lbs 0 oz    General Appearance: Pleasantly confused.  Does not appear to be in distress at rest.  Respiratory: Clear to auscultation.  Cardiovascular: S1 and S2 was normal.  GI: Soft and nontender  Skin: No rash  Other: Right knee has effusion that is warm.  Very painful on flexion.    Medical Decision Making       MANAGEMENT DISCUSSED with the following over the past 24 hours: Patient, ER provider and daughters at bedside       Data         Imaging results reviewed over the past 24 hrs:   Recent Results (from the past 24 hour(s))   XR Knee Bilateral 1/2 Views    Narrative    For Patients:  As a result of the 21st Century Cures Act, medical imaging exams and procedure reports are released immediately into your electronic medical record.  You may view this report before your referring provider.  If you have questions, please contact your health care provider.      Indication:  Chronic bilateral knee pain     Technique:  Bilateral knee 3 views, 4 films     Comparison:  07/26/2021    Findings:  Right knee:  Patient is status post right total knee replacement. No dislocation with moderate right knee effusion noted. Atherosclerosis.    Left knee: No evidence of fracture. Medial and patellofemoral osteophytes with small left knee effusion. Atherosclerosis.     Impression:  1. Status post right total knee replacement with moderate right knee effusion.  2. Mild-to-moderate left knee osteoarthritis which is mildly progressive from the prior exam. Small left knee effusion.        Dictated by Shashi Valverde MD @ 6/19/2024 3:28:11 PM    (Electronically Signed)   US VENOUS LOWER EXTREMITY RIGHT    Narrative    For Patients:  As a result of the 21st Century Cures Act, medical imaging exams and procedure reports are released immediately into your electronic medical record.  You may view this report before your referring provider.  If you have questions, please contact your health care provider.      Indication:  Right lower extremity swelling    Technique:  Right lower extremity venous ultrasound utilizing grayscale, color flow and duplex Doppler techniques    Comparison:  None    Findings:  Sonographic evaluation of the right lower extremity venous system from the common femoral, partially visualized greater saphenous, through the femoral, popliteal and calf veins demonstrates no echogenic debris, normal compressibility, normal color flow and normal augmented duplex Doppler waveforms. No popliteal cyst.    Impression:  No right lower extremity DVT.        Dictated by Dalton Chu MD @ 6/19/2024 3:37:36 PM    (Electronically Signed)   CT Knee Right w/o Contrast    Narrative    EXAM: CT KNEE RIGHT W/O CONTRAST  LOCATION: [facility]  DATE: 6/19/2024    INDICATION: Right knee pain. Prior total knee arthroplasty. Difficulty weightbearing.  COMPARISON: None.  TECHNIQUE: Noncontrast. Axial, sagittal and coronal thin-section reconstruction. Dose reduction techniques were used.     FINDINGS:     BONES:  -Right total  knee arthroplasty with patellar resurfacing. No findings to suggest loosening. No evidence of a fracture.    SOFT TISSUES:  -Small effusion. No muscle atrophy. No soft tissue edema.      Impression    IMPRESSION:  1.  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    2.  Small knee joint effusion.

## 2024-06-20 NOTE — PLAN OF CARE
Goal Outcome Evaluation:      Plan of Care Reviewed With: patient    Overall Patient Progress: no changeOverall Patient Progress: no change     Alert to self. Pleasantly confused. Bedrest. NPO. No PRNs given. Incont of urine. Family states she is total cares. Pt slept most of the night.       Problem: Adult Inpatient Plan of Care  Goal: Plan of Care Review    6/20/2024 0643 by Serene Abebe RN  Outcome: Progressing  6/20/2024 0643 by Serene Abebe RN  Outcome: Progressing  Flowsheets (Taken 6/20/2024 0643)  Plan of Care Reviewed With: patient  Overall Patient Progress: no change  Goal: Patient-Specific Goal (Individualized)    6/20/2024 0643 by Serene Abebe RN  Outcome: Progressing  6/20/2024 0643 by Serene Abebe RN  Outcome: Progressing  Goal: Absence of Hospital-Acquired Illness or Injury  6/20/2024 0643 by Serene Abebe RN  Outcome: Progressing  6/20/2024 0643 by Serene Abebe RN  Outcome: Progressing  Intervention: Identify and Manage Fall Risk  Recent Flowsheet Documentation  Taken 6/20/2024 0208 by Serene Abebe RN  Safety Promotion/Fall Prevention:   activity supervised   assistive device/personal items within reach   increased rounding and observation   increase visualization of patient   lighting adjusted   clutter free environment maintained   room door open   room near nurse's station   supervised activity  Intervention: Prevent Skin Injury  Recent Flowsheet Documentation  Taken 6/20/2024 0208 by Serene Abebe RN  Skin Protection: incontinence pads utilized  Intervention: Prevent Infection  Recent Flowsheet Documentation  Taken 6/20/2024 0208 by Sereen Abebe RN  Infection Prevention:   rest/sleep promoted   single patient room provided  Goal: Optimal Comfort and Wellbeing  6/20/2024 0643 by Serene Abebe RN  Outcome: Progressing  6/20/2024 0643 by Serafin Lagos  DIANE Cast  Outcome: Progressing  Goal: Readiness for Transition of Care  6/20/2024 0643 by Serene Abebe RN  Outcome: Progressing  6/20/2024 0643 by Serene Abebe RN  Outcome: Progressing  Intervention: Mutually Develop Transition Plan  Recent Flowsheet Documentation  Taken 6/20/2024 0000 by Serene Abebe RN  Equipment Currently Used at Home: cane, straight     Problem: Pain Acute  Goal: Optimal Pain Control and Function  6/20/2024 0643 by Serene Abebe RN  Outcome: Progressing  6/20/2024 0643 by Serene Abebe RN  Outcome: Progressing  Intervention: Prevent or Manage Pain  Recent Flowsheet Documentation  Taken 6/20/2024 0208 by Serene Abebe RN  Medication Review/Management: medications reviewed     Problem: Infection  Goal: Absence of Infection Signs and Symptoms  Outcome: Progressing

## 2024-06-21 ENCOUNTER — APPOINTMENT (OUTPATIENT)
Dept: GENERAL RADIOLOGY | Facility: CLINIC | Age: 89
DRG: 464 | End: 2024-06-21
Attending: ORTHOPAEDIC SURGERY
Payer: COMMERCIAL

## 2024-06-21 ENCOUNTER — ANESTHESIA (OUTPATIENT)
Dept: SURGERY | Facility: CLINIC | Age: 89
DRG: 464 | End: 2024-06-21
Payer: COMMERCIAL

## 2024-06-21 ENCOUNTER — ANESTHESIA EVENT (OUTPATIENT)
Dept: SURGERY | Facility: CLINIC | Age: 89
DRG: 464 | End: 2024-06-21
Payer: COMMERCIAL

## 2024-06-21 LAB
CREAT SERPL-MCNC: 0.79 MG/DL (ref 0.51–0.95)
CRP SERPL-MCNC: 45.86 MG/L
EGFRCR SERPLBLD CKD-EPI 2021: 72 ML/MIN/1.73M2
GLUCOSE BLDC GLUCOMTR-MCNC: 120 MG/DL (ref 70–99)
GLUCOSE BLDC GLUCOMTR-MCNC: 78 MG/DL (ref 70–99)

## 2024-06-21 PROCEDURE — 0SUC09Z SUPPLEMENT RIGHT KNEE JOINT WITH LINER, OPEN APPROACH: ICD-10-PCS | Performed by: ORTHOPAEDIC SURGERY

## 2024-06-21 PROCEDURE — 87075 CULTR BACTERIA EXCEPT BLOOD: CPT | Performed by: ORTHOPAEDIC SURGERY

## 2024-06-21 PROCEDURE — 82565 ASSAY OF CREATININE: CPT | Performed by: INTERNAL MEDICINE

## 2024-06-21 PROCEDURE — 120N000001 HC R&B MED SURG/OB

## 2024-06-21 PROCEDURE — 250N000013 HC RX MED GY IP 250 OP 250 PS 637: Performed by: STUDENT IN AN ORGANIZED HEALTH CARE EDUCATION/TRAINING PROGRAM

## 2024-06-21 PROCEDURE — 99232 SBSQ HOSP IP/OBS MODERATE 35: CPT | Performed by: STUDENT IN AN ORGANIZED HEALTH CARE EDUCATION/TRAINING PROGRAM

## 2024-06-21 PROCEDURE — 0SPC09Z REMOVAL OF LINER FROM RIGHT KNEE JOINT, OPEN APPROACH: ICD-10-PCS | Performed by: ORTHOPAEDIC SURGERY

## 2024-06-21 PROCEDURE — 258N000003 HC RX IP 258 OP 636: Mod: JZ | Performed by: ORTHOPAEDIC SURGERY

## 2024-06-21 PROCEDURE — 710N000009 HC RECOVERY PHASE 1, LEVEL 1, PER MIN: Performed by: ORTHOPAEDIC SURGERY

## 2024-06-21 PROCEDURE — 36415 COLL VENOUS BLD VENIPUNCTURE: CPT | Performed by: PHYSICIAN ASSISTANT

## 2024-06-21 PROCEDURE — 360N000078 HC SURGERY LEVEL 5, PER MIN: Performed by: ORTHOPAEDIC SURGERY

## 2024-06-21 PROCEDURE — 87070 CULTURE OTHR SPECIMN AEROBIC: CPT | Performed by: ORTHOPAEDIC SURGERY

## 2024-06-21 PROCEDURE — 250N000011 HC RX IP 250 OP 636: Performed by: PHYSICIAN ASSISTANT

## 2024-06-21 PROCEDURE — 272N000001 HC OR GENERAL SUPPLY STERILE: Performed by: ORTHOPAEDIC SURGERY

## 2024-06-21 PROCEDURE — 258N000003 HC RX IP 258 OP 636: Mod: JZ | Performed by: NURSE ANESTHETIST, CERTIFIED REGISTERED

## 2024-06-21 PROCEDURE — 250N000011 HC RX IP 250 OP 636: Mod: JZ | Performed by: INTERNAL MEDICINE

## 2024-06-21 PROCEDURE — C1776 JOINT DEVICE (IMPLANTABLE): HCPCS | Performed by: ORTHOPAEDIC SURGERY

## 2024-06-21 PROCEDURE — 250N000013 HC RX MED GY IP 250 OP 250 PS 637: Performed by: ORTHOPAEDIC SURGERY

## 2024-06-21 PROCEDURE — 250N000009 HC RX 250: Performed by: PHYSICIAN ASSISTANT

## 2024-06-21 PROCEDURE — 250N000011 HC RX IP 250 OP 636: Performed by: ORTHOPAEDIC SURGERY

## 2024-06-21 PROCEDURE — 250N000011 HC RX IP 250 OP 636: Mod: JZ | Performed by: NURSE ANESTHETIST, CERTIFIED REGISTERED

## 2024-06-21 PROCEDURE — 250N000025 HC SEVOFLURANE, PER MIN: Performed by: ORTHOPAEDIC SURGERY

## 2024-06-21 PROCEDURE — 86140 C-REACTIVE PROTEIN: CPT | Performed by: PHYSICIAN ASSISTANT

## 2024-06-21 PROCEDURE — 999N000141 HC STATISTIC PRE-PROCEDURE NURSING ASSESSMENT: Performed by: ORTHOPAEDIC SURGERY

## 2024-06-21 PROCEDURE — 258N000001 HC RX 258: Performed by: ORTHOPAEDIC SURGERY

## 2024-06-21 PROCEDURE — 250N000009 HC RX 250: Performed by: NURSE ANESTHETIST, CERTIFIED REGISTERED

## 2024-06-21 PROCEDURE — 250N000009 HC RX 250: Performed by: ORTHOPAEDIC SURGERY

## 2024-06-21 PROCEDURE — 370N000017 HC ANESTHESIA TECHNICAL FEE, PER MIN: Performed by: ORTHOPAEDIC SURGERY

## 2024-06-21 PROCEDURE — 999N000065 XR KNEE PORT RIGHT 1/2 VIEWS: Mod: RT

## 2024-06-21 DEVICE — LEGION PS HIGH FLEX XLPE SZ 3-4 12MM
Type: IMPLANTABLE DEVICE | Site: KNEE | Status: FUNCTIONAL
Brand: LEGION

## 2024-06-21 RX ORDER — ONDANSETRON 2 MG/ML
4 INJECTION INTRAMUSCULAR; INTRAVENOUS EVERY 6 HOURS PRN
Status: DISCONTINUED | OUTPATIENT
Start: 2024-06-21 | End: 2024-06-26 | Stop reason: HOSPADM

## 2024-06-21 RX ORDER — NALOXONE HYDROCHLORIDE 0.4 MG/ML
0.1 INJECTION, SOLUTION INTRAMUSCULAR; INTRAVENOUS; SUBCUTANEOUS
Status: DISCONTINUED | OUTPATIENT
Start: 2024-06-21 | End: 2024-06-21 | Stop reason: HOSPADM

## 2024-06-21 RX ORDER — LIDOCAINE 40 MG/G
CREAM TOPICAL
Status: DISCONTINUED | OUTPATIENT
Start: 2024-06-21 | End: 2024-06-21 | Stop reason: HOSPADM

## 2024-06-21 RX ORDER — PROPOFOL 10 MG/ML
INJECTION, EMULSION INTRAVENOUS PRN
Status: DISCONTINUED | OUTPATIENT
Start: 2024-06-21 | End: 2024-06-21

## 2024-06-21 RX ORDER — ASPIRIN 81 MG/1
81 TABLET ORAL 2 TIMES DAILY
Status: DISCONTINUED | OUTPATIENT
Start: 2024-06-21 | End: 2024-06-26 | Stop reason: HOSPADM

## 2024-06-21 RX ORDER — HYDROMORPHONE HCL IN WATER/PF 6 MG/30 ML
0.1 PATIENT CONTROLLED ANALGESIA SYRINGE INTRAVENOUS
Status: DISCONTINUED | OUTPATIENT
Start: 2024-06-21 | End: 2024-06-26 | Stop reason: HOSPADM

## 2024-06-21 RX ORDER — LIDOCAINE HYDROCHLORIDE 20 MG/ML
INJECTION, SOLUTION INFILTRATION; PERINEURAL PRN
Status: DISCONTINUED | OUTPATIENT
Start: 2024-06-21 | End: 2024-06-21

## 2024-06-21 RX ORDER — HYDROMORPHONE HCL IN WATER/PF 6 MG/30 ML
0.2 PATIENT CONTROLLED ANALGESIA SYRINGE INTRAVENOUS EVERY 5 MIN PRN
Status: DISCONTINUED | OUTPATIENT
Start: 2024-06-21 | End: 2024-06-21 | Stop reason: HOSPADM

## 2024-06-21 RX ORDER — CEFAZOLIN SODIUM 1 G/3ML
1 INJECTION, POWDER, FOR SOLUTION INTRAMUSCULAR; INTRAVENOUS EVERY 8 HOURS
Status: DISCONTINUED | OUTPATIENT
Start: 2024-06-21 | End: 2024-06-21

## 2024-06-21 RX ORDER — LABETALOL HYDROCHLORIDE 5 MG/ML
10 INJECTION, SOLUTION INTRAVENOUS
Status: DISCONTINUED | OUTPATIENT
Start: 2024-06-21 | End: 2024-06-21 | Stop reason: HOSPADM

## 2024-06-21 RX ORDER — SODIUM CHLORIDE, SODIUM LACTATE, POTASSIUM CHLORIDE, CALCIUM CHLORIDE 600; 310; 30; 20 MG/100ML; MG/100ML; MG/100ML; MG/100ML
INJECTION, SOLUTION INTRAVENOUS CONTINUOUS
Status: DISCONTINUED | OUTPATIENT
Start: 2024-06-21 | End: 2024-06-26 | Stop reason: HOSPADM

## 2024-06-21 RX ORDER — ACETAMINOPHEN 325 MG/1
975 TABLET ORAL EVERY 8 HOURS
Status: COMPLETED | OUTPATIENT
Start: 2024-06-21 | End: 2024-06-24

## 2024-06-21 RX ORDER — CEFAZOLIN SODIUM/WATER 2 G/20 ML
2 SYRINGE (ML) INTRAVENOUS SEE ADMIN INSTRUCTIONS
Status: DISCONTINUED | OUTPATIENT
Start: 2024-06-21 | End: 2024-06-21 | Stop reason: HOSPADM

## 2024-06-21 RX ORDER — ONDANSETRON 4 MG/1
4 TABLET, ORALLY DISINTEGRATING ORAL EVERY 30 MIN PRN
Status: DISCONTINUED | OUTPATIENT
Start: 2024-06-21 | End: 2024-06-21 | Stop reason: HOSPADM

## 2024-06-21 RX ORDER — TRANEXAMIC ACID 10 MG/ML
1 INJECTION, SOLUTION INTRAVENOUS ONCE
Status: COMPLETED | OUTPATIENT
Start: 2024-06-21 | End: 2024-06-21

## 2024-06-21 RX ORDER — SODIUM CHLORIDE, SODIUM LACTATE, POTASSIUM CHLORIDE, CALCIUM CHLORIDE 600; 310; 30; 20 MG/100ML; MG/100ML; MG/100ML; MG/100ML
INJECTION, SOLUTION INTRAVENOUS CONTINUOUS PRN
Status: DISCONTINUED | OUTPATIENT
Start: 2024-06-21 | End: 2024-06-21

## 2024-06-21 RX ORDER — CEFAZOLIN SODIUM/WATER 2 G/20 ML
2 SYRINGE (ML) INTRAVENOUS
Status: COMPLETED | OUTPATIENT
Start: 2024-06-21 | End: 2024-06-21

## 2024-06-21 RX ORDER — LIDOCAINE 40 MG/G
CREAM TOPICAL
Status: DISCONTINUED | OUTPATIENT
Start: 2024-06-21 | End: 2024-06-26 | Stop reason: HOSPADM

## 2024-06-21 RX ORDER — FENTANYL CITRATE 50 UG/ML
50 INJECTION, SOLUTION INTRAMUSCULAR; INTRAVENOUS EVERY 5 MIN PRN
Status: DISCONTINUED | OUTPATIENT
Start: 2024-06-21 | End: 2024-06-21 | Stop reason: HOSPADM

## 2024-06-21 RX ORDER — ONDANSETRON 4 MG/1
4 TABLET, ORALLY DISINTEGRATING ORAL EVERY 6 HOURS PRN
Status: DISCONTINUED | OUTPATIENT
Start: 2024-06-21 | End: 2024-06-26 | Stop reason: HOSPADM

## 2024-06-21 RX ORDER — FENTANYL CITRATE 50 UG/ML
INJECTION, SOLUTION INTRAMUSCULAR; INTRAVENOUS PRN
Status: DISCONTINUED | OUTPATIENT
Start: 2024-06-21 | End: 2024-06-21

## 2024-06-21 RX ORDER — SODIUM CHLORIDE, SODIUM LACTATE, POTASSIUM CHLORIDE, CALCIUM CHLORIDE 600; 310; 30; 20 MG/100ML; MG/100ML; MG/100ML; MG/100ML
INJECTION, SOLUTION INTRAVENOUS CONTINUOUS
Status: DISCONTINUED | OUTPATIENT
Start: 2024-06-21 | End: 2024-06-21 | Stop reason: HOSPADM

## 2024-06-21 RX ORDER — AMOXICILLIN 250 MG
1 CAPSULE ORAL 2 TIMES DAILY
Status: DISCONTINUED | OUTPATIENT
Start: 2024-06-21 | End: 2024-06-26 | Stop reason: HOSPADM

## 2024-06-21 RX ORDER — HYDROMORPHONE HYDROCHLORIDE 2 MG/1
2 TABLET ORAL EVERY 4 HOURS PRN
Status: DISCONTINUED | OUTPATIENT
Start: 2024-06-21 | End: 2024-06-26 | Stop reason: HOSPADM

## 2024-06-21 RX ORDER — HYDROXYZINE HYDROCHLORIDE 10 MG/1
10 TABLET, FILM COATED ORAL EVERY 6 HOURS PRN
Status: DISCONTINUED | OUTPATIENT
Start: 2024-06-21 | End: 2024-06-26 | Stop reason: HOSPADM

## 2024-06-21 RX ORDER — METHOCARBAMOL 500 MG/1
500 TABLET, FILM COATED ORAL EVERY 6 HOURS PRN
Status: DISCONTINUED | OUTPATIENT
Start: 2024-06-21 | End: 2024-06-26 | Stop reason: HOSPADM

## 2024-06-21 RX ORDER — FENTANYL CITRATE 50 UG/ML
25 INJECTION, SOLUTION INTRAMUSCULAR; INTRAVENOUS EVERY 5 MIN PRN
Status: DISCONTINUED | OUTPATIENT
Start: 2024-06-21 | End: 2024-06-21 | Stop reason: HOSPADM

## 2024-06-21 RX ORDER — ONDANSETRON 2 MG/ML
INJECTION INTRAMUSCULAR; INTRAVENOUS PRN
Status: DISCONTINUED | OUTPATIENT
Start: 2024-06-21 | End: 2024-06-21

## 2024-06-21 RX ORDER — HYDROMORPHONE HCL IN WATER/PF 6 MG/30 ML
0.2 PATIENT CONTROLLED ANALGESIA SYRINGE INTRAVENOUS
Status: DISCONTINUED | OUTPATIENT
Start: 2024-06-21 | End: 2024-06-26 | Stop reason: HOSPADM

## 2024-06-21 RX ORDER — DEXAMETHASONE SODIUM PHOSPHATE 4 MG/ML
4 INJECTION, SOLUTION INTRA-ARTICULAR; INTRALESIONAL; INTRAMUSCULAR; INTRAVENOUS; SOFT TISSUE
Status: DISCONTINUED | OUTPATIENT
Start: 2024-06-21 | End: 2024-06-21 | Stop reason: HOSPADM

## 2024-06-21 RX ORDER — ALBUTEROL SULFATE 0.83 MG/ML
2.5 SOLUTION RESPIRATORY (INHALATION) EVERY 4 HOURS PRN
Status: DISCONTINUED | OUTPATIENT
Start: 2024-06-21 | End: 2024-06-21 | Stop reason: HOSPADM

## 2024-06-21 RX ORDER — ONDANSETRON 2 MG/ML
4 INJECTION INTRAMUSCULAR; INTRAVENOUS EVERY 30 MIN PRN
Status: DISCONTINUED | OUTPATIENT
Start: 2024-06-21 | End: 2024-06-21 | Stop reason: HOSPADM

## 2024-06-21 RX ORDER — VANCOMYCIN HYDROCHLORIDE 1 G/20ML
INJECTION, POWDER, LYOPHILIZED, FOR SOLUTION INTRAVENOUS PRN
Status: DISCONTINUED | OUTPATIENT
Start: 2024-06-21 | End: 2024-06-21 | Stop reason: HOSPADM

## 2024-06-21 RX ORDER — BISACODYL 10 MG
10 SUPPOSITORY, RECTAL RECTAL DAILY PRN
Status: DISCONTINUED | OUTPATIENT
Start: 2024-06-24 | End: 2024-06-26 | Stop reason: HOSPADM

## 2024-06-21 RX ORDER — HYDROMORPHONE HCL IN WATER/PF 6 MG/30 ML
0.4 PATIENT CONTROLLED ANALGESIA SYRINGE INTRAVENOUS EVERY 5 MIN PRN
Status: DISCONTINUED | OUTPATIENT
Start: 2024-06-21 | End: 2024-06-21 | Stop reason: HOSPADM

## 2024-06-21 RX ORDER — BUPIVACAINE HYDROCHLORIDE 5 MG/ML
INJECTION, SOLUTION PERINEURAL PRN
Status: DISCONTINUED | OUTPATIENT
Start: 2024-06-21 | End: 2024-06-21 | Stop reason: HOSPADM

## 2024-06-21 RX ORDER — POLYETHYLENE GLYCOL 3350 17 G/17G
17 POWDER, FOR SOLUTION ORAL DAILY
Status: DISCONTINUED | OUTPATIENT
Start: 2024-06-22 | End: 2024-06-26 | Stop reason: HOSPADM

## 2024-06-21 RX ORDER — ACETAMINOPHEN 325 MG/1
650 TABLET ORAL EVERY 4 HOURS PRN
Status: DISCONTINUED | OUTPATIENT
Start: 2024-06-24 | End: 2024-06-26 | Stop reason: HOSPADM

## 2024-06-21 RX ORDER — HYDRALAZINE HYDROCHLORIDE 20 MG/ML
2.5-5 INJECTION INTRAMUSCULAR; INTRAVENOUS EVERY 10 MIN PRN
Status: DISCONTINUED | OUTPATIENT
Start: 2024-06-21 | End: 2024-06-21 | Stop reason: HOSPADM

## 2024-06-21 RX ORDER — PROCHLORPERAZINE MALEATE 5 MG
5 TABLET ORAL EVERY 6 HOURS PRN
Status: DISCONTINUED | OUTPATIENT
Start: 2024-06-21 | End: 2024-06-26 | Stop reason: HOSPADM

## 2024-06-21 RX ADMIN — PHENYLEPHRINE HYDROCHLORIDE 50 MCG: 10 INJECTION INTRAVENOUS at 09:11

## 2024-06-21 RX ADMIN — PROPOFOL 100 MG: 10 INJECTION, EMULSION INTRAVENOUS at 07:53

## 2024-06-21 RX ADMIN — ATORVASTATIN CALCIUM 20 MG: 20 TABLET, FILM COATED ORAL at 21:09

## 2024-06-21 RX ADMIN — FENTANYL CITRATE 100 MCG: 50 INJECTION INTRAMUSCULAR; INTRAVENOUS at 07:53

## 2024-06-21 RX ADMIN — HYDROMORPHONE HYDROCHLORIDE 0.3 MG: 1 INJECTION, SOLUTION INTRAMUSCULAR; INTRAVENOUS; SUBCUTANEOUS at 08:23

## 2024-06-21 RX ADMIN — PHENYLEPHRINE HYDROCHLORIDE 100 MCG: 10 INJECTION INTRAVENOUS at 08:06

## 2024-06-21 RX ADMIN — LIDOCAINE HYDROCHLORIDE 50 MG: 20 INJECTION, SOLUTION INFILTRATION; PERINEURAL at 07:53

## 2024-06-21 RX ADMIN — TRAZODONE HYDROCHLORIDE 25 MG: 50 TABLET ORAL at 21:11

## 2024-06-21 RX ADMIN — POLYETHYLENE GLYCOL 3350 17 G: 17 POWDER, FOR SOLUTION ORAL at 21:09

## 2024-06-21 RX ADMIN — SENNOSIDES AND DOCUSATE SODIUM 1 TABLET: 50; 8.6 TABLET ORAL at 21:08

## 2024-06-21 RX ADMIN — VANCOMYCIN HYDROCHLORIDE 1000 MG: 1 INJECTION, SOLUTION INTRAVENOUS at 17:06

## 2024-06-21 RX ADMIN — PILOCARPINE HYDROCHLORIDE 5 MG: 5 TABLET, FILM COATED ORAL at 14:29

## 2024-06-21 RX ADMIN — PHENYLEPHRINE HYDROCHLORIDE 100 MCG: 10 INJECTION INTRAVENOUS at 07:58

## 2024-06-21 RX ADMIN — DOXEPIN HYDROCHLORIDE 10 MG: 10 CAPSULE ORAL at 21:14

## 2024-06-21 RX ADMIN — CARBOXYMETHYLCELLULOSE SODIUM 1 DROP: 5 SOLUTION/ DROPS OPHTHALMIC at 21:07

## 2024-06-21 RX ADMIN — TRANEXAMIC ACID 1 G: 10 INJECTION, SOLUTION INTRAVENOUS at 09:20

## 2024-06-21 RX ADMIN — PHENYLEPHRINE HYDROCHLORIDE 100 MCG: 10 INJECTION INTRAVENOUS at 07:53

## 2024-06-21 RX ADMIN — HYDROMORPHONE HYDROCHLORIDE 0.5 MG: 1 INJECTION, SOLUTION INTRAMUSCULAR; INTRAVENOUS; SUBCUTANEOUS at 08:29

## 2024-06-21 RX ADMIN — PHENYLEPHRINE HYDROCHLORIDE 100 MCG: 10 INJECTION INTRAVENOUS at 08:09

## 2024-06-21 RX ADMIN — ONDANSETRON 4 MG: 2 INJECTION INTRAMUSCULAR; INTRAVENOUS at 09:14

## 2024-06-21 RX ADMIN — SODIUM CHLORIDE, POTASSIUM CHLORIDE, SODIUM LACTATE AND CALCIUM CHLORIDE: 600; 310; 30; 20 INJECTION, SOLUTION INTRAVENOUS at 12:18

## 2024-06-21 RX ADMIN — HYDROMORPHONE HYDROCHLORIDE 0.2 MG: 1 INJECTION, SOLUTION INTRAMUSCULAR; INTRAVENOUS; SUBCUTANEOUS at 08:30

## 2024-06-21 RX ADMIN — CEFTRIAXONE 2 G: 2 INJECTION, POWDER, FOR SOLUTION INTRAMUSCULAR; INTRAVENOUS at 02:09

## 2024-06-21 RX ADMIN — ACETAMINOPHEN 975 MG: 325 TABLET, FILM COATED ORAL at 21:09

## 2024-06-21 RX ADMIN — TRANEXAMIC ACID 1 G: 10 INJECTION, SOLUTION INTRAVENOUS at 08:09

## 2024-06-21 RX ADMIN — ACETAMINOPHEN 975 MG: 325 TABLET, FILM COATED ORAL at 14:29

## 2024-06-21 RX ADMIN — SODIUM CHLORIDE, POTASSIUM CHLORIDE, SODIUM LACTATE AND CALCIUM CHLORIDE: 600; 310; 30; 20 INJECTION, SOLUTION INTRAVENOUS at 17:05

## 2024-06-21 RX ADMIN — CARBOXYMETHYLCELLULOSE SODIUM 1 DROP: 5 SOLUTION/ DROPS OPHTHALMIC at 14:31

## 2024-06-21 RX ADMIN — Medication 2 G: at 08:09

## 2024-06-21 RX ADMIN — QUETIAPINE FUMARATE 25 MG: 25 TABLET ORAL at 21:08

## 2024-06-21 RX ADMIN — PHENYLEPHRINE HYDROCHLORIDE 100 MCG: 10 INJECTION INTRAVENOUS at 09:00

## 2024-06-21 RX ADMIN — PHENYLEPHRINE HYDROCHLORIDE 100 MCG: 10 INJECTION INTRAVENOUS at 08:43

## 2024-06-21 RX ADMIN — SODIUM CHLORIDE, POTASSIUM CHLORIDE, SODIUM LACTATE AND CALCIUM CHLORIDE: 600; 310; 30; 20 INJECTION, SOLUTION INTRAVENOUS at 07:06

## 2024-06-21 RX ADMIN — ASPIRIN 81 MG: 81 TABLET, COATED ORAL at 21:09

## 2024-06-21 RX ADMIN — PILOCARPINE HYDROCHLORIDE 5 MG: 5 TABLET, FILM COATED ORAL at 21:07

## 2024-06-21 RX ADMIN — MEMANTINE 5 MG: 5 TABLET ORAL at 21:08

## 2024-06-21 RX ADMIN — PHENYLEPHRINE HYDROCHLORIDE 100 MCG: 10 INJECTION INTRAVENOUS at 08:02

## 2024-06-21 ASSESSMENT — ACTIVITIES OF DAILY LIVING (ADL)
ADLS_ACUITY_SCORE: 36.25
ADLS_ACUITY_SCORE: 33.25
ADLS_ACUITY_SCORE: 33.75
ADLS_ACUITY_SCORE: 34.25
ADLS_ACUITY_SCORE: 36.25
ADLS_ACUITY_SCORE: 36.25
ADLS_ACUITY_SCORE: 33.25
ADLS_ACUITY_SCORE: 36.25
ADLS_ACUITY_SCORE: 34.25
ADLS_ACUITY_SCORE: 36.25
ADLS_ACUITY_SCORE: 33.25
ADLS_ACUITY_SCORE: 33.75
ADLS_ACUITY_SCORE: 36.25
ADLS_ACUITY_SCORE: 36.25
ADLS_ACUITY_SCORE: 34.25
ADLS_ACUITY_SCORE: 36.25
ADLS_ACUITY_SCORE: 34.25
ADLS_ACUITY_SCORE: 36.25

## 2024-06-21 NOTE — ANESTHESIA PREPROCEDURE EVALUATION
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 and associated small Bowel  Anesthesia Block;  Surgeon: Ahsna Nicolas MD;  Location: [facility] OR    ORTHOPEDIC SURGERY      right knee replacement    PICC INSERTION Left 2018    5Fr - 46cm (5cm external), basilic vein, low SVC      Allergies   Allergen Reactions    Lisinopril     Oxycodone      Other reaction(s): Confusion    Aspirin Buf(Cacarb-Mgcarb-Mgo)      Other reaction(s): GI Upset  81 mg works well for her      Social History     Tobacco Use    Smoking status: Never    Smokeless tobacco: Never   Substance Use Topics    Alcohol use: No      Wt Readings from Last 1 Encounters:   24 61.5 kg (135 lb 9.3 oz)        Anesthesia Evaluation            ROS/MED HX  ENT/Pulmonary:  - neg pulmonary ROS     Neurologic:     (+)   dementia,                             Cardiovascular:       METS/Exercise Tolerance:     Hematologic:     (+)      anemia (Hb 9.1, has up to date type and screen),          Musculoskeletal:       GI/Hepatic:     (+) GERD,                   Renal/Genitourinary:       Endo:     (+)  type II DM (diet controlled),   Not using insulin,                 Psychiatric/Substance Use:       Infectious Disease:     (+) Recent Fever (septic joint),           Malignancy:       Other:            Physical Exam    Airway        Mallampati: II   TM distance: > 3 FB   Neck ROM: full   Mouth opening: > 3 cm    Respiratory  Devices and Support         Dental           Cardiovascular   cardiovascular exam normal          Pulmonary   pulmonary exam normal                OUTSIDE LABS:  CBC:   Lab Results   Component Value Date    WBC 3.6 (L) 06/20/2024    WBC 3.9 (L) 06/19/2024    HGB 9.1 (L) 06/20/2024    HGB 10.3 (L) 06/19/2024    HCT 29.5 (L) 06/20/2024    HCT 33.6 (L) 06/19/2024     06/20/2024     06/19/2024     BMP:   Lab Results   Component Value Date     06/20/2024     06/19/2024    POTASSIUM 4.4 06/20/2024    POTASSIUM 4.4 06/19/2024    CHLORIDE 106 06/20/2024    CHLORIDE 103 06/19/2024    CO2 24 06/20/2024    CO2 24 06/19/2024    BUN 21.7 06/20/2024    BUN 16.2 06/19/2024    CR 0.79 06/21/2024    CR 0.93 06/20/2024    GLC 78 06/21/2024    GLC 97 06/20/2024     COAGS:   Lab Results   Component Value Date    INR 1.20 (H) 10/17/2011     POC:   Lab Results   Component Value Date     (H) 06/22/2018     HEPATIC:   Lab Results   Component Value Date    ALBUMIN 2.6 (L) 06/23/2018    PROTTOTAL 7.2 06/23/2018    ALT 13 06/23/2018    AST 22 06/23/2018    ALKPHOS 50 06/23/2018    BILITOTAL 0.3 06/23/2018     OTHER:   Lab Results   Component Value Date    PH 7.44 06/22/2018    LACT 1.2 06/23/2018    A1C 5.6 08/11/2016    ROBSON 7.7 (L) 06/20/2024    PHOS 4.5 06/21/2018    MAG 1.9 06/25/2018    LIPASE 220 06/20/2018    TSH 2.16 12/12/2014       Anesthesia Plan    ASA Status:  3       Anesthesia Type: General.     - Airway: LMA   Induction: Intravenous.   Maintenance: Balanced.        Consents    Anesthesia Plan(s) and associated risks, benefits, and realistic alternatives discussed. Questions answered and patient/representative(s) expressed understanding.     - Discussed:     - Discussed with:  Patient, Healthcare Power of  (daughters at bedside)       - Patient is DNR/DNI Status: Yes             Suspend during perioperative period? No (patient to remain DNR in perioperative period. Ok to intubate for  surgery).         Postoperative Care    Pain management: IV analgesics, Oral pain medications, Multi-modal analgesia.   PONV prophylaxis: Ondansetron (or other 5HT-3), Dexamethasone or Solumedrol     Comments:               Manjula Jimenez MD    I have reviewed the pertinent notes and labs in the chart from the past 30 days and (re)examined the patient.  Any updates or changes from those notes are reflected in this note.

## 2024-06-21 NOTE — PLAN OF CARE
"Goal Outcome Evaluation:      Plan of Care Reviewed With: patient, child    Overall Patient Progress: no changeOverall Patient Progress: no change    Outcome Evaluation: I&D tomorrow at 0730, bedrest, NPO, CHG bath competed    Patient on bedrest, alert to self only. VSS on room air. Rt knee warm and swollen. Pain managed with scheduled tylenol. Swallows pills whole with applesauce. Incontinent of urine - purewick in place. Total feed, does better with soft foods. Family requested not to use  because of her confusion and stated she can understand simple English. CHG bath completed. NPO @ midnight.     Problem: Adult Inpatient Plan of Care  Goal: Plan of Care Review  Description: The Plan of Care Review/Shift note should be completed every shift.  The Outcome Evaluation is a brief statement about your assessment that the patient is improving, declining, or no change.  This information will be displayed automatically on your shift  note.  Outcome: Progressing  Flowsheets (Taken 6/20/2024 9857)  Outcome Evaluation: I&D tomorrow at 0730, bedrest, NPO, CHG bath competed  Plan of Care Reviewed With:   patient   child  Overall Patient Progress: no change  Goal: Patient-Specific Goal (Individualized)  Description: You can add care plan individualizations to a care plan. Examples of Individualization might be:  \"Parent requests to be called daily at 9am for status\", \"I have a hard time hearing out of my right ear\", or \"Do not touch me to wake me up as it startles  me\".  Outcome: Progressing  Goal: Absence of Hospital-Acquired Illness or Injury  Outcome: Progressing  Intervention: Identify and Manage Fall Risk  Recent Flowsheet Documentation  Taken 6/20/2024 1800 by Deb Rodriguez RN  Safety Promotion/Fall Prevention:   activity supervised   assistive device/personal items within reach   clutter free environment maintained   patient and family education   safety round/check completed  Intervention: Prevent Skin " Injury  Recent Flowsheet Documentation  Taken 6/20/2024 2117 by Deb Rodriguez RN  Body Position: supine, head elevated  Taken 6/20/2024 1800 by Deb Rodriguez RN  Body Position: supine, head elevated  Skin Protection: incontinence pads utilized  Device Skin Pressure Protection:   absorbent pad utilized/changed   adhesive use limited  Intervention: Prevent Infection  Recent Flowsheet Documentation  Taken 6/20/2024 1800 by Deb Rodriguez RN  Infection Prevention:   rest/sleep promoted   single patient room provided  Goal: Optimal Comfort and Wellbeing  Outcome: Progressing  Goal: Readiness for Transition of Care  Outcome: Progressing     Problem: Pain Acute  Goal: Optimal Pain Control and Function  Outcome: Progressing  Intervention: Prevent or Manage Pain  Recent Flowsheet Documentation  Taken 6/20/2024 1800 by Deb Rodriguez RN  Sensory Stimulation Regulation: care clustered  Sleep/Rest Enhancement: family presence promoted  Bowel Elimination Promotion: adequate fluid intake promoted  Medication Review/Management: medications reviewed  Intervention: Optimize Psychosocial Wellbeing  Recent Flowsheet Documentation  Taken 6/20/2024 1800 by Deb Rodriguez RN  Supportive Measures: active listening utilized     Problem: Infection  Goal: Absence of Infection Signs and Symptoms  Outcome: Progressing

## 2024-06-21 NOTE — ANESTHESIA CARE TRANSFER NOTE
Patient: Chelly Dave    Procedure: Procedure(s):  Irrigation and debridement right total knee arthroplasty with polyethylene exchange       Diagnosis: Pyogenic arthritis of right knee joint, due to unspecified organism (H) [M00.9]  Diagnosis Additional Information: No value filed.    Anesthesia Type:   General     Note:    Oropharynx: oropharynx clear of all foreign objects and spontaneously breathing  Level of Consciousness: drowsy  Oxygen Supplementation: face mask  Level of Supplemental Oxygen (L/min / FiO2): 6  Independent Airway: airway patency satisfactory and stable  Dentition: dentition unchanged  Vital Signs Stable: post-procedure vital signs reviewed and stable  Report to RN Given: handoff report given  Patient transferred to: PACU    Handoff Report: Identifed the Patient, Identified the Reponsible Provider, Reviewed the pertinent medical history, Discussed the surgical course, Reviewed Intra-OP anesthesia mangement and issues during anesthesia, Set expectations for post-procedure period and Allowed opportunity for questions and acknowledgement of understanding      Vitals:  Vitals Value Taken Time   /61 06/21/24 1010   Temp     Pulse 92 06/21/24 1010   Resp 14 06/21/24 1011   SpO2 100 % 06/21/24 1011   Vitals shown include unfiled device data.    Electronically Signed By: JAYME Ramirez CRNA  June 21, 2024  10:12 AM

## 2024-06-21 NOTE — ANESTHESIA PROCEDURE NOTES
Airway       Patient location during procedure: OR  Staff -        Anesthesiologist:  Manjula Jimenez MD       CRNA: Shanique Chavez APRN CRNA       Performed By: anesthesiologist and CRNA  Consent for Airway        Urgency: elective  Indications and Patient Condition       Indications for airway management: sridhar-procedural       Induction type:intravenous       Mask difficulty assessment: 1 - vent by mask    Final Airway Details       Final airway type: supraglottic airway    Supraglottic Airway Details        Type: LMA       Brand: I-Gel       LMA size: 4    Post intubation assessment        Placement verified by: capnometry, equal breath sounds and chest rise        Number of attempts at approach: 1       Number of other approaches attempted: 0       Secured with: commercial tube medina       Ease of procedure: easy       Dentition: Unchanged (upper dentures removed, pt reported pain, visual inspection shows PRIOR  large right upper gum hole/ulcer and bleeding)

## 2024-06-21 NOTE — ANESTHESIA POSTPROCEDURE EVALUATION
Patient: Chelly Dave    Procedure: Procedure(s):  Irrigation and debridement right total knee arthroplasty with polyethylene exchange       Anesthesia Type:  General    Note:  Disposition: Inpatient   Postop Pain Control: Uneventful            Sign Out: Well controlled pain   PONV: No   Neuro/Psych: Uneventful            Sign Out: Acceptable/Baseline neuro status   Airway/Respiratory: Uneventful            Sign Out: Acceptable/Baseline resp. status   CV/Hemodynamics: Uneventful            Sign Out: Acceptable CV status; No obvious hypovolemia; No obvious fluid overload   Other NRE:    DID A NON-ROUTINE EVENT OCCUR? No           Last vitals:  Vitals Value Taken Time   /74 06/21/24 1135   Temp 96.8  F (36  C) 06/21/24 1100   Pulse 87 06/21/24 1138   Resp 8 06/21/24 1138   SpO2 98 % 06/21/24 1139   Vitals shown include unfiled device data.    Electronically Signed By: Manjula Jimenez MD  June 21, 2024  2:56 PM

## 2024-06-21 NOTE — PROGRESS NOTES
Arrived to room 641 from PACU via cart, transferred to bed via hover mat without difficulty. dressing to right knee CDI, CMS intact, IV patent and infusing. Prevena foam intact.  Frequent VS started. Family at bedside. Will continue monitoring.

## 2024-06-21 NOTE — PLAN OF CARE
"Pt is alert and confused. Pt denies any pain. Respiration unlabored and pt on room air.    Purewick draining, pt incontinent of urine and care completed. Bladder scanned for 242 ml.  Bedrest and NPO maintained.    Right knee warm to touch with mild swelling.     IV Abx Rocephin administered.    CHG wipes completed for surgery this AM. Pt transported off the unit to PACU at 0642hrs.    Plan: I&D at 0730hrs.    /67 (BP Location: Right arm)   Pulse 88   Temp 97.7  F (36.5  C) (Temporal)   Resp 18   Ht 1.56 m (5' 1.42\")   Wt 61.5 kg (135 lb 9.3 oz)   SpO2 97%   BMI 25.27 kg/m       Problem: Adult Inpatient Plan of Care  Goal: Plan of Care Review  Description: The Plan of Care Review/Shift note should be completed every shift.  The Outcome Evaluation is a brief statement about your assessment that the patient is improving, declining, or no change.  This information will be displayed automatically on your shift  note.  Outcome: Progressing  Flowsheets (Taken 6/21/2024 0500)  Outcome Evaluation: I & D today @ 0730hrs  Plan of Care Reviewed With: patient  Overall Patient Progress: no change  Goal: Patient-Specific Goal (Individualized)  Description: You can add care plan individualizations to a care plan. Examples of Individualization might be:  \"Parent requests to be called daily at 9am for status\", \"I have a hard time hearing out of my right ear\", or \"Do not touch me to wake me up as it startles  me\".  Outcome: Progressing  Goal: Absence of Hospital-Acquired Illness or Injury  Outcome: Progressing  Intervention: Identify and Manage Fall Risk  Recent Flowsheet Documentation  Taken 6/21/2024 0500 by Troy Land RN  Safety Promotion/Fall Prevention: safety round/check completed  Taken 6/21/2024 0400 by Troy Land RN  Safety Promotion/Fall Prevention: safety round/check completed  Taken 6/21/2024 0200 by Troy Land RN  Safety Promotion/Fall Prevention: safety round/check completed  Taken " 6/21/2024 0155 by Troy Land RN  Safety Promotion/Fall Prevention:   safety round/check completed   lighting adjusted   assistive device/personal items within reach   clutter free environment maintained  Taken 6/21/2024 0100 by Troy Land RN  Safety Promotion/Fall Prevention: safety round/check completed  Taken 6/21/2024 0000 by Troy Land RN  Safety Promotion/Fall Prevention: safety round/check completed  Taken 6/20/2024 2300 by Troy Land RN  Safety Promotion/Fall Prevention: safety round/check completed  Intervention: Prevent Skin Injury  Recent Flowsheet Documentation  Taken 6/21/2024 0155 by Troy Land RN  Body Position:   position changed independently   weight shifting  Intervention: Prevent and Manage VTE (Venous Thromboembolism) Risk  Recent Flowsheet Documentation  Taken 6/21/2024 0155 by Troy Land RN  VTE Prevention/Management: SCDs (sequential compression devices) off  Intervention: Prevent Infection  Recent Flowsheet Documentation  Taken 6/21/2024 0155 by Troy Land RN  Infection Prevention:   single patient room provided   rest/sleep promoted   hand hygiene promoted  Goal: Optimal Comfort and Wellbeing  Outcome: Progressing  Goal: Readiness for Transition of Care  Outcome: Progressing     Problem: Pain Acute  Goal: Optimal Pain Control and Function  Outcome: Progressing  Intervention: Prevent or Manage Pain  Recent Flowsheet Documentation  Taken 6/21/2024 0155 by Troy Land RN  Medication Review/Management: medications reviewed     Problem: Infection  Goal: Absence of Infection Signs and Symptoms  Outcome: Progressing   Goal Outcome Evaluation:      Plan of Care Reviewed With: patient    Overall Patient Progress: no changeOverall Patient Progress: no change    [facility]

## 2024-06-21 NOTE — OP NOTE
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 General    Complications:  None    Estimated blood loss:   50cc     Transfusions:  None    Fluids:  Per anesthesia    Specimens:  None    Drain:  None      Indications:   Chelly is a pleasant 89-year-old female with a history of dementia who presented to [facility] on June 19, 2024 for evaluation of new and increasing right knee pain.  She has a history of right total knee arthroplasty performed in 2008.  History is obtained from the patient's daughter and granddaughter given her diagnosis of dementia.  At baseline, her right knee was functioning well.  She has no history of right knee infection.  She lives with her granddaughter and was able to ambulate independently with a walker.  Then, on June 14, she started to complain of new and increasing right knee pain.  She was eventually brought to the [facility] [place] emergency department where a right knee aspiration was performed.  The aspiration was significant for cell count over 9000 with 95% neutrophils.  Cultures are still pending.  Blood cultures are pending.  Given the patient's new and increased pain as well as the cell count, this is consistent with an acute hematogenous prosthetic joint infection.  I discussed the risks and benefits of both operative and nonoperative management with the patient and her daughter.  I discussed the pros and cons of both irrigation and debridement with modular exchange and two-stage exchange.  Given this is an acute infection in a previously well functioning knee with symptoms that  began 1 week ago, as well as the patient's underlying comorbidities, I recommend proceeding with irrigation and debridement with modular exchange.  She has been cleared for surgery by the hospitalist team.  She has been n.p.o. since midnight.            Operative note from 2008 was reviewed.  Sima II size 4 PS femur, size 3 tibia, 11 mm PS insert.      Informed Consent:  Preoperatively, the risks, benefits, and possible complications of the procedure were discussed. The risks discussed included but were not limited to wear, loosening, infection, neurovascular damage, thromboembolic disease, foot drop, limb length inequality, persistent pain, lateral thigh numbness, wound healing complications, bleeding, transfusion, stiffness and dislocation. All of the questions were satisfactorily answered and the patient wished to proceed with joint replacement surgery to improve their lifestyle and reduce their pain.       Components:  [employer]hew 12mm PS high flex polyethylene      Procedural Pause:   The patient's correct identity, side, site, and procedure to be performed was verified.  Intravenous antibiotics were administered prior to skin incision.    Description of Procedure:   Chelly Dave was brought to the operating room.  General anesthesia was induced.  She was subsequent transferred to the operating room table.  All bony prominences were well-padded.  A nonsterile thigh tourniquet was placed.  The right lower extremities prepped and draped in routine sterile fashion.  A procedural pause was performed as described above.  The tourniquet was inflated to 250 mmHg. The patient's previous right knee incision was excised and sharp dissection was carried down to the level of the quadriceps tendon and capsule.  A medial parapatellar arthrotomy was performed.  A small amount of purulent fluid was encountered.  A medial release was performed.  The medial and lateral gutters were developed and a synovectomy was  performed.  The 11 mm PS polyethylene was removed.  3 tissue cultures were obtained. I then proceeded to irrigate the knee with 3 L normal saline followed by 1 L of back to sure.  I then removed the Ethibond sutures from the arthrotomy.  I debrided any additional necrotic appearing tissue.  I then irrigated the knee with 3 L of normal saline followed by 500 cc of Betadine saline solution.  I again looked for any additional tissue to debride.  I then irrigated the wound with 2 L normal saline.  I then trialed with a 12 mm PS polyethylene.  The 12 mm insert provided adequate range of motion and stability.  Therefore, I proceeded to insert a new 12 mm High Flex PS polyethylene.  The wound was irrigated then with the remaining 1 L normal saline.  I then proceeded to closure.  The tourniquet was deflated after 57 minutes.  The arthrotomy was closed with #1 PDS and #1 strata fix.  The subcutaneous layers were closed with 2-0 Monocryl and the skin was closed with staples.  A Prevena incisional wound VAC was placed over the wound.  The patient woke up from anesthesia without issue.  All sponge and needle counts were correct.      Post-operative Plan:  Activity: Weightbearing as tolerated  Antibiotics: Continue vancomycin and ceftriaxone.  Consult infectious disease.  DVT: Aspirin 81 mg twice daily  Wound: Prevena incisional wound VAC for 14 days  Disposition: Hospitalist      Billy Tracy MD  [facility]  Phone: [phone]  Fax: [phone]

## 2024-06-21 NOTE — PLAN OF CARE
"Goal Outcome Evaluation:      Plan of Care Reviewed With: patient, child    Overall Patient Progress: improvingOverall Patient Progress: improving    Outcome Evaluation: patient is Alert, confused. VSS, on 1 L oxygen. prevena foam intact. Dressing CDI. TOLERATED diet. Per PACU nurse they noted mouth bleeding when denture removed- provider notified. Scheduled Tylenol for pain. VOIDING OK. Will continue monitoring.     Patient is alert, confused. Family at bedside  Problem: Adult Inpatient Plan of Care  Goal: Plan of Care Review  Description: The Plan of Care Review/Shift note should be completed every shift.  The Outcome Evaluation is a brief statement about your assessment that the patient is improving, declining, or no change.  This information will be displayed automatically on your shift  note.  Outcome: Progressing  Flowsheets (Taken 6/21/2024 1501)  Outcome Evaluation: patient is Alert, confused. VSS, on 1 L oxygen.  Plan of Care Reviewed With:   patient   child  Overall Patient Progress: improving  Goal: Patient-Specific Goal (Individualized)  Description: You can add care plan individualizations to a care plan. Examples of Individualization might be:  \"Parent requests to be called daily at 9am for status\", \"I have a hard time hearing out of my right ear\", or \"Do not touch me to wake me up as it startles  me\".  Outcome: Progressing  Goal: Absence of Hospital-Acquired Illness or Injury  Outcome: Progressing  Intervention: Identify and Manage Fall Risk  Recent Flowsheet Documentation  Taken 6/21/2024 1443 by Angie Hare, RN  Safety Promotion/Fall Prevention:   safety round/check completed   supervised activity   clutter free environment maintained   lighting adjusted   nonskid shoes/slippers when out of bed   room near nurse's station  Intervention: Prevent Skin Injury  Recent Flowsheet Documentation  Taken 6/21/2024 1443 by Angie Hare, RN  Body Position: supine, head elevated  Skin Protection: " incontinence pads utilized  Device Skin Pressure Protection:   absorbent pad utilized/changed   adhesive use limited  Intervention: Prevent and Manage VTE (Venous Thromboembolism) Risk  Recent Flowsheet Documentation  Taken 6/21/2024 1443 by Angie Hare RN  VTE Prevention/Management: SCDs (sequential compression devices) off  Intervention: Prevent Infection  Recent Flowsheet Documentation  Taken 6/21/2024 1443 by Angie Hare RN  Infection Prevention:   single patient room provided   rest/sleep promoted   hand hygiene promoted   cohorting utilized  Goal: Optimal Comfort and Wellbeing  Outcome: Progressing  Goal: Readiness for Transition of Care  Outcome: Progressing     Problem: Pain Acute  Goal: Optimal Pain Control and Function  Outcome: Progressing  Intervention: Prevent or Manage Pain  Recent Flowsheet Documentation  Taken 6/21/2024 1443 by Angie Hare RN  Sensory Stimulation Regulation: care clustered  Sleep/Rest Enhancement: family presence promoted  Bowel Elimination Promotion: adequate fluid intake promoted  Medication Review/Management: medications reviewed  Intervention: Optimize Psychosocial Wellbeing  Recent Flowsheet Documentation  Taken 6/21/2024 1443 by Angie Hare RN  Supportive Measures: active listening utilized     Problem: Infection  Goal: Absence of Infection Signs and Symptoms  Outcome: Progressing  Intervention: Prevent or Manage Infection  Recent Flowsheet Documentation  Taken 6/21/2024 1443 by Angie Hare RN  Infection Management: aseptic technique maintained     Problem: Knee Arthroplasty  Goal: Optimal Coping  Outcome: Progressing  Intervention: Support Psychosocial Response to Surgery and Mobility Changes  Recent Flowsheet Documentation  Taken 6/21/2024 1443 by Angie Hare RN  Supportive Measures: active listening utilized  Goal: Absence of Bleeding  Outcome: Progressing  Goal: Effective Bowel Elimination  Outcome: Progressing  Goal: Fluid and Electrolyte  Balance  Outcome: Progressing  Intervention: Monitor and Manage Fluid and Electrolyte Balance  Recent Flowsheet Documentation  Taken 6/21/2024 1443 by Angie Hare RN  Fluid/Electrolyte Management: fluids provided  Goal: Optimal Functional Ability  Outcome: Progressing  Intervention: Promote Optimal Functional Status  Recent Flowsheet Documentation  Taken 6/21/2024 1443 by Angie Hare RN  Activity Management: activity adjusted per tolerance  Goal: Absence of Infection Signs and Symptoms  Outcome: Progressing  Intervention: Prevent or Manage Infection  Recent Flowsheet Documentation  Taken 6/21/2024 1443 by Angie Hare RN  Infection Management: aseptic technique maintained  Goal: Intact Neurovascular Status  Outcome: Progressing  Goal: Anesthesia/Sedation Recovery  Outcome: Progressing  Intervention: Optimize Anesthesia Recovery  Recent Flowsheet Documentation  Taken 6/21/2024 1443 by Angie Hare RN  Safety Promotion/Fall Prevention:   safety round/check completed   supervised activity   clutter free environment maintained   lighting adjusted   nonskid shoes/slippers when out of bed   room near nurse's station  Goal: Optimal Pain Control and Function  Outcome: Progressing  Goal: Nausea and Vomiting Relief  Outcome: Progressing  Goal: Effective Urinary Elimination  Outcome: Progressing  Goal: Effective Oxygenation and Ventilation  Outcome: Progressing  Intervention: Optimize Oxygenation and Ventilation  Recent Flowsheet Documentation  Taken 6/21/2024 1443 by Angie Hare RN  Head of Bed (HOB) Positioning: HOB at 20-30 degrees

## 2024-06-21 NOTE — PROGRESS NOTES
[facility]    Medicine Progress Note - Hospitalist Service    Date of Admission:  6/19/2024    Assessment & Plan   Chelly Dave is a 89 year old female admitted on 6/19/2024.  Patient was seen with 2 daughters at the bedside who provided translation.  Communication with the patient is limited due to language barrier (speaks Estonian) and severe dementia. Over the last 3 to 4 days she has been having worsening right knee pain and swelling.  Unable to bear weight.  No reported fever but she has been more confused over the last week. Was seen in urgent care where x-ray of bilateral knee was done.  Right knee post replacement with moderate knee effusion.  Left knee showed osteoarthritis.  Ultrasound of the right lower extremity was negative for DVT.      CT of the right knee without contrast showed knee arthroplasty without evidence of loosening or fracture and small knee joint effusion. Seen by orthopedic surgery, went to OR for I&D and modular exchange. Continuing IV antibiotics. Will consult ID as cultures initially growing GPC, will likely need chronic therapy since full exchange not done.      Septic arthritis s/p I&D and modular exchange 6/21  Right knee pain  Arthrocentesis yielded cloudy synovial fluid with cell count 9000, but 95% neutrophilic predominance. Growing GPCs. Started on vancomycin and ceftriaxone for now.  Orthopedic surgery consulted, took patient to OR for I&D with modular exchange.  Tolerated surgery well, wound VAC placed.  Given the synovial fluid is growing GPC's, will consult infectious diseases patient will likely need chronic suppressive therapy after acute infectious treatment given that full hardware exchange was not done.  - Orthopedic surgery consulted, appreciate assistance  - Continue ceftriaxone, vancomycin for now  - Follow-up blood cultures, joint aspirate cultures, intraoperative cultures  - Consulting infectious disease  - WOC consult for wound  "VAC    Depression/anxiety: Resume Seroquel, trazodone and fluoxetine.  Dementia: Prior to admission on Namenda, continued.  Hyperlipidemia: Can stop Lipitor as it is of no benefit at her age.  Diabetes: Does not appear to be on any diabetes medication.    Sjogren's disease: Continue PTA azathioprine, pilocarpine.        Diet: Advance Diet as Tolerated: Regular Diet Adult    DVT Prophylaxis: Enoxaparin (Lovenox) SQ  Quiroz Catheter: Not present  Lines: None     Cardiac Monitoring: None  Code Status: No CPR- Do NOT Intubate      Clinically Significant Risk Factors                                 # Overweight: Estimated body mass index is 25.27 kg/m  as calculated from the following:    Height as of this encounter: 1.56 m (5' 1.42\").    Weight as of this encounter: 61.5 kg (135 lb 9.3 oz)., PRESENT ON ADMISSION            Disposition Plan     Medically Ready for Discharge: Anticipated in 2-4 Days         Amilcar Bashir MD  Hospitalist Service  [facility]  Securely message with [employer] (more info)  Text page via [employer] Paging/Directory   ______________________________________________________________________    Interval History   No acute events overnight.  Hemodynamically stable.  Brought to the OR this morning for I&D and modular exchange.  Tolerated surgery well, doing okay on floor after surgery.    Physical Exam   Vital Signs: Temp: 97.5  F (36.4  C) Temp src: Temporal BP: (!) 145/67 Pulse: 98   Resp: 14 SpO2: 97 % O2 Device: Nasal cannula Oxygen Delivery: 1 LPM  Weight: 135 lbs 9.33 oz    General Appearance: Lying in bed, sleepy following surgery at the time of my exam.    Respiratory: Clear to auscultation.  Normal work of breathing.  Cardiovascular: S1 and S2 was normal.  RRR.  GI: Soft and nontender  Skin: No rash  Extremity:  With wound vac in place.    Medical Decision Making       45 MINUTES SPENT BY ME on the date of service doing chart review, history, exam, documentation & further " activities per the note.      Data     I have personally reviewed the following data over the past 24 hrs:    N/A  \   N/A   / N/A     N/A N/A N/A /  120 (H)   N/A N/A 0.79 \     Procal: N/A CRP: 45.86 (H) Lactic Acid: N/A         Imaging results reviewed over the past 24 hrs:   Recent Results (from the past 24 hour(s))   XR Knee Port Right 1/2 Views    Narrative    XR KNEE PORT RIGHT 1/2 VIEWS   6/21/2024 10:46 AM     HISTORY: Post-Op Total Knee  COMPARISON: None.       Impression    IMPRESSION: There are postoperative changes from right total knee  arthroplasty, in standard alignment. No periprosthetic lucency or  fracture. There is postoperative gas in the soft tissues.    VALERIA MORROW MD         SYSTEM ID:  [id number]

## 2024-06-22 ENCOUNTER — APPOINTMENT (OUTPATIENT)
Dept: PHYSICAL THERAPY | Facility: CLINIC | Age: 89
DRG: 464 | End: 2024-06-22
Payer: COMMERCIAL

## 2024-06-22 LAB
ANION GAP SERPL CALCULATED.3IONS-SCNC: 10 MMOL/L (ref 7–15)
BUN SERPL-MCNC: 11.8 MG/DL (ref 8–23)
CALCIUM SERPL-MCNC: 8.1 MG/DL (ref 8.8–10.2)
CHLORIDE SERPL-SCNC: 100 MMOL/L (ref 98–107)
CREAT SERPL-MCNC: 0.75 MG/DL (ref 0.51–0.95)
CRP SERPL-MCNC: 72.8 MG/L
DEPRECATED HCO3 PLAS-SCNC: 24 MMOL/L (ref 22–29)
EGFRCR SERPLBLD CKD-EPI 2021: 76 ML/MIN/1.73M2
ERYTHROCYTE [DISTWIDTH] IN BLOOD BY AUTOMATED COUNT: 12.5 % (ref 10–15)
GLUCOSE SERPL-MCNC: 173 MG/DL (ref 70–99)
HCT VFR BLD AUTO: 31.3 % (ref 35–47)
HGB BLD-MCNC: 9.6 G/DL (ref 11.7–15.7)
MCH RBC QN AUTO: 30.6 PG (ref 26.5–33)
MCHC RBC AUTO-ENTMCNC: 30.7 G/DL (ref 31.5–36.5)
MCV RBC AUTO: 100 FL (ref 78–100)
PLATELET # BLD AUTO: 254 10E3/UL (ref 150–450)
POTASSIUM SERPL-SCNC: 4.2 MMOL/L (ref 3.4–5.3)
RBC # BLD AUTO: 3.14 10E6/UL (ref 3.8–5.2)
SODIUM SERPL-SCNC: 134 MMOL/L (ref 135–145)
WBC # BLD AUTO: 7.2 10E3/UL (ref 4–11)

## 2024-06-22 PROCEDURE — 120N000001 HC R&B MED SURG/OB

## 2024-06-22 PROCEDURE — 250N000013 HC RX MED GY IP 250 OP 250 PS 637: Performed by: STUDENT IN AN ORGANIZED HEALTH CARE EDUCATION/TRAINING PROGRAM

## 2024-06-22 PROCEDURE — 86140 C-REACTIVE PROTEIN: CPT | Performed by: PHYSICIAN ASSISTANT

## 2024-06-22 PROCEDURE — 258N000003 HC RX IP 258 OP 636: Mod: JZ | Performed by: INTERNAL MEDICINE

## 2024-06-22 PROCEDURE — 85027 COMPLETE CBC AUTOMATED: CPT | Performed by: STUDENT IN AN ORGANIZED HEALTH CARE EDUCATION/TRAINING PROGRAM

## 2024-06-22 PROCEDURE — 97161 PT EVAL LOW COMPLEX 20 MIN: CPT | Mod: GP

## 2024-06-22 PROCEDURE — 36415 COLL VENOUS BLD VENIPUNCTURE: CPT | Performed by: STUDENT IN AN ORGANIZED HEALTH CARE EDUCATION/TRAINING PROGRAM

## 2024-06-22 PROCEDURE — 250N000013 HC RX MED GY IP 250 OP 250 PS 637: Performed by: ORTHOPAEDIC SURGERY

## 2024-06-22 PROCEDURE — 250N000012 HC RX MED GY IP 250 OP 636 PS 637: Performed by: STUDENT IN AN ORGANIZED HEALTH CARE EDUCATION/TRAINING PROGRAM

## 2024-06-22 PROCEDURE — 97530 THERAPEUTIC ACTIVITIES: CPT | Mod: GP

## 2024-06-22 PROCEDURE — 93010 ELECTROCARDIOGRAM REPORT: CPT | Performed by: INTERNAL MEDICINE

## 2024-06-22 PROCEDURE — 99232 SBSQ HOSP IP/OBS MODERATE 35: CPT | Performed by: STUDENT IN AN ORGANIZED HEALTH CARE EDUCATION/TRAINING PROGRAM

## 2024-06-22 PROCEDURE — 250N000011 HC RX IP 250 OP 636: Mod: JZ | Performed by: INTERNAL MEDICINE

## 2024-06-22 PROCEDURE — 99255 IP/OBS CONSLTJ NEW/EST HI 80: CPT | Performed by: SPECIALIST

## 2024-06-22 PROCEDURE — 80048 BASIC METABOLIC PNL TOTAL CA: CPT | Performed by: STUDENT IN AN ORGANIZED HEALTH CARE EDUCATION/TRAINING PROGRAM

## 2024-06-22 RX ADMIN — DOXEPIN HYDROCHLORIDE 10 MG: 10 CAPSULE ORAL at 21:28

## 2024-06-22 RX ADMIN — SENNOSIDES AND DOCUSATE SODIUM 1 TABLET: 50; 8.6 TABLET ORAL at 08:24

## 2024-06-22 RX ADMIN — MEMANTINE 5 MG: 5 TABLET ORAL at 22:39

## 2024-06-22 RX ADMIN — CEFTRIAXONE 2 G: 2 INJECTION, POWDER, FOR SOLUTION INTRAMUSCULAR; INTRAVENOUS at 01:18

## 2024-06-22 RX ADMIN — PILOCARPINE HYDROCHLORIDE 5 MG: 5 TABLET, FILM COATED ORAL at 12:10

## 2024-06-22 RX ADMIN — ASPIRIN 81 MG: 81 TABLET, COATED ORAL at 08:26

## 2024-06-22 RX ADMIN — CARBOXYMETHYLCELLULOSE SODIUM 1 DROP: 5 SOLUTION/ DROPS OPHTHALMIC at 08:24

## 2024-06-22 RX ADMIN — METHOCARBAMOL 500 MG: 500 TABLET ORAL at 21:27

## 2024-06-22 RX ADMIN — ASPIRIN 81 MG: 81 TABLET, COATED ORAL at 21:27

## 2024-06-22 RX ADMIN — SODIUM CHLORIDE 500 ML: 9 INJECTION, SOLUTION INTRAVENOUS at 23:38

## 2024-06-22 RX ADMIN — PILOCARPINE HYDROCHLORIDE 5 MG: 5 TABLET, FILM COATED ORAL at 08:26

## 2024-06-22 RX ADMIN — FLUOXETINE 20 MG: 20 CAPSULE ORAL at 08:26

## 2024-06-22 RX ADMIN — PILOCARPINE HYDROCHLORIDE 5 MG: 5 TABLET, FILM COATED ORAL at 21:26

## 2024-06-22 RX ADMIN — ATORVASTATIN CALCIUM 20 MG: 20 TABLET, FILM COATED ORAL at 21:28

## 2024-06-22 RX ADMIN — ACETAMINOPHEN 975 MG: 325 TABLET, FILM COATED ORAL at 21:28

## 2024-06-22 RX ADMIN — ACETAMINOPHEN 975 MG: 325 TABLET, FILM COATED ORAL at 04:04

## 2024-06-22 RX ADMIN — POLYETHYLENE GLYCOL 3350 17 G: 17 POWDER, FOR SOLUTION ORAL at 08:23

## 2024-06-22 RX ADMIN — QUETIAPINE FUMARATE 25 MG: 25 TABLET ORAL at 21:28

## 2024-06-22 RX ADMIN — MEMANTINE 5 MG: 5 TABLET ORAL at 08:26

## 2024-06-22 RX ADMIN — METHOCARBAMOL 500 MG: 500 TABLET ORAL at 09:59

## 2024-06-22 RX ADMIN — TRAZODONE HYDROCHLORIDE 25 MG: 50 TABLET ORAL at 21:28

## 2024-06-22 RX ADMIN — CARBOXYMETHYLCELLULOSE SODIUM 1 DROP: 5 SOLUTION/ DROPS OPHTHALMIC at 21:28

## 2024-06-22 RX ADMIN — ACETAMINOPHEN 975 MG: 325 TABLET, FILM COATED ORAL at 12:10

## 2024-06-22 RX ADMIN — HYDROXYZINE HYDROCHLORIDE 10 MG: 10 TABLET, FILM COATED ORAL at 21:27

## 2024-06-22 RX ADMIN — SENNOSIDES AND DOCUSATE SODIUM 1 TABLET: 50; 8.6 TABLET ORAL at 21:26

## 2024-06-22 RX ADMIN — AZATHIOPRINE 50 MG: 50 TABLET ORAL at 08:26

## 2024-06-22 RX ADMIN — QUETIAPINE FUMARATE 25 MG: 25 TABLET ORAL at 08:24

## 2024-06-22 RX ADMIN — PANTOPRAZOLE SODIUM 40 MG: 40 TABLET, DELAYED RELEASE ORAL at 08:24

## 2024-06-22 ASSESSMENT — ACTIVITIES OF DAILY LIVING (ADL)
ADLS_ACUITY_SCORE: 34.25
ADLS_ACUITY_SCORE: 34.25
ADLS_ACUITY_SCORE: 33.25
ADLS_ACUITY_SCORE: 34.25
ADLS_ACUITY_SCORE: 33.25
ADLS_ACUITY_SCORE: 34.25
ADLS_ACUITY_SCORE: 34.25
ADLS_ACUITY_SCORE: 33.25
ADLS_ACUITY_SCORE: 34.25
ADLS_ACUITY_SCORE: 33.25
ADLS_ACUITY_SCORE: 34.25
ADLS_ACUITY_SCORE: 33.25
DEPENDENT_IADLS:: CLEANING;COOKING;LAUNDRY;SHOPPING;MEAL PREPARATION;MEDICATION MANAGEMENT;MONEY MANAGEMENT;TRANSPORTATION;INCONTINENCE
ADLS_ACUITY_SCORE: 34.25
ADLS_ACUITY_SCORE: 34.25
ADLS_ACUITY_SCORE: 33.25
ADLS_ACUITY_SCORE: 34.25

## 2024-06-22 NOTE — PROGRESS NOTES
[facility]    Medicine Progress Note - Hospitalist Service    Date of Admission:  6/19/2024    Assessment & Plan   Chelly Dave is a 89 year old female admitted on 6/19/2024.  Patient was seen with 2 daughters at the bedside who provided translation.  Communication with the patient is limited due to language barrier (speaks Malagasy) and severe dementia. Over the last 3 to 4 days she has been having worsening right knee pain and swelling.  Unable to bear weight.  No reported fever but she has been more confused over the last week. Was seen in urgent care where x-ray of bilateral knee was done.  Right knee post replacement with moderate knee effusion.  Left knee showed osteoarthritis.  Ultrasound of the right lower extremity was negative for DVT.      CT of the right knee without contrast showed knee arthroplasty without evidence of loosening or fracture and small knee joint effusion. Seen by orthopedic surgery, went to OR for I&D and modular exchange. Continuing IV antibiotics. Will consult ID as cultures initially growing GPC, will likely need chronic therapy since full exchange not done.      Septic arthritis s/p I&D and modular exchange 6/21, strep gordonii  Right knee pain  Arthrocentesis yielded cloudy synovial fluid with cell count 9000, but 95% neutrophilic predominance. Growing GPCs. Started on vancomycin and ceftriaxone for now.  Orthopedic surgery consulted, took patient to OR for I&D with modular exchange.  Tolerated surgery well, wound VAC placed.  Given the synovial fluid is growing GPC's, will consult infectious diseases patient will likely need chronic suppressive therapy after acute infectious treatment given that full hardware exchange was not done.  - Orthopedic surgery consulted, appreciate assistance  - Continue ceftriaxone, vancomycin for now  - Follow-up blood cultures, joint aspirate cultures, intraoperative cultures.  Preliminary intraoperative culture growing strep  "nikoliaii  - Consulting infectious disease  - [facility] consult for wound VAC  -PT/OT consulted, will likely need TCU placement on discharge    Depression/anxiety: Resume Seroquel, trazodone and fluoxetine.  Dementia: Prior to admission on Namenda, continued.  Hyperlipidemia: Can stop Lipitor as it is of no benefit at her age.  Diabetes: Does not appear to be on any diabetes medication.    Sjogren's disease: Continue PTA azathioprine, pilocarpine.        Diet: Advance Diet as Tolerated: Regular Diet Adult    DVT Prophylaxis: Enoxaparin (Lovenox) SQ  Quiroz Catheter: Not present  Lines: None     Cardiac Monitoring: None  Code Status: No CPR- Do NOT Intubate      Clinically Significant Risk Factors                                 # Overweight: Estimated body mass index is 25.27 kg/m  as calculated from the following:    Height as of this encounter: 1.56 m (5' 1.42\").    Weight as of this encounter: 61.5 kg (135 lb 9.3 oz)., PRESENT ON ADMISSION            Disposition Plan     Medically Ready for Discharge: Anticipated in 2-4 Days         Amilcar Bashir MD  Hospitalist Service  [facility]  Securely message with [employer] (more info)  Text page via [employer] Paging/Directory   ______________________________________________________________________    Interval History   No acute events overnight.  Pain pretty well-controlled with Tylenol and Robaxin.  Able to get up to chair during the evening, taking p.o. with family assistance with feeding.  Wound VAC in place without issue.    Physical Exam   Vital Signs: Temp: 98.9  F (37.2  C) Temp src: Temporal BP: 130/66 Pulse: 113   Resp: 18 SpO2: 96 % O2 Device: None (Room air) Oxygen Delivery: 1 LPM  Weight: 135 lbs 9.33 oz    General Appearance: Lying in bed, tired but awakens during my exam.    Respiratory: Clear to auscultation.  Normal work of breathing.  Cardiovascular: S1 and S2 was normal.  RRR.  GI: Soft and nontender  Skin: No rash  Extremity: Wound VAC in place " over right knee, neurovascular intact in distal right lower extremity    Medical Decision Making       45 MINUTES SPENT BY ME on the date of service doing chart review, history, exam, documentation & further activities per the note.      Data     I have personally reviewed the following data over the past 24 hrs:    7.2  \   9.6 (L)   / 254     134 (L) 100 11.8 /  173 (H)   4.2 24 0.75 \     Procal: N/A CRP: 72.80 (H) Lactic Acid: N/A         Imaging results reviewed over the past 24 hrs:   No results found for this or any previous visit (from the past 24 hour(s)).

## 2024-06-22 NOTE — CONSULTS
Care Management Initial Consult    General Information  Assessment completed with: Patient, Family, Daughter Cee  Type of CM/SW Visit: Initial Assessment    Primary Care Provider verified and updated as needed: Yes   Readmission within the last 30 days: no previous admission in last 30 days      Reason for Consult: discharge planning, care coordination/care conference  Advance Care Planning:            Communication Assessment  Patient's communication style: spoken language (non-English), spoken language (English or Bilingual)    Hearing Difficulty or Deaf: no   Wear Glasses or Blind: yes    Cognitive  Cognitive/Neuro/Behavioral: .WDL except, orientation  Level of Consciousness: confused  Arousal Level: arouses to voice, arouses to touch/gentle shaking  Orientation: disoriented to, person, place, time  Mood/Behavior: calm  Best Language: 0 - No aphasia  Speech: clear    Living Environment:   People in home: child(tram), adult, grandchild(tram)     Current living Arrangements: house      Able to return to prior arrangements: no       Family/Social Support:  Care provided by: child(tram)  Provides care for: no one, unable/limited ability to care for self  Marital Status:   Children, Other (specify) (grandchildren)          Description of Support System: Involved, Supportive    Support Assessment: Adequate social supports    Current Resources:   Patient receiving home care services: No     Community Resources: None  Equipment currently used at home: cane, straight, walker, rolling  Supplies currently used at home: Wound Care Supplies, Incontinence Supplies             Does the patient's insurance plan have a 3 day qualifying hospital stay waiver?  No    Lifestyle & Psychosocial Needs:  Social Determinants of Health     Food Insecurity: No Food Insecurity (10/6/2022)    [facility]    Food Insecurity     Worried About Running Out of Food in the Last Year: 1    Depression: At risk (10/16/2023)    Received from [facility]    PHQ-2     PHQ-2 TOTAL SCORE: 6   Housing Stability: Low Risk  (10/6/2022)    [facility] from [facility]    Housing Stability     Unable to Pay for Housing in the Last Year: 1   Tobacco Use: Low Risk  (6/21/2024)    Patient History     Smoking Tobacco Use: Never     Smokeless Tobacco Use: Never     Passive Exposure: Not on file   Financial Resource Strain: Low Risk  (10/6/2022)    [facility] from [facility]    Financial Resource Strain     Difficulty of Paying Living Expenses: 3     Difficulty of Paying Living Expenses: Not on file   Alcohol Use: Not on file   Transportation Needs: No Transportation Needs (10/6/2022)    [facility] from [facility]    Transportation Needs     Lack of Transportation (Medical): 1   Physical Activity: Not on file   Interpersonal Safety: Not on file   Stress: Not on file   Social Connections: Socially Integrated (10/6/2022)    Received from [facility]    Social Connections     Frequency of Communication with Friends and Family: 0   Health Literacy: Not on file       Functional Status:  Prior to admission patient needed assistance:   Dependent ADLs:: Ambulation-cane, Ambulation-walker, Bathing, Dressing, Grooming, Incontinence, Positioning, Transfers, Toileting  Dependent IADLs:: Cleaning, Cooking, Laundry, Shopping, Meal Preparation, Medication Management, Money Management, Transportation, Incontinence  Assesssment of Functional Status: Not at  functional baseline                 Additional Information:  Patient admitted for worsening R knee pain and swelling. She was found to have a septic joint and was started on IV antibiotics. She will need 6 weeks of IV antibiotics and also PT recommends TCU.  CM met with patient, grandchildren and daughter Cee by phone at  the bedside. PT recommended TCU. TCU costs , choices and Medicare.gov website reviewed with everyone.They would like to review and make the choices and provide them tomorrow.  Patient resides with family in a house. Family describes that their mother and grandmother is total care at home. She does eat independently.She uses a walker or cane at home to ambulate. Transportation via family or agency and will be determined closer to discharge.  IV benefit check sent as well as family may elect to take their loved one home an have [facility] PT OT RN.  SW will follow up tomorrow for their decisions.  CM will monitor for IV benefit check.    Uma Feliciaon, RN, BSN, CM  Inpatient Care Coordination  [facility]  [phone]

## 2024-06-22 NOTE — PROGRESS NOTES
PROGRESS NOTE    SUBJECTIVE  Chelly Dave is status post right total knee arthroplasty irrigation debridement with polyethylene exchange on June 21, 2024.  She is doing well in PACU.  Resting comfortably.  Wound VAC holding suction.    PHYSICAL EXAM  General: No acute distress. Alert and oriented to person, time and place.  Pulmonary: Breathing comfortably on room air.   Cardiac: Bilateral PT pulses are equal and symmetric. Regular rate and rhythm.   Musculoskeletal: Prevena incisional wound VAC holding suction without any output in the canister.  Neurological: Patient is able to fire quadriceps, tibialis anterior, extensor hallucis longus and gastrocnemius. Sensation intact to light touch in bilateral L3-S1 distributions.       IMAGING  Post-operative radiographs were reviewed and demonstrate components in good position. No evidence of fracture or dislocation.     ASSESSMENT/PLAN  #1 Status post right total knee arthroplasty irrigation debridement with polyethylene exchange on June 21, 2024    Chelly Dave is status post the above procedure.  She is doing well in PACU.  Will consult infectious disease.  She will eventually need a PICC line placed.    Activity: Weightbearing as tolerated. Activity as tolerated. No precautions. Continue to work with PT while in hospital.   Antibiotics: Vancomycin and ceftriaxone  Cultures: Cultures from June 21 thousand 24 are pending  Diet: Advance as tolerated. Goal diet: General.   Dressing: Prevena incisional wound VAC for 14 days  DVT Prophylaxis: Aspirin 81mg BID for 6 weeks  Pain medication: Multimodal pain regimen with ice, elevation, Tylenol 1000mg Q6H scheduled, Celebrex 100mg BID, Oxycodone 5-10mg Q4 PRN    Disposition: Hospitalist

## 2024-06-22 NOTE — PROGRESS NOTES
PROGRESS NOTE    SUBJECTIVE  Chelly Dave is status post right total knee arthroplasty irrigation debridement with polyethylene exchange on June 21, 2024.  She is resting comfortably in her bedside chair this morning.  Cultures from the aspiration on June 21 are growing streptococcus gordonii.  Infectious diseases following and They are treating with ceftriaxone.  She will eventually need a PICC line placed prior to discharge.    PHYSICAL EXAM  General: No acute distress. Alert and oriented to person, time and place.  Pulmonary: Breathing comfortably on room air.   Cardiac: Bilateral PT pulses are equal and symmetric. Regular rate and rhythm.   Musculoskeletal: Prevena incisional wound VAC holding suction without any output in the canister.  Neurological: Patient is able to fire quadriceps, tibialis anterior, extensor hallucis longus and gastrocnemius. Sensation intact to light touch in bilateral L3-S1 distributions.       IMAGING  Post-operative radiographs were reviewed and demonstrate components in good position. No evidence of fracture or dislocation.     ASSESSMENT/PLAN  #1 Status post right total knee arthroplasty irrigation debridement with polyethylene exchange on June 21, 2024    Chelly Dave is status post the above procedure.  Cultures are growing Streptococcus gordonii.  She is on IV ceftriaxone.    Activity: Weightbearing as tolerated. Activity as tolerated. No precautions. Continue to work with PT while in hospital.   Antibiotics: Ceftriaxone.  PICC line placed prior to discharge.  Cultures: Cultures from June 21 aspiration are growing Streptococcus gordonii.   Diet: Advance as tolerated. Goal diet: General.   Dressing: Prevena incisional wound VAC for 14 days  DVT Prophylaxis: Aspirin 81mg BID for 6 weeks  Pain medication: Multimodal pain regimen with ice, elevation, Tylenol 1000mg Q6H scheduled, Celebrex 100mg BID, Oxycodone 5-10mg Q4 PRN    Disposition: Hospitalist

## 2024-06-22 NOTE — PLAN OF CARE
"Goal Outcome Evaluation:      Plan of Care Reviewed With: patient    Overall Patient Progress: improvingOverall Patient Progress: improving    Patient is alert to self,confused. Family at bedside. Pain controlled with scheduled Tylenol, and PRN Robaxin. ACE wrap CDI to R knee. Up in a chair during the shift. Tolerated puree diet, family, and staff assisted in feeding pt. Wound vac prevena foam intact. PT foll,  ID consulted.  On IV Abx. Pt voiding w/o difficulty. Miralax, and senna given for constipation,  provider paged for MOM order. Discharge plan to TCU 2-4 days.     Problem: Adult Inpatient Plan of Care  Goal: Plan of Care Review  Description: The Plan of Care Review/Shift note should be completed every shift.  The Outcome Evaluation is a brief statement about your assessment that the patient is improving, declining, or no change.  This information will be displayed automatically on your shift  note.  Outcome: Progressing  Flowsheets (Taken 6/22/2024 0937)  Outcome Evaluation: Discharge plan home today.  Plan of Care Reviewed With: patient  Overall Patient Progress: improving  Goal: Patient-Specific Goal (Individualized)  Description: You can add care plan individualizations to a care plan. Examples of Individualization might be:  \"Parent requests to be called daily at 9am for status\", \"I have a hard time hearing out of my right ear\", or \"Do not touch me to wake me up as it startles  me\".  Outcome: Progressing  Goal: Absence of Hospital-Acquired Illness or Injury  Outcome: Progressing  Intervention: Identify and Manage Fall Risk  Recent Flowsheet Documentation  Taken 6/22/2024 0900 by Angie Hare RN  Safety Promotion/Fall Prevention:   safety round/check completed   supervised activity   clutter free environment maintained   lighting adjusted   nonskid shoes/slippers when out of bed   room near nurse's station  Intervention: Prevent Skin Injury  Recent Flowsheet Documentation  Taken 6/22/2024 0900 by " Angie Hare RN  Body Position: supine, head elevated  Skin Protection: incontinence pads utilized  Device Skin Pressure Protection:   absorbent pad utilized/changed   adhesive use limited  Intervention: Prevent and Manage VTE (Venous Thromboembolism) Risk  Recent Flowsheet Documentation  Taken 6/22/2024 0900 by Angie Hare RN  VTE Prevention/Management: SCDs (sequential compression devices) on  Intervention: Prevent Infection  Recent Flowsheet Documentation  Taken 6/22/2024 0900 by Angie Hare RN  Infection Prevention:   single patient room provided   rest/sleep promoted   hand hygiene promoted   cohorting utilized  Goal: Optimal Comfort and Wellbeing  Outcome: Progressing  Goal: Readiness for Transition of Care  Outcome: Progressing     Problem: Pain Acute  Goal: Optimal Pain Control and Function  Outcome: Progressing  Intervention: Prevent or Manage Pain  Recent Flowsheet Documentation  Taken 6/22/2024 0900 by Angie Hare RN  Bowel Elimination Promotion: adequate fluid intake promoted  Medication Review/Management: medications reviewed     Problem: Infection  Goal: Absence of Infection Signs and Symptoms  Outcome: Progressing  Intervention: Prevent or Manage Infection  Recent Flowsheet Documentation  Taken 6/22/2024 0900 by Angie Hare RN  Infection Management: aseptic technique maintained     Problem: Knee Arthroplasty  Goal: Optimal Coping  Outcome: Progressing  Goal: Absence of Bleeding  Outcome: Progressing  Goal: Effective Bowel Elimination  Outcome: Progressing  Goal: Fluid and Electrolyte Balance  Outcome: Progressing  Intervention: Monitor and Manage Fluid and Electrolyte Balance  Recent Flowsheet Documentation  Taken 6/22/2024 0900 by Angie Hare RN  Fluid/Electrolyte Management: fluids provided  Goal: Optimal Functional Ability  Outcome: Progressing  Intervention: Promote Optimal Functional Status  Recent Flowsheet Documentation  Taken 6/22/2024 0900 by Angie Hare  RN  Assistive Device Utilized: lift device  Activity Management:   activity adjusted per tolerance   activity encouraged  Goal: Absence of Infection Signs and Symptoms  Outcome: Progressing  Intervention: Prevent or Manage Infection  Recent Flowsheet Documentation  Taken 6/22/2024 0900 by Angie Hare RN  Infection Management: aseptic technique maintained  Goal: Intact Neurovascular Status  Outcome: Progressing  Goal: Anesthesia/Sedation Recovery  Outcome: Progressing  Intervention: Optimize Anesthesia Recovery  Recent Flowsheet Documentation  Taken 6/22/2024 0900 by Angie Hare RN  Safety Promotion/Fall Prevention:   safety round/check completed   supervised activity   clutter free environment maintained   lighting adjusted   nonskid shoes/slippers when out of bed   room near nurse's station  Administration (IS): unable to perform  Goal: Optimal Pain Control and Function  Outcome: Progressing  Goal: Nausea and Vomiting Relief  Outcome: Progressing  Goal: Effective Urinary Elimination  Outcome: Progressing  Goal: Effective Oxygenation and Ventilation  Outcome: Progressing  Intervention: Optimize Oxygenation and Ventilation  Recent Flowsheet Documentation  Taken 6/22/2024 0900 by Angie Hare RN  Head of Bed (HOB) Positioning: HOB at 20-30 degrees

## 2024-06-22 NOTE — CONSULTS
[facility]    Infectious Disease Consultation     Date of Admission:  6/19/2024  Date of Consult : 06/22/24    Assessment:  89YF [place]ese female with severe dementia, Sjogren's -on treatment with Azatioprine, and prior remote R total knee arthroplasty, who has been admitted with acute onset right knee pain and swelling  over 3-4 days with inability to bear weight. Synovial fluid analysis was suggestive of infection with 9,159 WBC with PMN predominance, and she is now s/p I&D with polyethylene exchange on 6/21. Synovial fluid shows GPC on stain and operative tissue cxs are growing streptococcus gordonii, likely related to hematogenous seeding.    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  s/p I&D with polyethylene exchange on 6/21  -Sjogren's disease -on Azathioprine  -chronic medical conditions - Dementia, depression, anxiety, hyperlipidemia and diabetes    Recommendations  Follow cx data  Discontinue Vancomycin, treat with Ceftriaxone  Will need IV antibiotics at discharge  Care integration consultation for discharge planning  PICC line placement prior to discharge  IV antibiotics for 6 weeks followed by oral suppressive therapy    Cheyenne Rashid MD    Reason for Consult   Reason for consult: I was asked to evaluate this patient for prosthetic joint infection.    Primary Care Physician   Brittany Heredia    Chief Complaint   Right knee pain, swelling and inability to bear weight    History is obtained from medical records and patient's daughter    History of Present Illness   Chelly Dave is a 89 year old [place]ese female with severe dementia and prior R total knee arthroplasty, who has been admitted with acute onset right knee pain and swelling  over 3-4 days with inability to bear weight. Synovial fluid analysis was suggestive of infection with 9,159 WBC with PMN predominance, and she is now s/p I&D with polyethylene exchange on 6/21.  Synovial fluid shows GPC on stain and operative tissue cxs are growing streptococcus gordonii. She has remained afebrile. Had mild leukopenia on admission which has resolved., has persistently elevated CRP . No fever, chills or sweats.    She has been maintained on ceftriaxone and vancomycin and ID has been asked to assist with antibiotic management.      Antimicrobial therapy  6/20 Vancomycin, ceftriaxone  Past Medical History   I have reviewed this patient's medical history and updated it with pertinent information if needed.   Past Medical History:   Diagnosis Date    Arthritis     Cataract     Diabetes mellitus (H)     Gastro-oesophageal reflux disease     Hyperlipidemia        Past Surgical History   I have reviewed this patient's surgical history and updated it with pertinent information if needed.  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 Taking?   AzaTHIOprine (IMURAN PO) 6/19/2024 at am Pharmacy Yes Yes   Sig: Take 50 mg by mouth daily   Blood Glucose Monitoring Suppl (FIFTY50 GLUCOSE METER 2.0) w/Device KIT   Yes No   Sig: Dispense meter, test strips, lancets covered by pt ins. E11.9 NIDDM type II - Test 1 time/day   Calcium Carb-Cholecalciferol (CALCIUM 500 +D) 500-400 MG-UNIT TABS 6/19/2024 at am Pharmacy Yes Yes   Sig: Take 1 tablet by mouth 2 times daily   Cholecalciferol (VITAMIN D3) 1000 units CAPS 6/19/2024 at am  Yes Yes   Sig: Take 1,000 Units by mouth daily   DOXEPIN HCL PO  6/18/2024 at pm Pharmacy Yes Yes   Sig: Take 10 mg by mouth At Bedtime   FLUoxetine (PROZAC) 20 MG capsule 6/19/2024 at am  Yes Yes   Sig: Take 20 mg by mouth daily   Pilocarpine HCl (SALAGEN PO) 6/19/2024 at am Pharmacy Yes Yes   Sig: Take 5 mg by mouth 3 times daily AM, 1200 & HS   QUEtiapine (SEROQUEL) 25 MG tablet 6/19/2024 at am  Yes Yes   Sig: Take 25 mg by mouth 2 times daily   acetaminophen (TYLENOL) 500 MG tablet 6/19/2024 at am  Yes Yes   Sig: Take 2 tablets by mouth 2 times daily   acetaminophen (TYLENOL) 500 MG tablet Past Week at ?  Yes Yes   Sig: Take 1,000 mg by mouth daily as needed for mild pain   albuterol (PROVENTIL) (2.5 MG/3ML) 0.083% neb solution Past Week at ?  Yes Yes   Sig: Take 2.5 mg by nebulization every 6 hours as needed for shortness of breath, wheezing or cough   aspirin 81 MG EC tablet 6/19/2024 at am Daughter Yes Yes   Sig: Take 81 mg by mouth daily.   atorvastatin (LIPITOR) 40 MG tablet 6/18/2024 at pm  Yes Yes   Sig: Take 20 mg by mouth every evening   blood glucose monitoring (NO BRAND SPECIFIED) test strip   Yes No   Sig: USE TO CHECK BLOOD SUGARS EVERY DAY   blood glucose monitoring (SOFTCLIX) lancets   Yes No   Sig: USE TO TEST BLOOD SUGARS DAILY   cycloSPORINE (RESTASIS) 0.05 % ophthalmic emulsion 6/19/2024 at am  Yes Yes   Sig: Instill 1 drop into both eyes every 12 hours.   No further refills will be given unless you come in for your exam.   ferrous sulfate (IRON) 325 (65 FE) MG tablet 6/19/2024 at am  No Yes   Sig: Take 1 tablet (325 mg) by mouth daily   lidocaine (LIDODERM) 5 % patch Unknown at ? Daughter Yes Yes   Sig: Place 1 patch onto the skin daily as needed for moderate pain (12 hours on; 12 hours off. Patient applies to knees or back)   loratadine (CLARITIN) 10 MG tablet Past Month at ?  Yes Yes   Sig: Take 10 mg by mouth daily as needed   memantine (NAMENDA) 5 MG tablet 6/19/2024 at am  Yes Yes   Sig: Take 5 mg by mouth 2 times daily   pantoprazole (PROTONIX) 40 MG  EC tablet 6/19/2024 at am  Yes Yes   Sig: Take 40 mg by mouth daily   polyethylene glycol (MIRALAX/GLYCOLAX) powder Past Month at ?  Yes Yes   Sig: Take 17 g by mouth daily as needed   polyethylene glycol 400 (BLINK TEARS) 0.25 % SOLN ophthalmic solution Past Week at ? Daughter Yes Yes   Sig: Place 1-2 drops into both eyes every 2 hours as needed for dry eyes   traZODone (DESYREL) 50 MG tablet Past Week at ?  Yes Yes   Sig: Take 25 mg by mouth nightly as needed for sleep      Facility-Administered Medications: None     Allergies   Allergies   Allergen Reactions    Lisinopril     Oxycodone      Other reaction(s): Confusion    Aspirin Buf(Cacarb-Mgcarb-Mgo)      Other reaction(s): GI Upset  81 mg works well for her       Immunization History   Immunization History   Administered Date(s) Administered    COVID-19 MONOVALENT 12+ (Pfizer) 02/09/2021, 03/02/2021, 11/18/2021    COVID-19 Monovalent 12+ (Pfizer 2022) 07/07/2022    Flu 65+ Years 10/27/2017, 10/22/2018, 09/09/2019    Flu, Unspecified 10/18/2001    Influenza (High Dose) 3 valent vaccine 11/03/2015, 10/07/2016    Influenza (IIV3) PF 10/25/2004, 11/10/2005, 10/18/2006, 10/11/2007, 10/01/2008, 10/23/2008, 10/20/2009, 09/27/2010, 09/22/2011, 10/24/2012, 11/11/2013    Influenza Vaccine 65+ (FLUAD) 10/22/2020, 11/23/2021    Influenza, Whole Virus 11/21/2002    Influenza, seasonal, injectable, PF 09/27/2010, 09/22/2011    Pneumo Conj 13-V (2010&after) 03/27/2015    Pneumococcal 23 valent 11/21/2002, 10/01/2005    TDAP (Adacel,Boostrix) 07/02/2012    TDAP Vaccine (Adacel) 02/13/2020    Td (Adult), Adsorbed 04/01/2005, 04/20/2005    Zoster recombinant adjuvanted (SHINGRIX) 10/22/2020    Zoster vaccine, live 07/18/2017       Social History   I have reviewed this patient's social history and updated it with pertinent information if needed. Chelly Garciasamiadavid  reports that she has never smoked. She has never used smokeless tobacco. She reports that she does not drink alcohol  and does not use drugs.    Family History   I have reviewed this patient's family history and updated it with pertinent information if needed.   History reviewed. No pertinent family history.    Review of Systems   Cannot be obtained due to patient factors    Physical Exam   Temp: 98.7  F (37.1  C) Temp src: Temporal BP: 130/66 Pulse: 113   Resp: 18 SpO2: 96 % O2 Device: None (Room air) Oxygen Delivery: 1 LPM  Vital Signs with Ranges  Temp:  [96.8  F (36  C)-99.6  F (37.6  C)] 98.7  F (37.1  C)  Pulse:  [] 113  Resp:  [12-20] 18  BP: (106-146)/(47-79) 130/66  SpO2:  [90 %-100 %] 96 %  135 lbs 9.33 oz  Body mass index is 25.27 kg/m .    GENERAL APPEARANCE:  awake  EYES: Eyes grossly normal to inspection  NECK: supple  RESP: non labored breathing  MS: R knee in surgical dressing, wound vac in place  SKIN: no suspicious lesions or rashes        Data   All laboratory data reviewed  Component      Latest Ref Rng 6/22/2024  6:13 AM   Sodium      135 - 145 mmol/L 134 (L)    Potassium      3.4 - 5.3 mmol/L 4.2    Chloride      98 - 107 mmol/L 100    Carbon Dioxide (CO2)      22 - 29 mmol/L 24    Anion Gap      7 - 15 mmol/L 10    Urea Nitrogen      8.0 - 23.0 mg/dL 11.8    Creatinine      0.51 - 0.95 mg/dL 0.75    GFR Estimate      >60 mL/min/1.73m2 76    Calcium      8.8 - 10.2 mg/dL 8.1 (L)    Glucose      70 - 99 mg/dL 173 (H)    WBC      4.0 - 11.0 10e3/uL 7.2    RBC Count      3.80 - 5.20 10e6/uL 3.14 (L)    Hemoglobin      11.7 - 15.7 g/dL 9.6 (L)    Hematocrit      35.0 - 47.0 % 31.3 (L)    MCV      78 - 100 fL 100    MCH      26.5 - 33.0 pg 30.6    MCHC      31.5 - 36.5 g/dL 30.7 (L)    RDW      10.0 - 15.0 % 12.5    Platelet Count      150 - 450 10e3/uL 254    CRP Inflammation      <5.00 mg/L 72.80 (H)       Component      Latest Ref Rng 6/20/2024  5:04 AM   Procalcitonin      <0.50 ng/mL 0.07      Component      Latest Ref Rng 6/19/2024  11:59 PM   Color Fluid      Colorless, Yellow  Yellow    Appearance  Fluid      Clear  Cloudy !    Cell Count Fluid Source Knee, Right    Total Nucleated Cells      /uL 9,159    % Neutrophils Fluid      % 95    % Lymphocytes Fluid      % 1    % Mono/Macro Fluid      % 4    Glucose Fluid Source Knee, Right    Glucose Fluid      mg/dL 89    Protein Fluid Source Knee, Right    Protein Total Fluid      g/dL 3.6    Crystal Analysis      No clinically significant crystals seen.  No clinically significant crystals seen.         Microbiology  06/21/2024 0833 06/21/2024 0857 Anaerobic Bacterial Culture Routine [[id number]]   Tissue from Knee, Right    In process Component Value   No component results             06/21/2024 0833 06/22/2024 0739 Tissue Aerobic Bacterial Culture Routine [[id number]]   Tissue from Knee, Right    Preliminary result Component Value   Culture No growth, less than 1 day P             06/21/2024 0833 06/21/2024 0857 Anaerobic Bacterial Culture Routine [[id number]]   Tissue from Knee, Right    In process Component Value   No component results             06/21/2024 0833 06/22/2024 0745 Tissue Aerobic Bacterial Culture Routine [[id number]]   Tissue from Knee, Right    Preliminary result Component Value   Culture No growth, less than 1 day P             06/21/2024 0832 06/21/2024 0856 Anaerobic Bacterial Culture Routine [[id number]]   Tissue from Knee, Right    In process Component Value   No component results             06/21/2024 0832 06/22/2024 0914 Tissue Aerobic Bacterial Culture Routine [[id number]]   (Abnormal)   Tissue from Knee, Right    Preliminary result Component Value   Culture Culture in progress P    2+ Streptococcus gordonii Abnormal  P             06/20/2024 0058 06/22/2024 0217 Blood Culture Hand, Left [[id number]]   Blood from Hand, Left    Preliminary result Component Value   Culture No growth after 2 days P             06/20/2024 0000 06/22/2024 0217 Blood Culture Line, venous [[id number]]   Blood from Line, venous    Preliminary  result Component Value   Culture No growth after 2 days P             06/19/2024 2359 06/20/2024 0232 Crystal ID Synovial Fluid [[id number]]   Synovial fluid from Knee, Right    Final result Component Value   Crystals Analysis No clinically significant crystals seen.          06/19/2024 2359 06/20/2024 0104 Cell count with differential fluid [[id number]]    (Abnormal)   Synovial fluid from Knee, Right    Final result Component Value   No component results          06/19/2024 2359 06/20/2024 0051 Glucose fluid [[id number]]    Synovial fluid from Knee, Right    Final result Component Value Units   Glucose Fluid Source Knee, Right    Glucose fluid 89 mg/dL          06/19/2024 2359 06/20/2024 0051 Protein fluid [[id number]]    Synovial fluid from Knee, Right    Final result Component Value Units   Protein Fluid Source Knee, Right    Protein Total Fluid 3.6 g/dL          06/19/2024 2359 06/21/2024 1053 Synovial fluid Aerobic Bacterial Culture Routine With Gram Stain [[id number]]   (Abnormal)   Synovial fluid from Knee, Right    Preliminary result Component Value   Culture Culture in progress P    1+ Gram positive cocci Abnormal  P   Gram Stain Result No organisms seen P    4+ WBC seen P    Predominantly PMNs             06/19/2024 2359 06/20/2024 0047 Cell Count Body Fluid [[id number]]    (Abnormal)   Synovial fluid from Knee, Right    Final result Component Value Units   Color Yellow    Clarity Cloudy Abnormal     Cell Count Fluid Source Knee, Right    Total Nucleated Cells 9,159 /uL          06/19/2024 2359 06/20/2024 0104 Differential Body Fluid [[id number]]    Synovial fluid from Knee, Right    Final result Component Value Units   % Neutrophils 95 %   % Lymphocytes 1 %   % Monocyte/Macrophages 4 %         Imaging  EXAM: CT KNEE RIGHT W/O CONTRAST  LOCATION: [facility]  DATE: 6/19/2024     INDICATION: Right knee pain. Prior total knee arthroplasty. Difficulty  weightbearing.  COMPARISON: None.  TECHNIQUE: Noncontrast. Axial, sagittal and coronal thin-section reconstruction. Dose reduction techniques were used.      FINDINGS:      BONES:  -Right total knee arthroplasty with patellar resurfacing. No findings to suggest loosening. No evidence of a fracture.     SOFT TISSUES:  -Small effusion. No muscle atrophy. No soft tissue edema.                                                                      IMPRESSION:  1.  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     2.  Small knee joint effusion.

## 2024-06-22 NOTE — PLAN OF CARE
"Goal Outcome Evaluation:  Pt is alert to self only. Sleeping between cares. Confused. Not able to follow instructions. Dressing C/D/I. Palpable distal pulses. Denied pain. No n/v. Voiding clear yellow urine via pure wick external catheter. PRN miralax administered for constipation.       Plan of Care Reviewed With: patient, child    Overall Patient Progress: improving       Problem: Adult Inpatient Plan of Care  Goal: Plan of Care Review  Description: The Plan of Care Review/Shift note should be completed every shift.  The Outcome Evaluation is a brief statement about your assessment that the patient is improving, declining, or no change.  This information will be displayed automatically on your shift  note.  Outcome: Progressing  Flowsheets (Taken 6/21/2024 2159)  Outcome Evaluation: Pt is alert to self only. Sleeping between cares. Confused.  Plan of Care Reviewed With:   patient   child  Overall Patient Progress: improving  Goal: Patient-Specific Goal (Individualized)  Description: You can add care plan individualizations to a care plan. Examples of Individualization might be:  \"Parent requests to be called daily at 9am for status\", \"I have a hard time hearing out of my right ear\", or \"Do not touch me to wake me up as it startles  me\".  Outcome: Progressing  Goal: Absence of Hospital-Acquired Illness or Injury  Outcome: Progressing  Intervention: Identify and Manage Fall Risk  Recent Flowsheet Documentation  Taken 6/21/2024 1630 by Kayla Sosa, RN  Safety Promotion/Fall Prevention:   safety round/check completed   supervised activity   clutter free environment maintained   lighting adjusted   nonskid shoes/slippers when out of bed   room near nurse's station  Intervention: Prevent Skin Injury  Recent Flowsheet Documentation  Taken 6/21/2024 1630 by Kayla Sosa, RN  Body Position: supine, head elevated  Skin Protection: incontinence pads utilized  Device Skin Pressure Protection:   absorbent pad " utilized/changed   adhesive use limited  Intervention: Prevent and Manage VTE (Venous Thromboembolism) Risk  Recent Flowsheet Documentation  Taken 6/21/2024 1630 by Kayla Sosa RN  VTE Prevention/Management: SCDs (sequential compression devices) on  Intervention: Prevent Infection  Recent Flowsheet Documentation  Taken 6/21/2024 1630 by Kayla Sosa RN  Infection Prevention:   single patient room provided   rest/sleep promoted   hand hygiene promoted   cohorting utilized  Goal: Optimal Comfort and Wellbeing  Outcome: Progressing  Goal: Readiness for Transition of Care  Outcome: Progressing     Problem: Pain Acute  Goal: Optimal Pain Control and Function  Outcome: Progressing  Intervention: Prevent or Manage Pain  Recent Flowsheet Documentation  Taken 6/21/2024 1630 by Kayla Sosa RN  Sensory Stimulation Regulation: care clustered  Sleep/Rest Enhancement: family presence promoted  Bowel Elimination Promotion: adequate fluid intake promoted  Medication Review/Management: medications reviewed  Intervention: Optimize Psychosocial Wellbeing  Recent Flowsheet Documentation  Taken 6/21/2024 1630 by Kayla Sosa RN  Supportive Measures: active listening utilized     Problem: Infection  Goal: Absence of Infection Signs and Symptoms  Outcome: Progressing  Intervention: Prevent or Manage Infection  Recent Flowsheet Documentation  Taken 6/21/2024 1630 by Kayla Sosa RN  Infection Management: aseptic technique maintained     Problem: Knee Arthroplasty  Goal: Optimal Coping  Outcome: Progressing  Intervention: Support Psychosocial Response to Surgery and Mobility Changes  Recent Flowsheet Documentation  Taken 6/21/2024 1630 by Kayla Sosa RN  Supportive Measures: active listening utilized  Goal: Absence of Bleeding  Outcome: Progressing  Goal: Effective Bowel Elimination  Outcome: Progressing  Intervention: Enhance Bowel Motility and Elimination  Recent Flowsheet Documentation  Taken 6/21/2024 1630 by Ian  Kayla ELAINE RN  Bowel Elimination Management: toileting offered  Goal: Fluid and Electrolyte Balance  Outcome: Progressing  Intervention: Monitor and Manage Fluid and Electrolyte Balance  Recent Flowsheet Documentation  Taken 6/21/2024 1630 by Kayla Sosa RN  Fluid/Electrolyte Management: fluids provided  Goal: Optimal Functional Ability  Outcome: Progressing  Intervention: Promote Optimal Functional Status  Recent Flowsheet Documentation  Taken 6/21/2024 1630 by Kayla Sosa RN  Activity Management: activity adjusted per tolerance  Goal: Absence of Infection Signs and Symptoms  Outcome: Progressing  Intervention: Prevent or Manage Infection  Recent Flowsheet Documentation  Taken 6/21/2024 1630 by Kayla Sosa RN  Infection Management: aseptic technique maintained  Goal: Intact Neurovascular Status  Outcome: Progressing  Goal: Anesthesia/Sedation Recovery  Outcome: Progressing  Intervention: Optimize Anesthesia Recovery  Recent Flowsheet Documentation  Taken 6/21/2024 1630 by Kayla Sosa RN  Safety Promotion/Fall Prevention:   safety round/check completed   supervised activity   clutter free environment maintained   lighting adjusted   nonskid shoes/slippers when out of bed   room near nurse's station  Administration (IS): unable to perform  Goal: Optimal Pain Control and Function  Outcome: Progressing  Goal: Nausea and Vomiting Relief  Outcome: Progressing  Goal: Effective Urinary Elimination  Outcome: Progressing  Goal: Effective Oxygenation and Ventilation  Outcome: Progressing  Intervention: Optimize Oxygenation and Ventilation  Recent Flowsheet Documentation  Taken 6/21/2024 1630 by Kayla Sosa RN  Head of Bed (HOB) Positioning: HOB at 20-30 degrees

## 2024-06-22 NOTE — PLAN OF CARE
"Goal Outcome Evaluation:      Plan of Care Reviewed With: george)    Overall Patient Progress: improvingOverall Patient Progress: improving    Outcome Evaluation: Pt is alert to self, wasnot out of bed. Pain controlled with scheduled Tylenol. ACE wrap cdi to R knee. Plan to d/c to TCU      Problem: Adult Inpatient Plan of Care  Goal: Plan of Care Review  Description: The Plan of Care Review/Shift note should be completed every shift.  The Outcome Evaluation is a brief statement about your assessment that the patient is improving, declining, or no change.  This information will be displayed automatically on your shift  note.  Outcome: Not Progressing  Flowsheets (Taken 6/22/2024 0231)  Outcome Evaluation: Pt is alert to self, wasnot out of bed. Pain controlled with scheduled Tylenol. ACE wrap cdi to R knee. Plan to d/c to TCU  Plan of Care Reviewed With: cadet (ren)  Overall Patient Progress: improving  Goal: Patient-Specific Goal (Individualized)  Description: You can add care plan individualizations to a care plan. Examples of Individualization might be:  \"Parent requests to be called daily at 9am for status\", \"I have a hard time hearing out of my right ear\", or \"Do not touch me to wake me up as it startles  me\".  Outcome: Not Progressing  Goal: Absence of Hospital-Acquired Illness or Injury  Outcome: Not Progressing  Intervention: Identify and Manage Fall Risk  Recent Flowsheet Documentation  Taken 6/22/2024 0351 by Gwen Pritchett, RN  Safety Promotion/Fall Prevention:   safety round/check completed   supervised activity   clutter free environment maintained   lighting adjusted   nonskid shoes/slippers when out of bed   room near nurse's station  Intervention: Prevent Skin Injury  Recent Flowsheet Documentation  Taken 6/22/2024 0351 by Gwen Pritchett, RN  Body Position: supine, head elevated  Skin Protection: incontinence pads utilized  Device Skin Pressure Protection:   absorbent pad utilized/changed   " adhesive use limited  Intervention: Prevent and Manage VTE (Venous Thromboembolism) Risk  Recent Flowsheet Documentation  Taken 6/22/2024 0351 by Gwen Pritchett RN  VTE Prevention/Management: SCDs (sequential compression devices) on  Intervention: Prevent Infection  Recent Flowsheet Documentation  Taken 6/22/2024 0351 by Gwen Pritchett RN  Infection Prevention:   single patient room provided   rest/sleep promoted   hand hygiene promoted   cohorting utilized  Goal: Optimal Comfort and Wellbeing  Outcome: Not Progressing  Goal: Readiness for Transition of Care  Outcome: Not Progressing     Problem: Pain Acute  Goal: Optimal Pain Control and Function  Outcome: Not Progressing  Intervention: Prevent or Manage Pain  Recent Flowsheet Documentation  Taken 6/22/2024 0351 by Gwen Pritchett RN  Bowel Elimination Promotion: adequate fluid intake promoted  Medication Review/Management: medications reviewed     Problem: Infection  Goal: Absence of Infection Signs and Symptoms  Outcome: Not Progressing  Intervention: Prevent or Manage Infection  Recent Flowsheet Documentation  Taken 6/22/2024 0351 by Gwen Pritchett RN  Infection Management: aseptic technique maintained     Problem: Knee Arthroplasty  Goal: Optimal Coping  Outcome: Not Progressing  Goal: Absence of Bleeding  Outcome: Not Progressing  Goal: Effective Bowel Elimination  Outcome: Not Progressing  Intervention: Enhance Bowel Motility and Elimination  Recent Flowsheet Documentation  Taken 6/22/2024 0351 by Gwen Pritchett RN  Bowel Elimination Management: toileting offered  Goal: Fluid and Electrolyte Balance  Outcome: Not Progressing  Goal: Optimal Functional Ability  Outcome: Not Progressing  Intervention: Promote Optimal Functional Status  Recent Flowsheet Documentation  Taken 6/22/2024 0351 by Gwen Pritchett RN  Activity Management: activity adjusted per tolerance  Goal: Absence of Infection Signs and Symptoms  Outcome: Not Progressing  Intervention: Prevent or Manage  Infection  Recent Flowsheet Documentation  Taken 6/22/2024 0351 by Gwen Pritchett, RN  Infection Management: aseptic technique maintained  Goal: Intact Neurovascular Status  Outcome: Not Progressing  Goal: Anesthesia/Sedation Recovery  Outcome: Not Progressing  Intervention: Optimize Anesthesia Recovery  Recent Flowsheet Documentation  Taken 6/22/2024 0351 by Gwen Pritchett, RN  Safety Promotion/Fall Prevention:   safety round/check completed   supervised activity   clutter free environment maintained   lighting adjusted   nonskid shoes/slippers when out of bed   room near nurse's station  Administration (IS): unable to perform  Goal: Optimal Pain Control and Function  Outcome: Not Progressing  Goal: Nausea and Vomiting Relief  Outcome: Not Progressing  Goal: Effective Urinary Elimination  Outcome: Not Progressing  Goal: Effective Oxygenation and Ventilation  Outcome: Not Progressing  Intervention: Optimize Oxygenation and Ventilation  Recent Flowsheet Documentation  Taken 6/22/2024 0351 by Gwen Pritchett, RN  Head of Bed (HOB) Positioning: HOB at 20-30 degrees

## 2024-06-23 ENCOUNTER — APPOINTMENT (OUTPATIENT)
Dept: GENERAL RADIOLOGY | Facility: CLINIC | Age: 89
DRG: 464 | End: 2024-06-23
Attending: STUDENT IN AN ORGANIZED HEALTH CARE EDUCATION/TRAINING PROGRAM
Payer: COMMERCIAL

## 2024-06-23 LAB
BACTERIA SNV CULT: ABNORMAL
CREAT SERPL-MCNC: 0.82 MG/DL (ref 0.51–0.95)
CRP SERPL-MCNC: 141.58 MG/L
EGFRCR SERPLBLD CKD-EPI 2021: 68 ML/MIN/1.73M2
GLUCOSE SERPL-MCNC: 107 MG/DL (ref 70–99)
GRAM STAIN RESULT: ABNORMAL
GRAM STAIN RESULT: ABNORMAL
HGB BLD-MCNC: 8.1 G/DL (ref 11.7–15.7)

## 2024-06-23 PROCEDURE — 258N000003 HC RX IP 258 OP 636: Mod: JZ | Performed by: INTERNAL MEDICINE

## 2024-06-23 PROCEDURE — 250N000011 HC RX IP 250 OP 636: Mod: JZ | Performed by: INTERNAL MEDICINE

## 2024-06-23 PROCEDURE — 250N000013 HC RX MED GY IP 250 OP 250 PS 637: Performed by: STUDENT IN AN ORGANIZED HEALTH CARE EDUCATION/TRAINING PROGRAM

## 2024-06-23 PROCEDURE — 258N000003 HC RX IP 258 OP 636: Mod: JZ | Performed by: STUDENT IN AN ORGANIZED HEALTH CARE EDUCATION/TRAINING PROGRAM

## 2024-06-23 PROCEDURE — 250N000012 HC RX MED GY IP 250 OP 636 PS 637: Performed by: STUDENT IN AN ORGANIZED HEALTH CARE EDUCATION/TRAINING PROGRAM

## 2024-06-23 PROCEDURE — 99232 SBSQ HOSP IP/OBS MODERATE 35: CPT | Performed by: SPECIALIST

## 2024-06-23 PROCEDURE — 71045 X-RAY EXAM CHEST 1 VIEW: CPT

## 2024-06-23 PROCEDURE — 86140 C-REACTIVE PROTEIN: CPT | Performed by: PHYSICIAN ASSISTANT

## 2024-06-23 PROCEDURE — 99232 SBSQ HOSP IP/OBS MODERATE 35: CPT | Performed by: STUDENT IN AN ORGANIZED HEALTH CARE EDUCATION/TRAINING PROGRAM

## 2024-06-23 PROCEDURE — 82565 ASSAY OF CREATININE: CPT | Performed by: INTERNAL MEDICINE

## 2024-06-23 PROCEDURE — 82947 ASSAY GLUCOSE BLOOD QUANT: CPT | Performed by: STUDENT IN AN ORGANIZED HEALTH CARE EDUCATION/TRAINING PROGRAM

## 2024-06-23 PROCEDURE — 999N000111 HC STATISTIC OT IP EVAL DEFER

## 2024-06-23 PROCEDURE — 85018 HEMOGLOBIN: CPT | Performed by: ORTHOPAEDIC SURGERY

## 2024-06-23 PROCEDURE — 36415 COLL VENOUS BLD VENIPUNCTURE: CPT | Performed by: PHYSICIAN ASSISTANT

## 2024-06-23 PROCEDURE — 120N000001 HC R&B MED SURG/OB

## 2024-06-23 PROCEDURE — 250N000013 HC RX MED GY IP 250 OP 250 PS 637: Performed by: ORTHOPAEDIC SURGERY

## 2024-06-23 RX ORDER — CEFTRIAXONE 2 G/1
2 INJECTION, POWDER, FOR SOLUTION INTRAMUSCULAR; INTRAVENOUS EVERY 24 HOURS
DISCHARGE
Start: 2024-06-24 | End: 2024-08-02

## 2024-06-23 RX ADMIN — ASPIRIN 81 MG: 81 TABLET, COATED ORAL at 20:13

## 2024-06-23 RX ADMIN — METHOCARBAMOL 500 MG: 500 TABLET ORAL at 22:04

## 2024-06-23 RX ADMIN — QUETIAPINE FUMARATE 25 MG: 25 TABLET ORAL at 08:08

## 2024-06-23 RX ADMIN — CARBOXYMETHYLCELLULOSE SODIUM 1 DROP: 5 SOLUTION/ DROPS OPHTHALMIC at 20:13

## 2024-06-23 RX ADMIN — CARBOXYMETHYLCELLULOSE SODIUM 1 DROP: 5 SOLUTION/ DROPS OPHTHALMIC at 08:08

## 2024-06-23 RX ADMIN — AZATHIOPRINE 50 MG: 50 TABLET ORAL at 08:09

## 2024-06-23 RX ADMIN — DOXEPIN HYDROCHLORIDE 10 MG: 10 CAPSULE ORAL at 20:14

## 2024-06-23 RX ADMIN — MEMANTINE 5 MG: 5 TABLET ORAL at 20:13

## 2024-06-23 RX ADMIN — ASPIRIN 81 MG: 81 TABLET, COATED ORAL at 08:08

## 2024-06-23 RX ADMIN — QUETIAPINE FUMARATE 25 MG: 25 TABLET ORAL at 20:12

## 2024-06-23 RX ADMIN — PILOCARPINE HYDROCHLORIDE 5 MG: 5 TABLET, FILM COATED ORAL at 12:08

## 2024-06-23 RX ADMIN — MEMANTINE 5 MG: 5 TABLET ORAL at 08:24

## 2024-06-23 RX ADMIN — ATORVASTATIN CALCIUM 20 MG: 20 TABLET, FILM COATED ORAL at 20:13

## 2024-06-23 RX ADMIN — SODIUM CHLORIDE 500 ML: 9 INJECTION, SOLUTION INTRAVENOUS at 00:50

## 2024-06-23 RX ADMIN — CEFTRIAXONE 2 G: 2 INJECTION, POWDER, FOR SOLUTION INTRAMUSCULAR; INTRAVENOUS at 01:58

## 2024-06-23 RX ADMIN — PILOCARPINE HYDROCHLORIDE 5 MG: 5 TABLET, FILM COATED ORAL at 22:04

## 2024-06-23 RX ADMIN — SENNOSIDES AND DOCUSATE SODIUM 1 TABLET: 50; 8.6 TABLET ORAL at 08:08

## 2024-06-23 RX ADMIN — FLUOXETINE 20 MG: 20 CAPSULE ORAL at 08:08

## 2024-06-23 RX ADMIN — SODIUM CHLORIDE, POTASSIUM CHLORIDE, SODIUM LACTATE AND CALCIUM CHLORIDE 1000 ML: 600; 310; 30; 20 INJECTION, SOLUTION INTRAVENOUS at 02:46

## 2024-06-23 RX ADMIN — POLYETHYLENE GLYCOL 3350 17 G: 17 POWDER, FOR SOLUTION ORAL at 08:07

## 2024-06-23 RX ADMIN — SENNOSIDES AND DOCUSATE SODIUM 1 TABLET: 50; 8.6 TABLET ORAL at 20:13

## 2024-06-23 RX ADMIN — ACETAMINOPHEN 975 MG: 325 TABLET, FILM COATED ORAL at 12:07

## 2024-06-23 RX ADMIN — PANTOPRAZOLE SODIUM 40 MG: 40 TABLET, DELAYED RELEASE ORAL at 08:08

## 2024-06-23 RX ADMIN — ACETAMINOPHEN 975 MG: 325 TABLET, FILM COATED ORAL at 05:08

## 2024-06-23 RX ADMIN — HYDROXYZINE HYDROCHLORIDE 10 MG: 10 TABLET, FILM COATED ORAL at 22:04

## 2024-06-23 RX ADMIN — ACETAMINOPHEN 975 MG: 325 TABLET, FILM COATED ORAL at 20:12

## 2024-06-23 RX ADMIN — TRAZODONE HYDROCHLORIDE 25 MG: 50 TABLET ORAL at 20:12

## 2024-06-23 RX ADMIN — PILOCARPINE HYDROCHLORIDE 5 MG: 5 TABLET, FILM COATED ORAL at 08:09

## 2024-06-23 RX ADMIN — MAGNESIUM HYDROXIDE 30 ML: 2400 SUSPENSION ORAL at 12:08

## 2024-06-23 ASSESSMENT — ACTIVITIES OF DAILY LIVING (ADL)
ADLS_ACUITY_SCORE: 34.75
ADLS_ACUITY_SCORE: 33.25
ADLS_ACUITY_SCORE: 33.75
ADLS_ACUITY_SCORE: 33.75
ADLS_ACUITY_SCORE: 34.75
ADLS_ACUITY_SCORE: 33.75
ADLS_ACUITY_SCORE: 33.25
ADLS_ACUITY_SCORE: 33.75
ADLS_ACUITY_SCORE: 33.75
ADLS_ACUITY_SCORE: 34.75
ADLS_ACUITY_SCORE: 33.75
ADLS_ACUITY_SCORE: 34.75
ADLS_ACUITY_SCORE: 33.75
ADLS_ACUITY_SCORE: 33.75
ADLS_ACUITY_SCORE: 34.75
ADLS_ACUITY_SCORE: 34.75

## 2024-06-23 NOTE — PROGRESS NOTES
06/22/24 1043   Appointment Info   Signing Clinician's Name / Credentials (PT) Twyla Kerr DPT   Living Environment   People in Home child(tram), adult   Current Living Arrangements house   Living Environment Comments Patient lives in a 2 story home with her daughter.  There are 3 steps to enter front door, no railings.  Bedroom on 2ns floor.  Could stay on main level if need be at dicharge   Self-Care   Usual Activity Tolerance moderate   Current Activity Tolerance poor   Equipment Currently Used at Home cane, straight;walker, rolling   Fall history within last six months yes   Number of times patient has fallen within last six months 1   Activity/Exercise/Self-Care Comment Daughter reports patient ambulates with a cane or walker at baseline.  Family assists with ADLs and IADLs.  Approx 3 days before admission became increasingly weak until required multiple people to get out of house to urgent care.   General Information   Onset of Illness/Injury or Date of Surgery 06/19/24   Referring Physician Billy Tracy MD   Patient/Family Therapy Goals Statement (PT) Daughter agreeable to TCU if patient unable to demonstrate household mobility   Pertinent History of Current Problem (include personal factors and/or comorbidities that impact the POC) Per medical chart: Chelly Dave is a 89 year old female admitted on 6/19/2024 with septic arthritis s/p 1&D and modular exchange on 6/21.  Patient with history of right TKA in 2008.  Patient with dementia and language barrier; responds to daughter speaking to patient in english.   Existing Precautions/Restrictions fall   Weight-Bearing Status - RLE weight-bearing as tolerated  (ROM - per tolerance)   Cognition   Follows Commands (Cognition) follows one-step commands;25-49% accuracy;physical/tactile prompts required   Cognitive Status Comments Patient with known dementia   Pain Assessment   Patient Currently in Pain Yes, see Vital Sign flowsheet  (Patient reports  pain with any movement of right LE)   Integumentary/Edema   Integumentary/Edema Comments Patient with ace wrap from foot to thigh   Posture    Posture Forward head position;Protracted shoulders   Range of Motion (ROM)   Range of Motion ROM deficits secondary to surgical procedure;ROM deficits secondary to pain   Strength (Manual Muscle Testing)   Strength (Manual Muscle Testing) Deficits observed during functional mobility   Bed Mobility   Comment, (Bed Mobility) Max A and curing for rolling   Transfers   Comment, (Transfers) Lift dependent   Gait/Stairs (Locomotion)   Comment, (Gait/Stairs) Lift dependent   Balance   Balance Comments Patient unable to stand at this time.  Requires posterior support in sitting   Sensory Examination   Sensory Perception Comments Patient unable to state   Clinical Impression   Criteria for Skilled Therapeutic Intervention Yes, treatment indicated   PT Diagnosis (PT) Impiared functional mobility   Influenced by the following impairments pain, decreased strength, decreased activity tolerance, decreased balance, cognition deficits   Functional limitations due to impairments difficulties with gait, transfers, stair negotiation   Clinical Presentation (PT Evaluation Complexity) stable   Clinical Presentation Rationale clinical judgement   Clinical Decision Making (Complexity) low complexity   Planned Therapy Interventions (PT) balance training;bed mobility training;gait training;patient/family education;ROM (range of motion);stair training;strengthening   Risk & Benefits of therapy have been explained evaluation/treatment results reviewed;care plan/treatment goals reviewed;risks/benefits reviewed;current/potential barriers reviewed;participants voiced agreement with care plan;participants included;patient;daughter   PT Total Evaluation Time   PT Eval, Low Complexity Minutes (45988) 10   Physical Therapy Goals   PT Frequency Daily   PT Predicted Duration/Target Date for Goal Attainment  06/23/24   PT Goals Bed Mobility;Transfers;Gait;Stairs   PT: Bed Mobility Minimal assist;Supine to/from sit   PT: Transfers Minimal assist;Sit to/from stand;Bed to/from chair;Assistive device   PT: Gait Supervision/stand-by assist;Assistive device;25 feet   PT: Stairs Minimal assist;Assistive device;3 stairs   Interventions   Interventions Quick Adds Therapeutic Activity   Therapeutic Activity   Therapeutic Activities: dynamic activities to improve functional performance Minutes (85232) 25   Symptoms Noted During/After Treatment Increased pain   Treatment Detail/Skilled Intervention Patient supine upon arrival.  Discussed with daughter regarding , daughter stated she would interpret if needed.  Patient following cueing in english.  Attempted transfer to sitting at EOB with max A x2; patient actively resisting and crying out in pain.  Nursing alerted, brought pain medication.  Not tolerating movement required for standing trial.  Facilitated rolling in either direction with max A for donning overhead lift.  Dependently lifted patient to chair; positioned in neutral, with nursing and family at end of session, chair alarm on, all needs within reach.   PT Discharge Planning   PT Discharge Recommendation (DC Rec) Transitional Care Facility   PT Rationale for DC Rec Patient presents below baseline for functional mobility.  Currently is lift dependent, unable to demonstrate stair negotiation and household distance ambulation in order to safely return to home.  Recommend discharge to TCU in order to increase strength, activity tolerance, balance and independence with mobility.   PT Brief overview of current status Lift dependent   Total Session Time   Timed Code Treatment Minutes 25   Total Session Time (sum of timed and untimed services) 35

## 2024-06-23 NOTE — PROGRESS NOTES
PROGRESS NOTE    SUBJECTIVE  Chelly Dave is status post right total knee arthroplasty irrigation debridement with polyethylene exchange on June 21, 2024.  No acute events overnight.  Afebrile with stable vitals.  She is doing well this morning.  She remains on ceftriaxone.      PHYSICAL EXAM  General: No acute distress. Alert and oriented to person, time and place.  Pulmonary: Breathing comfortably on room air.   Cardiac: Bilateral PT pulses are equal and symmetric. Regular rate and rhythm.   Musculoskeletal: Prevena incisional wound VAC holding suction without any output in the canister.  Neurological: Patient is able to fire quadriceps, tibialis anterior, extensor hallucis longus and gastrocnemius. Sensation intact to light touch in bilateral L3-S1 distributions.       IMAGING  Post-operative radiographs were reviewed and demonstrate components in good position. No evidence of fracture or dislocation.     ASSESSMENT/PLAN  #1 Status post right total knee arthroplasty irrigation debridement with polyethylene exchange on June 21, 2024    Chelly Dave is status post the above procedure.  Cultures are growing Streptococcus gordonii.  She is on IV ceftriaxone.  She is doing well.  She will need a PICC line prior to discharge.  Infectious diseases following.    Activity: Weightbearing as tolerated. Activity as tolerated. No precautions. Continue to work with PT while in hospital.   Antibiotics: Ceftriaxone.  PICC line placed prior to discharge.  Cultures: Cultures from June 21 aspiration are growing Streptococcus gordonii.   Diet: Advance as tolerated. Goal diet: General.   Dressing: Prevena incisional wound VAC for 14 days  DVT Prophylaxis: Aspirin 81mg BID for 6 weeks  Pain medication: Multimodal pain regimen with ice, elevation, Tylenol 1000mg Q6H scheduled, Celebrex 100mg BID, Oxycodone 5-10mg Q4 PRN    Disposition: Hospitalist

## 2024-06-23 NOTE — PLAN OF CARE
"Goal Outcome Evaluation:      Plan of Care Reviewed With: patient, child    Overall Patient Progress: no changeOverall Patient Progress: no change    Outcome Evaluation: Pt confused. Arouse to touch and voice. Recieved two 1L boluses overnight due to low BP. Prevena drain, no output (beeped all night). daughter in room. pain managed with scheduled tylenol. LS coarse. CMS intact. Meds crushed and given with applesauce or pudding.      Problem: Adult Inpatient Plan of Care  Goal: Plan of Care Review  Description: The Plan of Care Review/Shift note should be completed every shift.  The Outcome Evaluation is a brief statement about your assessment that the patient is improving, declining, or no change.  This information will be displayed automatically on your shift  note.  Outcome: Progressing  Flowsheets (Taken 6/23/2024 0537)  Outcome Evaluation: Pt confused. Arouse to touch and voice. Recieved two 1L boluses overnight due to low BP. Prevena drain, no output (beeped all night). daughter in room. pain managed with scheduled tylenol. LS coarse. CMS intact. Meds crushed and given with applesauce or pudding.  Plan of Care Reviewed With:   patient   child  Overall Patient Progress: no change  Goal: Patient-Specific Goal (Individualized)  Description: You can add care plan individualizations to a care plan. Examples of Individualization might be:  \"Parent requests to be called daily at 9am for status\", \"I have a hard time hearing out of my right ear\", or \"Do not touch me to wake me up as it startles  me\".  Outcome: Progressing  Goal: Absence of Hospital-Acquired Illness or Injury  Outcome: Progressing  Intervention: Identify and Manage Fall Risk  Recent Flowsheet Documentation  Taken 6/23/2024 0136 by Cindy Lawrence RN  Safety Promotion/Fall Prevention:   activity supervised   room near nurse's station   safety round/check completed  Intervention: Prevent Skin Injury  Recent Flowsheet Documentation  Taken " 6/23/2024 0136 by Cindy Lawrence RN  Body Position: supine, head elevated  Skin Protection: incontinence pads utilized  Device Skin Pressure Protection:   absorbent pad utilized/changed   adhesive use limited  Intervention: Prevent and Manage VTE (Venous Thromboembolism) Risk  Recent Flowsheet Documentation  Taken 6/23/2024 0136 by Cindy Lawrence RN  VTE Prevention/Management: SCDs (sequential compression devices) on  Intervention: Prevent Infection  Recent Flowsheet Documentation  Taken 6/23/2024 0136 by Cindy Lawrence RN  Infection Prevention:   single patient room provided   rest/sleep promoted  Goal: Optimal Comfort and Wellbeing  Outcome: Progressing  Goal: Readiness for Transition of Care  Outcome: Progressing     Problem: Pain Acute  Goal: Optimal Pain Control and Function  Outcome: Progressing  Intervention: Prevent or Manage Pain  Recent Flowsheet Documentation  Taken 6/23/2024 0136 by Cindy Lawrence RN  Medication Review/Management: medications reviewed  Intervention: Optimize Psychosocial Wellbeing  Recent Flowsheet Documentation  Taken 6/23/2024 0136 by Cindy Lawrence RN  Supportive Measures: active listening utilized     Problem: Infection  Goal: Absence of Infection Signs and Symptoms  Outcome: Progressing     Problem: Knee Arthroplasty  Goal: Optimal Coping  Outcome: Progressing  Intervention: Support Psychosocial Response to Surgery and Mobility Changes  Recent Flowsheet Documentation  Taken 6/23/2024 0136 by Cindy Lawrence RN  Supportive Measures: active listening utilized  Goal: Absence of Bleeding  Outcome: Progressing  Intervention: Monitor and Manage Bleeding  Recent Flowsheet Documentation  Taken 6/23/2024 0136 by Cindy Lawrence RN  Bleeding Management: dressing monitored  Goal: Effective Bowel Elimination  Outcome: Progressing  Goal: Fluid and Electrolyte Balance  Outcome: Progressing  Goal: Optimal Functional  Ability  Outcome: Progressing  Goal: Absence of Infection Signs and Symptoms  Outcome: Progressing  Goal: Intact Neurovascular Status  Outcome: Progressing  Goal: Anesthesia/Sedation Recovery  Outcome: Progressing  Intervention: Optimize Anesthesia Recovery  Recent Flowsheet Documentation  Taken 6/23/2024 0136 by Cindy Lawrence, RN  Safety Promotion/Fall Prevention:   activity supervised   room near nurse's station   safety round/check completed  Goal: Optimal Pain Control and Function  Outcome: Progressing  Goal: Nausea and Vomiting Relief  Outcome: Progressing  Goal: Effective Urinary Elimination  Outcome: Progressing  Goal: Effective Oxygenation and Ventilation  Outcome: Progressing  Intervention: Optimize Oxygenation and Ventilation  Recent Flowsheet Documentation  Taken 6/23/2024 0136 by Cindy Lawrence, RN  Head of Bed (HOB) Positioning: HOB at 30-45 degrees

## 2024-06-23 NOTE — PROGRESS NOTES
Cross cover paged for low blood pressure.  -Also was noted to be tachycardic  -EKG done to confirm sinus tachycardia  -She seems to be hypovolemic  -Fluid challenge  -Will recheck blood pressure

## 2024-06-23 NOTE — PLAN OF CARE
"Goal Outcome Evaluation: Diminished lung sounds via all lobes. Intermittent wet cough; gurgled, rattle. Elevated pulse, low SBP. Cross cover paged. Orders obtained for EKG and NS 0.9% bolus 500 ml/hr stat.       Pt is confused. Severe dementia at baseline. Arouses to voice, gentle touch. Ace wrap C/D/I. Prevena incisional wound vac suctioning well without drainage. Sensation intact.       Plan of Care Reviewed With: patient, child    Overall Patient Progress: no change       Problem: Knee Arthroplasty  Goal: Effective Bowel Elimination  Outcome: Not Progressing  Intervention: Enhance Bowel Motility and Elimination  Recent Flowsheet Documentation  Taken 6/22/2024 1609 by Kayla Sosa, RN  Bowel Elimination Management: toileting offered       Problem: Adult Inpatient Plan of Care  Goal: Plan of Care Review  Description: The Plan of Care Review/Shift note should be completed every shift.  The Outcome Evaluation is a brief statement about your assessment that the patient is improving, declining, or no change.  This information will be displayed automatically on your shift  note.  6/22/2024 2343 by Kayla Sosa, RN  Outcome: Progressing  Flowsheets (Taken 6/22/2024 2343)  Outcome Evaluation:   Diminished lung sounds via all lobes. Intermittent wet cough   gurgled, rattle. Elevated pulse, low SBP.  Plan of Care Reviewed With:   patient   child  Overall Patient Progress: no change  6/22/2024 2341 by Kayla Sosa, RN  Outcome: Progressing  Flowsheets (Taken 6/22/2024 2341)  Plan of Care Reviewed With:   patient   child  Overall Patient Progress: no change  6/22/2024 2339 by Kayla Sosa, RN  Outcome: Not Progressing  Goal: Patient-Specific Goal (Individualized)  Description: You can add care plan individualizations to a care plan. Examples of Individualization might be:  \"Parent requests to be called daily at 9am for status\", \"I have a hard time hearing out of my right ear\", or \"Do not touch me to wake me up as it " "startles  me\".  6/22/2024 2341 by Kayla Sosa RN  Outcome: Progressing  6/22/2024 2339 by Kayla Sosa RN  Outcome: Not Progressing  Goal: Absence of Hospital-Acquired Illness or Injury  Outcome: Progressing  Intervention: Identify and Manage Fall Risk  Recent Flowsheet Documentation  Taken 6/22/2024 1609 by Kayla Sosa RN  Safety Promotion/Fall Prevention:   safety round/check completed   supervised activity   clutter free environment maintained   lighting adjusted   nonskid shoes/slippers when out of bed   room near nurse's station  Intervention: Prevent Skin Injury  Recent Flowsheet Documentation  Taken 6/22/2024 1609 by Kayla Sosa RN  Body Position: supine, head elevated  Skin Protection: incontinence pads utilized  Device Skin Pressure Protection:   absorbent pad utilized/changed   adhesive use limited  Intervention: Prevent and Manage VTE (Venous Thromboembolism) Risk  Recent Flowsheet Documentation  Taken 6/22/2024 1609 by Kayla Sosa RN  VTE Prevention/Management: SCDs (sequential compression devices) on  Intervention: Prevent Infection  Recent Flowsheet Documentation  Taken 6/22/2024 1609 by Kayla Sosa RN  Infection Prevention:   single patient room provided   rest/sleep promoted   hand hygiene promoted   cohorting utilized  Goal: Optimal Comfort and Wellbeing  Outcome: Progressing  Goal: Readiness for Transition of Care  Outcome: Progressing     Problem: Pain Acute  Goal: Optimal Pain Control and Function  Outcome: Progressing  Intervention: Prevent or Manage Pain  Recent Flowsheet Documentation  Taken 6/22/2024 1609 by Kayla Sosa RN  Sensory Stimulation Regulation: care clustered  Sleep/Rest Enhancement: family presence promoted  Bowel Elimination Promotion: adequate fluid intake promoted  Medication Review/Management: medications reviewed  Intervention: Optimize Psychosocial Wellbeing  Recent Flowsheet Documentation  Taken 6/22/2024 1609 by Kayla Sosa RN  Supportive " Measures: active listening utilized     Problem: Infection  Goal: Absence of Infection Signs and Symptoms  Outcome: Progressing  Intervention: Prevent or Manage Infection  Recent Flowsheet Documentation  Taken 6/22/2024 1609 by Kayla Sosa RN  Infection Management: aseptic technique maintained     Problem: Knee Arthroplasty  Goal: Optimal Coping  Outcome: Progressing  Intervention: Support Psychosocial Response to Surgery and Mobility Changes  Recent Flowsheet Documentation  Taken 6/22/2024 1609 by Kayla Sosa RN  Supportive Measures: active listening utilized  Goal: Absence of Bleeding  Outcome: Progressing  Goal: Fluid and Electrolyte Balance  Outcome: Progressing  Intervention: Monitor and Manage Fluid and Electrolyte Balance  Recent Flowsheet Documentation  Taken 6/22/2024 1609 by Kayla Sosa RN  Fluid/Electrolyte Management: fluids provided  Goal: Optimal Functional Ability  Outcome: Progressing  Intervention: Promote Optimal Functional Status  Recent Flowsheet Documentation  Taken 6/22/2024 1609 by Kayla Sosa RN  Activity Management: activity adjusted per tolerance  Goal: Absence of Infection Signs and Symptoms  Outcome: Progressing  Intervention: Prevent or Manage Infection  Recent Flowsheet Documentation  Taken 6/22/2024 1609 by Kayla Sosa RN  Infection Management: aseptic technique maintained  Goal: Intact Neurovascular Status  Outcome: Progressing  Goal: Anesthesia/Sedation Recovery  Outcome: Progressing  Intervention: Optimize Anesthesia Recovery  Recent Flowsheet Documentation  Taken 6/22/2024 1609 by Kayla Sosa RN  Safety Promotion/Fall Prevention:   safety round/check completed   supervised activity   clutter free environment maintained   lighting adjusted   nonskid shoes/slippers when out of bed   room near nurse's station  Administration (IS): unable to perform  Goal: Optimal Pain Control and Function  Outcome: Progressing  Goal: Nausea and Vomiting Relief  Outcome:  Progressing  Goal: Effective Urinary Elimination  Outcome: Progressing  Goal: Effective Oxygenation and Ventilation  Outcome: Progressing  Intervention: Optimize Oxygenation and Ventilation  Recent Flowsheet Documentation  Taken 6/22/2024 1609 by Kayla Sosa, RN  Head of Bed (HOB) Positioning: HOB at 20-30 degrees

## 2024-06-23 NOTE — PROGRESS NOTES
[facility]    Infectious Disease Progress Note    Date of Service : 06/23/2024       Assessment:  89YF Burmese female with severe dementia, Sjogren's -on treatment with Azatioprine, and prior remote R total knee arthroplasty, who has been admitted with acute onset right knee pain and swelling  over 3-4 days with inability to bear weight. Synovial fluid analysis was suggestive of infection with 9,159 WBC with PMN predominance, and she is now s/p I&D with polyethylene exchange on 6/21. Synovial fluid shows GPC on stain and operative tissue cxs are growing streptococcus gordonii, likely related to hematogenous seeding.     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  s/p I&D with polyethylene exchange on 6/21  -Sjogren's disease -on Azathioprine  -chronic medical conditions - Dementia, depression, anxiety, hyperlipidemia and diabetes     Recommendations  Treat with Ceftriaxone  Will need IV antibiotics at discharge  Care integration consultation for discharge planning  PICC line placement prior to discharge  IV antibiotics for 6 weeks followed by oral suppressive therapy. OPIV antibiotic orders placed in [facility] until 8/2 for discharge  ID follow up in 2-3 weeks      Cheyenne Rashid MD    Interval History   Remains afebrile, no new cx data, tolerating antibiotics without side effects      Physical Exam   Temp: 97.7  F (36.5  C) Temp src: Temporal BP: 115/60 Pulse: 84   Resp: 16 SpO2: 96 % O2 Device: Nasal cannula Oxygen Delivery: 1/2 LPM  Vitals:    06/20/24 0039   Weight: 61.5 kg (135 lb 9.3 oz)     Vital Signs with Ranges  Temp:  [97.4  F (36.3  C)-99.2  F (37.3  C)] 97.7  F (36.5  C)  Pulse:  [] 84  Resp:  [16-20] 16  BP: ()/(43-63) 115/60  SpO2:  [94 %-99 %] 96 %    GENERAL APPEARANCE:  resting, appears comfortable, in no pain  EYES: Eyes grossly normal to inspection  RESP: non labored breathing  MS: R knee in surgical dressing,  wound vac in place  SKIN: no suspicious lesions or rashes    Other:    Medications   Current Facility-Administered Medications   Medication Dose Route Frequency Provider Last Rate Last Admin    lactated ringers infusion   Intravenous Continuous Amilcar Bashir MD 50 mL/hr at 06/23/24 0824 Rate Change at 06/23/24 0824     Current Facility-Administered Medications   Medication Dose Route Frequency Provider Last Rate Last Admin    acetaminophen (TYLENOL) tablet 975 mg  975 mg Oral Q8H Billy Tracy MD   975 mg at 06/23/24 0508    aspirin EC tablet 81 mg  81 mg Oral BID Billy Tracy MD   81 mg at 06/23/24 0808    atorvastatin (LIPITOR) tablet 20 mg  20 mg Oral QPM Amilcar Bashir MD   20 mg at 06/22/24 2128    azaTHIOprine (IMURAN) tablet 50 mg  50 mg Oral Daily Amilcar Bashir MD   50 mg at 06/23/24 0809    carboxymethylcellulose PF (REFRESH PLUS) 0.5 % ophthalmic solution 1 drop  1 drop Both Eyes BID Jair Nassar MD   1 drop at 06/23/24 0808    cefTRIAXone (ROCEPHIN) 2 g vial to attach to  ml bag for ADULTS or NS 50 ml bag for PEDS  2 g Intravenous Q24H Landry Alejo MD   2 g at 06/23/24 0158    doxepin (SINEquan) capsule 10 mg  10 mg Oral At Bedtime Amilcar Bashir MD   10 mg at 06/22/24 2128    FLUoxetine (PROzac) capsule 20 mg  20 mg Oral Daily Jair Nassar MD   20 mg at 06/23/24 0808    memantine (NAMENDA) tablet 5 mg  5 mg Oral BID Amilcar Bashir MD   5 mg at 06/23/24 0824    pantoprazole (PROTONIX) EC tablet 40 mg  40 mg Oral Daily Amilcar Bashir MD   40 mg at 06/23/24 0808    pilocarpine (SALAGEN) tablet 5 mg  5 mg Oral TID Amilcar Bashir MD   5 mg at 06/23/24 0809    polyethylene glycol (MIRALAX) Packet 17 g  17 g Oral Daily Billy Tracy MD   17 g at 06/23/24 0807    QUEtiapine (SEROquel) tablet 25 mg  25 mg Oral BID Jari Nassar MD   25 mg at 06/23/24 0808    senna-docusate (SENOKOT-S/PERICOLACE) 8.6-50 MG per tablet 1 tablet  1 tablet Oral BID Billy Tracy MD   1 tablet  at 06/23/24 0808    sodium chloride (PF) 0.9% PF flush 3 mL  3 mL Intracatheter Q8H Billy Tracy MD   3 mL at 06/22/24 2239    sodium chloride (PF) 0.9% PF flush 3 mL  3 mL Intracatheter Q8H Jair Nassar MD   3 mL at 06/23/24 0809    traZODone (DESYREL) half-tab 25 mg  25 mg Oral At Bedtime Jair Nassar MD   25 mg at 06/22/24 2128       Data   All microbiology laboratory data reviewed.  Recent Labs   Lab Test 06/23/24  0723 06/22/24  0613 06/20/24  0504 06/19/24  2221   WBC  --  7.2 3.6* 3.9*   HGB 8.1* 9.6* 9.1* 10.3*   HCT  --  31.3* 29.5* 33.6*   MCV  --  100 101* 102*   PLT  --  254 177 195     Recent Labs   Lab Test 06/23/24  0723 06/22/24  0613 06/21/24  0607   CR 0.82 0.75 0.79            06/21/2024 0833 06/22/2024 1117 Anaerobic Bacterial Culture Routine [[id number]]   Tissue from Knee, Right    Preliminary result Component Value   Culture No anaerobic organisms isolated after 1 day P             06/21/2024 0833 06/22/2024 0739 Tissue Aerobic Bacterial Culture Routine [[id number]]   Tissue from Knee, Right    Preliminary result Component Value   Culture No growth, less than 1 day P             06/21/2024 0833 06/22/2024 1101 Anaerobic Bacterial Culture Routine [[id number]]   Tissue from Knee, Right    Preliminary result Component Value   Culture No anaerobic organisms isolated after 1 day P             06/21/2024 0833 06/22/2024 0745 Tissue Aerobic Bacterial Culture Routine [[id number]]   Tissue from Knee, Right    Preliminary result Component Value   Culture No growth, less than 1 day P             06/21/2024 0832 06/22/2024 1101 Anaerobic Bacterial Culture Routine [[id number]]   Tissue from Knee, Right    Preliminary result Component Value   Culture No anaerobic organisms isolated after 1 day P             06/21/2024 0832 06/22/2024 0914 Tissue Aerobic Bacterial Culture Routine [[id number]]   (Abnormal)   Tissue from Knee, Right    Preliminary result Component Value   Culture Culture in  progress P    2+ Streptococcus gordonii Abnormal  P             06/20/2024 0058 06/23/2024 0217 Blood Culture Hand, Left [[id number]]   Blood from Hand, Left    Preliminary result Component Value   Culture No growth after 3 days P             06/20/2024 0000 06/23/2024 0217 Blood Culture Line, venous [[id number]]   Blood from Line, venous    Preliminary result Component Value   Culture No growth after 3 days P             06/19/2024 2359 06/20/2024 0232 Crystal ID Synovial Fluid [[id number]]   Synovial fluid from Knee, Right    Final result Component Value   Crystals Analysis No clinically significant crystals seen.          06/19/2024 2359 06/20/2024 0104 Cell count with differential fluid [[id number]]    (Abnormal)   Synovial fluid from Knee, Right    Final result Component Value   No component results          06/19/2024 2359 06/20/2024 0051 Glucose fluid [[id number]]    Synovial fluid from Knee, Right    Final result Component Value Units   Glucose Fluid Source Knee, Right    Glucose fluid 89 mg/dL          06/19/2024 2359 06/20/2024 0051 Protein fluid [[id number]]    Synovial fluid from Knee, Right    Final result Component Value Units   Protein Fluid Source Knee, Right    Protein Total Fluid 3.6 g/dL          06/19/2024 2359 06/23/2024 0947 Synovial fluid Aerobic Bacterial Culture Routine With Gram Stain [[id number]]    (Abnormal)   Synovial fluid from Knee, Right    Final result Component Value   Culture 1+ Streptococcus gordonii Abnormal    Gram Stain Result No organisms seen    4+ WBC seen    Predominantly PMNs       Susceptibility     Streptococcus gordonii     MAKAYLA     Ampicillin <=0.06 ug/mL Susceptible     Cefotaxime <=0.25 ug/mL Susceptible     Ceftriaxone <=0.25 ug/mL Susceptible     Clindamycin <=0.06 ug/mL Susceptible     Erythromycin >0.5 ug/mL Resistant     Penicillin <=0.03 ug/mL Susceptible     Vancomycin 0.5 ug/mL Susceptible

## 2024-06-23 NOTE — PLAN OF CARE
"Goal Outcome Evaluation:      Plan of Care Reviewed With: patient, child    Overall Patient Progress: improvingOverall Patient Progress: improving    Outcome Evaluation: Patient is alert, confused. family at bedside. VS quin KOWALSKI updated. LR infusing.    Patient  is alert to self,confused. Family at bedside. Pain controlled with scheduled Tylenol. IV infusing. Soft BP, LS diminished, on oxygen 1 L--provider updated. ACE wrap CDI to R knee. Up in a chair during lunch.Tolerated puree diet, family, and staff assisted in feeding pt. Wound vac prevena foam intact, no drainage, beeping- ortho paged, and stated to re enforce the dressing for leaking.  On IV Abx. Pt voiding w/o difficulty. Miralax, senna, an PRN MOM given for constipation- will continue monitoring. Discharge plan- PICC line, need long term Abx.     The wound vac is beep sound is fixed by ortho.     Problem: Adult Inpatient Plan of Care  Goal: Plan of Care Review  Description: The Plan of Care Review/Shift note should be completed every shift.  The Outcome Evaluation is a brief statement about your assessment that the patient is improving, declining, or no change.  This information will be displayed automatically on your shift  note.  Outcome: Progressing  Flowsheets (Taken 6/23/2024 1235)  Outcome Evaluation: Patient is alert, confused. family at bedside. VS quin KOWALSKI updated. LR infusing.  Plan of Care Reviewed With:   patient   child  Overall Patient Progress: improving  Goal: Patient-Specific Goal (Individualized)  Description: You can add care plan individualizations to a care plan. Examples of Individualization might be:  \"Parent requests to be called daily at 9am for status\", \"I have a hard time hearing out of my right ear\", or \"Do not touch me to wake me up as it startles  me\".  Outcome: Progressing  Goal: Absence of Hospital-Acquired Illness or Injury  Outcome: Progressing  Intervention: Identify and Manage Fall Risk  Recent Flowsheet " Documentation  Taken 6/23/2024 0931 by Angie Hare RN  Safety Promotion/Fall Prevention:   activity supervised   room near nurse's station   safety round/check completed   lighting adjusted   increase visualization of patient   nonskid shoes/slippers when out of bed   patient and family education   room door open   clutter free environment maintained  Intervention: Prevent Skin Injury  Recent Flowsheet Documentation  Taken 6/23/2024 0931 by Angie Hare RN  Body Position: supine, head elevated  Skin Protection: incontinence pads utilized  Device Skin Pressure Protection:   absorbent pad utilized/changed   adhesive use limited  Intervention: Prevent and Manage VTE (Venous Thromboembolism) Risk  Recent Flowsheet Documentation  Taken 6/23/2024 0931 by Angie Hare RN  VTE Prevention/Management: SCDs (sequential compression devices) on  Intervention: Prevent Infection  Recent Flowsheet Documentation  Taken 6/23/2024 0931 by Angie Hare RN  Infection Prevention:   single patient room provided   rest/sleep promoted   hand hygiene promoted  Goal: Optimal Comfort and Wellbeing  Outcome: Progressing  Goal: Readiness for Transition of Care  Outcome: Progressing     Problem: Pain Acute  Goal: Optimal Pain Control and Function  Outcome: Progressing  Intervention: Prevent or Manage Pain  Recent Flowsheet Documentation  Taken 6/23/2024 0931 by Angie Hare RN  Sleep/Rest Enhancement:   comfort measures   family presence promoted  Bowel Elimination Promotion: adequate fluid intake promoted  Medication Review/Management: medications reviewed  Intervention: Optimize Psychosocial Wellbeing  Recent Flowsheet Documentation  Taken 6/23/2024 0931 by Angie Hare RN  Supportive Measures: active listening utilized     Problem: Infection  Goal: Absence of Infection Signs and Symptoms  Outcome: Progressing  Intervention: Prevent or Manage Infection  Recent Flowsheet Documentation  Taken 6/23/2024 0931 by Angie Hare  RN  Infection Management: aseptic technique maintained     Problem: Knee Arthroplasty  Goal: Optimal Coping  Outcome: Progressing  Intervention: Support Psychosocial Response to Surgery and Mobility Changes  Recent Flowsheet Documentation  Taken 6/23/2024 0931 by Angie Hare RN  Supportive Measures: active listening utilized  Goal: Absence of Bleeding  Outcome: Progressing  Intervention: Monitor and Manage Bleeding  Recent Flowsheet Documentation  Taken 6/23/2024 0931 by Angie Hare RN  Bleeding Management: dressing monitored  Goal: Effective Bowel Elimination  Outcome: Progressing  Intervention: Enhance Bowel Motility and Elimination  Recent Flowsheet Documentation  Taken 6/23/2024 0931 by Angie Hare RN  Bowel Elimination Management: (meds given) toileting offered  Goal: Fluid and Electrolyte Balance  Outcome: Progressing  Intervention: Monitor and Manage Fluid and Electrolyte Balance  Recent Flowsheet Documentation  Taken 6/23/2024 0931 by Angie Hare RN  Fluid/Electrolyte Management: fluids provided  Goal: Optimal Functional Ability  Outcome: Progressing  Intervention: Promote Optimal Functional Status  Recent Flowsheet Documentation  Taken 6/23/2024 0931 by Angie Hare RN  Assistive Device Utilized: lift device  Activity Management: activity adjusted per tolerance  Goal: Absence of Infection Signs and Symptoms  Outcome: Progressing  Intervention: Prevent or Manage Infection  Recent Flowsheet Documentation  Taken 6/23/2024 0931 by Angie Hare RN  Infection Management: aseptic technique maintained  Goal: Intact Neurovascular Status  Outcome: Progressing  Goal: Anesthesia/Sedation Recovery  Outcome: Progressing  Intervention: Optimize Anesthesia Recovery  Recent Flowsheet Documentation  Taken 6/23/2024 0931 by Angie Hare RN  Safety Promotion/Fall Prevention:   activity supervised   room near nurse's station   safety round/check completed   lighting adjusted   increase visualization of  patient   nonskid shoes/slippers when out of bed   patient and family education   room door open   clutter free environment maintained  Administration (IS): unable to perform  Goal: Optimal Pain Control and Function  Outcome: Progressing  Goal: Nausea and Vomiting Relief  Outcome: Progressing  Goal: Effective Urinary Elimination  Outcome: Progressing  Goal: Effective Oxygenation and Ventilation  Outcome: Progressing  Intervention: Optimize Oxygenation and Ventilation  Recent Flowsheet Documentation  Taken 6/23/2024 0931 by Angie Hare, RN  Head of Bed (HOB) Positioning: HOB at 30-45 degrees

## 2024-06-23 NOTE — PLAN OF CARE
Occupational Therapy: Orders received. Chart reviewed and discussed with care team.? Occupational Therapy not indicated due to pt at baseline has assist with dressing, toileting, bathing, grooming. Pt typically ambulates with FWW. Pt currently lift dependent and unable to tolerate mobility. Physical therapy can address pt's therapy needs while at [facility] to avoid duplication of services. Defer OT to TCU.? Defer discharge recommendations to PT/medical team.? Will complete orders.

## 2024-06-23 NOTE — PROGRESS NOTES
[facility]    Medicine Progress Note - Hospitalist Service    Date of Admission:  6/19/2024    Assessment & Plan   Chelly Dave is a 89 year old female admitted on 6/19/2024.  Patient was seen with 2 daughters at the bedside who provided translation.  Communication with the patient is limited due to language barrier (speaks Monegasque) and severe dementia. Over the last 3 to 4 days she has been having worsening right knee pain and swelling.  Unable to bear weight.  No reported fever but she has been more confused over the last week. Was seen in urgent care where x-ray of bilateral knee was done.  Right knee post replacement with moderate knee effusion.  Left knee showed osteoarthritis.  Ultrasound of the right lower extremity was negative for DVT.      CT of the right knee without contrast showed knee arthroplasty without evidence of loosening or fracture and small knee joint effusion. Seen by orthopedic surgery, went to OR for I&D and modular exchange. Continuing IV antibiotics. Will consult ID, cultures growing Strep gordonii. Will need 6 weeks IV Abx followed by suppressive therapy. SW assisting with discharge planning.      Septic arthritis s/p I&D and modular exchange 6/21, strep gordonii  Right knee pain  Arthrocentesis yielded cloudy synovial fluid with cell count 9000, but 95% neutrophilic predominance. Growing GPCs. Started on vancomycin and ceftriaxone for now.  Orthopedic surgery consulted, took patient to OR for I&D with modular exchange.  Tolerated surgery well, wound VAC placed.  Fluid and intra-op cultures growing Strep gordonii. Consulted ID, will need 6 weeks IV Abx, chronic suppressive therapy after that.   - Orthopedic surgery consulted, appreciate assistance  - Continue ceftriaxone  - Consulted infectious disease, appreciate assistance  - WOC consult for wound VAC  - PT/OT consulted, TCU on discharge  -- Will need PICC prior to discharge for IV antibiotics, consult PICC  "team on Monday, unable to accommodate over the weekend     Depression/anxiety: Resume Seroquel, trazodone and fluoxetine.  Dementia: Prior to admission on Namenda, continued.  Hyperlipidemia: Can stop Lipitor as it is of no benefit at her age.  Diabetes: Does not appear to be on any diabetes medication.    Sjogren's disease: Continue PTA azathioprine, pilocarpine.        Diet: Pureed Diet (level 4) Thin Liquids (level 0)    DVT Prophylaxis: Enoxaparin (Lovenox) SQ  Quiroz Catheter: Not present  Lines: None     Cardiac Monitoring: None  Code Status: No CPR- Do NOT Intubate      Clinically Significant Risk Factors                              #Precipitous drop in Hgb/Hct: Lowest Hgb this hospitalization: 8.1 g/dL. Will continue to monitor and treat/transfuse as appropriate.     # Overweight: Estimated body mass index is 25.27 kg/m  as calculated from the following:    Height as of this encounter: 1.56 m (5' 1.42\").    Weight as of this encounter: 61.5 kg (135 lb 9.3 oz)., PRESENT ON ADMISSION            Disposition Plan     Medically Ready for Discharge: Anticipated in 2-4 Days         Amilcar Bashir MD  Hospitalist Service  [facility]  Securely message with [employer] (more info)  Text page via [employer] Paging/Directory   ______________________________________________________________________    Interval History   No acute events overnight. Pain well controlled. Wound Vac in place with no issues. Some soft pressures overnight but fluid responsive. Restarted IV fluids, continue to encourage PO intake.     Physical Exam   Vital Signs: Temp: 98.1  F (36.7  C) Temp src: Temporal BP: 115/53 Pulse: 84   Resp: 16 SpO2: 98 % O2 Device: Nasal cannula Oxygen Delivery: 1/2 LPM  Weight: 135 lbs 9.33 oz    General Appearance: Lying in bed, tired but awakens during my exam.    Respiratory: Clear to auscultation.  Normal work of breathing.  Cardiovascular: S1 and S2 was normal.  RRR.  GI: Soft and nontender  Skin: No " rash  Extremity: Wound VAC in place over right knee, neurovascular intact in distal right lower extremity    Medical Decision Making       45 MINUTES SPENT BY ME on the date of service doing chart review, history, exam, documentation & further activities per the note.      Data     I have personally reviewed the following data over the past 24 hrs:    N/A  \   8.1 (L)   / N/A     N/A N/A N/A /  107 (H)   N/A N/A 0.82 \     Procal: N/A CRP: 141.58 (H) Lactic Acid: N/A         Imaging results reviewed over the past 24 hrs:   No results found for this or any previous visit (from the past 24 hour(s)).

## 2024-06-24 ENCOUNTER — APPOINTMENT (OUTPATIENT)
Dept: PHYSICAL THERAPY | Facility: CLINIC | Age: 89
DRG: 464 | End: 2024-06-24
Payer: COMMERCIAL

## 2024-06-24 LAB
ANION GAP SERPL CALCULATED.3IONS-SCNC: 9 MMOL/L (ref 7–15)
ATRIAL RATE - MUSE: 123 BPM
BUN SERPL-MCNC: 18.5 MG/DL (ref 8–23)
CALCIUM SERPL-MCNC: 7.6 MG/DL (ref 8.8–10.2)
CHLORIDE SERPL-SCNC: 103 MMOL/L (ref 98–107)
CREAT SERPL-MCNC: 0.65 MG/DL (ref 0.51–0.95)
CRP SERPL-MCNC: 113.94 MG/L
DEPRECATED HCO3 PLAS-SCNC: 26 MMOL/L (ref 22–29)
DIASTOLIC BLOOD PRESSURE - MUSE: NORMAL MMHG
EGFRCR SERPLBLD CKD-EPI 2021: 84 ML/MIN/1.73M2
ERYTHROCYTE [DISTWIDTH] IN BLOOD BY AUTOMATED COUNT: 12.6 % (ref 10–15)
GLUCOSE SERPL-MCNC: 105 MG/DL (ref 70–99)
HCT VFR BLD AUTO: 26.8 % (ref 35–47)
HGB BLD-MCNC: 8.3 G/DL (ref 11.7–15.7)
INTERPRETATION ECG - MUSE: NORMAL
MCH RBC QN AUTO: 31.1 PG (ref 26.5–33)
MCHC RBC AUTO-ENTMCNC: 31 G/DL (ref 31.5–36.5)
MCV RBC AUTO: 100 FL (ref 78–100)
P AXIS - MUSE: 37 DEGREES
PLATELET # BLD AUTO: 207 10E3/UL (ref 150–450)
POTASSIUM SERPL-SCNC: 4.4 MMOL/L (ref 3.4–5.3)
PR INTERVAL - MUSE: 132 MS
QRS DURATION - MUSE: 66 MS
QT - MUSE: 322 MS
QTC - MUSE: 460 MS
R AXIS - MUSE: -22 DEGREES
RBC # BLD AUTO: 2.67 10E6/UL (ref 3.8–5.2)
SODIUM SERPL-SCNC: 138 MMOL/L (ref 135–145)
SYSTOLIC BLOOD PRESSURE - MUSE: NORMAL MMHG
T AXIS - MUSE: 19 DEGREES
VENTRICULAR RATE- MUSE: 123 BPM
WBC # BLD AUTO: 6.9 10E3/UL (ref 4–11)

## 2024-06-24 PROCEDURE — 120N000001 HC R&B MED SURG/OB

## 2024-06-24 PROCEDURE — 250N000013 HC RX MED GY IP 250 OP 250 PS 637: Performed by: STUDENT IN AN ORGANIZED HEALTH CARE EDUCATION/TRAINING PROGRAM

## 2024-06-24 PROCEDURE — 86140 C-REACTIVE PROTEIN: CPT | Performed by: PHYSICIAN ASSISTANT

## 2024-06-24 PROCEDURE — 258N000003 HC RX IP 258 OP 636: Mod: JZ | Performed by: STUDENT IN AN ORGANIZED HEALTH CARE EDUCATION/TRAINING PROGRAM

## 2024-06-24 PROCEDURE — 250N000013 HC RX MED GY IP 250 OP 250 PS 637: Performed by: ORTHOPAEDIC SURGERY

## 2024-06-24 PROCEDURE — 99232 SBSQ HOSP IP/OBS MODERATE 35: CPT | Performed by: INTERNAL MEDICINE

## 2024-06-24 PROCEDURE — 85027 COMPLETE CBC AUTOMATED: CPT | Performed by: STUDENT IN AN ORGANIZED HEALTH CARE EDUCATION/TRAINING PROGRAM

## 2024-06-24 PROCEDURE — 250N000011 HC RX IP 250 OP 636: Mod: JZ | Performed by: INTERNAL MEDICINE

## 2024-06-24 PROCEDURE — 97530 THERAPEUTIC ACTIVITIES: CPT | Mod: GP | Performed by: PHYSICAL THERAPIST

## 2024-06-24 PROCEDURE — 36569 INSJ PICC 5 YR+ W/O IMAGING: CPT

## 2024-06-24 PROCEDURE — 80048 BASIC METABOLIC PNL TOTAL CA: CPT | Performed by: STUDENT IN AN ORGANIZED HEALTH CARE EDUCATION/TRAINING PROGRAM

## 2024-06-24 PROCEDURE — 36415 COLL VENOUS BLD VENIPUNCTURE: CPT | Performed by: STUDENT IN AN ORGANIZED HEALTH CARE EDUCATION/TRAINING PROGRAM

## 2024-06-24 PROCEDURE — 272N000579 HC TRAY POWER PICC SOLO 4FR SINGLE LUMEN

## 2024-06-24 PROCEDURE — 250N000012 HC RX MED GY IP 250 OP 636 PS 637: Performed by: STUDENT IN AN ORGANIZED HEALTH CARE EDUCATION/TRAINING PROGRAM

## 2024-06-24 PROCEDURE — 97110 THERAPEUTIC EXERCISES: CPT | Mod: GP | Performed by: PHYSICAL THERAPIST

## 2024-06-24 RX ORDER — ASPIRIN 81 MG/1
81 TABLET ORAL 2 TIMES DAILY
DISCHARGE
Start: 2024-06-24 | End: 2024-06-28

## 2024-06-24 RX ORDER — ACETAMINOPHEN 325 MG/1
650 TABLET ORAL EVERY 4 HOURS PRN
DISCHARGE
Start: 2024-06-24 | End: 2024-07-05

## 2024-06-24 RX ORDER — AMOXICILLIN 250 MG
1 CAPSULE ORAL 2 TIMES DAILY PRN
DISCHARGE
Start: 2024-06-24 | End: 2024-07-16

## 2024-06-24 RX ORDER — LIDOCAINE 40 MG/G
CREAM TOPICAL
Status: DISCONTINUED | OUTPATIENT
Start: 2024-06-24 | End: 2024-06-24

## 2024-06-24 RX ORDER — HYDROMORPHONE HYDROCHLORIDE 2 MG/1
1-2 TABLET ORAL EVERY 4 HOURS PRN
Qty: 20 TABLET | Refills: 0 | Status: SHIPPED | OUTPATIENT
Start: 2024-06-24 | End: 2024-08-06

## 2024-06-24 RX ADMIN — HYDROXYZINE HYDROCHLORIDE 10 MG: 10 TABLET, FILM COATED ORAL at 09:38

## 2024-06-24 RX ADMIN — MEMANTINE 5 MG: 5 TABLET ORAL at 20:51

## 2024-06-24 RX ADMIN — CEFTRIAXONE 2 G: 2 INJECTION, POWDER, FOR SOLUTION INTRAMUSCULAR; INTRAVENOUS at 01:30

## 2024-06-24 RX ADMIN — FLUOXETINE 20 MG: 20 CAPSULE ORAL at 08:49

## 2024-06-24 RX ADMIN — ACETAMINOPHEN 975 MG: 325 TABLET, FILM COATED ORAL at 04:12

## 2024-06-24 RX ADMIN — HYDROXYZINE HYDROCHLORIDE 10 MG: 10 TABLET, FILM COATED ORAL at 16:48

## 2024-06-24 RX ADMIN — POLYETHYLENE GLYCOL 3350 17 G: 17 POWDER, FOR SOLUTION ORAL at 08:50

## 2024-06-24 RX ADMIN — AZATHIOPRINE 50 MG: 50 TABLET ORAL at 08:49

## 2024-06-24 RX ADMIN — ASPIRIN 81 MG: 81 TABLET, COATED ORAL at 20:56

## 2024-06-24 RX ADMIN — ASPIRIN 81 MG: 81 TABLET, COATED ORAL at 08:49

## 2024-06-24 RX ADMIN — TRAZODONE HYDROCHLORIDE 25 MG: 50 TABLET ORAL at 20:51

## 2024-06-24 RX ADMIN — MEMANTINE 5 MG: 5 TABLET ORAL at 08:49

## 2024-06-24 RX ADMIN — ACETAMINOPHEN 650 MG: 325 TABLET, FILM COATED ORAL at 09:38

## 2024-06-24 RX ADMIN — DOXEPIN HYDROCHLORIDE 10 MG: 10 CAPSULE ORAL at 20:51

## 2024-06-24 RX ADMIN — QUETIAPINE FUMARATE 25 MG: 25 TABLET ORAL at 20:50

## 2024-06-24 RX ADMIN — PILOCARPINE HYDROCHLORIDE 5 MG: 5 TABLET, FILM COATED ORAL at 11:53

## 2024-06-24 RX ADMIN — METHOCARBAMOL 500 MG: 500 TABLET ORAL at 20:51

## 2024-06-24 RX ADMIN — ACETAMINOPHEN 650 MG: 325 TABLET, FILM COATED ORAL at 16:49

## 2024-06-24 RX ADMIN — QUETIAPINE FUMARATE 25 MG: 25 TABLET ORAL at 08:49

## 2024-06-24 RX ADMIN — CARBOXYMETHYLCELLULOSE SODIUM 1 DROP: 5 SOLUTION/ DROPS OPHTHALMIC at 08:50

## 2024-06-24 RX ADMIN — PANTOPRAZOLE SODIUM 40 MG: 40 TABLET, DELAYED RELEASE ORAL at 08:49

## 2024-06-24 RX ADMIN — PILOCARPINE HYDROCHLORIDE 5 MG: 5 TABLET, FILM COATED ORAL at 22:13

## 2024-06-24 RX ADMIN — PILOCARPINE HYDROCHLORIDE 5 MG: 5 TABLET, FILM COATED ORAL at 08:49

## 2024-06-24 RX ADMIN — SODIUM CHLORIDE, POTASSIUM CHLORIDE, SODIUM LACTATE AND CALCIUM CHLORIDE: 600; 310; 30; 20 INJECTION, SOLUTION INTRAVENOUS at 02:35

## 2024-06-24 RX ADMIN — CARBOXYMETHYLCELLULOSE SODIUM 1 DROP: 5 SOLUTION/ DROPS OPHTHALMIC at 20:51

## 2024-06-24 RX ADMIN — SENNOSIDES AND DOCUSATE SODIUM 1 TABLET: 50; 8.6 TABLET ORAL at 08:49

## 2024-06-24 RX ADMIN — BISACODYL 10 MG: 10 SUPPOSITORY RECTAL at 14:56

## 2024-06-24 ASSESSMENT — ACTIVITIES OF DAILY LIVING (ADL)
ADLS_ACUITY_SCORE: 35.25
ADLS_ACUITY_SCORE: 35.25
ADLS_ACUITY_SCORE: 37.25
ADLS_ACUITY_SCORE: 37.25
ADLS_ACUITY_SCORE: 34.75
ADLS_ACUITY_SCORE: 37.25
ADLS_ACUITY_SCORE: 37.25
ADLS_ACUITY_SCORE: 35.25
ADLS_ACUITY_SCORE: 35.25
ADLS_ACUITY_SCORE: 37.25
ADLS_ACUITY_SCORE: 34.75
ADLS_ACUITY_SCORE: 37.25
ADLS_ACUITY_SCORE: 34.75
ADLS_ACUITY_SCORE: 35.25
ADLS_ACUITY_SCORE: 34.75
ADLS_ACUITY_SCORE: 35.25
ADLS_ACUITY_SCORE: 34.75
ADLS_ACUITY_SCORE: 35.25
ADLS_ACUITY_SCORE: 37.25
ADLS_ACUITY_SCORE: 35.25
ADLS_ACUITY_SCORE: 37.25

## 2024-06-24 NOTE — PLAN OF CARE
"  Problem: Adult Inpatient Plan of Care  Goal: Plan of Care Review  Description: The Plan of Care Review/Shift note should be completed every shift.  The Outcome Evaluation is a brief statement about your assessment that the patient is improving, declining, or no change.  This information will be displayed automatically on your shift  note.  Outcome: Progressing  Goal: Patient-Specific Goal (Individualized)  Description: You can add care plan individualizations to a care plan. Examples of Individualization might be:  \"Parent requests to be called daily at 9am for status\", \"I have a hard time hearing out of my right ear\", or \"Do not touch me to wake me up as it startles  me\".  Outcome: Progressing  Goal: Absence of Hospital-Acquired Illness or Injury  Outcome: Progressing  Intervention: Identify and Manage Fall Risk  Recent Flowsheet Documentation  Taken 6/24/2024 0000 by Hemant Plaza RN  Safety Promotion/Fall Prevention:   activity supervised   assistive device/personal items within reach   clutter free environment maintained   nonskid shoes/slippers when out of bed   room organization consistent   safety round/check completed   increased rounding and observation   patient and family education   room door open  Intervention: Prevent Infection  Recent Flowsheet Documentation  Taken 6/24/2024 0000 by Hemant Plaza RN  Infection Prevention: hand hygiene promoted  Goal: Optimal Comfort and Wellbeing  Outcome: Progressing  Goal: Readiness for Transition of Care  Outcome: Progressing     Problem: Pain Acute  Goal: Optimal Pain Control and Function  Outcome: Progressing  Intervention: Prevent or Manage Pain  Recent Flowsheet Documentation  Taken 6/24/2024 0000 by Hemant Plaza RN  Medication Review/Management: medications reviewed     Problem: Infection  Goal: Absence of Infection Signs and Symptoms  Outcome: Progressing     Problem: Knee Arthroplasty  Goal: Optimal Coping  Outcome: Progressing  Goal: Absence " of Bleeding  Outcome: Progressing  Goal: Effective Bowel Elimination  Outcome: Progressing  Goal: Fluid and Electrolyte Balance  Outcome: Progressing  Goal: Optimal Functional Ability  Outcome: Progressing  Goal: Absence of Infection Signs and Symptoms  Outcome: Progressing  Goal: Intact Neurovascular Status  Outcome: Progressing  Goal: Anesthesia/Sedation Recovery  Outcome: Progressing  Intervention: Optimize Anesthesia Recovery  Recent Flowsheet Documentation  Taken 6/24/2024 0000 by Hemant Plaza RN  Safety Promotion/Fall Prevention:   activity supervised   assistive device/personal items within reach   clutter free environment maintained   nonskid shoes/slippers when out of bed   room organization consistent   safety round/check completed   increased rounding and observation   patient and family education   room door open  Goal: Optimal Pain Control and Function  Outcome: Progressing  Goal: Nausea and Vomiting Relief  Outcome: Progressing  Goal: Effective Urinary Elimination  Outcome: Progressing  Goal: Effective Oxygenation and Ventilation  Outcome: Progressing     Problem: Comorbidity Management  Goal: Maintenance of Behavioral Health Symptom Control  Outcome: Progressing  Intervention: Maintain Behavioral Health Symptom Control  Recent Flowsheet Documentation  Taken 6/24/2024 0000 by Hemant Plaza RN  Medication Review/Management: medications reviewed   Goal Outcome Evaluation:

## 2024-06-24 NOTE — PROGRESS NOTES
Care Management Follow Up    Length of Stay (days): 5    Expected Discharge Date: 06/25/2024     Concerns to be Addressed: discharge planning     Patient plan of care discussed at interdisciplinary rounds: Yes    Anticipated Discharge Disposition: Transitional Care     Anticipated Discharge Services: None  Anticipated Discharge DME:      Patient/family educated on Medicare website which has current facility and service quality ratings: yes  Education Provided on the Discharge Plan:    Patient/Family in Agreement with the Plan: yes    Referrals Placed by CM/SW: Post Acute Facilities  Private pay costs discussed: Not applicable    Additional Information:  CM reviewed coverage at 100% for meds and supplies in the home and TCU with daughterCee. Patient will need TCU at discharge. CM had reviewed costs with family on Saturday, but reviewed again. Daughter chose [facility] for now. CM requested further referrals, but daughter will have to discuss with other siblings.  For now, CM sent referral to [facility] TCU. They are undecided on private room.    Uma Feliciano, RN, BSN, CM  Inpatient Care Coordination  [facility]  [phone]

## 2024-06-24 NOTE — PLAN OF CARE
"Goal Outcome Evaluation:      Plan of Care Reviewed With: patient    Overall Patient Progress: no changeOverall Patient Progress: no change    Outcome Evaluation: Patient unable to place weight on right leg, assist of 2 and a lift used for transfers. Pain with movement, managed with tylenol and atarax. Alert to self only. Incontinent of urine, voiding via purewick. Patient unable to have a BM in a week per family. PRN suppository given today, Patient had a BM at shift change. CMS in tact. Drain WDL. Dressing CDI. PICC placed today per vascular for long term antibiotics. Plan to discharge to TCU.          Problem: Adult Inpatient Plan of Care  Goal: Plan of Care Review  Description: The Plan of Care Review/Shift note should be completed every shift.  The Outcome Evaluation is a brief statement about your assessment that the patient is improving, declining, or no change.  This information will be displayed automatically on your shift  note.  Outcome: Progressing  Flowsheets (Taken 6/24/2024 1726)  Outcome Evaluation: Patient unable to place weight on right leg, assist of 2 and a lift used for transfers.  Plan of Care Reviewed With: patient  Overall Patient Progress: no change  Goal: Patient-Specific Goal (Individualized)  Description: You can add care plan individualizations to a care plan. Examples of Individualization might be:  \"Parent requests to be called daily at 9am for status\", \"I have a hard time hearing out of my right ear\", or \"Do not touch me to wake me up as it startles  me\".  Outcome: Progressing  Goal: Absence of Hospital-Acquired Illness or Injury  Outcome: Progressing  Intervention: Identify and Manage Fall Risk  Recent Flowsheet Documentation  Taken 6/24/2024 1247 by Rose Mary Lopez RN  Safety Promotion/Fall Prevention:   activity supervised   assistive device/personal items within reach   clutter free environment maintained   increased rounding and observation   patient and family education   " safety round/check completed  Intervention: Prevent Skin Injury  Recent Flowsheet Documentation  Taken 6/24/2024 0849 by Rose Mary Lopez RN  Body Position: supine, head elevated  Skin Protection: adhesive use limited  Device Skin Pressure Protection:   absorbent pad utilized/changed   adhesive use limited  Intervention: Prevent and Manage VTE (Venous Thromboembolism) Risk  Recent Flowsheet Documentation  Taken 6/24/2024 0849 by Rose Mary Lopez RN  VTE Prevention/Management: SCDs (sequential compression devices) on  Intervention: Prevent Infection  Recent Flowsheet Documentation  Taken 6/24/2024 0849 by Rose Mary Lopez RN  Infection Prevention:   rest/sleep promoted   single patient room provided  Goal: Optimal Comfort and Wellbeing  Outcome: Progressing  Intervention: Monitor Pain and Promote Comfort  Recent Flowsheet Documentation  Taken 6/24/2024 1649 by Rose Mary Lopez RN  Pain Management Interventions: medication (see MAR)  Taken 6/24/2024 0937 by Rose Mary Lopez RN  Pain Management Interventions: medication (see MAR)  Goal: Readiness for Transition of Care  Outcome: Progressing     Problem: Pain Acute  Goal: Optimal Pain Control and Function  Outcome: Progressing  Intervention: Develop Pain Management Plan  Recent Flowsheet Documentation  Taken 6/24/2024 1649 by Rose Mary Lopez RN  Pain Management Interventions: medication (see MAR)  Taken 6/24/2024 0937 by Rose Mary Lopez RN  Pain Management Interventions: medication (see MAR)  Intervention: Prevent or Manage Pain  Recent Flowsheet Documentation  Taken 6/24/2024 0849 by Rose Mary Lopez RN  Sensory Stimulation Regulation: care clustered  Bowel Elimination Promotion: adequate fluid intake promoted  Medication Review/Management: medications reviewed  Intervention: Optimize Psychosocial Wellbeing  Recent Flowsheet Documentation  Taken 6/24/2024 0849 by Rose Mary Lopez RN  Supportive Measures:   active listening utilized   positive reinforcement provided      Problem: Infection  Goal: Absence of Infection Signs and Symptoms  Outcome: Progressing  Intervention: Prevent or Manage Infection  Recent Flowsheet Documentation  Taken 6/24/2024 0849 by Rose Mary Lopez RN  Infection Management: aseptic technique maintained     Problem: Knee Arthroplasty  Goal: Optimal Coping  Outcome: Progressing  Intervention: Support Psychosocial Response to Surgery and Mobility Changes  Recent Flowsheet Documentation  Taken 6/24/2024 0849 by Rose Mary Lopez RN  Supportive Measures:   active listening utilized   positive reinforcement provided  Goal: Absence of Bleeding  Outcome: Progressing  Intervention: Monitor and Manage Bleeding  Recent Flowsheet Documentation  Taken 6/24/2024 0849 by Rose Mary Lopez RN  Bleeding Management: dressing monitored  Goal: Effective Bowel Elimination  Outcome: Progressing  Intervention: Enhance Bowel Motility and Elimination  Recent Flowsheet Documentation  Taken 6/24/2024 0849 by Rose Mary Lopez RN  Bowel Elimination Management: (stool softeners) other (see comments)  Goal: Fluid and Electrolyte Balance  Outcome: Progressing  Goal: Optimal Functional Ability  Outcome: Progressing  Intervention: Promote Optimal Functional Status  Recent Flowsheet Documentation  Taken 6/24/2024 0849 by Rose Mary Lopez RN  Activity Management: activity adjusted per tolerance  Goal: Absence of Infection Signs and Symptoms  Outcome: Progressing  Intervention: Prevent or Manage Infection  Recent Flowsheet Documentation  Taken 6/24/2024 0849 by Rose Mary Lopez RN  Infection Management: aseptic technique maintained  Goal: Intact Neurovascular Status  Outcome: Progressing  Intervention: Prevent or Manage Neurovascular Compromise  Recent Flowsheet Documentation  Taken 6/24/2024 0849 by Rose Mary Lopez RN  Neurovascular Pressure Management: Cast: ice/cold therapy performed  Goal: Anesthesia/Sedation Recovery  Outcome: Progressing  Intervention: Optimize Anesthesia Recovery  Recent  Flowsheet Documentation  Taken 6/24/2024 0849 by Rose Mary Lopez, RN  Safety Promotion/Fall Prevention:   activity supervised   assistive device/personal items within reach   clutter free environment maintained   increased rounding and observation   patient and family education   safety round/check completed  Reorientation Measures:   calendar in view   clock in view   familiar social contact encouraged  Goal: Optimal Pain Control and Function  Outcome: Progressing  Intervention: Prevent or Manage Pain  Recent Flowsheet Documentation  Taken 6/24/2024 1649 by Rose Mary Lopez RN  Pain Management Interventions: medication (see MAR)  Taken 6/24/2024 0937 by Rose Mary Lopez, RN  Pain Management Interventions: medication (see MAR)  Goal: Nausea and Vomiting Relief  Outcome: Progressing  Goal: Effective Urinary Elimination  Outcome: Progressing  Goal: Effective Oxygenation and Ventilation  Outcome: Progressing  Intervention: Optimize Oxygenation and Ventilation  Recent Flowsheet Documentation  Taken 6/24/2024 0849 by Rose Mary Lopez, RN  Head of Bed (HOB) Positioning: HOB at 20-30 degrees     Problem: Comorbidity Management  Goal: Maintenance of Behavioral Health Symptom Control  Outcome: Progressing  Intervention: Maintain Behavioral Health Symptom Control  Recent Flowsheet Documentation  Taken 6/24/2024 0849 by Rose Mary Lopez, RN  Medication Review/Management: medications reviewed

## 2024-06-24 NOTE — PROGRESS NOTES
[facility]    Received request for benefit check should pt require home IV abx. Pt will have coverage for IV Abx at 100% through the [employer] plan.    Thank you     Teodora Lutz RN  [facility] Liaison  [phone] (Mon thru Fri 8am - 5pm)  [phone] Office

## 2024-06-24 NOTE — PROGRESS NOTES
Orthopedic Surgery  Chelly Dave  06/24/2024     Admit Date:  6/19/2024    POD: 3 Days Post-Op   Procedure(s):  Right knee irrigation and debridement with polyethylene exchange     Patient resting comfortably in chair.    Pain controlled.  Tolerating oral intake.    Denies nausea or vomiting  Denies chest pain or shortness of breath    Temp:  [98.1  F (36.7  C)-98.7  F (37.1  C)] 98.4  F (36.9  C)  Pulse:  [] 91  Resp:  [16] 16  BP: (114-139)/(65-80) 139/69  SpO2:  [94 %-98 %] 95 %    Prevena dressing is clean, dry, and intact right knee.   Minimal erythema of the surrounding skin.   Moderate right knee effusion present with some tenderness.   Bilateral calves are soft, non-tender.  Bilateral lower extremity is NVI.  Sensation intact bilateral lower extremities  5/5 motor with resisted dorsi and plantar flexion bilaterally  +Dp pulse    Labs:  Recent Labs   Lab Test 06/24/24  0726 06/23/24  0723 06/22/24  0613 06/20/24  0504   WBC 6.9  --  7.2 3.6*   HGB 8.3* 8.1* 9.6* 9.1*     --  254 177     No lab results found.  Recent Labs   Lab Test 06/24/24  0726 06/23/24  0723 06/22/24  0613   CRPI 113.94* 141.58* 72.80*     All cultures:  Recent Labs   Lab 06/21/24  0833 06/21/24  0832 06/20/24  0058 06/20/24  0000 06/19/24  2359   CULTURE No anaerobic organisms isolated after 3 days  No anaerobic organisms isolated after 3 days  No growth after 2 days  No growth after 2 days No anaerobic organisms isolated after 3 days  Culture in progress  2+ Streptococcus gordonii* No growth after 4 days No growth after 4 days 1+ Streptococcus gordonii*       1. PLAN:   CRP downtrending.    Continue ASA 81mg bid for DVT prophylaxis.     Mobilize with PT/OT    Abx per ID    WBAT Right LE with walker.     Continue current pain regiment.   Dressings: Keep prevena intact x 14 days.    Follow-up: 2 weeks post-op with Dr Tracy team    2. Disposition   Anticipate d/c to TCU when medically cleared and progressing in  PT.    Barbara Cobos PA-C

## 2024-06-24 NOTE — PROCEDURES
[facility]    Single Lumen PICC Placement    Date/Time: 6/24/2024 2:39 PM    Performed by: Lio Elam RN  Authorized by: Nelson Figueroa MD  Indications: vascular access      UNIVERSAL PROTOCOL   Site Marked: Yes  Prior Images Obtained and Reviewed:  Yes  Required items: Required blood products, implants, devices and special equipment available    Patient identity confirmed:  Verbally with patient, arm band, hospital-assigned identification number and anonymous protocol, patient vented/unresponsive  NA - No sedation, light sedation, or local anesthesia  Confirmation Checklist:  Patient's identity using two indicators, relevant allergies, procedure was appropriate and matched the consent or emergent situation and correct equipment/implants were available  Time out: Immediately prior to the procedure a time out was called    Universal Protocol: the Joint Commission Universal Protocol was followed    Preparation: Patient was prepped and draped in usual sterile fashion       ANESTHESIA    Anesthesia:  Local infiltration  Local Anesthetic:  Lidocaine 1% without epinephrine  Anesthetic Total (mL):  3      SEDATION    Patient Sedated: No        Preparation: skin prepped with ChloraPrep  Skin prep agent: skin prep agent completely dried prior to procedure  Sterile barriers: maximum sterile barriers were used: cap, mask, sterile gown, sterile gloves, and large sterile sheet  Hand hygiene: hand hygiene performed prior to central venous catheter insertion  Type of line used: PICC  Catheter type: single lumen  Lumen type: power PICC and valved  Catheter size: 4 Fr  Brand: Bard  Lot number: [id number]  Placement method: venipuncture, MST, ultrasound and tip navigation system  Number of attempts: 1  Difficulty threading catheter: no  Successful placement: yes  Orientation: right    Location: brachial vein (medial) (4.2mm)  Tip Location: SVC/RA Junction  Arm circumference: adults 10 cm  Dressing and  securement: alcohol impregnated caps, blood cleaned with CHG, blood removed, chlorhexidine disc applied, sterile dressing applied, site cleansed and subcutaneous anchor securement system  Post procedure assessment: blood return through all ports, free fluid flow and placement verified by 3CG technology  PROCEDURE   Patient Tolerance:  Patient tolerated the procedure well with no immediate complicationsDescribe Procedure:   PICC ok to use, verified with 3cg Tip confirmation, Rose Mary Lopez RN informed  Disposal: sharps and needle count correct at the end of procedure, needles and guidewire disposed in sharps container

## 2024-06-24 NOTE — PROGRESS NOTES
[facility]    Medicine Progress Note - Hospitalist Service    Date of Admission:  6/19/2024    Assessment & Plan   Chelly Dave is a 89 year old female admitted on 6/19/2024.  On admission, the patient was seen with her 2 daughters at the bedside who provided translation as the patient speaks in broken English and has severe dementia.  Communication with the patient is limited due to language barrier (speaks [place]) and severe dementia. Over the last 3 to 4 days prior to admission,  she had been having worsening right knee pain and swelling.  She was unable to bear weight. She lives at home with her daughter.   No reported fever but she had been more confused over the previous week. Was seen in urgent care where x-ray of bilateral knee was done.  Right knee, with history of replacement with moderate knee effusion.  Left knee showed osteoarthritis.  Ultrasound of the right lower extremity was negative for DVT.      CT of the right knee without contrast showed knee arthroplasty without evidence of loosening or fracture and small knee joint effusion. She was seen by orthopedic surgery, went to OR for I&D and modular exchange and found to have septic arthritis with Strep growing.  She was placed on  IV antibiotics.ID was consulted with cultures growing Strep gordonii. Will need 6 weeks IV antibiotics (ending 8/2/24) with oral suppressive therapy to follow. SW assisting with discharge planning.      Septic arthritis s/p I&D and modular exchange 6/21, strep gordonii  Right knee pain  Arthrocentesis yielded cloudy synovial fluid with cell count 9000, but 95% neutrophilic predominance. Growing Streptococcus gordonii. She was initially started on vancomycin and ceftriaxone and now is only on Rocephin alone.  Orthopedic surgery consulted, took patient to OR for I&D with modular exchange.  Tolerated surgery well, wound VAC was placed.  Fluid and intra-op cultures growing Strep gordonii. Consulted  "ID, will need 6 weeks IV Abx, chronic suppressive therapy after that.  Patient is still having a hard time even standing up and is not safe to be at home where she lives with her daughter.  Patient will need TCU for ongoing therapy as well as for IV antibiotics.  Patient is currently afebrile she will have a PICC line placed in preparation of discharge.       Depression/anxiety:   Resume Seroquel, trazodone and fluoxetine.    Dementia:   The patient's language as well as cultural barrier makes her dementia and confusion worse.  Prior to admission on Namenda, continued.  Patient currently lives at home with her daughter.    Hyperlipidemia:   Can stop Lipitor as the risk far outweighs the benefit at this point.    Diabetes:   Does not appear to be on any diabetes medication.  Goal is to be conservative with her blood sugars being less than 250.  Need to prevent hypoglycemia.    Sjogren's disease:   Continue PTA azathioprine, pilocarpine.        Diet: Pureed Diet (level 4) Thin Liquids (level 0)    DVT Prophylaxis: Enoxaparin (Lovenox) SQ  Quiroz Catheter: Not present  Lines: None     Cardiac Monitoring: None  Code Status: No CPR- Do NOT Intubate          #Precipitous drop in Hgb/Hct: Lowest Hgb this hospitalization: 8.1 g/dL. Will continue to monitor and treat/transfuse as appropriate.     # Overweight: Estimated body mass index is 25.27 kg/m  as calculated from the following:    Height as of this encounter: 1.56 m (5' 1.42\").    Weight as of this encounter: 61.5 kg (135 lb 9.3 oz).             Medically Ready for Discharge: Anticipated Tomorrow, patient at this point can leave at any time once a TCU bed is found and her PICC line has been placed.      Discussed with nursing, social work/case management, updated daughter bedside       You Kirby MD  Hospitalist Service  [facility]  Securely message with [employer] [facility] (more info)  Text page via [employer] Paging/Directory "   ______________________________________________________________________    Interval History   Patient is new to me.  Patient is seen with her daughter bedside.  Patient speaks in broken English.  She has been having quite a bit of pain especially with standing.  She cries out with this.  Family is concerned about her going home and thinks the patient needs a higher level of care like a TCU at least in the short-term.  Patient has no current fevers.  Goal is to get a PICC line placed.      Physical Exam   Vital Signs: Temp: 98.4  F (36.9  C) Temp src: Temporal BP: 139/69 Pulse: 91   Resp: 16 SpO2: 95 % O2 Device: None (Room air) Oxygen Delivery: 1/2 LPM  Weight: 135 lbs 9.33 oz    Exam is unchanged from yesterday  General Appearance: Lying in bed, tired but awakens during my exam.  She is comfortable lying still in bed, appears elderly and frail, appears her stated age  HEENT:  MMM  Respiratory: Clear to auscultation.  Normal work of breathing.  Cardiovascular:  RRR.  No current murmur appreciated  GI:  nontender, bowel sounds are present throughout, soft and not distended  Extremity: Wound VAC in place over right knee, neurovascular intact in distal right lower extremity, not appreciably warm compared to the left leg, moves all extremities      Data     I have personally reviewed the following data over the past 24 hrs:    6.9  \   8.3 (L)   / 207     138 103 18.5 /  105 (H)   4.4 26 0.65 \     Procal: N/A CRP: 113.94 (H) Lactic Acid: N/A         Imaging results reviewed over the past 24 hrs:   Recent Results (from the past 24 hour(s))   XR Chest Port 1 View    Narrative    EXAM: XR CHEST PORT 1 VIEW  LOCATION: [facility]  DATE: 6/23/2024    INDICATION: crackles  COMPARISON: 2/13/2020.    FINDINGS: The heart size is normal. Thoracic aorta is calcified. The right hemidiaphragm is elevated. Mild left basilar atelectasis or scar. The lungs are otherwise clear. No pneumothorax.      Impression     IMPRESSION: Mild left basilar atelectasis or scar.

## 2024-06-24 NOTE — PROGRESS NOTES
[facility]    Infectious Disease Progress Note    Date of Service : 06/24/2024       Assessment:  89YF [place] female with severe dementia, Sjogren's -on treatment with Azatioprine, and prior remote R total knee arthroplasty, who has been admitted with acute onset right knee pain and swelling  over 3-4 days with inability to bear weight. Synovial fluid analysis was suggestive of infection with 9,159 WBC with PMN predominance, and she is now s/p I&D with polyethylene exchange on 6/21. Synovial fluid shows GPC on stain and operative tissue cxs are growing streptococcus gordonii, likely related to hematogenous seeding.     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  s/p I&D with polyethylene exchange on 6/21  -Sjogren's disease -on Azathioprine  -chronic medical conditions - Dementia, depression, anxiety, hyperlipidemia and diabetes     Recommendations  Continue ceftriaxone  Will need IV antibiotics at discharge, note has home IV coverage but with think unlikely to be doing antibiotics alone at home  Care integration consultation for discharge planning, presume rehab for reasons to be on the antibiotic  PICC line ordered  IV antibiotics for 6 weeks followed by oral suppressive therapy. OPIV antibiotic orders placed in epic until 8/2 for discharge  ID follow up in 2-3 weeks this organism fairly high probability of cure but given her age and overall condition quite possibly oral antibiotics indefinitely long-term for suppression      Nelson Figueroa MD    Interval History   Remains afebrile, no new cx data, tolerating antibiotics without side effects  Cultures same, able to get up some but with pain    Physical Exam   Temp: 98.4  F (36.9  C) Temp src: Temporal BP: 139/69 Pulse: 91   Resp: 16 SpO2: 95 % O2 Device: None (Room air) Oxygen Delivery: 1/2 LPM  Vitals:    06/20/24 0039   Weight: 61.5 kg (135 lb 9.3 oz)     Vital Signs with  Ranges  Temp:  [98.1  F (36.7  C)-98.7  F (37.1  C)] 98.4  F (36.9  C)  Pulse:  [] 91  Resp:  [16] 16  BP: (114-139)/(65-80) 139/69  SpO2:  [94 %-98 %] 95 %    GENERAL APPEARANCE:  resting, appears comfortable, in no pain  EYES: Eyes grossly normal to inspection  RESP: non labored breathing  MS: R knee in surgical dressing, wound vac in place  SKIN: no suspicious lesions or rashes    Other:    Medications   Current Facility-Administered Medications   Medication Dose Route Frequency Provider Last Rate Last Admin    lactated ringers infusion   Intravenous Continuous Amilcar Bashir MD   Stopped at 06/24/24 0935     Current Facility-Administered Medications   Medication Dose Route Frequency Provider Last Rate Last Admin    aspirin EC tablet 81 mg  81 mg Oral BID Billy Tracy MD   81 mg at 06/24/24 0849    azaTHIOprine (IMURAN) tablet 50 mg  50 mg Oral Daily Amilcar Bashir MD   50 mg at 06/24/24 0849    carboxymethylcellulose PF (REFRESH PLUS) 0.5 % ophthalmic solution 1 drop  1 drop Both Eyes BID Jair Nassar MD   1 drop at 06/24/24 0850    cefTRIAXone (ROCEPHIN) 2 g vial to attach to  ml bag for ADULTS or NS 50 ml bag for PEDS  2 g Intravenous Q24H Landry Alejo MD   2 g at 06/24/24 0130    doxepin (SINEquan) capsule 10 mg  10 mg Oral At Bedtime Amilcar Bashir MD   10 mg at 06/23/24 2014    FLUoxetine (PROzac) capsule 20 mg  20 mg Oral Daily Jair Nassar MD   20 mg at 06/24/24 0849    memantine (NAMENDA) tablet 5 mg  5 mg Oral BID Amilcar Bashir MD   5 mg at 06/24/24 0849    pantoprazole (PROTONIX) EC tablet 40 mg  40 mg Oral Daily Amilcar Bashir MD   40 mg at 06/24/24 0849    pilocarpine (SALAGEN) tablet 5 mg  5 mg Oral TID Amilcar Bashir MD   5 mg at 06/24/24 1153    polyethylene glycol (MIRALAX) Packet 17 g  17 g Oral Daily Billy Tracy MD   17 g at 06/24/24 0850    QUEtiapine (SEROquel) tablet 25 mg  25 mg Oral BID Jair Nassar MD   25 mg at 06/24/24 0849    senna-docusate  (SENOKOT-S/PERICOLACE) 8.6-50 MG per tablet 1 tablet  1 tablet Oral BID Billy Tracy MD   1 tablet at 06/24/24 0849    sodium chloride (PF) 0.9% PF flush 3 mL  3 mL Intracatheter Q8H Billy Tracy MD   3 mL at 06/24/24 1153    sodium chloride (PF) 0.9% PF flush 3 mL  3 mL Intracatheter Q8H Jair Nassar MD   3 mL at 06/24/24 0935    traZODone (DESYREL) half-tab 25 mg  25 mg Oral At Bedtime Jair Nassar MD   25 mg at 06/23/24 2012       Data   All microbiology laboratory data reviewed.  Recent Labs   Lab Test 06/24/24  0726 06/23/24  0723 06/22/24  0613 06/20/24  0504   WBC 6.9  --  7.2 3.6*   HGB 8.3* 8.1* 9.6* 9.1*   HCT 26.8*  --  31.3* 29.5*     --  100 101*     --  254 177     Recent Labs   Lab Test 06/24/24  0726 06/23/24  0723 06/22/24  0613   CR 0.65 0.82 0.75            06/21/2024 0833 06/22/2024 1117 Anaerobic Bacterial Culture Routine [[id number]]   Tissue from Knee, Right    Preliminary result Component Value   Culture No anaerobic organisms isolated after 1 day P             06/21/2024 0833 06/22/2024 0739 Tissue Aerobic Bacterial Culture Routine [[id number]]   Tissue from Knee, Right    Preliminary result Component Value   Culture No growth, less than 1 day P             06/21/2024 0833 06/22/2024 1101 Anaerobic Bacterial Culture Routine [[id number]]   Tissue from Knee, Right    Preliminary result Component Value   Culture No anaerobic organisms isolated after 1 day P             06/21/2024 0833 06/22/2024 0745 Tissue Aerobic Bacterial Culture Routine [[id number]]   Tissue from Knee, Right    Preliminary result Component Value   Culture No growth, less than 1 day P             06/21/2024 0832 06/22/2024 1101 Anaerobic Bacterial Culture Routine [[id number]]   Tissue from Knee, Right    Preliminary result Component Value   Culture No anaerobic organisms isolated after 1 day P             06/21/2024 0832 06/22/2024 0914 Tissue Aerobic Bacterial Culture Routine  [[id number]]   (Abnormal)   Tissue from Knee, Right    Preliminary result Component Value   Culture Culture in progress P    2+ Streptococcus gordonii Abnormal  P             06/20/2024 0058 06/23/2024 0217 Blood Culture Hand, Left [[id number]]   Blood from Hand, Left    Preliminary result Component Value   Culture No growth after 3 days P             06/20/2024 0000 06/23/2024 0217 Blood Culture Line, venous [[id number]]   Blood from Line, venous    Preliminary result Component Value   Culture No growth after 3 days P             06/19/2024 2359 06/20/2024 0232 Crystal ID Synovial Fluid [[id number]]   Synovial fluid from Knee, Right    Final result Component Value   Crystals Analysis No clinically significant crystals seen.          06/19/2024 2359 06/20/2024 0104 Cell count with differential fluid [[id number]]    (Abnormal)   Synovial fluid from Knee, Right    Final result Component Value   No component results          06/19/2024 2359 06/20/2024 0051 Glucose fluid [[id number]]    Synovial fluid from Knee, Right    Final result Component Value Units   Glucose Fluid Source Knee, Right    Glucose fluid 89 mg/dL          06/19/2024 2359 06/20/2024 0051 Protein fluid [[id number]]    Synovial fluid from Knee, Right    Final result Component Value Units   Protein Fluid Source Knee, Right    Protein Total Fluid 3.6 g/dL          06/19/2024 2359 06/23/2024 0947 Synovial fluid Aerobic Bacterial Culture Routine With Gram Stain [[id number]]    (Abnormal)   Synovial fluid from Knee, Right    Final result Component Value   Culture 1+ Streptococcus gordonii Abnormal    Gram Stain Result No organisms seen    4+ WBC seen    Predominantly PMNs       Susceptibility     Streptococcus gordonii     MAKAYLA     Ampicillin <=0.06 ug/mL Susceptible     Cefotaxime <=0.25 ug/mL Susceptible     Ceftriaxone <=0.25 ug/mL Susceptible     Clindamycin <=0.06 ug/mL Susceptible     Erythromycin >0.5 ug/mL Resistant     Penicillin  <=0.03 ug/mL Susceptible     Vancomycin 0.5 ug/mL Susceptible

## 2024-06-24 NOTE — PLAN OF CARE
"Goal Outcome Evaluation:       Writer got order from MD  for  chest X-ray. Will continue monitoring. Pt denies SOB  at rest and while lying flat. No cough noted.  Daughter at bed side.    Pt is A&OX 1, baseline confusion. Sat 95% on RA. Awake. LS crackles, coarse to R/ side.  MD was updated via vocera   messaging.LR 50 ml /hr infusing. IV to R/ chest are CDI. Wound Vac prevena foam intact , no drainage.CDI to R/knee. Pt had dinner. No N/V. No BM, all scheduled and PRN BM meds given.BS hypoactive. Voiding via pure wick.      Plan of Care Reviewed With: patient    Overall Patient Progress: improvingOverall Patient Progress: improving    Outcome Evaluation: pt is A&OX1, confused, baseline. LR infusing, IV line CDI. voiding via external cath. lift A-2. dressing CDI with wound vac.      Problem: Adult Inpatient Plan of Care  Goal: Plan of Care Review  Description: The Plan of Care Review/Shift note should be completed every shift.  The Outcome Evaluation is a brief statement about your assessment that the patient is improving, declining, or no change.  This information will be displayed automatically on your shift  note.  Outcome: Progressing  Flowsheets (Taken 6/23/2024 2048)  Outcome Evaluation: pt is A&OX1, confused, baseline. LR infusing, IV line CDI. voiding via external cath. lift A-2. dressing CDI with wound vac.  Plan of Care Reviewed With: patient  Overall Patient Progress: improving  Goal: Patient-Specific Goal (Individualized)  Description: You can add care plan individualizations to a care plan. Examples of Individualization might be:  \"Parent requests to be called daily at 9am for status\", \"I have a hard time hearing out of my right ear\", or \"Do not touch me to wake me up as it startles  me\".  Outcome: Progressing  Goal: Absence of Hospital-Acquired Illness or Injury  Outcome: Progressing  Intervention: Identify and Manage Fall Risk  Recent Flowsheet Documentation  Taken 6/23/2024 2000 by Alexandria Hilario, " RN  Safety Promotion/Fall Prevention:   activity supervised   assistive device/personal items within reach   clutter free environment maintained   nonskid shoes/slippers when out of bed   room organization consistent   safety round/check completed  Intervention: Prevent Infection  Recent Flowsheet Documentation  Taken 6/23/2024 2000 by Alexandria Hilario RN  Infection Prevention:   single patient room provided   rest/sleep promoted   hand hygiene promoted  Goal: Optimal Comfort and Wellbeing  Outcome: Progressing  Intervention: Monitor Pain and Promote Comfort  Recent Flowsheet Documentation  Taken 6/23/2024 2012 by Alexandria Hilario RN  Pain Management Interventions: medication (see MAR)  Goal: Readiness for Transition of Care  Outcome: Progressing     Problem: Pain Acute  Goal: Optimal Pain Control and Function  Outcome: Progressing  Intervention: Develop Pain Management Plan  Recent Flowsheet Documentation  Taken 6/23/2024 2012 by Alexandria Hilario RN  Pain Management Interventions: medication (see MAR)  Intervention: Prevent or Manage Pain  Recent Flowsheet Documentation  Taken 6/23/2024 2000 by Alexandria Hilario RN  Medication Review/Management: medications reviewed     Problem: Infection  Goal: Absence of Infection Signs and Symptoms  Outcome: Progressing     Problem: Knee Arthroplasty  Goal: Optimal Coping  Outcome: Progressing  Goal: Absence of Bleeding  Outcome: Progressing  Goal: Effective Bowel Elimination  Outcome: Progressing  Goal: Fluid and Electrolyte Balance  Outcome: Progressing  Goal: Optimal Functional Ability  Outcome: Progressing  Goal: Absence of Infection Signs and Symptoms  Outcome: Progressing  Goal: Intact Neurovascular Status  Outcome: Progressing  Goal: Anesthesia/Sedation Recovery  Outcome: Progressing  Intervention: Optimize Anesthesia Recovery  Recent Flowsheet Documentation  Taken 6/23/2024 2000 by Alexandria Hilario RN  Safety Promotion/Fall Prevention:   activity supervised   assistive  device/personal items within reach   clutter free environment maintained   nonskid shoes/slippers when out of bed   room organization consistent   safety round/check completed  Administration (IS): unable to perform  Reorientation Measures:   calendar in view   clock in view  Goal: Optimal Pain Control and Function  Outcome: Progressing  Intervention: Prevent or Manage Pain  Recent Flowsheet Documentation  Taken 6/23/2024 2012 by Alexandria Hilario, RN  Pain Management Interventions: medication (see MAR)  Goal: Nausea and Vomiting Relief  Outcome: Progressing  Goal: Effective Urinary Elimination  Outcome: Progressing  Goal: Effective Oxygenation and Ventilation  Outcome: Progressing     Problem: Comorbidity Management  Goal: Maintenance of Behavioral Health Symptom Control  Outcome: Progressing  Intervention: Maintain Behavioral Health Symptom Control  Recent Flowsheet Documentation  Taken 6/23/2024 2000 by Alexandria Hilario, RN  Medication Review/Management: medications reviewed

## 2024-06-25 ENCOUNTER — APPOINTMENT (OUTPATIENT)
Dept: PHYSICAL THERAPY | Facility: CLINIC | Age: 89
DRG: 464 | End: 2024-06-25
Payer: COMMERCIAL

## 2024-06-25 LAB
BACTERIA BLD CULT: NO GROWTH
BACTERIA BLD CULT: NO GROWTH
CREAT SERPL-MCNC: 0.6 MG/DL (ref 0.51–0.95)
CRP SERPL-MCNC: 100.78 MG/L
EGFRCR SERPLBLD CKD-EPI 2021: 86 ML/MIN/1.73M2
PLATELET # BLD AUTO: 282 10E3/UL (ref 150–450)

## 2024-06-25 PROCEDURE — 250N000013 HC RX MED GY IP 250 OP 250 PS 637: Performed by: STUDENT IN AN ORGANIZED HEALTH CARE EDUCATION/TRAINING PROGRAM

## 2024-06-25 PROCEDURE — 250N000011 HC RX IP 250 OP 636: Mod: JZ | Performed by: INTERNAL MEDICINE

## 2024-06-25 PROCEDURE — 250N000012 HC RX MED GY IP 250 OP 636 PS 637: Performed by: STUDENT IN AN ORGANIZED HEALTH CARE EDUCATION/TRAINING PROGRAM

## 2024-06-25 PROCEDURE — 120N000001 HC R&B MED SURG/OB

## 2024-06-25 PROCEDURE — 250N000013 HC RX MED GY IP 250 OP 250 PS 637: Performed by: ORTHOPAEDIC SURGERY

## 2024-06-25 PROCEDURE — 97530 THERAPEUTIC ACTIVITIES: CPT | Mod: GP

## 2024-06-25 PROCEDURE — 82565 ASSAY OF CREATININE: CPT | Performed by: INTERNAL MEDICINE

## 2024-06-25 PROCEDURE — 250N000011 HC RX IP 250 OP 636: Mod: JZ | Performed by: ORTHOPAEDIC SURGERY

## 2024-06-25 PROCEDURE — 99232 SBSQ HOSP IP/OBS MODERATE 35: CPT | Performed by: INTERNAL MEDICINE

## 2024-06-25 PROCEDURE — 99239 HOSP IP/OBS DSCHRG MGMT >30: CPT | Performed by: INTERNAL MEDICINE

## 2024-06-25 PROCEDURE — 85049 AUTOMATED PLATELET COUNT: CPT | Performed by: STUDENT IN AN ORGANIZED HEALTH CARE EDUCATION/TRAINING PROGRAM

## 2024-06-25 PROCEDURE — 86140 C-REACTIVE PROTEIN: CPT | Performed by: PHYSICIAN ASSISTANT

## 2024-06-25 RX ORDER — CEFTRIAXONE 2 G/1
2 INJECTION, POWDER, FOR SOLUTION INTRAMUSCULAR; INTRAVENOUS EVERY 24 HOURS
Status: DISCONTINUED | OUTPATIENT
Start: 2024-06-25 | End: 2024-06-26 | Stop reason: HOSPADM

## 2024-06-25 RX ADMIN — METHOCARBAMOL 500 MG: 500 TABLET ORAL at 20:50

## 2024-06-25 RX ADMIN — ACETAMINOPHEN 650 MG: 325 TABLET, FILM COATED ORAL at 10:19

## 2024-06-25 RX ADMIN — ASPIRIN 81 MG: 81 TABLET, COATED ORAL at 09:57

## 2024-06-25 RX ADMIN — HYDROMORPHONE HYDROCHLORIDE 0.2 MG: 0.2 INJECTION, SOLUTION INTRAMUSCULAR; INTRAVENOUS; SUBCUTANEOUS at 06:58

## 2024-06-25 RX ADMIN — CEFTRIAXONE 2 G: 2 INJECTION, POWDER, FOR SOLUTION INTRAMUSCULAR; INTRAVENOUS at 21:58

## 2024-06-25 RX ADMIN — DOXEPIN HYDROCHLORIDE 10 MG: 10 CAPSULE ORAL at 20:50

## 2024-06-25 RX ADMIN — PILOCARPINE HYDROCHLORIDE 5 MG: 5 TABLET, FILM COATED ORAL at 13:38

## 2024-06-25 RX ADMIN — MEMANTINE 5 MG: 5 TABLET ORAL at 09:57

## 2024-06-25 RX ADMIN — PILOCARPINE HYDROCHLORIDE 5 MG: 5 TABLET, FILM COATED ORAL at 09:58

## 2024-06-25 RX ADMIN — TRAZODONE HYDROCHLORIDE 25 MG: 50 TABLET ORAL at 20:50

## 2024-06-25 RX ADMIN — PILOCARPINE HYDROCHLORIDE 5 MG: 5 TABLET, FILM COATED ORAL at 21:58

## 2024-06-25 RX ADMIN — CARBOXYMETHYLCELLULOSE SODIUM 1 DROP: 5 SOLUTION/ DROPS OPHTHALMIC at 09:57

## 2024-06-25 RX ADMIN — AZATHIOPRINE 50 MG: 50 TABLET ORAL at 09:58

## 2024-06-25 RX ADMIN — FLUOXETINE 20 MG: 20 CAPSULE ORAL at 09:57

## 2024-06-25 RX ADMIN — QUETIAPINE FUMARATE 25 MG: 25 TABLET ORAL at 09:58

## 2024-06-25 RX ADMIN — ASPIRIN 81 MG: 81 TABLET, COATED ORAL at 20:50

## 2024-06-25 RX ADMIN — CEFTRIAXONE 2 G: 2 INJECTION, POWDER, FOR SOLUTION INTRAMUSCULAR; INTRAVENOUS at 01:52

## 2024-06-25 RX ADMIN — MEMANTINE 5 MG: 5 TABLET ORAL at 20:50

## 2024-06-25 RX ADMIN — HYDROXYZINE HYDROCHLORIDE 10 MG: 10 TABLET, FILM COATED ORAL at 05:10

## 2024-06-25 RX ADMIN — QUETIAPINE FUMARATE 25 MG: 25 TABLET ORAL at 20:50

## 2024-06-25 RX ADMIN — ACETAMINOPHEN 650 MG: 325 TABLET, FILM COATED ORAL at 05:10

## 2024-06-25 RX ADMIN — METHOCARBAMOL 500 MG: 500 TABLET ORAL at 10:19

## 2024-06-25 RX ADMIN — CARBOXYMETHYLCELLULOSE SODIUM 1 DROP: 5 SOLUTION/ DROPS OPHTHALMIC at 20:50

## 2024-06-25 RX ADMIN — PANTOPRAZOLE SODIUM 40 MG: 40 TABLET, DELAYED RELEASE ORAL at 09:58

## 2024-06-25 ASSESSMENT — ACTIVITIES OF DAILY LIVING (ADL)
ADLS_ACUITY_SCORE: 37.25
ADLS_ACUITY_SCORE: 38.25
ADLS_ACUITY_SCORE: 37.25
ADLS_ACUITY_SCORE: 38.25
ADLS_ACUITY_SCORE: 37.25
ADLS_ACUITY_SCORE: 37.25
ADLS_ACUITY_SCORE: 38.25
ADLS_ACUITY_SCORE: 37.25
ADLS_ACUITY_SCORE: 38.25
ADLS_ACUITY_SCORE: 37.25
ADLS_ACUITY_SCORE: 38.25
ADLS_ACUITY_SCORE: 38.25
ADLS_ACUITY_SCORE: 37.25
ADLS_ACUITY_SCORE: 38.25
ADLS_ACUITY_SCORE: 37.25
ADLS_ACUITY_SCORE: 38.25
ADLS_ACUITY_SCORE: 38.25
ADLS_ACUITY_SCORE: 37.25
ADLS_ACUITY_SCORE: 38.25

## 2024-06-25 NOTE — PROGRESS NOTES
[facility]    Infectious Disease Progress Note    Date of Service : 06/25/2024       Assessment:  89YF [place] female with severe dementia, Sjogren's -on treatment with Azatioprine, and prior remote R total knee arthroplasty, who has been admitted with acute onset right knee pain and swelling  over 3-4 days with inability to bear weight. Synovial fluid analysis was suggestive of infection with 9,159 WBC with PMN predominance, and she is now s/p I&D with polyethylene exchange on 6/21. Synovial fluid shows GPC on stain and operative tissue cxs are growing streptococcus gordonii, likely related to hematogenous seeding.     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  s/p I&D with polyethylene exchange on 6/21  -Sjogren's disease -on Azathioprine  -chronic medical conditions - Dementia, depression, anxiety, hyperlipidemia and diabetes     Recommendations  Continue ceftriaxone  Will need IV antibiotics at discharge, note has home IV coverage but looks like TCU is the plan  Care integration consultation for discharge planning, presume rehab for reasons to be on the antibiotic  PICC line ordered  IV antibiotics for 6 weeks followed by oral suppressive therapy. OPIV antibiotic orders placed in epic until 8/2 for discharge  ID follow up in 2-3 weeks this organism fairly high probability of cure but given her age and overall condition quite possibly oral antibiotics indefinitely long-term for suppression      Nelson Figueroa MD    Interval History   Remains afebrile, no new cx data, tolerating antibiotics without side effects  Cultures same, able to get up some but with pain    Physical Exam   Temp: 97.1  F (36.2  C) Temp src: Temporal BP: 120/74 Pulse: 88   Resp: 18 SpO2: 96 % O2 Device: None (Room air)    Vitals:    06/20/24 0039   Weight: 61.5 kg (135 lb 9.3 oz)     Vital Signs with Ranges  Temp:  [97  F (36.1  C)-98.1  F (36.7  C)] 97.1  F (36.2   C)  Pulse:  [] 88  Resp:  [18-22] 18  BP: (120-142)/(73-78) 120/74  SpO2:  [96 %] 96 %    GENERAL APPEARANCE:  resting, appears comfortable, in no pain  EYES: Eyes grossly normal to inspection  RESP: non labored breathing  MS: R knee in surgical dressing, wound vac in place  SKIN: no suspicious lesions or rashes    Other:    Medications   Current Facility-Administered Medications   Medication Dose Route Frequency Provider Last Rate Last Admin    lactated ringers infusion   Intravenous Continuous Amilcar Bashir MD   Stopped at 06/24/24 0935     Current Facility-Administered Medications   Medication Dose Route Frequency Provider Last Rate Last Admin    aspirin EC tablet 81 mg  81 mg Oral BID Billy Tracy MD   81 mg at 06/25/24 0957    azaTHIOprine (IMURAN) tablet 50 mg  50 mg Oral Daily Amilcar Bashir MD   50 mg at 06/25/24 0958    carboxymethylcellulose PF (REFRESH PLUS) 0.5 % ophthalmic solution 1 drop  1 drop Both Eyes BID Jair Nassar MD   1 drop at 06/25/24 0957    cefTRIAXone (ROCEPHIN) 2 g vial to attach to  ml bag for ADULTS or NS 50 ml bag for PEDS  2 g Intravenous Q24H Nelson Figueroa MD        doxepin (SINEquan) capsule 10 mg  10 mg Oral At Bedtime Amilcar Bashir MD   10 mg at 06/24/24 2051    FLUoxetine (PROzac) capsule 20 mg  20 mg Oral Daily Jair Nassar MD   20 mg at 06/25/24 0957    memantine (NAMENDA) tablet 5 mg  5 mg Oral BID Amilcar Bashir MD   5 mg at 06/25/24 0957    pantoprazole (PROTONIX) EC tablet 40 mg  40 mg Oral Daily Amilcar Bashir MD   40 mg at 06/25/24 0958    pilocarpine (SALAGEN) tablet 5 mg  5 mg Oral TID Amilcar Bashir MD   5 mg at 06/25/24 1338    polyethylene glycol (MIRALAX) Packet 17 g  17 g Oral Daily Billy Tracy MD   17 g at 06/24/24 0850    QUEtiapine (SEROquel) tablet 25 mg  25 mg Oral BID Jair Nassar MD   25 mg at 06/25/24 0958    senna-docusate (SENOKOT-S/PERICOLACE) 8.6-50 MG per tablet 1 tablet  1 tablet Oral BID Billy Tracy MD   1  tablet at 06/24/24 0849    sodium chloride (PF) 0.9% PF flush 10-40 mL  10-40 mL Intracatheter Q7 Days You Kirby MD   10 mL at 06/25/24 1321    traZODone (DESYREL) half-tab 25 mg  25 mg Oral At Bedtime Jair Nassar MD   25 mg at 06/24/24 2051       Data   All microbiology laboratory data reviewed.  Recent Labs   Lab Test 06/25/24  0453 06/24/24  0726 06/23/24  0723 06/22/24  0613 06/20/24  0504   WBC  --  6.9  --  7.2 3.6*   HGB  --  8.3* 8.1* 9.6* 9.1*   HCT  --  26.8*  --  31.3* 29.5*   MCV  --  100  --  100 101*    207  --  254 177     Recent Labs   Lab Test 06/25/24  0453 06/24/24  0726 06/23/24  0723   CR 0.60 0.65 0.82            06/21/2024 0833 06/22/2024 1117 Anaerobic Bacterial Culture Routine [[id number]]   Tissue from Knee, Right    Preliminary result Component Value   Culture No anaerobic organisms isolated after 1 day P             06/21/2024 0833 06/22/2024 0739 Tissue Aerobic Bacterial Culture Routine [[id number]]   Tissue from Knee, Right    Preliminary result Component Value   Culture No growth, less than 1 day P             06/21/2024 0833 06/22/2024 1101 Anaerobic Bacterial Culture Routine [[id number]]   Tissue from Knee, Right    Preliminary result Component Value   Culture No anaerobic organisms isolated after 1 day P             06/21/2024 0833 06/22/2024 0745 Tissue Aerobic Bacterial Culture Routine [[id number]]   Tissue from Knee, Right    Preliminary result Component Value   Culture No growth, less than 1 day P             06/21/2024 0832 06/22/2024 1101 Anaerobic Bacterial Culture Routine [[id number]]   Tissue from Knee, Right    Preliminary result Component Value   Culture No anaerobic organisms isolated after 1 day P             06/21/2024 0832 06/22/2024 0914 Tissue Aerobic Bacterial Culture Routine [[id number]]   (Abnormal)   Tissue from Knee, Right    Preliminary result Component Value   Culture Culture in progress P    2+ Streptococcus gordonii Abnormal   P             06/20/2024 0058 06/23/2024 0217 Blood Culture Hand, Left [[id number]]   Blood from Hand, Left    Preliminary result Component Value   Culture No growth after 3 days P             06/20/2024 0000 06/23/2024 0217 Blood Culture Line, venous [[id number]]   Blood from Line, venous    Preliminary result Component Value   Culture No growth after 3 days P             06/19/2024 2359 06/20/2024 0232 Crystal ID Synovial Fluid [[id number]]   Synovial fluid from Knee, Right    Final result Component Value   Crystals Analysis No clinically significant crystals seen.          06/19/2024 2359 06/20/2024 0104 Cell count with differential fluid [[id number]]    (Abnormal)   Synovial fluid from Knee, Right    Final result Component Value   No component results          06/19/2024 2359 06/20/2024 0051 Glucose fluid [[id number]]    Synovial fluid from Knee, Right    Final result Component Value Units   Glucose Fluid Source Knee, Right    Glucose fluid 89 mg/dL          06/19/2024 2359 06/20/2024 0051 Protein fluid [[id number]]    Synovial fluid from Knee, Right    Final result Component Value Units   Protein Fluid Source Knee, Right    Protein Total Fluid 3.6 g/dL          06/19/2024 2359 06/23/2024 0947 Synovial fluid Aerobic Bacterial Culture Routine With Gram Stain [[id number]]    (Abnormal)   Synovial fluid from Knee, Right    Final result Component Value   Culture 1+ Streptococcus gordonii Abnormal    Gram Stain Result No organisms seen    4+ WBC seen    Predominantly PMNs       Susceptibility     Streptococcus gordonii     MAKAYLA     Ampicillin <=0.06 ug/mL Susceptible     Cefotaxime <=0.25 ug/mL Susceptible     Ceftriaxone <=0.25 ug/mL Susceptible     Clindamycin <=0.06 ug/mL Susceptible     Erythromycin >0.5 ug/mL Resistant     Penicillin <=0.03 ug/mL Susceptible     Vancomycin 0.5 ug/mL Susceptible

## 2024-06-25 NOTE — PLAN OF CARE
Goal Outcome Evaluation:      Plan of Care Reviewed With: patient    Overall Patient Progress: no changeOverall Patient Progress: no change    Outcome Evaluation: NWB on right leg, assist of 2 and lift for transfers. Loose stool overnight. PRN robaxin, tylenol and atarax for pain. Pt alert to self only. DC to TCU 6/25 pending placement      Problem: Adult Inpatient Plan of Care  Goal: Plan of Care Review  Description: The Plan of Care Review/Shift note should be completed every shift.  The Outcome Evaluation is a brief statement about your assessment that the patient is improving, declining, or no change.  This information will be displayed automatically on your shift  note.  Outcome: Progressing  Flowsheets (Taken 6/25/2024 0533)  Outcome Evaluation: NWB on right leg, assist of 2 and lift for transfers. Loose stool overnight. PRN robaxin, tylenol and atarax for pain. Pt alert to self only. DC to TCU 6/25 pending placement  Plan of Care Reviewed With: patient  Overall Patient Progress: no change  Goal: Absence of Hospital-Acquired Illness or Injury  Intervention: Identify and Manage Fall Risk  Recent Flowsheet Documentation  Taken 6/24/2024 2041 by Tish Lanza, RN  Safety Promotion/Fall Prevention: activity supervised  Intervention: Prevent Infection  Recent Flowsheet Documentation  Taken 6/24/2024 2041 by Tish Lanza RN  Infection Prevention:   hand hygiene promoted   rest/sleep promoted  Goal: Readiness for Transition of Care  Intervention: Mutually Develop Transition Plan  Recent Flowsheet Documentation  Taken 6/24/2024 2000 by Tish Lanza, RN  Equipment Currently Used at Home:   cane, straight   walker, rolling     Problem: Pain Acute  Goal: Optimal Pain Control and Function  Intervention: Prevent or Manage Pain  Recent Flowsheet Documentation  Taken 6/24/2024 2041 by Tish Lanza, RN  Sensory Stimulation Regulation: care clustered  Medication Review/Management: medications reviewed     Problem:  Knee Arthroplasty  Goal: Anesthesia/Sedation Recovery  Intervention: Optimize Anesthesia Recovery  Recent Flowsheet Documentation  Taken 6/24/2024 2041 by Tish Lanza, RN  Safety Promotion/Fall Prevention: activity supervised  Reorientation Measures:   calendar in view   clock in view   familiar social contact encouraged     Problem: Comorbidity Management  Goal: Maintenance of Behavioral Health Symptom Control  Intervention: Maintain Behavioral Health Symptom Control  Recent Flowsheet Documentation  Taken 6/24/2024 2041 by Tish Lanza, RN  Medication Review/Management: medications reviewed

## 2024-06-25 NOTE — PROGRESS NOTES
Orthopedic Surgery  Chelly Dave  06/25/2024     Admit Date:  6/19/2024  POD: 4 Days Post-Op   Procedure(s):  Right knee irrigation and debridement with polyethylene exchange     Patient resting comfortably in bed.    Pain controlled.  Tolerating oral intake.    Denies chest pain or shortness of breath    Temp:  [97  F (36.1  C)-98.1  F (36.7  C)] 97  F (36.1  C)  Pulse:  [] 92  Resp:  [18-22] 18  BP: (134-142)/(69-78) 140/73  SpO2:  [96 %] 96 %    Prevena dressing is clean, dry, and intact right knee.   Minimal erythema of the surrounding skin.   Moderate right knee effusion present with some tenderness.   Bilateral calves are soft, non-tender.  Bilateral lower extremity is NVI.  Sensation intact bilateral lower extremities  5/5 motor with resisted dorsi and plantar flexion bilaterally  +Dp pulse    Labs:  Recent Labs   Lab Test 06/25/24  0453 06/24/24  0726 06/23/24  0723 06/22/24  0613 06/20/24  0504   WBC  --  6.9  --  7.2 3.6*   HGB  --  8.3* 8.1* 9.6* 9.1*    207  --  254 177     No lab results found.  Recent Labs   Lab Test 06/25/24  0453 06/24/24  0726 06/23/24  0723   CRPI 100.78* 113.94* 141.58*     All cultures:  Recent Labs   Lab 06/21/24  0833 06/21/24  0832 06/20/24  0058 06/20/24  0000 06/19/24  2359   CULTURE No growth after 3 days  No growth after 3 days  No anaerobic organisms isolated after 3 days  No anaerobic organisms isolated after 3 days No anaerobic organisms isolated after 3 days  Culture in progress  2+ Streptococcus gordonii* No Growth No Growth 1+ Streptococcus gordonii*       1. PLAN:   CRP downtrending.    Continue ASA 81mg bid for DVT prophylaxis.     Mobilize with PT/OT    Abx per ID    WBAT Right LE with walker.     Continue current pain regiment.   Dressings: Keep prevena intact x 14 days post-op.    Follow-up: 2 weeks post-op with Dr Tracy team    2. Disposition   Anticipate d/c to TCU when medically cleared and progressing in PT.  Ortho stable for  discharge.  We will follow peripherally.     Barbara Cobos PA-C

## 2024-06-25 NOTE — PROGRESS NOTES
Care Management Follow Up    Length of Stay (days): 6    Expected Discharge Date: 06/25/2024     Concerns to be Addressed: discharge planning     Patient plan of care discussed at interdisciplinary rounds: Yes    Anticipated Discharge Disposition: Transitional Care     Anticipated Discharge Services: None  Anticipated Discharge DME:      Patient/family educated on Medicare website which has current facility and service quality ratings: yes  Education Provided on the Discharge Plan:    Patient/Family in Agreement with the Plan: yes    Referrals Placed by CM/SW: Post Acute Facilities  Private pay costs discussed: private room/amenity fees    Additional Information:  [facility] was unable to accept patient for TCU. Spoke with daughter and got choices of [facility] and [facility]. Referrals sent.  [facility]. They are submitting for auth and plan on being able to accept patient tomorrow.  Updated daughter. She would like a private room to start for patient. [facility] updated.     Zhanna Acuña RN  Care Coordinator  [facility]

## 2024-06-25 NOTE — DISCHARGE SUMMARY
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  On admission, the patient was seen with her 2 daughters at the bedside who provided translation as the patient speaks in broken English and has severe dementia.  Communication with the patient is limited due to language barrier (speaks Citizen of the [place]) and severe dementia. Over the last 3 to 4 days prior to admission,  she had been having worsening right knee pain and swelling.  She was unable to bear weight. She lives at home with her daughter.   No reported fever but she had been more confused over the previous week. Was seen in urgent care where x-ray of bilateral knee was done.  Right knee, with history of replacement with moderate knee effusion.  Left knee showed osteoarthritis.  Ultrasound of the right lower extremity was negative for DVT.      CT of the right knee without contrast showed knee arthroplasty without evidence of loosening or fracture and small knee joint effusion. She was seen by orthopedic surgery, went to OR for I&D and modular exchange and found to have septic arthritis with Strep growing.  She was placed on  IV antibiotics.ID was consulted with cultures growing Strep gordonii. Will need 6 weeks IV antibiotics (ending 8/2/24) with oral suppressive therapy to follow.      Septic arthritis s/p I&D and modular exchange 6/21, strep gordonii  Right knee pain  On  "work up her arthrocentesis yielded cloudy synovial fluid with cell count 9000, but 95% neutrophilic predominance. Culture grew out Streptococcus gordonii. She was initially started on vancomycin and ceftriaxone and now is only on Rocephin alone through 8/2/24.  Orthopedic surgery consulted, took patient to OR for I&D with modular exchange.  She tolerated surgery well, wound VAC was placed.  Fluid and intra-op cultures growing Strep gordonii. Consulted ID, will need 6 weeks IV antibiotics total (through 8/2), chronic suppressive therapy after that most likely.  Patient is still having a hard time even standing up and is not safe to be at home where she lives with her daughter.  Patient will need TCU for ongoing therapy as well as for IV antibiotics.  Patient did get a  PICC line placed in preparation 6/24/24.  Patient is to mobilize with physical therapy and occupational therapy.  Is to see Dr Tracy's team with ortho in 2 weeks.  She is weightbearing as tolerated to the right leg using a walker     Depression/anxiety:   Resumed Seroquel, trazodone and fluoxetine.     Dementia:   The patient's language as well as cultural barrier makes her dementia and confusion worse.  Prior to admission on Namenda, continued.  Patient currently lives at home with her daughter.  Difficult to test given language barrier.      Hyperlipidemia:   Can stop Lipitor as the risk far outweighs the benefit at this point.     Diabetes:   Does not appear to be on any diabetes medication.  Goal is to be conservative with her blood sugars being less than 250.  Need to prevent hypoglycemia.     Sjogren's disease:   Continue PTA azathioprine, pilocarpine.    Clinically Significant Risk Factors     # Overweight: Estimated body mass index is 25.27 kg/m  as calculated from the following:    Height as of this encounter: 1.56 m (5' 1.42\").    Weight as of this encounter: 61.5 kg (135 lb 9.3 oz).       Significant Results and Procedures   Most Recent 3 " CBC's:  Recent Labs   Lab Test 06/25/24  0453 06/24/24  0726 06/23/24  0723 06/22/24  0613 06/20/24  0504   WBC  --  6.9  --  7.2 3.6*   HGB  --  8.3* 8.1* 9.6* 9.1*   MCV  --  100  --  100 101*    207  --  254 177     Most Recent 3 BMP's:  Recent Labs   Lab Test 06/25/24  0453 06/24/24  0726 06/23/24  0723 06/22/24  0613 06/21/24  0607 06/20/24  0504   NA  --  138  --  134*  --  139   POTASSIUM  --  4.4  --  4.2  --  4.4   CHLORIDE  --  103  --  100  --  106   CO2  --  26  --  24  --  24   BUN  --  18.5  --  11.8  --  21.7   CR 0.60 0.65 0.82 0.75   < > 0.93   ANIONGAP  --  9  --  10  --  9   ROBSON  --  7.6*  --  8.1*  --  7.7*   GLC  --  105* 107* 173*   < > 97    < > = values in this interval not displayed.   ,   Results for orders placed or performed during the hospital encounter of 06/19/24   CT Knee Right w/o Contrast    Narrative    EXAM: CT KNEE RIGHT W/O CONTRAST  LOCATION: [facility]  DATE: 6/19/2024    INDICATION: Right knee pain. Prior total knee arthroplasty. Difficulty weightbearing.  COMPARISON: None.  TECHNIQUE: Noncontrast. Axial, sagittal and coronal thin-section reconstruction. Dose reduction techniques were used.     FINDINGS:     BONES:  -Right total knee arthroplasty with patellar resurfacing. No findings to suggest loosening. No evidence of a fracture.    SOFT TISSUES:  -Small effusion. No muscle atrophy. No soft tissue edema.      Impression    IMPRESSION:  1.  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    2.  Small knee joint effusion.     XR Knee Port Right 1/2 Views    Narrative    XR KNEE PORT RIGHT 1/2 VIEWS   6/21/2024 10:46 AM     HISTORY: Post-Op Total Knee  COMPARISON: None.       Impression    IMPRESSION: There are postoperative changes from right total knee  arthroplasty, in standard alignment. No periprosthetic lucency or  fracture. There is postoperative gas in the soft tissues.    VALERIA MORROW MD         SYSTEM ID:   [id number]   XR Chest Port 1 View    Narrative    EXAM: XR CHEST PORT 1 VIEW  LOCATION: [facility]  DATE: 6/23/2024    INDICATION: crackles  COMPARISON: 2/13/2020.    FINDINGS: The heart size is normal. Thoracic aorta is calcified. The right hemidiaphragm is elevated. Mild left basilar atelectasis or scar. The lungs are otherwise clear. No pneumothorax.      Impression    IMPRESSION: Mild left basilar atelectasis or scar.            Follow up/instructions: Patient is follow-up with TCU provider making sure that her pain is well-controlled.  Has follow-up with orthopedics in 2 weeks with Dr. Demarcus ziegler.  She should see ID, Dr Figueroa, in 2-3 weeks.    Pending test results at discharge:      Unresulted Labs Ordered in the Past 30 Days of this Admission       Date and Time Order Name Status Description    6/21/2024  8:33 AM Tissue Aerobic Bacterial Culture Routine Preliminary     6/21/2024  8:33 AM Anaerobic Bacterial Culture Routine Preliminary     6/21/2024  8:33 AM Tissue Aerobic Bacterial Culture Routine Preliminary     6/21/2024  8:33 AM Anaerobic Bacterial Culture Routine Preliminary     6/21/2024  8:33 AM Tissue Aerobic Bacterial Culture Routine Preliminary     6/21/2024  8:33 AM Anaerobic Bacterial Culture Routine Preliminary              Discharge Orders      General info for SNF    Length of Stay Estimate: Short Term Care: Estimated # of Days <30  Condition at Discharge: Improving  Level of care:skilled   Rehabilitation Potential: Good  Admission H&P remains valid and up-to-date: Yes  Recent Chemotherapy: N/A  Use Nursing Home Standing Orders: Yes     Mantoux instructions    Give two-step Mantoux (PPD) Per Facility Policy Yes     Reason for your hospital stay    Right septic TKA: I&D with poly change     Wound care    Site:   Right knee   Instructions:  Keep prevena in place x 14 days post op.  Do not shower over prevena dressings.  Then can remove POD #14 and apply island dressing  until seen at follow-up appointment.     Follow Up and recommended labs and tests    Follow-up with Dr. Tracy team at [facility] approximately 2 weeks following your surgery.  Call the care coordinator at [phone] to arrange appointment or if any questions.  If applicable and patient is difficult to transport, patient can be seen by facility provider and have x-rays sent to the surgeon's office.   [facility] clinic numbers:  [place] [phone], [place] [phone]  Walk in clinic hours: 8 am - 8 pm     Activity - Up with assistive device     Weight bearing status    WBAT right LE with walker     Physical Therapy Adult Consult    Evaluate and treat as clinically indicated.    Reason:  Right septic TKA: I&D with poly change     Occupational Therapy Adult Consult    Evaluate and treat as clinically indicated.    Reason:  Right septic TKA: I&D with poly change     Fall precautions     Crutches DME    DME Documentation: Describe the reason for need to support medical necessity: Impaired gait status post knee surgery. I, the undersigned, certify that the above prescribed supplies are medically necessary for this patient and is both reasonable and necessary in reference to accepted standards of medical practice in the treatment of this patient's condition and is not prescribed as a convenience.     Cane DME    DME Documentation: Describe the reason for need to support medical necessity: Impaired gait status post knee surgery. I, the undersigned, certify that the above prescribed supplies are medically necessary for this patient and is both reasonable and necessary in reference to accepted standards of medical practice in the treatment of this patient's condition and is not prescribed as a convenience.     Walker DME    DME Documentation: Describe the reason for need to support medical necessity: Impaired gait status post knee surgery. I, the undersigned, certify that the above prescribed supplies are medically  necessary for this patient and is both reasonable and necessary in reference to accepted standards of medical practice in the treatment of this patient's condition and is not prescribed as a convenience.     Diet    Follow this diet upon discharge: Orders Placed This Encounter      Pureed Diet (level 4) Thin Liquids (level 0)       Discharge Disposition   Discharged to short-term care facility  Condition at discharge: Stable      Consultations This Hospital Stay   ORTHOPEDIC SURGERY IP CONSULT  PHARMACY TO DOSE VANCO  PHYSICAL THERAPY ADULT IP CONSULT  OCCUPATIONAL THERAPY ADULT IP CONSULT  INFECTIOUS DISEASES IP CONSULT  CARE MANAGEMENT / SOCIAL WORK IP CONSULT  VASCULAR ACCESS ADULT IP CONSULT  VASCULAR ACCESS ADULT IP CONSULT  PHYSICAL THERAPY ADULT IP CONSULT  OCCUPATIONAL THERAPY ADULT IP CONSULT  ADVANCE DIRECTIVE IP CONSULT  ADVANCE DIRECTIVE IP CONSULT    Code Status   No CPR- Do NOT Intubate    Time Spent on this Encounter   I, You Kirby MD, personally saw the patient today and spent greater than 30 minutes discharging this patient.  Discussed with nursing, social work, case management          This document was created using voice recognition technology.  Please excuse any typographical errors that may have occurred.  Please call with any questions.       You Kirby MD  [facility]  [address] [place] [address]  Phone: [phone]  Fax: [phone]  ______________________________________________________________________    Physical Exam   Vital Signs: Temp: 97  F (36.1  C) Temp src: Temporal BP: (!) 140/73 Pulse: 92   Resp: 18 SpO2: 96 % O2 Device: None (Room air)    Weight: 135 lbs 9.33 oz    Exam is unchanged from yesterday  General Appearance: Lying in bed,  comfortable lying still in bed, appears elderly and frail, appears her stated age  HEENT:  MMM  Respiratory: Clear to auscultation.  Normal work of breathing.  Cardiovascular:  RRR.  No  "current murmur appreciated  GI:  nontender, bowel sounds are present throughout, soft and not distended  Extremity: Wound VAC in place over right knee, neurovascular intact in distal right lower extremity, not appreciably warm compared to the left leg, moves all extremities      Discharge Medications   Current Discharge Medication List        START taking these medications    Details   cefTRIAXone (ROCEPHIN) 2 GM vial Inject 2 g into the vein every 24 hours for 39 days    Comments: Check weekly CBC/diff, creatinine, AST, CRP  Send results to intermed consultants Dr Rashid  Associated Diagnoses: Pyogenic arthritis of right knee joint, due to unspecified organism (H)      HYDROmorphone (DILAUDID) 2 MG tablet Take 0.5-1 tablets (1-2 mg) by mouth every 4 hours as needed for moderate to severe pain 1/2 tab po q 4 hrs prn pain scale \"3-6\"  1 tab po q 4 hrs prn pain scale \"7-10\"  Qty: 20 tablet, Refills: 0    Associated Diagnoses: Pyogenic arthritis of right knee joint, due to unspecified organism (H)      senna-docusate (SENOKOT-S/PERICOLACE) 8.6-50 MG tablet Take 1 tablet by mouth 2 times daily as needed for constipation    Associated Diagnoses: Pyogenic arthritis of right knee joint, due to unspecified organism (H)           CONTINUE these medications which have CHANGED    Details   acetaminophen (TYLENOL) 325 MG tablet Take 2 tablets (650 mg) by mouth every 4 hours as needed for other (For optimal non-opioid multimodal pain management to improve pain control.)    Associated Diagnoses: Pyogenic arthritis of right knee joint, due to unspecified organism (H)      aspirin 81 MG EC tablet Take 1 tablet (81 mg) by mouth 2 times daily    Associated Diagnoses: Pyogenic arthritis of right knee joint, due to unspecified organism (H)           CONTINUE these medications which have NOT CHANGED    Details   albuterol (PROVENTIL) (2.5 MG/3ML) 0.083% neb solution Take 2.5 mg by nebulization every 6 hours as needed for shortness of " breath, wheezing or cough      AzaTHIOprine (IMURAN PO) Take 50 mg by mouth daily      Calcium Carb-Cholecalciferol (CALCIUM 500 +D) 500-400 MG-UNIT TABS Take 1 tablet by mouth 2 times daily      Cholecalciferol (VITAMIN D3) 1000 units CAPS Take 1,000 Units by mouth daily      cycloSPORINE (RESTASIS) 0.05 % ophthalmic emulsion Instill 1 drop into both eyes every 12 hours.   No further refills will be given unless you come in for your exam.      DOXEPIN HCL PO Take 10 mg by mouth At Bedtime      ferrous sulfate (IRON) 325 (65 FE) MG tablet Take 1 tablet (325 mg) by mouth daily  Qty: 100 tablet, Refills: 0    Associated Diagnoses: Iron deficiency anemia, unspecified iron deficiency anemia type      FLUoxetine (PROZAC) 20 MG capsule Take 20 mg by mouth daily      lidocaine (LIDODERM) 5 % patch Place 1 patch onto the skin daily as needed for moderate pain (12 hours on; 12 hours off. Patient applies to knees or back)      loratadine (CLARITIN) 10 MG tablet Take 10 mg by mouth daily as needed      memantine (NAMENDA) 5 MG tablet Take 5 mg by mouth 2 times daily      pantoprazole (PROTONIX) 40 MG EC tablet Take 40 mg by mouth daily      Pilocarpine HCl (SALAGEN PO) Take 5 mg by mouth 3 times daily AM, 1200 & HS      polyethylene glycol (MIRALAX/GLYCOLAX) powder Take 17 g by mouth daily as needed      polyethylene glycol 400 (BLINK TEARS) 0.25 % SOLN ophthalmic solution Place 1-2 drops into both eyes every 2 hours as needed for dry eyes      QUEtiapine (SEROQUEL) 25 MG tablet Take 25 mg by mouth 2 times daily      blood glucose monitoring (NO BRAND SPECIFIED) test strip USE TO CHECK BLOOD SUGARS EVERY DAY      blood glucose monitoring (SOFTCLIX) lancets USE TO TEST BLOOD SUGARS DAILY      Blood Glucose Monitoring Suppl (FIFTY50 GLUCOSE METER 2.0) w/Device KIT Dispense meter, test strips, lancets covered by pt ins. E11.9 NIDDM type II - Test 1 time/day           STOP taking these medications       atorvastatin (LIPITOR) 40  MG tablet Comments:   Reason for Stopping:         traZODone (DESYREL) 50 MG tablet Comments:   Reason for Stopping:         traZODone (DESYREL) 50 MG tablet Comments:   Reason for Stopping:             Allergies   Allergies   Allergen Reactions    Lisinopril     Oxycodone      Other reaction(s): Confusion    Aspirin Buf(Cacarb-Mgcarb-Mgo)      Other reaction(s): GI Upset  81 mg works well for her

## 2024-06-26 ENCOUNTER — LAB REQUISITION (OUTPATIENT)
Dept: LAB | Facility: CLINIC | Age: 89
End: 2024-06-26
Payer: COMMERCIAL

## 2024-06-26 VITALS
HEIGHT: 61 IN | BODY MASS INDEX: 25.6 KG/M2 | DIASTOLIC BLOOD PRESSURE: 73 MMHG | TEMPERATURE: 97.7 F | HEART RATE: 89 BPM | WEIGHT: 135.58 LBS | RESPIRATION RATE: 20 BRPM | SYSTOLIC BLOOD PRESSURE: 153 MMHG | OXYGEN SATURATION: 96 %

## 2024-06-26 DIAGNOSIS — Z11.1 ENCOUNTER FOR SCREENING FOR RESPIRATORY TUBERCULOSIS: ICD-10-CM

## 2024-06-26 LAB
BACTERIA TISS BX CULT: ABNORMAL
BACTERIA TISS BX CULT: NO GROWTH
BACTERIA TISS BX CULT: NO GROWTH
CREAT SERPL-MCNC: 0.7 MG/DL (ref 0.51–0.95)
EGFRCR SERPLBLD CKD-EPI 2021: 83 ML/MIN/1.73M2

## 2024-06-26 PROCEDURE — 250N000013 HC RX MED GY IP 250 OP 250 PS 637: Performed by: ORTHOPAEDIC SURGERY

## 2024-06-26 PROCEDURE — 250N000013 HC RX MED GY IP 250 OP 250 PS 637: Performed by: STUDENT IN AN ORGANIZED HEALTH CARE EDUCATION/TRAINING PROGRAM

## 2024-06-26 PROCEDURE — 99232 SBSQ HOSP IP/OBS MODERATE 35: CPT | Performed by: INTERNAL MEDICINE

## 2024-06-26 PROCEDURE — 250N000011 HC RX IP 250 OP 636: Mod: JZ | Performed by: ORTHOPAEDIC SURGERY

## 2024-06-26 PROCEDURE — 82565 ASSAY OF CREATININE: CPT | Performed by: INTERNAL MEDICINE

## 2024-06-26 PROCEDURE — 250N000012 HC RX MED GY IP 250 OP 636 PS 637: Performed by: STUDENT IN AN ORGANIZED HEALTH CARE EDUCATION/TRAINING PROGRAM

## 2024-06-26 RX ADMIN — CARBOXYMETHYLCELLULOSE SODIUM 1 DROP: 5 SOLUTION/ DROPS OPHTHALMIC at 08:18

## 2024-06-26 RX ADMIN — PILOCARPINE HYDROCHLORIDE 5 MG: 5 TABLET, FILM COATED ORAL at 13:39

## 2024-06-26 RX ADMIN — FLUOXETINE 20 MG: 20 CAPSULE ORAL at 08:18

## 2024-06-26 RX ADMIN — MEMANTINE 5 MG: 5 TABLET ORAL at 08:18

## 2024-06-26 RX ADMIN — HYDROXYZINE HYDROCHLORIDE 10 MG: 10 TABLET, FILM COATED ORAL at 04:53

## 2024-06-26 RX ADMIN — ASPIRIN 81 MG: 81 TABLET, COATED ORAL at 08:18

## 2024-06-26 RX ADMIN — QUETIAPINE FUMARATE 25 MG: 25 TABLET ORAL at 08:18

## 2024-06-26 RX ADMIN — PILOCARPINE HYDROCHLORIDE 5 MG: 5 TABLET, FILM COATED ORAL at 08:18

## 2024-06-26 RX ADMIN — ACETAMINOPHEN 650 MG: 325 TABLET, FILM COATED ORAL at 04:53

## 2024-06-26 RX ADMIN — AZATHIOPRINE 50 MG: 50 TABLET ORAL at 08:18

## 2024-06-26 RX ADMIN — PANTOPRAZOLE SODIUM 40 MG: 40 TABLET, DELAYED RELEASE ORAL at 08:18

## 2024-06-26 RX ADMIN — HYDROMORPHONE HYDROCHLORIDE 0.2 MG: 0.2 INJECTION, SOLUTION INTRAMUSCULAR; INTRAVENOUS; SUBCUTANEOUS at 16:06

## 2024-06-26 ASSESSMENT — ACTIVITIES OF DAILY LIVING (ADL)
ADLS_ACUITY_SCORE: 35.25
ADLS_ACUITY_SCORE: 38.25
ADLS_ACUITY_SCORE: 35.25
ADLS_ACUITY_SCORE: 38.25
ADLS_ACUITY_SCORE: 38.25
ADLS_ACUITY_SCORE: 35.25
ADLS_ACUITY_SCORE: 38.25
ADLS_ACUITY_SCORE: 35.25
ADLS_ACUITY_SCORE: 38.25
ADLS_ACUITY_SCORE: 35.25
ADLS_ACUITY_SCORE: 35.25
ADLS_ACUITY_SCORE: 38.25

## 2024-06-26 NOTE — PLAN OF CARE
"Goal Outcome Evaluation:      Plan of Care Reviewed With: patient, child    Overall Patient Progress: no changeOverall Patient Progress: no change    Outcome Evaluation: Pt slept much of today but did awaken to voice and was more alert when family present, A2 w/lift for OOB, incontinent b/b, Tylenol/Robaxin for pain, drsg C/D/I, Prevena wound vac patent, DNR/DNI, PICC patent, Rocephin cont, plan for [facility] TCU tomorrow.    Problem: Adult Inpatient Plan of Care  Goal: Plan of Care Review  Description: The Plan of Care Review/Shift note should be completed every shift.  The Outcome Evaluation is a brief statement about your assessment that the patient is improving, declining, or no change.  This information will be displayed automatically on your shift  note.  Outcome: Progressing  Flowsheets (Taken 6/25/2024 1906)  Outcome Evaluation: Pt slept much of today but did awaken to voice and was more alert when family present, A2 w/lift for OOB, incontinent b/b, Tylenol/Robaxin for pain, drsg C/D/I, Prevena wound vac patent, DNR/DNI, PICC patent, Rocephin cont, plan for [facility] TCU tomorrow.  Plan of Care Reviewed With:   patient   child  Overall Patient Progress: no change  Goal: Patient-Specific Goal (Individualized)  Description: You can add care plan individualizations to a care plan. Examples of Individualization might be:  \"Parent requests to be called daily at 9am for status\", \"I have a hard time hearing out of my right ear\", or \"Do not touch me to wake me up as it startles  me\".  Outcome: Progressing  Goal: Absence of Hospital-Acquired Illness or Injury  Outcome: Progressing  Intervention: Identify and Manage Fall Risk  Recent Flowsheet Documentation  Taken 6/25/2024 1019 by Lesli Mcrae RN  Safety Promotion/Fall Prevention:   assistive device/personal items within reach   activity supervised   clutter free environment maintained   lighting adjusted   nonskid shoes/slippers when out of bed   patient and family " education   increase visualization of patient   room organization consistent   safety round/check completed   supervised activity   treat reversible contributory factors   treat underlying cause  Intervention: Prevent Skin Injury  Recent Flowsheet Documentation  Taken 6/25/2024 1019 by Lesli Mcrae RN  Body Position: supine, head elevated  Skin Protection: adhesive use limited  Device Skin Pressure Protection:   absorbent pad utilized/changed   adhesive use limited   tubing/devices free from skin contact  Intervention: Prevent and Manage VTE (Venous Thromboembolism) Risk  Recent Flowsheet Documentation  Taken 6/25/2024 1019 by Lesli Mcrae RN  VTE Prevention/Management: SCDs (sequential compression devices) off  Intervention: Prevent Infection  Recent Flowsheet Documentation  Taken 6/25/2024 1019 by Lesli Mcrae RN  Infection Prevention:   hand hygiene promoted   rest/sleep promoted   single patient room provided  Goal: Optimal Comfort and Wellbeing  Outcome: Progressing  Intervention: Monitor Pain and Promote Comfort  Recent Flowsheet Documentation  Taken 6/25/2024 1019 by Lesli Mcrae RN  Pain Management Interventions: medication (see MAR)  Goal: Readiness for Transition of Care  Outcome: Progressing     Problem: Pain Acute  Goal: Optimal Pain Control and Function  Outcome: Progressing  Intervention: Develop Pain Management Plan  Recent Flowsheet Documentation  Taken 6/25/2024 1019 by Lesli Mcrae RN  Pain Management Interventions: medication (see MAR)  Intervention: Prevent or Manage Pain  Recent Flowsheet Documentation  Taken 6/25/2024 1019 by Lesli Mcrae RN  Sensory Stimulation Regulation:   lighting decreased   care clustered   quiet environment promoted  Sleep/Rest Enhancement:   awakenings minimized   noise level reduced   regular sleep/rest pattern promoted  Bowel Elimination Promotion:   adequate fluid intake promoted   ambulation promoted  Medication Review/Management: medications  reviewed     Problem: Infection  Goal: Absence of Infection Signs and Symptoms  Outcome: Progressing     Problem: Knee Arthroplasty  Goal: Optimal Coping  Outcome: Progressing  Goal: Absence of Bleeding  Outcome: Progressing  Intervention: Monitor and Manage Bleeding  Recent Flowsheet Documentation  Taken 6/25/2024 1019 by Lesli Mcrae RN  Bleeding Management: dressing monitored  Goal: Effective Bowel Elimination  Outcome: Progressing  Intervention: Enhance Bowel Motility and Elimination  Recent Flowsheet Documentation  Taken 6/25/2024 1019 by Lesli Mcrae RN  Bowel Motility Enhancement:   ambulation promoted   fluid intake encouraged   oral intake encouraged  Bowel Elimination Management:   hygiene measures promoted   relaxation techniques promoted   sitting position facilitated   toileting offered  Goal: Fluid and Electrolyte Balance  Outcome: Progressing  Intervention: Monitor and Manage Fluid and Electrolyte Balance  Recent Flowsheet Documentation  Taken 6/25/2024 1019 by Lesli Mcrae RN  Fluid/Electrolyte Management: fluids provided  Goal: Optimal Functional Ability  Outcome: Progressing  Intervention: Promote Optimal Functional Status  Recent Flowsheet Documentation  Taken 6/25/2024 1019 by Lesli Mcrae RN  Self-Care Promotion:   independence encouraged   BADL personal objects within reach   BADL personal routines maintained   meal set-up provided   adaptive equipment use encouraged  Assistive Device Utilized: lift device  Activity Management:   dorsiflexion/plantar flexion performed   up in chair  Goal: Absence of Infection Signs and Symptoms  Outcome: Progressing  Goal: Intact Neurovascular Status  Outcome: Progressing  Intervention: Prevent or Manage Neurovascular Compromise  Recent Flowsheet Documentation  Taken 6/25/2024 1019 by Lesli Mcrae RN  Neurovascular Pressure Management: Cast: ice/cold therapy performed  Goal: Anesthesia/Sedation Recovery  Outcome: Progressing  Intervention:  Optimize Anesthesia Recovery  Recent Flowsheet Documentation  Taken 6/25/2024 1019 by Lesli Mcrae RN  Safety Promotion/Fall Prevention:   assistive device/personal items within reach   activity supervised   clutter free environment maintained   lighting adjusted   nonskid shoes/slippers when out of bed   patient and family education   increase visualization of patient   room organization consistent   safety round/check completed   supervised activity   treat reversible contributory factors   treat underlying cause  Administration (IS): unable to perform  Reorientation Measures: clock in view  Goal: Optimal Pain Control and Function  Outcome: Progressing  Intervention: Prevent or Manage Pain  Recent Flowsheet Documentation  Taken 6/25/2024 1019 by Lesli Mcrae RN  Pain Management Interventions: medication (see MAR)  Goal: Nausea and Vomiting Relief  Outcome: Progressing  Goal: Effective Urinary Elimination  Outcome: Progressing  Intervention: Monitor and Manage Urinary Retention  Recent Flowsheet Documentation  Taken 6/25/2024 1019 by Lesli Mcrae RN  Urinary Elimination Promotion:   absorbent pad/diaper use encouraged   frequent voiding encouraged   positioned for ease of voiding   voiding relaxation promoted  Goal: Effective Oxygenation and Ventilation  Outcome: Progressing  Intervention: Optimize Oxygenation and Ventilation  Recent Flowsheet Documentation  Taken 6/25/2024 1019 by Lesli Mcrae RN  Airway/Ventilation Management:   airway patency maintained   calming measures promoted   pulmonary hygiene promoted     Problem: Comorbidity Management  Goal: Maintenance of Behavioral Health Symptom Control  Outcome: Progressing  Intervention: Maintain Behavioral Health Symptom Control  Recent Flowsheet Documentation  Taken 6/25/2024 1019 by Lesli Mcrae RN  Medication Review/Management: medications reviewed

## 2024-06-26 NOTE — PROGRESS NOTES
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  There is no changes to the patient's plan of care and I did update her discharge summary.  No new changes.           Hospital Course  Chelly Dave is a 89 year old female admitted on 6/19/2024.  On admission, the patient was seen with her 2 daughters at the bedside who provided translation as the patient speaks in broken English and has severe dementia.  Communication with the patient is limited due to language barrier (speaks [place]e) and severe dementia. Over the last 3 to 4 days prior to admission,  she had been having worsening right knee pain and swelling.  She was unable to bear weight. She lives at home with her daughter.   No reported fever but she had been more confused over the previous week. Was seen in urgent care where x-ray of bilateral knee was done.  Right knee, with history of replacement with moderate knee effusion.  Left knee showed osteoarthritis.  Ultrasound of the right lower extremity was negative for DVT.      CT of the right knee without contrast showed knee arthroplasty without evidence of loosening or fracture and small knee joint effusion. She was seen by orthopedic surgery, went to OR for I&D and modular exchange and found to have septic arthritis with Strep growing.  She was placed on  IV antibiotics.ID was consulted with cultures growing Strep gordonii. Will need 6 weeks IV antibiotics (ending 8/2/24) with oral suppressive therapy to follow.      Septic arthritis s/p I&D and modular  exchange 6/21, strep gordonii  Right knee pain  On work up her arthrocentesis yielded cloudy synovial fluid with cell count 9000, but 95% neutrophilic predominance. Culture grew out Streptococcus gordonii. She was initially started on vancomycin and ceftriaxone and now is only on Rocephin alone through 8/2/24.  Orthopedic surgery consulted, took patient to OR for I&D with modular exchange.  She tolerated surgery well, wound VAC was placed.  Fluid and intra-op cultures growing Strep gordonii. Consulted ID, will need 6 weeks IV antibiotics total (through 8/2), chronic suppressive therapy after that most likely.  Patient is still having a hard time even standing up and is not safe to be at home where she lives with her daughter.  Patient will need TCU for ongoing therapy as well as for IV antibiotics.  Patient did get a  PICC line placed in preparation 6/24/24.  Patient is to mobilize with physical therapy and occupational therapy.  Is to see Dr Tracy's team with ortho in 2 weeks.  She is weightbearing as tolerated to the right leg using a walker     Depression/anxiety:   Resumed Seroquel, trazodone and fluoxetine.     Dementia:   The patient's language as well as cultural barrier makes her dementia and confusion worse.  Prior to admission on Namenda, continued.  Patient currently lives at home with her daughter.  Difficult to test given language barrier.      Hyperlipidemia:   Can stop Lipitor as the risk far outweighs the benefit at this point.     Diabetes:   Does not appear to be on any diabetes medication.  Goal is to be conservative with her blood sugars being less than 250.  Need to prevent hypoglycemia.     Sjogren's disease:   Continue PTA azathioprine, pilocarpine.       Discussed plan of care with nursing, social work/case management      Diet: Pureed Diet (level 4) Thin Liquids (level 0)  Diet    DVT Prophylaxis: Enoxaparin (Lovenox) SQ  Quiroz Catheter: Not present  Lines: PRESENT      PICC 06/24/24  "Single Lumen Right Brachial vein medial Antibiotics-Site Assessment: WDL      Cardiac Monitoring: None    RESTRAINTS:not indicated  Code Status: No CPR- Do NOT Intubate         This document was created using voice recognition technology.  Please excuse any typographical errors that may have occurred.  Please call with any questions.      #Precipitous drop in Hgb/Hct: Lowest Hgb this hospitalization: 8.1 g/dL. Will continue to monitor and treat/transfuse as appropriate.     # Overweight: Estimated body mass index is 25.27 kg/m  as calculated from the following:    Height as of this encounter: 1.56 m (5' 1.42\").    Weight as of this encounter: 61.5 kg (135 lb 9.3 oz).             Disposition Plan      Expected Discharge Date: 06/26/2024      Destination: inpatient rehabilitation facility;family members' home  Discharge Comments: Lives at home with daughter, will need TCU on D/C also 6 weeks of IV antibiotics          Barrier to discharge: awaiting prior auth, TCU bed placement  Medically Ready for Discharge: Ready Now         You Kirby MD  Hospitalist Service  [facility]  Securely message with [employer] (more info)  Text page via [employer] Paging/Directory   ______________________________________________________________________    Interval History   No changes overnight.  Awaiting prior authorization for TCU bed placement.  Patient has a bed available just waiting for the insurance to go through.  Patient can leave at any time.  Was no changes in her plan of care.        Physical Exam   Vital Signs: Temp: 97.7  F (36.5  C) Temp src: Temporal BP: (!) 153/73 Pulse: 89   Resp: 20 SpO2: 96 % O2 Device: None (Room air)    Weight: 135 lbs 9.33 oz        Exam is stable from yesterday  General Appearance: Lying in bed, tired but awakens during my exam.  She is comfortable lying still in bed, appears elderly and frail, appears her stated age, sleeping well  Extremity: Wound VAC in place over right " knee, neurovascular intact in distal right lower extremity, not appreciably warm compared to the left leg, moves all extremities      Data     I have personally reviewed the following data over the past 24 hrs:    N/A  \   N/A   / N/A     N/A N/A N/A /  N/A   N/A N/A 0.70 \       Imaging:   Results for orders placed or performed during the hospital encounter of 06/19/24   CT Knee Right w/o Contrast    Narrative    EXAM: CT KNEE RIGHT W/O CONTRAST  LOCATION: [facility]  DATE: 6/19/2024    INDICATION: Right knee pain. Prior total knee arthroplasty. Difficulty weightbearing.  COMPARISON: None.  TECHNIQUE: Noncontrast. Axial, sagittal and coronal thin-section reconstruction. Dose reduction techniques were used.     FINDINGS:     BONES:  -Right total knee arthroplasty with patellar resurfacing. No findings to suggest loosening. No evidence of a fracture.    SOFT TISSUES:  -Small effusion. No muscle atrophy. No soft tissue edema.      Impression    IMPRESSION:  1.  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    2.  Small knee joint effusion.     XR Knee Port Right 1/2 Views    Narrative    XR KNEE PORT RIGHT 1/2 VIEWS   6/21/2024 10:46 AM     HISTORY: Post-Op Total Knee  COMPARISON: None.       Impression    IMPRESSION: There are postoperative changes from right total knee  arthroplasty, in standard alignment. No periprosthetic lucency or  fracture. There is postoperative gas in the soft tissues.    VALERIA MORROW MD         SYSTEM ID:  [id number]   XR Chest Port 1 View    Narrative    EXAM: XR CHEST PORT 1 VIEW  LOCATION: [facility]  DATE: 6/23/2024    INDICATION: crackles  COMPARISON: 2/13/2020.    FINDINGS: The heart size is normal. Thoracic aorta is calcified. The right hemidiaphragm is elevated. Mild left basilar atelectasis or scar. The lungs are otherwise clear. No pneumothorax.      Impression    IMPRESSION: Mild left basilar atelectasis  or scar.       I have personally have reviewed the patient's most up to date orders, and medications myself

## 2024-06-26 NOTE — CONSULTS
"CLINICAL NUTRITION SERVICES  -  ASSESSMENT NOTE      Recommendations:   - Diet per MD.  - Add 4 oz (vanilla) Ensure offerings w/ meals TID per family's request.     MALNUTRITION:  % Weight Loss:  None noted  % Intake:  Decreased intake does not meet criteria for malnutrition   Subcutaneous Fat Loss:  Upper arm region mild to moderate depletion --> not using as indicator with only 1 region present   Muscle Loss:  Temporal region mild depletion, Clavicle bone region moderate depletion, and Acromion bone region moderate depletion  Fluid Retention: None except R knee (not using as indicator)    Malnutrition Diagnosis: Patient does not meet two of the above criteria necessary for diagnosing malnutrition          REASON FOR ASSESSMENT  Chelly Dave is a 89 year old female seen by Registered Dietitian for length of stay.    PMH of: Dementia, DM.    Admit 2/2: R knee pain, prosthetic joint infection requiring R knee I&D on 6/21.    NUTRITION HISTORY  - Information obtained from patient's daughter at bedside + chart.  Patient is oriented to self.  - Documented to reside with daughter(s) at baseline.  Is medically ready for discharge to TCU when facility found per MD notes.  - Diet at home: Daughter in room notes her sister does a majority of cares for their mother, that she requires feeding, and that she generally has a good appetite, but may decline eating/meals at times (not consistently).  - Use of oral supplements: None.  - Allergies: NKFA.      CURRENT NUTRITION ORDERS  Diet Order:     IDDSI 4/thins (ordered by MD on 6/22)    Current Intake/Tolerance:  25-75% intakes based on flowsheet review since admission.  Has consistently been receiving meals TID.      Daughter confirms some days patient does not feel hungry or states she does not want to eat.  She would like her mom to receive Ensure while admitted.      ANTHROPOMETRICS  Height: 5' 1.417\"  Weight: 135 lbs 9.33 oz  Body mass index is 25.27 kg/m .  Weight " Status:  Overweight BMI 25-29.9  Weight History:  Wt Readings from Last 10 Encounters:   06/20/24 61.5 kg (135 lb 9.3 oz)   09/13/19 63.5 kg (140 lb)   11/29/18 68 kg (150 lb)   06/21/18 62.6 kg (137 lb 14.4 oz)   06/20/18 62.6 kg (138 lb)   10/22/17 58.1 kg (128 lb)   11/15/16 62.9 kg (138 lb 9.6 oz)   08/13/16 64.3 kg (141 lb 12.1 oz)   05/06/15 64.6 kg (142 lb 6.7 oz)   10/17/11 62.1 kg (137 lb)     - Only edema is trace to mild, R knee.  - Wt of 130# from 10/16/2023.  Wt stable since at least this time.   - Daughter confirms wt stable without wt loss PTA.    LABS: Reviewed.    MEDICATIONS: Reviewed.    GI: Stooling patterns noted.     SKIN: No current documentation of PI.       ASSESSED NUTRITION NEEDS PER APPROVED PRACTICE GUIDELINES:    Dosing Weight 62 kg   Estimated Energy Needs: 25-30 Kcal/Kg  Justification: maintenance  Estimated Protein Needs: >/=1.2 g pro/Kg  Justification: preservation of lean body mass, advanced age  Estimated Fluid Needs: per MD      NUTRITION DIAGNOSIS:  Inadequate oral intake related to suspect low appetite and dementia in new environment causing meal refusal at times as evidenced by meeting <100% nutrition needs acutely.    NUTRITION INTERVENTIONS  Recommendations / Nutrition Prescription  See above.    Implementation  Nutrition education: Provided education on above.    Medical Food Supplement: As above.     Collaboration and Referral of Nutrition care: NA in room feeding patient during assessment.    Nutrition goals:  PO intakes of at least 75% meals or supplements TID (on average).      MONITORING AND EVALUATION:  Progress towards goals will be monitored and evaluated per protocol and Practice Guidelines          Rashida Estes RDN, LD  Clinical Dietitian  3rd floor/ICU: [phone]  All other floors: [phone]  Weekend/holiday: [phone]  Office: [phone]

## 2024-06-26 NOTE — PROGRESS NOTES
Orthopedic Surgery  Chelly Dave  06/26/2024     Admit Date:  6/19/2024  POD: 5 Days Post-Op   Procedure(s):  Right knee irrigation and debridement with polyethylene exchange     Patient resting comfortably in bed.    Pain controlled  Tolerating oral intake.    Denies chest pain, fever, chills or shortness of breath  No concerns to address      Temp:  [97.1  F (36.2  C)-97.8  F (36.6  C)] 97.7  F (36.5  C)  Pulse:  [] 89  Resp:  [18-20] 20  BP: (120-153)/(73-77) 153/73  SpO2:  [95 %-97 %] 96 %    Prevena dressing is clean, dry, and intact right knee with good seal in place   Moderate right knee effusion present with minimal tenderness   Bilateral calves are soft, non-tender.  Bilateral lower extremity is NVI.  Sensation intact bilateral lower extremities  5/5 motor with resisted dorsi and plantar flexion bilaterally  Pulse is palpable     Labs:  Recent Labs   Lab Test 06/25/24  0453 06/24/24  0726 06/23/24  0723 06/22/24  0613 06/20/24  0504   WBC  --  6.9  --  7.2 3.6*   HGB  --  8.3* 8.1* 9.6* 9.1*    207  --  254 177     No lab results found.  Recent Labs   Lab Test 06/25/24  0453 06/24/24  0726 06/23/24  0723   CRPI 100.78* 113.94* 141.58*     All cultures:  Recent Labs   Lab 06/21/24  0833 06/21/24  0832 06/20/24  0058 06/20/24  0000 06/19/24  2359   CULTURE No anaerobic organisms isolated after 5 days  No Growth  No anaerobic organisms isolated after 4 days  No growth after 3 days No anaerobic organisms isolated after 5 days  Culture in progress  2+ Streptococcus gordonii* No Growth No Growth 1+ Streptococcus gordonii*       1. PLAN:   CRP downtrending.    Continue ASA 81mg bid for DVT prophylaxis.     Mobilize with PT/OT    Abx per ID    Weightbearing as tolerated Right LE with walker.     Continue current pain regiment.   Dressings: Keep prevena intact x 14 days post-op.    Follow-up: 2 weeks post-op with Dr Tracy team    2. Disposition   Anticipate d/c to TCU when medically cleared  and progressing in PT.  Ortho stable for discharge.  We will follow peripherally.     Konrad Cheney PA-C

## 2024-06-26 NOTE — PLAN OF CARE
Goal Outcome Evaluation:      Plan of Care Reviewed With: patient    Overall Patient Progress: no changeOverall Patient Progress: no change    Outcome Evaluation: PICC patent, wound vac patent. slept most of the night. Incontinent of bladder and bowel. PRN robaxin, atarax, and tylenol for pain. Discharge to [facility] 6/26      Problem: Adult Inpatient Plan of Care  Goal: Plan of Care Review  Description: The Plan of Care Review/Shift note should be completed every shift.  The Outcome Evaluation is a brief statement about your assessment that the patient is improving, declining, or no change.  This information will be displayed automatically on your shift  note.  Outcome: Progressing  Flowsheets (Taken 6/26/2024 0506)  Outcome Evaluation: PICC patent, wound vac patent. slept most of the night. Incontinent of bladder and bowel. PRN robaxin, atarax, and tylenol for pian.  Plan of Care Reviewed With: patient  Overall Patient Progress: no change  Goal: Absence of Hospital-Acquired Illness or Injury  Intervention: Identify and Manage Fall Risk  Recent Flowsheet Documentation  Taken 6/25/2024 1955 by Tish Lanza RN  Safety Promotion/Fall Prevention:   activity supervised   lighting adjusted   nonskid shoes/slippers when out of bed   room door open   room near nurse's station   room organization consistent   safety round/check completed   supervised activity  Intervention: Prevent Infection  Recent Flowsheet Documentation  Taken 6/25/2024 1955 by Tish Lanza, RN  Infection Prevention:   hand hygiene promoted   rest/sleep promoted   single patient room provided     Problem: Pain Acute  Goal: Optimal Pain Control and Function  Intervention: Prevent or Manage Pain  Recent Flowsheet Documentation  Taken 6/25/2024 1955 by Tish Lanza, RN  Medication Review/Management: medications reviewed     Problem: Knee Arthroplasty  Goal: Anesthesia/Sedation Recovery  Intervention: Optimize Anesthesia Recovery  Recent  Flowsheet Documentation  Taken 6/25/2024 1955 by Tish Lanza, RN  Safety Promotion/Fall Prevention:   activity supervised   lighting adjusted   nonskid shoes/slippers when out of bed   room door open   room near nurse's station   room organization consistent   safety round/check completed   supervised activity     Problem: Comorbidity Management  Goal: Maintenance of Behavioral Health Symptom Control  Intervention: Maintain Behavioral Health Symptom Control  Recent Flowsheet Documentation  Taken 6/25/2024 1955 by Tish Lanaz, RN  Medication Review/Management: medications reviewed

## 2024-06-26 NOTE — PLAN OF CARE
Physical Therapy Discharge Summary    Reason for therapy discharge:    Discharged to transitional care facility.    Progress towards therapy goal(s). See goals on Care Plan in [facility] electronic health record for goal details.  Goals not met.  Barriers to achieving goals:   discharge from facility.    Therapy recommendation(s):    Continued therapy is recommended.  Rationale/Recommendations:  TCU recommended for further progression of strength and IND mobility.

## 2024-06-26 NOTE — PROGRESS NOTES
Care Management Discharge Note    Discharge Date: 06/26/2024       Discharge Disposition: Transitional Care    Discharge Services: None    Discharge DME:  none    Discharge Transportation: family or friend will provide, agency    Private pay costs discussed: transportation costs    Does the patient's insurance plan have a 3 day qualifying hospital stay waiver?  Yes     Which insurance plan 3 day waiver is available? Alternative insurance waiver    Will the waiver be used for post-acute placement? No    PAS Confirmation Code: [id number]  Patient/family educated on Medicare website which has current facility and service quality ratings: yes    Education Provided on the Discharge Plan:  yes  Persons Notified of Discharge Plans: patent, daughters  Patient/Family in Agreement with the Plan: yes    Handoff Referral Completed: No    Additional Information:  Tom Blanton received auth from patient's insurance and can accept today. Discharge orders faxed over. Stretcher transport set up for today between [phone]. Updated patient and daughter at bedside. Updated care team of transport time. Updated [facility] that orders sent and of transport time. Confirmed that patient will be in a private room and cost. Updated daughter.     Zhanna Acuña RN  Care Coordinator  [facility]

## 2024-06-26 NOTE — PROGRESS NOTES
[facility]    Infectious Disease Progress Note    Date of Service : 06/26/2024       Assessment:  89YF [place] female with severe dementia, Sjogren's -on treatment with Azatioprine, and prior remote R total knee arthroplasty, who has been admitted with acute onset right knee pain and swelling  over 3-4 days with inability to bear weight. Synovial fluid analysis was suggestive of infection with 9,159 WBC with PMN predominance, and she is now s/p I&D with polyethylene exchange on 6/21. Synovial fluid shows GPC on stain and operative tissue cxs are growing streptococcus gordonii, likely related to hematogenous seeding.     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  s/p I&D with polyethylene exchange on 6/21  -Sjogren's disease -on Azathioprine  -chronic medical conditions - Dementia, depression, anxiety, hyperlipidemia and diabetes     Recommendations  Continue ceftriaxone  Will need IV antibiotics at discharge, note has home IV coverage but looks like TCU is the plan  Care integration consultation for discharge planning, presume rehab for reasons to be on the antibiotic  PICC line ordered  IV antibiotics for 6 weeks followed by oral suppressive therapy. OPIV antibiotic orders placed in [facility] until 8/2 for discharge  ID follow up in 3-4 weeks this organism fairly high probability of cure but given her age and overall condition quite possibly oral antibiotics indefinitely long-term for suppression will be the eventual plan here      Nelson Figueroa MD    Interval History   Remains afebrile, no new cx data, tolerating antibiotics without side effects  Cultures same, able to get up some but with pain this is now improving    Physical Exam   Temp: 97.7  F (36.5  C) Temp src: Temporal BP: (!) 153/73 Pulse: 89   Resp: 20 SpO2: 96 % O2 Device: None (Room air)    Vitals:    06/20/24 0039   Weight: 61.5 kg (135 lb 9.3 oz)     Vital Signs with  Ranges  Temp:  [97.1  F (36.2  C)-97.8  F (36.6  C)] 97.7  F (36.5  C)  Pulse:  [] 89  Resp:  [18-20] 20  BP: (120-153)/(73-77) 153/73  SpO2:  [95 %-97 %] 96 %    GENERAL APPEARANCE:  resting, appears comfortable, in no pain  EYES: Eyes grossly normal to inspection  RESP: non labored breathing  MS: R knee in surgical dressing, wound vac in place  SKIN: no suspicious lesions or rashes    Other:    Medications   Current Facility-Administered Medications   Medication Dose Route Frequency Provider Last Rate Last Admin    lactated ringers infusion   Intravenous Continuous Amilcar Bashir MD   Stopped at 06/24/24 0935     Current Facility-Administered Medications   Medication Dose Route Frequency Provider Last Rate Last Admin    aspirin EC tablet 81 mg  81 mg Oral BID Billy Tracy MD   81 mg at 06/26/24 0818    azaTHIOprine (IMURAN) tablet 50 mg  50 mg Oral Daily Amilcar Bashir MD   50 mg at 06/26/24 0818    carboxymethylcellulose PF (REFRESH PLUS) 0.5 % ophthalmic solution 1 drop  1 drop Both Eyes BID Jair Nassar MD   1 drop at 06/26/24 0818    cefTRIAXone (ROCEPHIN) 2 g vial to attach to  ml bag for ADULTS or NS 50 ml bag for PEDS  2 g Intravenous Q24H Nelson Figueroa MD   2 g at 06/25/24 2158    doxepin (SINEquan) capsule 10 mg  10 mg Oral At Bedtime Amilcar Bashir MD   10 mg at 06/25/24 2050    FLUoxetine (PROzac) capsule 20 mg  20 mg Oral Daily Jair Nassar MD   20 mg at 06/26/24 0818    memantine (NAMENDA) tablet 5 mg  5 mg Oral BID Amilcar Bashir MD   5 mg at 06/26/24 0818    pantoprazole (PROTONIX) EC tablet 40 mg  40 mg Oral Daily Amilcar Bashir MD   40 mg at 06/26/24 0818    pilocarpine (SALAGEN) tablet 5 mg  5 mg Oral TID Amilcar Bashir MD   5 mg at 06/26/24 0818    polyethylene glycol (MIRALAX) Packet 17 g  17 g Oral Daily Billy Tracy MD   17 g at 06/24/24 0850    QUEtiapine (SEROquel) tablet 25 mg  25 mg Oral BID Jair Nassar MD   25 mg at 06/26/24 0818    senna-docusate  (SENOKOT-S/PERICOLACE) 8.6-50 MG per tablet 1 tablet  1 tablet Oral BID Billy Tracy MD   1 tablet at 06/24/24 0849    sodium chloride (PF) 0.9% PF flush 10-40 mL  10-40 mL Intracatheter Q7 Days You Kirby MD   10 mL at 06/25/24 1321    traZODone (DESYREL) half-tab 25 mg  25 mg Oral At Bedtime Jair Nassar MD   25 mg at 06/25/24 2050       Data   All microbiology laboratory data reviewed.  Recent Labs   Lab Test 06/25/24  0453 06/24/24  0726 06/23/24  0723 06/22/24  0613 06/20/24  0504   WBC  --  6.9  --  7.2 3.6*   HGB  --  8.3* 8.1* 9.6* 9.1*   HCT  --  26.8*  --  31.3* 29.5*   MCV  --  100  --  100 101*    207  --  254 177     Recent Labs   Lab Test 06/26/24  0457 06/25/24  0453 06/24/24  0726   CR 0.70 0.60 0.65            06/21/2024 0833 06/22/2024 1117 Anaerobic Bacterial Culture Routine [[id number]]   Tissue from Knee, Right    Preliminary result Component Value   Culture No anaerobic organisms isolated after 1 day P             06/21/2024 0833 06/22/2024 0739 Tissue Aerobic Bacterial Culture Routine [[id number]]   Tissue from Knee, Right    Preliminary result Component Value   Culture No growth, less than 1 day P             06/21/2024 0833 06/22/2024 1101 Anaerobic Bacterial Culture Routine [[id number]]   Tissue from Knee, Right    Preliminary result Component Value   Culture No anaerobic organisms isolated after 1 day P             06/21/2024 0833 06/22/2024 0745 Tissue Aerobic Bacterial Culture Routine [[id number]]   Tissue from Knee, Right    Preliminary result Component Value   Culture No growth, less than 1 day P             06/21/2024 0832 06/22/2024 1101 Anaerobic Bacterial Culture Routine [[id number]]   Tissue from Knee, Right    Preliminary result Component Value   Culture No anaerobic organisms isolated after 1 day P             06/21/2024 0832 06/22/2024 0914 Tissue Aerobic Bacterial Culture Routine [[id number]]   (Abnormal)   Tissue from Knee, Right     Preliminary result Component Value   Culture Culture in progress P    2+ Streptococcus gordonii Abnormal  P             06/20/2024 0058 06/23/2024 0217 Blood Culture Hand, Left [[id number]]   Blood from Hand, Left    Preliminary result Component Value   Culture No growth after 3 days P             06/20/2024 0000 06/23/2024 0217 Blood Culture Line, venous [[id number]]   Blood from Line, venous    Preliminary result Component Value   Culture No growth after 3 days P             06/19/2024 2359 06/20/2024 0232 Crystal ID Synovial Fluid [[id number]]   Synovial fluid from Knee, Right    Final result Component Value   Crystals Analysis No clinically significant crystals seen.          06/19/2024 2359 06/20/2024 0104 Cell count with differential fluid [[id number]]    (Abnormal)   Synovial fluid from Knee, Right    Final result Component Value   No component results          06/19/2024 2359 06/20/2024 0051 Glucose fluid [[id number]]    Synovial fluid from Knee, Right    Final result Component Value Units   Glucose Fluid Source Knee, Right    Glucose fluid 89 mg/dL          06/19/2024 2359 06/20/2024 0051 Protein fluid [[id number]]    Synovial fluid from Knee, Right    Final result Component Value Units   Protein Fluid Source Knee, Right    Protein Total Fluid 3.6 g/dL          06/19/2024 2359 06/23/2024 0947 Synovial fluid Aerobic Bacterial Culture Routine With Gram Stain [[id number]]    (Abnormal)   Synovial fluid from Knee, Right    Final result Component Value   Culture 1+ Streptococcus gordonii Abnormal    Gram Stain Result No organisms seen    4+ WBC seen    Predominantly PMNs       Susceptibility     Streptococcus gordonii     MAKAYLA     Ampicillin <=0.06 ug/mL Susceptible     Cefotaxime <=0.25 ug/mL Susceptible     Ceftriaxone <=0.25 ug/mL Susceptible     Clindamycin <=0.06 ug/mL Susceptible     Erythromycin >0.5 ug/mL Resistant     Penicillin <=0.03 ug/mL Susceptible     Vancomycin 0.5 ug/mL Susceptible

## 2024-06-26 NOTE — PLAN OF CARE
Goal Outcome Evaluation:    Reviewed discharge instructions with patient and daughters. Questions answered. Patient discharged with personal belongings and discharge paperwork. Patient discharged to TCU via stretcher transport.     Problem: Adult Inpatient Plan of Care  Goal: Plan of Care Review  Outcome: Adequate for Care Transition  Goal: Patient-Specific Goal (Individualized)  Outcome: Adequate for Care Transition  Goal: Absence of Hospital-Acquired Illness or Injury  Outcome: Adequate for Care Transition  Intervention: Identify and Manage Fall Risk  Recent Flowsheet Documentation  Taken 6/26/2024 0818 by Maylin Girard RN  Safety Promotion/Fall Prevention:   activity supervised   assistive device/personal items within reach   clutter free environment maintained   increased rounding and observation   increase visualization of patient   lighting adjusted   mobility aid in reach   nonskid shoes/slippers when out of bed   patient and family education   room near nurse's station   room organization consistent   safety round/check completed   supervised activity   toileting scheduled  Intervention: Prevent Skin Injury  Recent Flowsheet Documentation  Taken 6/26/2024 0818 by Maylin Girard RN  Body Position: supine, head elevated  Intervention: Prevent and Manage VTE (Venous Thromboembolism) Risk  Recent Flowsheet Documentation  Taken 6/26/2024 0818 by Maylin Girard RN  VTE Prevention/Management: SCDs (sequential compression devices) off  Goal: Optimal Comfort and Wellbeing  Outcome: Adequate for Care Transition  Intervention: Monitor Pain and Promote Comfort  Recent Flowsheet Documentation  Taken 6/26/2024 1606 by Maylin Girard RN  Pain Management Interventions: medication (see MAR)  Goal: Readiness for Transition of Care  Outcome: Adequate for Care Transition     Problem: Pain Acute  Goal: Optimal Pain Control and Function  Outcome: Adequate for Care Transition  Intervention: Develop Pain Management  Plan  Recent Flowsheet Documentation  Taken 6/26/2024 1606 by Maylin Girard RN  Pain Management Interventions: medication (see MAR)     Problem: Infection  Goal: Absence of Infection Signs and Symptoms  Outcome: Adequate for Care Transition     Problem: Knee Arthroplasty  Goal: Optimal Coping  Outcome: Adequate for Care Transition  Goal: Absence of Bleeding  Outcome: Adequate for Care Transition  Goal: Effective Bowel Elimination  Outcome: Adequate for Care Transition  Goal: Fluid and Electrolyte Balance  Outcome: Adequate for Care Transition  Goal: Optimal Functional Ability  Outcome: Adequate for Care Transition  Intervention: Promote Optimal Functional Status  Recent Flowsheet Documentation  Taken 6/26/2024 0818 by Maylin Girard RN  Activity Management:   activity adjusted per tolerance   activity encouraged  Goal: Absence of Infection Signs and Symptoms  Outcome: Adequate for Care Transition  Goal: Intact Neurovascular Status  Outcome: Adequate for Care Transition  Goal: Anesthesia/Sedation Recovery  Outcome: Adequate for Care Transition  Intervention: Optimize Anesthesia Recovery  Recent Flowsheet Documentation  Taken 6/26/2024 0818 by Maylin Girard RN  Safety Promotion/Fall Prevention:   activity supervised   assistive device/personal items within reach   clutter free environment maintained   increased rounding and observation   increase visualization of patient   lighting adjusted   mobility aid in reach   nonskid shoes/slippers when out of bed   patient and family education   room near nurse's station   room organization consistent   safety round/check completed   supervised activity   toileting scheduled  Administration (IS): (Dementia) unable to perform  Goal: Optimal Pain Control and Function  Outcome: Adequate for Care Transition  Intervention: Prevent or Manage Pain  Recent Flowsheet Documentation  Taken 6/26/2024 1606 by Maylin Girard RN  Pain Management Interventions: medication (see  MAR)  Goal: Nausea and Vomiting Relief  Outcome: Adequate for Care Transition  Goal: Effective Urinary Elimination  Outcome: Adequate for Care Transition  Goal: Effective Oxygenation and Ventilation  Outcome: Adequate for Care Transition  Intervention: Optimize Oxygenation and Ventilation  Recent Flowsheet Documentation  Taken 6/26/2024 0818 by Maylin Girard RN  Head of Bed (HOB) Positioning: HOB at 30-45 degrees     Problem: Comorbidity Management  Goal: Maintenance of Behavioral Health Symptom Control  Outcome: Adequate for Care Transition     Problem: Depression  Goal: Improved Mood  Outcome: Adequate for Care Transition     Problem: Anxiety Signs/Symptoms  Goal: Optimized Energy Level (Anxiety Signs/Symptoms)  Outcome: Adequate for Care Transition  Goal: Optimized Cognitive Function (Anxiety Signs/Symptoms)  Outcome: Adequate for Care Transition  Goal: Improved Mood Symptoms (Anxiety Signs/Symptoms)  Outcome: Adequate for Care Transition  Goal: Improved Sleep (Anxiety Signs/Symptoms)  Outcome: Adequate for Care Transition  Goal: Enhanced Social, Occupational or Functional Skills (Anxiety Signs/Symptoms)  Outcome: Adequate for Care Transition  Goal: Improved Somatic Symptoms (Anxiety Signs/Symptoms)  Outcome: Adequate for Care Transition     Problem: Confusion Chronic  Goal: Optimal Cognitive Function  Outcome: Adequate for Care Transition  Intervention: Minimize Injury Risk and Provide Safety  Recent Flowsheet Documentation  Taken 6/26/2024 0818 by Maylin Girard, RN  Enhanced Safety Measures: assistive devices when indicated

## 2024-06-27 ENCOUNTER — DOCUMENTATION ONLY (OUTPATIENT)
Dept: GERIATRICS | Facility: CLINIC | Age: 89
End: 2024-06-27

## 2024-06-27 ENCOUNTER — LAB REQUISITION (OUTPATIENT)
Dept: LAB | Facility: CLINIC | Age: 89
End: 2024-06-27
Payer: COMMERCIAL

## 2024-06-27 ENCOUNTER — TRANSITIONAL CARE UNIT VISIT (OUTPATIENT)
Dept: GERIATRICS | Facility: CLINIC | Age: 89
End: 2024-06-27
Payer: COMMERCIAL

## 2024-06-27 VITALS
TEMPERATURE: 97.4 F | HEART RATE: 88 BPM | HEIGHT: 62 IN | DIASTOLIC BLOOD PRESSURE: 75 MMHG | RESPIRATION RATE: 18 BRPM | OXYGEN SATURATION: 94 % | WEIGHT: 135 LBS | SYSTOLIC BLOOD PRESSURE: 115 MMHG | BODY MASS INDEX: 24.84 KG/M2

## 2024-06-27 DIAGNOSIS — K59.01 SLOW TRANSIT CONSTIPATION: ICD-10-CM

## 2024-06-27 DIAGNOSIS — F03.C0 SEVERE DEMENTIA, UNSPECIFIED DEMENTIA TYPE, UNSPECIFIED WHETHER BEHAVIORAL, PSYCHOTIC, OR MOOD DISTURBANCE OR ANXIETY (H): ICD-10-CM

## 2024-06-27 DIAGNOSIS — E11.9 TYPE 2 DIABETES MELLITUS WITHOUT COMPLICATIONS (H): ICD-10-CM

## 2024-06-27 DIAGNOSIS — I73.00 RAYNAUD'S DISEASE WITHOUT GANGRENE: ICD-10-CM

## 2024-06-27 DIAGNOSIS — R73.03 PREDIABETES: ICD-10-CM

## 2024-06-27 DIAGNOSIS — D64.9 CHRONIC ANEMIA: ICD-10-CM

## 2024-06-27 DIAGNOSIS — K21.9 CHRONIC GASTROESOPHAGEAL REFLUX DISEASE: ICD-10-CM

## 2024-06-27 DIAGNOSIS — F32.A DEPRESSION, UNSPECIFIED DEPRESSION TYPE: ICD-10-CM

## 2024-06-27 DIAGNOSIS — E78.5 DYSLIPIDEMIA: ICD-10-CM

## 2024-06-27 DIAGNOSIS — N18.2 CKD (CHRONIC KIDNEY DISEASE) STAGE 2, GFR 60-89 ML/MIN: ICD-10-CM

## 2024-06-27 DIAGNOSIS — D62 ABLA (ACUTE BLOOD LOSS ANEMIA): ICD-10-CM

## 2024-06-27 DIAGNOSIS — T84.53XD INFECTION ASSOCIATED WITH INTERNAL RIGHT KNEE PROSTHESIS, SUBSEQUENT ENCOUNTER: Primary | ICD-10-CM

## 2024-06-27 DIAGNOSIS — M35.00 SJOGREN'S SYNDROME, WITH UNSPECIFIED ORGAN INVOLVEMENT (H): ICD-10-CM

## 2024-06-27 DIAGNOSIS — R53.81 PHYSICAL DECONDITIONING: ICD-10-CM

## 2024-06-27 DIAGNOSIS — Z86.79 HISTORY OF HYPERTENSION: ICD-10-CM

## 2024-06-27 DIAGNOSIS — D64.9 ANEMIA, UNSPECIFIED: ICD-10-CM

## 2024-06-27 DIAGNOSIS — M00.9 PYOGENIC ARTHRITIS, UNSPECIFIED (H): ICD-10-CM

## 2024-06-27 PROCEDURE — 86481 TB AG RESPONSE T-CELL SUSP: CPT | Performed by: NURSE PRACTITIONER

## 2024-06-27 PROCEDURE — 99310 SBSQ NF CARE HIGH MDM 45: CPT

## 2024-06-27 PROCEDURE — 36415 COLL VENOUS BLD VENIPUNCTURE: CPT | Performed by: NURSE PRACTITIONER

## 2024-06-27 RX ORDER — ONDANSETRON 4 MG/1
TABLET, FILM COATED ORAL EVERY 8 HOURS PRN
Status: CANCELLED
Start: 2024-06-27

## 2024-06-27 NOTE — PATIENT INSTRUCTIONS
Orders  Chelly Dave  1935     Zofran 4 mg Q6H PRN for nausea      JAYME Martinez CNP on 6/27/2024 at 5:12 PM

## 2024-06-27 NOTE — LETTER
6/27/2024      Chelly Dave  [address]  [facility] [address]        [facility] GERIATRICS    PRIMARY CARE PROVIDER AND CLINIC:  Brittany Heredia, [address] / [facility] [address]  Chief Complaint   Patient presents with     Hospital F/U      [facility] Medical Record Number:  [id number]  Place of Service where encounter took place:  [facility]  (U) [[id number]]    Chelly Dave  is a 89 year old  (1935), admitted to the above facility from  [facility]. Hospital stay 6/19/24 through 6/26/24..     By chart review, patient was hospitalized at [facility] with a right knee prosthetic joint infection. In ED, labs remarkable for Cr 0.7, WBC 2.9, hgb 10.3. Ortho was consulted and right knee aspiration grew Strep godonii, thought likely due to hematogenous seeding. She underwent I&D with polyethylene exchange on 6/21/24 with EBL 50 ml. Postoperatively hgb dropped to 8.1. She was started on vanco and rocephin. ID recommended 6 weeks of rocephin. She was recommended TCU at discharge and admitted to current facility for ongoing medical management, rehab, nursing care.    HPI:    Seen today for follow-up in TCU resting abed. She speaks broken english, aphasic with dementia. She reports she ate breakfast. Denies pain but tender on exam. Unsure about last BM. No additional concerns per nursing.    Spoke with patient's daughter who reported patient was nauseated this afternoon. Chelly was living with her daughter prior to admission and dependent for most cares. Daughter reports communication barriers are related to dementia and aphasia and patient does not need an .     CODE STATUS/ADVANCE DIRECTIVES DISCUSSION:  Prior  DNR / DNI  ALLERGIES:   Allergies   Allergen Reactions     Lisinopril      Oxycodone      Other reaction(s): Confusion     Aspirin Buf(Cacarb-Mgcarb-Mgo)      Other reaction(s): GI Upset  81 mg works well for her      PAST MEDICAL HISTORY:    Past Medical History:   Diagnosis Date     Arthritis      Cataract      Diabetes mellitus (H)      Gastro-oesophageal reflux disease      Hyperlipidemia       PAST SURGICAL HISTORY:   has a past surgical history that includes orthopedic surgery; colonoscopy; Laparotomy exploratory (N/A, 6/22/2018); picc insertion (Left, 06/25/2018); and Arthroplasty revision knee (Right, 6/21/2024).  FAMILY HISTORY: family history is not on file.  SOCIAL HISTORY:   reports that she has never smoked. She has never used smokeless tobacco. She reports that she does not drink alcohol and does not use drugs.  Patient's living condition: lives with family, DAUGHTER     Post Discharge Medication Reconciliation Status:   MED REC REQUIRED  Post Medication Reconciliation Status: discharge medications reconciled and changed, per note/orders       Current Outpatient Medications   Medication Sig Dispense Refill     acetaminophen (TYLENOL) 325 MG tablet Take 2 tablets (650 mg) by mouth 3 times daily And BID PRN (Patient taking differently: Take 650 mg by mouth 3 times daily And daily PRN)       albuterol (PROVENTIL) (2.5 MG/3ML) 0.083% neb solution Take 2.5 mg by nebulization every 6 hours as needed for shortness of breath, wheezing or cough       aspirin 81 MG EC tablet Take 81 mg by mouth 2 times daily For DVT prophylaxis       [START ON 8/3/2024] aspirin 81 MG EC tablet Take 81 mg by mouth daily       AzaTHIOprine (IMURAN PO) Take 50 mg by mouth daily       blood glucose monitoring (NO BRAND SPECIFIED) test strip USE TO CHECK BLOOD SUGARS EVERY DAY       blood glucose monitoring (SOFTCLIX) lancets USE TO TEST BLOOD SUGARS DAILY       Blood Glucose Monitoring Suppl (FIFTY50 GLUCOSE METER 2.0) w/Device KIT Dispense meter, test strips, lancets covered by pt ins. E11.9 NIDDM type II - Test 1 time/day       Calcium Carb-Cholecalciferol (CALCIUM 500 +D) 500-400 MG-UNIT TABS Take 1 tablet by mouth 2 times daily       cefTRIAXone (ROCEPHIN) 2 GM  "vial Inject 2 g into the vein every 24 hours for 39 days       Cholecalciferol (VITAMIN D3) 1000 units CAPS Take 1,000 Units by mouth daily       cycloSPORINE (RESTASIS) 0.05 % ophthalmic emulsion Instill 1 drop into both eyes every 12 hours.   No further refills will be given unless you come in for your exam.       DOXEPIN HCL PO Take 10 mg by mouth At Bedtime       ferrous sulfate (IRON) 325 (65 FE) MG tablet Take 1 tablet (325 mg) by mouth daily 100 tablet 0     FLUoxetine (PROZAC) 20 MG capsule Take 20 mg by mouth daily       HYDROmorphone (DILAUDID) 2 MG tablet Take 0.5-1 tablets (1-2 mg) by mouth every 4 hours as needed for moderate to severe pain 1/2 tab po q 4 hrs prn pain scale \"3-6\"  1 tab po q 4 hrs prn pain scale \"7-10\" 20 tablet 0     lidocaine (LIDODERM) 5 % patch Place 1 patch onto the skin daily as needed for moderate pain (12 hours on; 12 hours off. Patient applies to knees or back)       loratadine (CLARITIN) 10 MG tablet Take 10 mg by mouth daily as needed       memantine (NAMENDA) 5 MG tablet Take 5 mg by mouth 2 times daily       ondansetron (ZOFRAN ODT) 4 MG ODT tab Take 1 tablet (4 mg) by mouth every 6 hours as needed for nausea       pantoprazole (PROTONIX) 40 MG EC tablet Take 40 mg by mouth daily       Pilocarpine HCl (SALAGEN PO) Take 5 mg by mouth 3 times daily AM, 1200 & HS       polyethylene glycol (MIRALAX/GLYCOLAX) powder Take 17 g by mouth daily as needed       polyethylene glycol 400 (BLINK TEARS) 0.25 % SOLN ophthalmic solution Place 1-2 drops into both eyes every 2 hours as needed for dry eyes       QUEtiapine (SEROQUEL) 25 MG tablet Take 25 mg by mouth 2 times daily       senna-docusate (SENOKOT-S/PERICOLACE) 8.6-50 MG tablet Take 1 tablet by mouth 2 times daily as needed for constipation       No current facility-administered medications for this visit.       ROS:  Limited secondary to aphasia impairment but today pt reports the above    Vitals:  /75   Pulse 88   Temp " "97.4  F (36.3  C)   Resp 18   Ht 1.575 m (5' 2\")   Wt 61.2 kg (135 lb)   SpO2 94%   BMI 24.69 kg/m    Exam:  GENERAL APPEARANCE:  Alert, in no distress  RESP:  respiratory effort and palpation of chest normal, lungs clear to auscultation , no respiratory distress  CV:  regular rate and rhythm, no murmur, rub, or gallop, no edema  ABDOMEN:  normal bowel sounds, soft, nontender, no hepatosplenomegaly or other masses  M/S:   Gait and station abnormal transfers with assist  SKIN:  right knee with wound vac in place  NEURO:   rubin spontaneously, aphasic  PSYCH:  memory impaired , affect and mood normal    Lab/Diagnostic data:  Labs done in SNF are in [place] [employer]. Please refer to them using [employer] Everywhere. and Recent labs in [facility] reviewed by me today.     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 Cultures grew strep gordonii likely from hematogenous seeding. CRP trending down. Pain is fairly controlled  -daughter noting nausea this afternoon, suspect related to abx, will start zofran - QTSC 460 6/22  Plan: continue rocephin 2 g daily through 8/5. Weekly labs with results to ID. WBAT. Continue tylenol PRN, dilaudid PRN, lidocaine. DVT prophy with ASA 81 mg BID through 8/2 then reduce to once daily. Continue wound care/wound vac as directed. PT/OT. Follow-up with ortho, ID as directed. Zofran 4 mg Q6H PRN    (D62) ABLA (acute blood loss anemia)  (D64.9) Chronic anemia  Comment: baseline hgb ~10  Plan: continue iron supplementation, recheck hgb 6/28 then weekly per ID. Monitor for s/sx bleeding    (Z86.79) History of hypertension  Comment: no longer on treatment. BP is fairly controlled. Avoid hypotension due to patient age, frailty, and risk for complications.  Plan: monitor BP per facility protocol    (E78.5) " Dyslipidemia  Comment: chronic, no longer on statin due to advanced age and frailty  Plan: regular diet    (K21.9) Chronic gastroesophageal reflux disease  Comment: chronic  Plan: continue PPI    (R73.03) Prediabetes  Comment: By A1C, 5.4 on 5/20/24  Plan: monitor glucose PRN, follow-up outpatient.     (N18.2) CKD (chronic kidney disease) stage 2, GFR 60-89 ml/min  Comment: chronic, baseline Cr 0.7-0.9  Plan: Avoid nephrotoxins. Renally dose medications as appropriate. Monitor BMP, repeat 6/28      (M35.00) Sjogren's syndrome, with unspecified organ involvement (H24)  (I73.00) Raynaud's disease without gangrene  Comment: chronic  Plan: continue azathioprine 50 mg aily, pilocarpine 5 mg TID, cyclosporine 1 drop BID, follow-up with rheumatology as directed    (F32.A) Depression, unspecified depression type  Comment: chronic  Plan: continue fluoxetine 20 mg daily, doxepin 10 mg at bedtime, monitor symptoms    (K59.01) Slow transit constipation  Comment: ongoing  Plan: monitor bowel habits, continue bowel regimen    (F03.C0) Severe dementia, unspecified dementia type, unspecified whether behavioral, psychotic, or mood disturbance or anxiety (H)  Comment: by history, resides with daughter at baseline, utilized as an independent historian today.   -management reviewed with SW, per family son(s) not involved in care and family does not want them making changes to care plan.  Plan: continue memantine 5 mg bid, quetiapine 25 mg BID, OT following for formal cognitive testing and safe discharge recommendations       Orders:  CBC, BMP, CRP 6/28/24 dx: septic arthritis, anemia, DMII  Zofran 4 mg Q6H PRN for nausea    Total time spent with patient visit at the skilled nursing facility was 55 minutes including patient visit, review of past records, review of management with nursing facility staff and phone call to patient contact.       Electronically signed by:  JAYME Martinez CNP                   Sincerely,        Nidhi  JAYME Rivera CNP

## 2024-06-27 NOTE — PROGRESS NOTES
[facility] GERIATRICS    PRIMARY CARE PROVIDER AND CLINIC:  Brittany Heredia, [address] / [facility] [id number]  Chief Complaint   Patient presents with    Hospital F/U      [facility] Medical Record Number:  [id number]  Place of Service where encounter took place:  [facility]  (U) [[id number]]    Chelly Dave  is a 89 year old  (1935), admitted to the above facility from  [facility]. Hospital stay 6/19/24 through 6/26/24..     By chart review, patient was hospitalized at [facility] with a right knee prosthetic joint infection. In ED, labs remarkable for Cr 0.7, WBC 2.9, hgb 10.3. Ortho was consulted and right knee aspiration grew Strep godonii, thought likely due to hematogenous seeding. She underwent I&D with polyethylene exchange on 6/21/24 with EBL 50 ml. Postoperatively hgb dropped to 8.1. She was started on vanco and rocephin. ID recommended 6 weeks of rocephin. She was recommended TCU at discharge and admitted to current facility for ongoing medical management, rehab, nursing care.    HPI:    Seen today for follow-up in TCU resting abed. She speaks broken english, aphasic with dementia. She reports she ate breakfast. Denies pain but tender on exam. Unsure about last BM. No additional concerns per nursing.    Spoke with patient's daughter who reported patient was nauseated this afternoon. Chelly was living with her daughter prior to admission and dependent for most cares. Daughter reports communication barriers are related to dementia and aphasia and patient does not need an .     CODE STATUS/ADVANCE DIRECTIVES DISCUSSION:  Prior  DNR / DNI  ALLERGIES:   Allergies   Allergen Reactions    Lisinopril     Oxycodone      Other reaction(s): Confusion    Aspirin Buf(Cacarb-Mgcarb-Mgo)      Other reaction(s): GI Upset  81 mg works well for her      PAST MEDICAL HISTORY:   Past Medical History:   Diagnosis Date    Arthritis     Cataract     Diabetes  mellitus (H)     Gastro-oesophageal reflux disease     Hyperlipidemia       PAST SURGICAL HISTORY:   has a past surgical history that includes orthopedic surgery; colonoscopy; Laparotomy exploratory (N/A, 6/22/2018); picc insertion (Left, 06/25/2018); and Arthroplasty revision knee (Right, 6/21/2024).  FAMILY HISTORY: family history is not on file.  SOCIAL HISTORY:   reports that she has never smoked. She has never used smokeless tobacco. She reports that she does not drink alcohol and does not use drugs.  Patient's living condition: lives with family, DAUGHTER     Post Discharge Medication Reconciliation Status:   MED REC REQUIRED  Post Medication Reconciliation Status: discharge medications reconciled and changed, per note/orders       Current Outpatient Medications   Medication Sig Dispense Refill    acetaminophen (TYLENOL) 325 MG tablet Take 2 tablets (650 mg) by mouth 3 times daily And BID PRN (Patient taking differently: Take 650 mg by mouth 3 times daily And daily PRN)      albuterol (PROVENTIL) (2.5 MG/3ML) 0.083% neb solution Take 2.5 mg by nebulization every 6 hours as needed for shortness of breath, wheezing or cough      aspirin 81 MG EC tablet Take 81 mg by mouth 2 times daily For DVT prophylaxis      [START ON 8/3/2024] aspirin 81 MG EC tablet Take 81 mg by mouth daily      AzaTHIOprine (IMURAN PO) Take 50 mg by mouth daily      blood glucose monitoring (NO BRAND SPECIFIED) test strip USE TO CHECK BLOOD SUGARS EVERY DAY      blood glucose monitoring (SOFTCLIX) lancets USE TO TEST BLOOD SUGARS DAILY      Blood Glucose Monitoring Suppl (FIFTY50 GLUCOSE METER 2.0) w/Device KIT Dispense meter, test strips, lancets covered by pt ins. E11.9 NIDDM type II - Test 1 time/day      Calcium Carb-Cholecalciferol (CALCIUM 500 +D) 500-400 MG-UNIT TABS Take 1 tablet by mouth 2 times daily      cefTRIAXone (ROCEPHIN) 2 GM vial Inject 2 g into the vein every 24 hours for 39 days      Cholecalciferol (VITAMIN D3) 1000  "units CAPS Take 1,000 Units by mouth daily      cycloSPORINE (RESTASIS) 0.05 % ophthalmic emulsion Instill 1 drop into both eyes every 12 hours.   No further refills will be given unless you come in for your exam.      DOXEPIN HCL PO Take 10 mg by mouth At Bedtime      ferrous sulfate (IRON) 325 (65 FE) MG tablet Take 1 tablet (325 mg) by mouth daily 100 tablet 0    FLUoxetine (PROZAC) 20 MG capsule Take 20 mg by mouth daily      HYDROmorphone (DILAUDID) 2 MG tablet Take 0.5-1 tablets (1-2 mg) by mouth every 4 hours as needed for moderate to severe pain 1/2 tab po q 4 hrs prn pain scale \"3-6\"  1 tab po q 4 hrs prn pain scale \"7-10\" 20 tablet 0    lidocaine (LIDODERM) 5 % patch Place 1 patch onto the skin daily as needed for moderate pain (12 hours on; 12 hours off. Patient applies to knees or back)      loratadine (CLARITIN) 10 MG tablet Take 10 mg by mouth daily as needed      memantine (NAMENDA) 5 MG tablet Take 5 mg by mouth 2 times daily      ondansetron (ZOFRAN ODT) 4 MG ODT tab Take 1 tablet (4 mg) by mouth every 6 hours as needed for nausea      pantoprazole (PROTONIX) 40 MG EC tablet Take 40 mg by mouth daily      Pilocarpine HCl (SALAGEN PO) Take 5 mg by mouth 3 times daily AM, 1200 & HS      polyethylene glycol (MIRALAX/GLYCOLAX) powder Take 17 g by mouth daily as needed      polyethylene glycol 400 (BLINK TEARS) 0.25 % SOLN ophthalmic solution Place 1-2 drops into both eyes every 2 hours as needed for dry eyes      QUEtiapine (SEROQUEL) 25 MG tablet Take 25 mg by mouth 2 times daily      senna-docusate (SENOKOT-S/PERICOLACE) 8.6-50 MG tablet Take 1 tablet by mouth 2 times daily as needed for constipation       No current facility-administered medications for this visit.       ROS:  Limited secondary to aphasia impairment but today pt reports the above    Vitals:  /75   Pulse 88   Temp 97.4  F (36.3  C)   Resp 18   Ht 1.575 m (5' 2\")   Wt 61.2 kg (135 lb)   SpO2 94%   BMI 24.69 kg/m  "   Exam:  GENERAL APPEARANCE:  Alert, in no distress  RESP:  respiratory effort and palpation of chest normal, lungs clear to auscultation , no respiratory distress  CV:  regular rate and rhythm, no murmur, rub, or gallop, no edema  ABDOMEN:  normal bowel sounds, soft, nontender, no hepatosplenomegaly or other masses  M/S:   Gait and station abnormal transfers with assist  SKIN:  right knee with wound vac in place  NEURO:   rubin spontaneously, aphasic  PSYCH:  memory impaired , affect and mood normal    Lab/Diagnostic data:  Labs done in SNF [facility] in [facility]. Please refer to them using [employer] Everywhere. and Recent labs in [facility] reviewed by me today.     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 Cultures grew strep gordonii likely from hematogenous seeding. CRP trending down. Pain is fairly controlled  -daughter noting nausea this afternoon, suspect related to abx, will start zofran - QTSC 460 6/22  Plan: continue rocephin 2 g daily through 8/5. Weekly labs with results to ID. WBAT. Continue tylenol PRN, dilaudid PRN, lidocaine. DVT prophy with ASA 81 mg BID through 8/2 then reduce to once daily. Continue wound care/wound vac as directed. PT/OT. Follow-up with ortho, ID as directed. Zofran 4 mg Q6H PRN    (D62) ABLA (acute blood loss anemia)  (D64.9) Chronic anemia  Comment: baseline hgb ~10  Plan: continue iron supplementation, recheck hgb 6/28 then weekly per ID. Monitor for s/sx bleeding    (Z86.79) History of hypertension  Comment: no longer on treatment. BP is fairly controlled. Avoid hypotension due to patient age, frailty, and risk for complications.  Plan: monitor BP per facility protocol    (E78.5) Dyslipidemia  Comment: chronic, no longer on statin due to advanced age and frailty  Plan: regular diet    (K21.9) Chronic  gastroesophageal reflux disease  Comment: chronic  Plan: continue PPI    (R73.03) Prediabetes  Comment: By A1C, 5.4 on 5/20/24  Plan: monitor glucose PRN, follow-up outpatient.     (N18.2) CKD (chronic kidney disease) stage 2, GFR 60-89 ml/min  Comment: chronic, baseline Cr 0.7-0.9  Plan: Avoid nephrotoxins. Renally dose medications as appropriate. Monitor BMP, repeat 6/28      (M35.00) Sjogren's syndrome, with unspecified organ involvement (H24)  (I73.00) Raynaud's disease without gangrene  Comment: chronic  Plan: continue azathioprine 50 mg aily, pilocarpine 5 mg TID, cyclosporine 1 drop BID, follow-up with rheumatology as directed    (F32.A) Depression, unspecified depression type  Comment: chronic  Plan: continue fluoxetine 20 mg daily, doxepin 10 mg at bedtime, monitor symptoms    (K59.01) Slow transit constipation  Comment: ongoing  Plan: monitor bowel habits, continue bowel regimen    (F03.C0) Severe dementia, unspecified dementia type, unspecified whether behavioral, psychotic, or mood disturbance or anxiety (H)  Comment: by history, resides with daughter at baseline, utilized as an independent historian today.   -management reviewed with SW, per family son(s) not involved in care and family does not want them making changes to care plan.  Plan: continue memantine 5 mg bid, quetiapine 25 mg BID, OT following for formal cognitive testing and safe discharge recommendations       Orders:  CBC, BMP, CRP 6/28/24 dx: septic arthritis, anemia, DMII  Zofran 4 mg Q6H PRN for nausea    Total time spent with patient visit at the skilled nursing facility was 55 minutes including patient visit, review of past records, review of management with nursing facility staff and phone call to patient contact.       Electronically signed by:  JAYME Martinez CNP

## 2024-06-28 LAB
ANION GAP SERPL CALCULATED.3IONS-SCNC: 11 MMOL/L (ref 7–15)
BACTERIA TISS BX CULT: NORMAL
BUN SERPL-MCNC: 16.1 MG/DL (ref 8–23)
CALCIUM SERPL-MCNC: 8.1 MG/DL (ref 8.8–10.2)
CHLORIDE SERPL-SCNC: 99 MMOL/L (ref 98–107)
CREAT SERPL-MCNC: 0.58 MG/DL (ref 0.51–0.95)
CRP SERPL-MCNC: 40.89 MG/L
DEPRECATED HCO3 PLAS-SCNC: 26 MMOL/L (ref 22–29)
EGFRCR SERPLBLD CKD-EPI 2021: 87 ML/MIN/1.73M2
ERYTHROCYTE [DISTWIDTH] IN BLOOD BY AUTOMATED COUNT: 12.7 % (ref 10–15)
GAMMA INTERFERON BACKGROUND BLD IA-ACNC: 0.02 IU/ML
GLUCOSE SERPL-MCNC: 88 MG/DL (ref 70–99)
HCT VFR BLD AUTO: 30 % (ref 35–47)
HGB BLD-MCNC: 9 G/DL (ref 11.7–15.7)
M TB IFN-G BLD-IMP: NEGATIVE
M TB IFN-G CD4+ BCKGRND COR BLD-ACNC: 5.5 IU/ML
MCH RBC QN AUTO: 30.2 PG (ref 26.5–33)
MCHC RBC AUTO-ENTMCNC: 30 G/DL (ref 31.5–36.5)
MCV RBC AUTO: 101 FL (ref 78–100)
MITOGEN IGNF BCKGRD COR BLD-ACNC: 0.01 IU/ML
MITOGEN IGNF BCKGRD COR BLD-ACNC: 0.01 IU/ML
PLATELET # BLD AUTO: 331 10E3/UL (ref 150–450)
POTASSIUM SERPL-SCNC: 3.9 MMOL/L (ref 3.4–5.3)
QUANTIFERON MITOGEN: 5.52 IU/ML
QUANTIFERON NIL TUBE: 0.02 IU/ML
QUANTIFERON TB1 TUBE: 0.03 IU/ML
QUANTIFERON TB2 TUBE: 0.03
RBC # BLD AUTO: 2.98 10E6/UL (ref 3.8–5.2)
SODIUM SERPL-SCNC: 136 MMOL/L (ref 135–145)
WBC # BLD AUTO: 6.3 10E3/UL (ref 4–11)

## 2024-06-28 PROCEDURE — 82435 ASSAY OF BLOOD CHLORIDE: CPT | Performed by: NURSE PRACTITIONER

## 2024-06-28 PROCEDURE — 86140 C-REACTIVE PROTEIN: CPT | Performed by: NURSE PRACTITIONER

## 2024-06-28 PROCEDURE — 36415 COLL VENOUS BLD VENIPUNCTURE: CPT | Performed by: NURSE PRACTITIONER

## 2024-06-28 PROCEDURE — 85014 HEMATOCRIT: CPT | Performed by: NURSE PRACTITIONER

## 2024-06-28 PROCEDURE — 80048 BASIC METABOLIC PNL TOTAL CA: CPT | Performed by: NURSE PRACTITIONER

## 2024-06-28 PROCEDURE — P9604 ONE-WAY ALLOW PRORATED TRIP: HCPCS | Performed by: NURSE PRACTITIONER

## 2024-06-28 RX ORDER — ASPIRIN 81 MG/1
81 TABLET ORAL 2 TIMES DAILY
COMMUNITY
End: 2024-08-02

## 2024-06-28 RX ORDER — ASPIRIN 81 MG/1
81 TABLET ORAL DAILY
Status: ON HOLD | COMMUNITY
Start: 2024-08-03 | End: 2024-08-16

## 2024-06-28 NOTE — PROGRESS NOTES
[facility]  INITIAL VISIT NOTE  July 1, 2024    PRIMARY CARE PROVIDER AND CLINIC:  Brittany Heredia [address] [facility] [address] [facility] [id number]    CHIEF COMPLAINT:  Hospital follow-up/Initial visit      HPI:    Chelly Dave is a 89 year old  (1935) female who [facility], [facility] for an initial visit.     Medical history is notable for dementia, hypertension, dyslipidemia, GERD, prediabetes, CKD, anemia, leukopenia, allergic rhinitis, mesenteric mass, urinary incontinence, Raynaud's disease, Sjogren's syndrome, depression, osteopenia, and osteoarthritis.    Summary of hospital course:  Patient was hospitalized at [facility] from June 19 through June 26, 2024 for right knee prosthetic joint septic arthritis.  Initial evaluation was significant for creatinine 0.7, white count 3.9, and hemoglobin 10.3.  Patient was evaluated by orthopedic service.  Right knee aspiration grew Streptococcus gordonii, likely due to hematogenous seeding.  She subsequently underwent irrigation and debridement with polyethylene exchange on June 21, 2024.  EBL was 50 mL and postop hemoglobin dropped to 8.1 on June 23.  She was initially treated with vancomycin and ceftriaxone.  ID was consulted and recommended 6 weeks of IV antibiotic therapy with ceftriaxone.  PTA atorvastatin was discontinued due to severe dementia and advanced age.  TCU was recommended for rehab.    Patient is admitted to this facility for medical management, nursing care, and subacute rehab.     Of note, history was obtained from patient, facility staff, and extensive review of the chart including hospital admission note, consult notes, and discharge summary.    Today's visit:  Patient was seen in her room, lying in bed.  She appears frail.  She has marked cognitive impairment and unable to provide much credible information.  She is oriented only x 1.  Pain is fairly controlled.  There is no report  of fever, chills, chest pain, palpitation, dyspnea, nausea, vomiting, abdominal pain, or urinary symptoms.        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), s/p irrigation and debridement with polyethylene exchange on June 21, 2024    Past Medical History:   Diagnosis Date    Arthritis     Cataract     Diabetes mellitus (H)     Gastro-oesophageal reflux disease     Hyperlipidemia        PAST SURGICAL HISTORY:   Past Surgical History:   Procedure Laterality Date    ARTHROPLASTY REVISION KNEE Right 6/21/2024    Procedure: Right knee irrigation and debridement with polyethylene exchange;  Surgeon: Billy Tracy MD;  Location: [facility] OR    COLONOSCOPY      LAPAROTOMY EXPLORATORY N/A 6/22/2018    Procedure: LAPAROTOMY EXPLORATORY;  Exploratory Laparotomy with Resection of Messenteric Mass and associated small Bowel  Anesthesia Block;  Surgeon: Ahsan Nicolas MD;  Location: [facility] OR    ORTHOPEDIC SURGERY      right knee replacement    PICC INSERTION Left 06/25/2018    5Fr - 46cm (5cm external), basilic vein, low SVC       FAMILY HISTORY:   Family history is significant for breast cancer in her cousin and heart disease in brother 1 and 2.    SOCIAL HISTORY:  Social History     Tobacco Use    Smoking status: Never    Smokeless tobacco: Never   Substance Use Topics    Alcohol use: No       MEDICATIONS:  Current Outpatient Medications   Medication Sig Dispense Refill    acetaminophen (TYLENOL) 325 MG tablet Take 2 tablets (650 mg) by mouth  "every 4 hours as needed for other (For optimal non-opioid multimodal pain management to improve pain control.)      albuterol (PROVENTIL) (2.5 MG/3ML) 0.083% neb solution Take 2.5 mg by nebulization every 6 hours as needed for shortness of breath, wheezing or cough      aspirin 81 MG EC tablet Take 1 tablet (81 mg) by mouth 2 times daily      AzaTHIOprine (IMURAN PO) Take 50 mg by mouth daily      blood glucose monitoring (NO BRAND SPECIFIED) test strip USE TO CHECK BLOOD SUGARS EVERY DAY      blood glucose monitoring (SOFTCLIX) lancets USE TO TEST BLOOD SUGARS DAILY      Blood Glucose Monitoring Suppl (FIFTY50 GLUCOSE METER 2.0) w/Device KIT Dispense meter, test strips, lancets covered by pt ins. E11.9 NIDDM type II - Test 1 time/day      Calcium Carb-Cholecalciferol (CALCIUM 500 +D) 500-400 MG-UNIT TABS Take 1 tablet by mouth 2 times daily      cefTRIAXone (ROCEPHIN) 2 GM vial Inject 2 g into the vein every 24 hours for 39 days      Cholecalciferol (VITAMIN D3) 1000 units CAPS Take 1,000 Units by mouth daily      cycloSPORINE (RESTASIS) 0.05 % ophthalmic emulsion Instill 1 drop into both eyes every 12 hours.   No further refills will be given unless you come in for your exam.      DOXEPIN HCL PO Take 10 mg by mouth At Bedtime      ferrous sulfate (IRON) 325 (65 FE) MG tablet Take 1 tablet (325 mg) by mouth daily 100 tablet 0    FLUoxetine (PROZAC) 20 MG capsule Take 20 mg by mouth daily      HYDROmorphone (DILAUDID) 2 MG tablet Take 0.5-1 tablets (1-2 mg) by mouth every 4 hours as needed for moderate to severe pain 1/2 tab po q 4 hrs prn pain scale \"3-6\"  1 tab po q 4 hrs prn pain scale \"7-10\" 20 tablet 0    lidocaine (LIDODERM) 5 % patch Place 1 patch onto the skin daily as needed for moderate pain (12 hours on; 12 hours off. Patient applies to knees or back)      loratadine (CLARITIN) 10 MG tablet Take 10 mg by mouth daily as needed      memantine (NAMENDA) 5 MG tablet Take 5 mg by mouth 2 times daily      " "pantoprazole (PROTONIX) 40 MG EC tablet Take 40 mg by mouth daily      Pilocarpine HCl (SALAGEN PO) Take 5 mg by mouth 3 times daily AM, 1200 & HS      polyethylene glycol (MIRALAX/GLYCOLAX) powder Take 17 g by mouth daily as needed      polyethylene glycol 400 (BLINK TEARS) 0.25 % SOLN ophthalmic solution Place 1-2 drops into both eyes every 2 hours as needed for dry eyes      QUEtiapine (SEROQUEL) 25 MG tablet Take 25 mg by mouth 2 times daily      senna-docusate (SENOKOT-S/PERICOLACE) 8.6-50 MG tablet Take 1 tablet by mouth 2 times daily as needed for constipation         MED REC REQUIRED  Post Medication Reconciliation Status: medication reconcilation previously completed during another office visit      ALLERGIES:  Allergies   Allergen Reactions    Lisinopril     Oxycodone      Other reaction(s): Confusion    Aspirin Buf(Cacarb-Mgcarb-Mgo)      Other reaction(s): GI Upset  81 mg works well for her       ROS:  10 point ROS was attempted but was limited, given patient's underlying cognitive impairment. It was reviewed as much as possible as outlined in HPI.     PHYSICAL EXAM:  Vital signs were reviewed in the chart.  Vital Signs: /78   Pulse 79   Temp 98  F (36.7  C)   Resp 18   Ht 1.575 m (5' 2\")   Wt 61.2 kg (135 lb)   SpO2 92%   BMI 24.69 kg/m     General: Comfortable and in no acute distress  HEENT: Conjunctival pallor, no scleral icterus or injection, moist oral mucosa  Cardiovascular: Normal S1, S2, RRR  Respiratory: Lungs clear to auscultation bilaterally  GI: Abdomen soft, non-tender, non-distended, +BS  Extremities: No LE edema  Neuro: CX II-XII grossly intact; ROM in all four extremities grossly intact  Psych: Alert and oriented x1; normal affect  Skin: Right knee wound VAC is in place    LABORATORY/IMAGING DATA:  All relevant labs and imaging data in [facility] and/or Care Everywhere were personally reviewed today.    Most Recent 3 CBC's:  Recent Labs   Lab Test 06/28/24  0625 06/25/24  0457 " 06/24/24  0726 06/23/24  0723 06/22/24  0613   WBC 6.3  --  6.9  --  7.2   HGB 9.0*  --  8.3* 8.1* 9.6*   *  --  100  --  100    282 207  --  254     Most Recent 3 BMP's:  Recent Labs   Lab Test 06/28/24  0625 06/26/24  0457 06/25/24  0453 06/24/24  0726 06/23/24  0723 06/22/24  0613     --   --  138  --  134*   POTASSIUM 3.9  --   --  4.4  --  4.2   CHLORIDE 99  --   --  103  --  100   CO2 26  --   --  26  --  24   BUN 16.1  --   --  18.5  --  11.8   CR 0.58 0.70 0.60 0.65 0.82 0.75   ANIONGAP 11  --   --  9  --  10   ROBSON 8.1*  --   --  7.6*  --  8.1*   GLC 88  --   --  105* 107* 173*     Most Recent 2 LFT's:  Recent Labs   Lab Test 06/23/18  2334 06/21/18  0730   AST 22 19   ALT 13 16   ALKPHOS 50 66   BILITOTAL 0.3 0.2         ASSESSMENT/PLAN:  Right knee hematogenous prosthetic joint infection, s/p irrigation and debridement with polyethylene exchange on June 21, 2024,  Physical deconditioning.  Synovial culture grew Streptococcus gordonii, likely related to hematogenous seeding.  Last CRP 40.89 on June 28.  Pain is fairly controlled.  Plan:  Continue IV ceftriaxone 2 g daily through August 5, 2024  Weekly CBC with differential, BMP, AST, CRP every Tuesday; fax results to ID clinic  WBAT RLE with walker  Pain management with acetaminophen 650 mg every 4 hours PRN, hydromorphone 2 mg every 4 hours PRN and lidocaine patch  DVT prophylaxis with aspirin 81 mg twice daily through August 2, then back to PTA 81 mg daily  Wound VAC/wound care management as instructed per Ortho   Mobilize with PT/OT  Follow-up with Ortho and ID as directed    ABLA,  Chronic anemia.  Baseline hemoglobin around 10.  Kem hemoglobin 8.1 on June 23.  Last hemoglobin 9 on June 28.  Plan:  Continue ferrous sulfate 325 mg daily  Monitor hemoglobin weekly as above  Transfuse PRN for hemoglobin less than 7    History of hypertension.  No longer on antihypertensive regimen.  Blood pressure is fairly controlled.  No indication  for rigorous blood pressure control given age and advanced dementia.  Plan:  Monitor blood pressure    Dyslipidemia.  PTA atorvastatin was discontinued in the hospital due to advanced age and severe dementia  Plan:   No indication to resume statin therapy    GERD.  Plan:  Continue pantoprazole 40 mg daily    History of prediabetes.  Last Hgb A1c 5.4% on May 20, 2024.  Plan:  Monitor blood glucose.  Follow-up as outpatient    CKD stage II.  Baseline creatinine historically around 0.7-0.9.  Last creatinine 0.58 on June 28.  Plan:  Avoid NSAIDs and nephrotoxins  Monitor renal function periodically    Sjogren's syndrome,  Raynaud's disease.  Plan:  Continue azathioprine 50 mg daily  Continue pilocarpine 5 mg 3 times daily for dry mouth  Continue cyclosporine 0.05% 1 drop into both eyes every 12 hours  Follow-up with rheumatology as directed    Depression.  Plan:  Continue fluoxetine 20 mg daily  Continue doxepin 10 mg at bedtime  Monitor symptoms    Slow transit constipation.  Plan:  Continue the bowel regimen     Dementia.  Today, she is oriented only x 1.  Plan:  Standard delirium precautions  Continue memantine 5 mg twice daily  Continue quetiapine 25 mg twice daily  Comprehensive cognitive evaluation per OT for safe discharge planning        Orders written by provider at facility:  Weekly CBC with differential, BMP, AST, CRP every Tuesday, DX: Knee joint infection, fax results to ID clinic  Continue aspirin 81 mg p.o. twice daily through August 2, 2024, for DVT prophylaxis then back to PTA 81 mg p.o. daily      Total time: 55 minutes including face to face time with the patient, reviewing the notes, labs, and imaging in [facility], adjusting medications, ordering weekly, communicating the plan of care with RN/NP, and documenting all information electronically.       Disclaimer: This note contains text created using speech-recognition software and may have unintended word substitutions.      Electronically signed  by:  Carlos Eduardo Costello MD

## 2024-06-30 VITALS
BODY MASS INDEX: 24.84 KG/M2 | OXYGEN SATURATION: 92 % | DIASTOLIC BLOOD PRESSURE: 78 MMHG | RESPIRATION RATE: 18 BRPM | HEIGHT: 62 IN | WEIGHT: 135 LBS | TEMPERATURE: 98 F | SYSTOLIC BLOOD PRESSURE: 138 MMHG | HEART RATE: 79 BPM

## 2024-07-01 ENCOUNTER — TRANSITIONAL CARE UNIT VISIT (OUTPATIENT)
Dept: GERIATRICS | Facility: CLINIC | Age: 89
End: 2024-07-01
Payer: COMMERCIAL

## 2024-07-01 ENCOUNTER — LAB REQUISITION (OUTPATIENT)
Dept: LAB | Facility: CLINIC | Age: 89
End: 2024-07-01
Payer: COMMERCIAL

## 2024-07-01 DIAGNOSIS — M35.00 SJOGREN'S SYNDROME, WITH UNSPECIFIED ORGAN INVOLVEMENT (H): ICD-10-CM

## 2024-07-01 DIAGNOSIS — Z87.898 HISTORY OF PREDIABETES: ICD-10-CM

## 2024-07-01 DIAGNOSIS — K21.9 GASTROESOPHAGEAL REFLUX DISEASE, UNSPECIFIED WHETHER ESOPHAGITIS PRESENT: ICD-10-CM

## 2024-07-01 DIAGNOSIS — E78.5 DYSLIPIDEMIA: ICD-10-CM

## 2024-07-01 DIAGNOSIS — Z51.81 ENCOUNTER FOR THERAPEUTIC DRUG LEVEL MONITORING: ICD-10-CM

## 2024-07-01 DIAGNOSIS — R53.81 PHYSICAL DECONDITIONING: ICD-10-CM

## 2024-07-01 DIAGNOSIS — D64.9 CHRONIC ANEMIA: ICD-10-CM

## 2024-07-01 DIAGNOSIS — K59.01 SLOW TRANSIT CONSTIPATION: ICD-10-CM

## 2024-07-01 DIAGNOSIS — F32.A DEPRESSION, UNSPECIFIED DEPRESSION TYPE: ICD-10-CM

## 2024-07-01 DIAGNOSIS — D62 ACUTE BLOOD LOSS ANEMIA: ICD-10-CM

## 2024-07-01 DIAGNOSIS — Z86.79 HISTORY OF HYPERTENSION: ICD-10-CM

## 2024-07-01 DIAGNOSIS — I73.00 RAYNAUD'S DISEASE WITHOUT GANGRENE: ICD-10-CM

## 2024-07-01 DIAGNOSIS — T84.50XD INFECTION OF PROSTHETIC JOINT, SUBSEQUENT ENCOUNTER: Primary | ICD-10-CM

## 2024-07-01 DIAGNOSIS — F03.90 DEMENTIA, UNSPECIFIED DEMENTIA SEVERITY, UNSPECIFIED DEMENTIA TYPE, UNSPECIFIED WHETHER BEHAVIORAL, PSYCHOTIC, OR MOOD DISTURBANCE OR ANXIETY (H): ICD-10-CM

## 2024-07-01 DIAGNOSIS — N18.2 STAGE 2 CHRONIC KIDNEY DISEASE: ICD-10-CM

## 2024-07-01 PROCEDURE — 99306 1ST NF CARE HIGH MDM 50: CPT | Performed by: INTERNAL MEDICINE

## 2024-07-01 NOTE — PATIENT INSTRUCTIONS
Orders for Chelly Dave (1935), MR# [id number]:    1) Weekly CBC with differential, BMP, AST, CRP every Tuesday, DX: Knee joint infection, fax results to ID clinic  2) Continue aspirin 81 mg p.o. twice daily through August 2, 2024, for DVT prophylaxis then back to PTA 81 mg p.o. daily    Carlos Eduardo Costello MD  [facility]

## 2024-07-01 NOTE — LETTER
7/1/2024      Chelly Dave  [address]  [facility] [id number]        [facility]  INITIAL VISIT NOTE  July 1, 2024    PRIMARY CARE PROVIDER AND CLINIC:  Brittany Heredia [address] / [facility] [id number]    CHIEF COMPLAINT:  Hospital follow-up/Initial visit      HPI:    Chelly Dave is a 89 year old  (1935) female who was seen at [facility] on July 1, 2024 for an initial visit.     Medical history is notable for dementia, hypertension, dyslipidemia, GERD, prediabetes, CKD, anemia, leukopenia, allergic rhinitis, mesenteric mass, urinary incontinence, Raynaud's disease, Sjogren's syndrome, depression, osteopenia, and osteoarthritis.    Summary of hospital course:  Patient was hospitalized at [facility] from June 19 through June 26, 2024 for right knee prosthetic joint septic arthritis.  Initial evaluation was significant for creatinine 0.7, white count 3.9, and hemoglobin 10.3.  Patient was evaluated by orthopedic service.  Right knee aspiration grew Streptococcus gordonii, likely due to hematogenous seeding.  She subsequently underwent irrigation and debridement with polyethylene exchange on June 21, 2024.  EBL was 50 mL and postop hemoglobin dropped to 8.1 on June 23.  She was initially treated with vancomycin and ceftriaxone.  ID was consulted and recommended 6 weeks of IV antibiotic therapy with ceftriaxone.  PTA atorvastatin was discontinued due to severe dementia and advanced age.  TCU was recommended for rehab.    Patient is admitted to this facility for medical management, nursing care, and subacute rehab.     Of note, history was obtained from patient, facility staff, and extensive review of the chart including hospital admission note, consult notes, and discharge summary.    Today's visit:  Patient was seen in her room, lying in bed.  She appears frail.  She has marked cognitive impairment and unable to provide much credible information.   She is oriented only x 1.  Pain is fairly controlled.  There is no report of fever, chills, chest pain, palpitation, dyspnea, nausea, vomiting, abdominal pain, or urinary symptoms.        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), s/p irrigation and debridement with polyethylene exchange on June 21, 2024    Past Medical History:   Diagnosis Date     Arthritis      Cataract      Diabetes mellitus (H)      Gastro-oesophageal reflux disease      Hyperlipidemia        PAST SURGICAL HISTORY:   Past Surgical History:   Procedure Laterality Date     ARTHROPLASTY REVISION KNEE Right 6/21/2024    Procedure: Right knee irrigation and debridement with polyethylene exchange;  Surgeon: Billy Tracy MD;  Location: [facility] OR     COLONOSCOPY       LAPAROTOMY EXPLORATORY N/A 6/22/2018    Procedure: LAPAROTOMY EXPLORATORY;  Exploratory Laparotomy with Resection of Messenteric Mass and associated small Bowel  Anesthesia Block;  Surgeon: Ahsan Nicolas MD;  Location: [facility] OR     ORTHOPEDIC SURGERY      right knee replacement     PICC INSERTION Left 06/25/2018    5Fr - 46cm (5cm external), basilic vein, low SVC       FAMILY HISTORY:   Family history is significant for breast cancer in her cousin and heart disease in brother 1 and 2.    SOCIAL HISTORY:  Social History     Tobacco Use     Smoking status: Never     Smokeless tobacco: Never   Substance Use Topics     Alcohol use: No       MEDICATIONS:  Current Outpatient Medications   Medication Sig  "Dispense Refill     acetaminophen (TYLENOL) 325 MG tablet Take 2 tablets (650 mg) by mouth every 4 hours as needed for other (For optimal non-opioid multimodal pain management to improve pain control.)       albuterol (PROVENTIL) (2.5 MG/3ML) 0.083% neb solution Take 2.5 mg by nebulization every 6 hours as needed for shortness of breath, wheezing or cough       aspirin 81 MG EC tablet Take 1 tablet (81 mg) by mouth 2 times daily       AzaTHIOprine (IMURAN PO) Take 50 mg by mouth daily       blood glucose monitoring (NO BRAND SPECIFIED) test strip USE TO CHECK BLOOD SUGARS EVERY DAY       blood glucose monitoring (SOFTCLIX) lancets USE TO TEST BLOOD SUGARS DAILY       Blood Glucose Monitoring Suppl (FIFTY50 GLUCOSE METER 2.0) w/Device KIT Dispense meter, test strips, lancets covered by pt ins. E11.9 NIDDM type II - Test 1 time/day       Calcium Carb-Cholecalciferol (CALCIUM 500 +D) 500-400 MG-UNIT TABS Take 1 tablet by mouth 2 times daily       cefTRIAXone (ROCEPHIN) 2 GM vial Inject 2 g into the vein every 24 hours for 39 days       Cholecalciferol (VITAMIN D3) 1000 units CAPS Take 1,000 Units by mouth daily       cycloSPORINE (RESTASIS) 0.05 % ophthalmic emulsion Instill 1 drop into both eyes every 12 hours.   No further refills will be given unless you come in for your exam.       DOXEPIN HCL PO Take 10 mg by mouth At Bedtime       ferrous sulfate (IRON) 325 (65 FE) MG tablet Take 1 tablet (325 mg) by mouth daily 100 tablet 0     FLUoxetine (PROZAC) 20 MG capsule Take 20 mg by mouth daily       HYDROmorphone (DILAUDID) 2 MG tablet Take 0.5-1 tablets (1-2 mg) by mouth every 4 hours as needed for moderate to severe pain 1/2 tab po q 4 hrs prn pain scale \"3-6\"  1 tab po q 4 hrs prn pain scale \"7-10\" 20 tablet 0     lidocaine (LIDODERM) 5 % patch Place 1 patch onto the skin daily as needed for moderate pain (12 hours on; 12 hours off. Patient applies to knees or back)       loratadine (CLARITIN) 10 MG tablet Take 10 " "mg by mouth daily as needed       memantine (NAMENDA) 5 MG tablet Take 5 mg by mouth 2 times daily       pantoprazole (PROTONIX) 40 MG EC tablet Take 40 mg by mouth daily       Pilocarpine HCl (SALAGEN PO) Take 5 mg by mouth 3 times daily AM, 1200 & HS       polyethylene glycol (MIRALAX/GLYCOLAX) powder Take 17 g by mouth daily as needed       polyethylene glycol 400 (BLINK TEARS) 0.25 % SOLN ophthalmic solution Place 1-2 drops into both eyes every 2 hours as needed for dry eyes       QUEtiapine (SEROQUEL) 25 MG tablet Take 25 mg by mouth 2 times daily       senna-docusate (SENOKOT-S/PERICOLACE) 8.6-50 MG tablet Take 1 tablet by mouth 2 times daily as needed for constipation         MED REC REQUIRED  Post Medication Reconciliation Status: medication reconcilation previously completed during another office visit      ALLERGIES:  Allergies   Allergen Reactions     Lisinopril      Oxycodone      Other reaction(s): Confusion     Aspirin Buf(Cacarb-Mgcarb-Mgo)      Other reaction(s): GI Upset  81 mg works well for her       ROS:  10 point ROS was attempted but was limited, given patient's underlying cognitive impairment. It was reviewed as much as possible as outlined in HPI.     PHYSICAL EXAM:  Vital signs were reviewed in the chart.  Vital Signs: /78   Pulse 79   Temp 98  F (36.7  C)   Resp 18   Ht 1.575 m (5' 2\")   Wt 61.2 kg (135 lb)   SpO2 92%   BMI 24.69 kg/m     General: Comfortable and in no acute distress  HEENT: Conjunctival pallor, no scleral icterus or injection, moist oral mucosa  Cardiovascular: Normal S1, S2, RRR  Respiratory: Lungs clear to auscultation bilaterally  GI: Abdomen soft, non-tender, non-distended, +BS  Extremities: No LE edema  Neuro: CX II-XII grossly intact; ROM in all four extremities grossly intact  Psych: Alert and oriented x1; normal affect  Skin: Right knee wound VAC is in place    LABORATORY/IMAGING DATA:  All relevant labs and imaging data in [facility] and/or Care Everywhere " were personally reviewed today.    Most Recent 3 CBC's:  Recent Labs   Lab Test 06/28/24  0625 06/25/24  0453 06/24/24  0726 06/23/24  0723 06/22/24  0613   WBC 6.3  --  6.9  --  7.2   HGB 9.0*  --  8.3* 8.1* 9.6*   *  --  100  --  100    282 207  --  254     Most Recent 3 BMP's:  Recent Labs   Lab Test 06/28/24  0625 06/26/24 0457 06/25/24  0453 06/24/24  0726 06/23/24  0723 06/22/24  0613     --   --  138  --  134*   POTASSIUM 3.9  --   --  4.4  --  4.2   CHLORIDE 99  --   --  103  --  100   CO2 26  --   --  26  --  24   BUN 16.1  --   --  18.5  --  11.8   CR 0.58 0.70 0.60 0.65 0.82 0.75   ANIONGAP 11  --   --  9  --  10   ROBSON 8.1*  --   --  7.6*  --  8.1*   GLC 88  --   --  105* 107* 173*     Most Recent 2 LFT's:  Recent Labs   Lab Test 06/23/18 2334 06/21/18  0730   AST 22 19   ALT 13 16   ALKPHOS 50 66   BILITOTAL 0.3 0.2         ASSESSMENT/PLAN:  Right knee hematogenous prosthetic joint infection, s/p irrigation and debridement with polyethylene exchange on June 21, 2024,  Physical deconditioning.  Synovial culture grew Streptococcus gordonii, likely related to hematogenous seeding.  Last CRP 40.89 on June 28.  Pain is fairly controlled.  Plan:  Continue IV ceftriaxone 2 g daily through August 5, 2024  Weekly CBC with differential, BMP, AST, CRP every Tuesday; fax results to ID clinic  WBAT RLE with walker  Pain management with acetaminophen 650 mg every 4 hours PRN, hydromorphone 2 mg every 4 hours PRN and lidocaine patch  DVT prophylaxis with aspirin 81 mg twice daily through August 2, then back to PTA 81 mg daily  Wound VAC/wound care management as instructed per Ortho   Mobilize with PT/OT  Follow-up with Ortho and ID as directed    ABLA,  Chronic anemia.  Baseline hemoglobin around 10.  Kem hemoglobin 8.1 on June 23.  Last hemoglobin 9 on June 28.  Plan:  Continue ferrous sulfate 325 mg daily  Monitor hemoglobin weekly as above  Transfuse PRN for hemoglobin less than 7    History  of hypertension.  No longer on antihypertensive regimen.  Blood pressure is fairly controlled.  No indication for rigorous blood pressure control given age and advanced dementia.  Plan:  Monitor blood pressure    Dyslipidemia.  PTA atorvastatin was discontinued in the hospital due to advanced age and severe dementia  Plan:   No indication to resume statin therapy    GERD.  Plan:  Continue pantoprazole 40 mg daily    History of prediabetes.  Last Hgb A1c 5.4% on May 20, 2024.  Plan:  Monitor blood glucose.  Follow-up as outpatient    CKD stage II.  Baseline creatinine historically around 0.7-0.9.  Last creatinine 0.58 on June 28.  Plan:  Avoid NSAIDs and nephrotoxins  Monitor renal function periodically    Sjogren's syndrome,  Raynaud's disease.  Plan:  Continue azathioprine 50 mg daily  Continue pilocarpine 5 mg 3 times daily for dry mouth  Continue cyclosporine 0.05% 1 drop into both eyes every 12 hours  Follow-up with rheumatology as directed    Depression.  Plan:  Continue fluoxetine 20 mg daily  Continue doxepin 10 mg at bedtime  Monitor symptoms    Slow transit constipation.  Plan:  Continue the bowel regimen     Dementia.  Today, she is oriented only x 1.  Plan:  Standard delirium precautions  Continue memantine 5 mg twice daily  Continue quetiapine 25 mg twice daily  Comprehensive cognitive evaluation per OT for safe discharge planning        Orders written by provider at facility:  Weekly CBC with differential, BMP, AST, CRP every Tuesday, DX: Knee joint infection, fax results to ID clinic  Continue aspirin 81 mg p.o. twice daily through August 2, 2024, for DVT prophylaxis then back to PTA 81 mg p.o. daily      Total time: 55 minutes including face to face time with the patient, reviewing the notes, labs, and imaging in [facility] and [facility], adjusting medications, ordering weekly, communicating the plan of care with RN, and documenting all information electronically.       Disclaimer: This note contains text created  using speech-recognition software and may have unintended word substitutions.      Electronically signed by:  Carlos Eduardo Costello MD                       Sincerely,        Carlos Eduardo Costello MD

## 2024-07-02 LAB
ANION GAP SERPL CALCULATED.3IONS-SCNC: 10 MMOL/L (ref 7–15)
AST SERPL W P-5'-P-CCNC: 40 U/L (ref 0–45)
BASOPHILS # BLD AUTO: 0 10E3/UL (ref 0–0.2)
BASOPHILS NFR BLD AUTO: 0 %
BUN SERPL-MCNC: 22.5 MG/DL (ref 8–23)
CALCIUM SERPL-MCNC: 8.6 MG/DL (ref 8.8–10.2)
CHLORIDE SERPL-SCNC: 104 MMOL/L (ref 98–107)
CREAT SERPL-MCNC: 0.7 MG/DL (ref 0.51–0.95)
CRP SERPL-MCNC: 14.73 MG/L
DEPRECATED HCO3 PLAS-SCNC: 26 MMOL/L (ref 22–29)
EGFRCR SERPLBLD CKD-EPI 2021: 83 ML/MIN/1.73M2
EOSINOPHIL # BLD AUTO: 0.2 10E3/UL (ref 0–0.7)
EOSINOPHIL NFR BLD AUTO: 3 %
ERYTHROCYTE [DISTWIDTH] IN BLOOD BY AUTOMATED COUNT: 13.2 % (ref 10–15)
GLUCOSE SERPL-MCNC: 94 MG/DL (ref 70–99)
HCT VFR BLD AUTO: 31 % (ref 35–47)
HGB BLD-MCNC: 9.1 G/DL (ref 11.7–15.7)
IMM GRANULOCYTES # BLD: 0.1 10E3/UL
IMM GRANULOCYTES NFR BLD: 2 %
LYMPHOCYTES # BLD AUTO: 0.8 10E3/UL (ref 0.8–5.3)
LYMPHOCYTES NFR BLD AUTO: 12 %
MCH RBC QN AUTO: 30.4 PG (ref 26.5–33)
MCHC RBC AUTO-ENTMCNC: 29.4 G/DL (ref 31.5–36.5)
MCV RBC AUTO: 104 FL (ref 78–100)
MONOCYTES # BLD AUTO: 0.6 10E3/UL (ref 0–1.3)
MONOCYTES NFR BLD AUTO: 10 %
NEUTROPHILS # BLD AUTO: 4.6 10E3/UL (ref 1.6–8.3)
NEUTROPHILS NFR BLD AUTO: 74 %
NRBC # BLD AUTO: 0 10E3/UL
NRBC BLD AUTO-RTO: 0 /100
PLATELET # BLD AUTO: 405 10E3/UL (ref 150–450)
POTASSIUM SERPL-SCNC: 3.8 MMOL/L (ref 3.4–5.3)
RBC # BLD AUTO: 2.99 10E6/UL (ref 3.8–5.2)
SODIUM SERPL-SCNC: 140 MMOL/L (ref 135–145)
WBC # BLD AUTO: 6.2 10E3/UL (ref 4–11)

## 2024-07-02 PROCEDURE — 36415 COLL VENOUS BLD VENIPUNCTURE: CPT | Performed by: NURSE PRACTITIONER

## 2024-07-02 PROCEDURE — 82374 ASSAY BLOOD CARBON DIOXIDE: CPT | Performed by: NURSE PRACTITIONER

## 2024-07-02 PROCEDURE — 85025 COMPLETE CBC W/AUTO DIFF WBC: CPT | Performed by: NURSE PRACTITIONER

## 2024-07-02 PROCEDURE — 86140 C-REACTIVE PROTEIN: CPT | Performed by: NURSE PRACTITIONER

## 2024-07-02 PROCEDURE — 84450 TRANSFERASE (AST) (SGOT): CPT | Performed by: NURSE PRACTITIONER

## 2024-07-02 PROCEDURE — P9604 ONE-WAY ALLOW PRORATED TRIP: HCPCS | Performed by: NURSE PRACTITIONER

## 2024-07-02 PROCEDURE — 80048 BASIC METABOLIC PNL TOTAL CA: CPT | Performed by: NURSE PRACTITIONER

## 2024-07-05 ENCOUNTER — TRANSITIONAL CARE UNIT VISIT (OUTPATIENT)
Dept: GERIATRICS | Facility: CLINIC | Age: 89
End: 2024-07-05
Payer: COMMERCIAL

## 2024-07-05 VITALS
BODY MASS INDEX: 24.17 KG/M2 | HEART RATE: 86 BPM | SYSTOLIC BLOOD PRESSURE: 134 MMHG | RESPIRATION RATE: 16 BRPM | HEIGHT: 61 IN | DIASTOLIC BLOOD PRESSURE: 67 MMHG | WEIGHT: 128 LBS | TEMPERATURE: 97.7 F | OXYGEN SATURATION: 95 %

## 2024-07-05 DIAGNOSIS — F03.90 DEMENTIA, UNSPECIFIED DEMENTIA SEVERITY, UNSPECIFIED DEMENTIA TYPE, UNSPECIFIED WHETHER BEHAVIORAL, PSYCHOTIC, OR MOOD DISTURBANCE OR ANXIETY (H): ICD-10-CM

## 2024-07-05 DIAGNOSIS — D64.9 CHRONIC ANEMIA: ICD-10-CM

## 2024-07-05 DIAGNOSIS — I73.00 RAYNAUD'S DISEASE WITHOUT GANGRENE: ICD-10-CM

## 2024-07-05 DIAGNOSIS — K21.9 GASTROESOPHAGEAL REFLUX DISEASE, UNSPECIFIED WHETHER ESOPHAGITIS PRESENT: ICD-10-CM

## 2024-07-05 DIAGNOSIS — F32.A DEPRESSION, UNSPECIFIED DEPRESSION TYPE: ICD-10-CM

## 2024-07-05 DIAGNOSIS — Z86.79 HISTORY OF HYPERTENSION: ICD-10-CM

## 2024-07-05 DIAGNOSIS — N18.2 STAGE 2 CHRONIC KIDNEY DISEASE: ICD-10-CM

## 2024-07-05 DIAGNOSIS — R53.81 PHYSICAL DECONDITIONING: ICD-10-CM

## 2024-07-05 DIAGNOSIS — M35.00 SJOGREN'S SYNDROME, WITH UNSPECIFIED ORGAN INVOLVEMENT (H): ICD-10-CM

## 2024-07-05 DIAGNOSIS — M00.9 PYOGENIC ARTHRITIS OF RIGHT KNEE JOINT, DUE TO UNSPECIFIED ORGANISM (H): Primary | ICD-10-CM

## 2024-07-05 DIAGNOSIS — D62 ACUTE BLOOD LOSS ANEMIA: ICD-10-CM

## 2024-07-05 PROCEDURE — 99309 SBSQ NF CARE MODERATE MDM 30: CPT | Performed by: NURSE PRACTITIONER

## 2024-07-05 RX ORDER — ONDANSETRON 4 MG/1
4 TABLET, ORALLY DISINTEGRATING ORAL EVERY 6 HOURS PRN
Status: SHIPPED
Start: 2024-07-05

## 2024-07-05 RX ORDER — ACETAMINOPHEN 325 MG/1
650 TABLET ORAL 3 TIMES DAILY
Status: ON HOLD
Start: 2024-07-05 | End: 2024-08-16

## 2024-07-05 NOTE — PATIENT INSTRUCTIONS
Mateusz Dave  1935  1) Change tylenol to 650 mg TID and once daily PRN.  Diagnosis: pain  2) Ensure patient has fu with ID (DR. Figueroa) and Ortho (Dr. Tracy) per recommendations.  JAYME Godfrey CNP on 7/5/2024 at 10:25 AM

## 2024-07-05 NOTE — PROGRESS NOTES
[facility]    Chief Complaint   Patient presents with    RECHECK     HPI:  Chelly Dave is a 89 year old  (1935), who is being seen today for an episodic care visit at: [facility]  ([facility]) [[id number]].     Past medical history significant for hypertension, dyslipidemia, dementia, GERD, chronic kidney disease, anemia, leukopenia, prediabetes, mesenteric mass, Raynaud's disease, Sjogren's, depression    Summary of recent hospitalization  Patient was hospitalized at [facility] from 6/19 to 6/26/2024 for right knee prosthetic joint septic arthritis.  Laboratory evaluation in the emergency department significant for WBC 3.9, hemoglobin 10.3, CRP 22.32, procalcitonin 0.07.  Orthopedics was consulted.  Patient underwent right knee aspiration, synovial fluid grew Streptococcus gorgaii likely due to combat tensionless seeding.  Patient underwent I&D with polyethylene exchange on 6/21/2024.  EBL 50 mL.  Hemoglobin postoperatively dropped, hemoglobin 8.3 prior to discharge.  Patient discharged with Prevena wound VAC and follow-up in 2 weeks with Ortho.  She was started on aspirin 81 mg twice daily for DVT prophylaxis.  ID was consulted and patient was started on vancomycin with ceftriaxone, with plan for 6-week course of IV ceftriaxone.  Follow-up with ID in 3 to 4 weeks.  PTA atorvastatin discontinued due to dementia and age. [facility] for physical rehabilitation and medical management.       Of note there is no discharge summary available.    Today patient was seen for routine follow-up in the TCU.  She is limited historian due to dementia.  She reports that she does have pain at times.  She denies any shortness of breath, chest pain, dizziness/lightheadedness.  Patient is not sure how her bowels are moving.  Per nursing documentation bowels are moving regularly.  Patient continues to work with therapy.    Reviewed facility EMR including medications, recent nursing progress  "notes, vital signs.  Discussed plan of care with nursing.      Allergies, and PMH/PSH reviewed in EPIC today.  REVIEW OF SYSTEMS:  Limited secondary to cognitive impairment but today pt reports as above in HPI    Objective:   /67   Pulse 86   Temp 97.7  F (36.5  C)   Resp 16   Ht 1.549 m (5' 1\")   Wt 58.1 kg (128 lb)   SpO2 95%   BMI 24.19 kg/m    GENERAL APPEARANCE:  Alert, frail, in NAD  HEENT: normocephalic, moist mucous membranes, nose without drainage or crusting  RESP:  respiratory effort normal, no respiratory distress, Lung sounds clear, patient is on RA  CV: auscultation of heart done, rate and rhythm regular.   ABDOMEN: + bowel sounds, soft, nontender, no grimacing or guarding with palpation.  M/S:  no lower extremity edema  SKIN:  staples to right knee present, no drainage or erythema at incision  PSYCH: affect and mood normal      [facility] SNF [facility]. Please refer to them using [facility]/Care Everywhere. and Recent labs in [facility] reviewed by me today.     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 Continue Dilaudid 1 to 2 mg every 4 hours as needed, lidocaine patch.  Continue IV ceftriaxone 2 g daily through 8/5/2024.  Weekly CBC with differential, BMP, CRP, AST every Tuesday with results to infectious disease.  DVT prophylaxis with aspirin 81 mg twice daily through August 2, then resume 81 mg daily.  Wound care per Ortho.  Therapy as below.  Follow-up with ID per recommendations.  Follow-up with Ortho per recommendations.    Acute blood loss anemia  Chronic anemia  Baseline hemoglobin 10  Hemoglobin 9 point normal 7/2/2024-trending up  Plan: CBC " weekly with ID labs.  Continue ferrous sulfate 325 mg daily.    History of hypertension  SBP 130s to 140s  Plan: Patient no longer on medication management.  SBP goal less than 150 based on age, comorbidities, frailty.  Monitor blood pressure.    Dyslipidemia  PTA statin discontinued inpatient  Plan: Follow-up with PCP.    Dementia  Depression  SLUMS 23/30 in the TCU  Plan: Continue fluoxetine 20 mg daily, memantine 50 mg twice daily, Seroquel 25 mg twice daily.  Monitor mood and symptoms. Nursing to assist with cares, meals, medication assistance, activities.    GERD  Plan: Continue pantoprazole 40 mg daily.    Chronic kidney disease stage II  Baseline creatinine 0.6-0.8  Creatinine 0.70 on 7/2/2024  Plan: BMP weekly with ID labs. Avoid nephrotoxins. Renally dose medications as indicated.    Sjogren's syndrome  Raynaud's disease  Plan: Continue azathioprine 50 mg daily, pilocarpine 5 mg 3 times daily, CSA ophthalmic drops every 12 hours.  Follow-up with rheumatology per recommendations.    Slow transit constipation  Per nursing documentation bowels moving regularly  Plan: Continue senna S1 tab twice daily as needed, MiraLAX 17 g daily as needed.  Monitor bowels.    Physical deconditioning  Secondary to recent hospitalization, medical conditions as above  Patient continues to work with therapy  Plan: Encourage participation in physical therapy/occupational therapy for strengthening and deconditioning. Discharge planning per their recommendation. Social work to assist with d/c planning.      MED REC REQUIRED  Post Medication Reconciliation Status:  Medication reconciliation previously completed during another office visit      Disclaimer: This note may contain text created using speech-recognition software and may contain unintended word substitutions.     Electronically signed by: JAYME Godfrey CNP

## 2024-07-05 NOTE — LETTER
7/5/2024      Chelly Dave  [id number]  [place] [address]        [facility]    Chief Complaint   Patient presents with     RECHECK     HPI:  Chelly Dave is a 89 year old  (1935), who is being seen today for an episodic care visit at: [facility]  ([facility]) [[id number]].     Past medical history significant for hypertension, dyslipidemia, dementia, GERD, chronic kidney disease, anemia, leukopenia, prediabetes, mesenteric mass, Raynaud's disease, Sjogren's, depression    Summary of recent hospitalization  Patient was hospitalized at [facility] from 6/19 to 6/26/2024 for right knee prosthetic joint septic arthritis.  Laboratory evaluation in the emergency department significant for WBC 3.9, hemoglobin 10.3, CRP 22.32, procalcitonin 0.07.  Orthopedics was consulted.  Patient underwent right knee aspiration, synovial fluid grew Streptococcus gorgaii likely due to combat tensionless seeding.  Patient underwent I&D with polyethylene exchange on 6/21/2024.  EBL 50 mL.  Hemoglobin postoperatively dropped, hemoglobin 8.3 prior to discharge.  Patient discharged with Prevena wound VAC and follow-up in 2 weeks with Ortho.  She was started on aspirin 81 mg twice daily for DVT prophylaxis.  ID was consulted and patient was started on vancomycin with ceftriaxone, with plan for 6-week course of IV ceftriaxone.  Follow-up with ID in 3 to 4 weeks.  PTA atorvastatin discontinued due to dementia and age. Discharged to U for physical rehabilitation and medical management.       Of note there is no discharge summary available.    Today patient was seen for routine follow-up in the TCU.  She is limited historian due to dementia.  She reports that she does have pain at times.  She denies any shortness of breath, chest pain, dizziness/lightheadedness.  Patient is not sure how her bowels are moving.  Per nursing documentation bowels are moving regularly.  Patient continues to work with  "therapy.    Reviewed facility EMR including medications, recent nursing progress notes, vital signs.  Discussed plan of care with nursing.      Allergies, and PMH/PSH reviewed in EPIC today.  REVIEW OF SYSTEMS:  Limited secondary to cognitive impairment but today pt reports as above in HPI    Objective:   /67   Pulse 86   Temp 97.7  F (36.5  C)   Resp 16   Ht 1.549 m (5' 1\")   Wt 58.1 kg (128 lb)   SpO2 95%   BMI 24.19 kg/m    GENERAL APPEARANCE:  Alert, frail, in NAD  HEENT: normocephalic, moist mucous membranes, nose without drainage or crusting  RESP:  respiratory effort normal, no respiratory distress, Lung sounds clear, patient is on RA  CV: auscultation of heart done, rate and rhythm regular.   ABDOMEN: + bowel sounds, soft, nontender, no grimacing or guarding with palpation.  M/S:  no lower extremity edema  SKIN:  staples to right knee present, no drainage or erythema at incision  PSYCH: affect and mood normal      Labs done in SNF are in [facility]. Please refer to them using [employer]/Care Everywhere. and Recent labs in [facility] reviewed by me today.     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 Continue Dilaudid 1 to 2 mg every 4 hours as needed, lidocaine patch.  Continue IV ceftriaxone 2 g daily through 8/5/2024.  Weekly CBC with differential, BMP, CRP, AST every Tuesday with results to infectious disease.  DVT prophylaxis with aspirin 81 mg twice daily through August 2, then resume 81 mg daily.  Wound care per Ortho.  Therapy as below.  Follow-up with ID per recommendations.  Follow-up with Ortho per recommendations.    Acute blood loss anemia  Chronic " anemia  Baseline hemoglobin 10  Hemoglobin 9 point normal 7/2/2024-trending up  Plan: CBC weekly with ID labs.  Continue ferrous sulfate 325 mg daily.    History of hypertension  SBP 130s to 140s  Plan: Patient no longer on medication management.  SBP goal less than 150 based on age, comorbidities, frailty.  Monitor blood pressure.    Dyslipidemia  PTA statin discontinued inpatient  Plan: Follow-up with PCP.    Dementia  Depression  SLUMS 23/30 in the TCU  Plan: Continue fluoxetine 20 mg daily, memantine 50 mg twice daily, Seroquel 25 mg twice daily.  Monitor mood and symptoms. Nursing to assist with cares, meals, medication assistance, activities.    GERD  Plan: Continue pantoprazole 40 mg daily.    Chronic kidney disease stage II  Baseline creatinine 0.6-0.8  Creatinine 0.70 on 7/2/2024  Plan: BMP weekly with ID labs. Avoid nephrotoxins. Renally dose medications as indicated.    Sjogren's syndrome  Raynaud's disease  Plan: Continue azathioprine 50 mg daily, pilocarpine 5 mg 3 times daily, CSA ophthalmic drops every 12 hours.  Follow-up with rheumatology per recommendations.    Slow transit constipation  Per nursing documentation bowels moving regularly  Plan: Continue senna S1 tab twice daily as needed, MiraLAX 17 g daily as needed.  Monitor bowels.    Physical deconditioning  Secondary to recent hospitalization, medical conditions as above  Patient continues to work with therapy  Plan: Encourage participation in physical therapy/occupational therapy for strengthening and deconditioning. Discharge planning per their recommendation. Social work to assist with d/c planning.      MED REC REQUIRED  Post Medication Reconciliation Status:  Medication reconciliation previously completed during another office visit      Disclaimer: This note may contain text created using speech-recognition software and may contain unintended word substitutions.     Electronically signed by: JAYME Godfrey CNP          Sincerely,        JAYME Godfrey CNP

## 2024-07-08 ENCOUNTER — LAB REQUISITION (OUTPATIENT)
Dept: LAB | Facility: CLINIC | Age: 89
End: 2024-07-08
Payer: COMMERCIAL

## 2024-07-08 VITALS
WEIGHT: 128 LBS | TEMPERATURE: 97.1 F | SYSTOLIC BLOOD PRESSURE: 135 MMHG | DIASTOLIC BLOOD PRESSURE: 65 MMHG | BODY MASS INDEX: 24.17 KG/M2 | RESPIRATION RATE: 16 BRPM | OXYGEN SATURATION: 95 % | HEART RATE: 64 BPM | HEIGHT: 61 IN

## 2024-07-08 DIAGNOSIS — Z51.81 ENCOUNTER FOR THERAPEUTIC DRUG LEVEL MONITORING: ICD-10-CM

## 2024-07-08 NOTE — PROGRESS NOTES
"[facility] GERIATRICS    Chief Complaint   Patient presents with    RECHECK     HPI:  Chelly Dave is a 89 year old  (1935), who is being seen today for an episodic care visit at: [facility]  ([facility]) [[id number]].     Past medical history significant for hypertension, dyslipidemia, dementia, GERD, chronic kidney disease, anemia, leukopenia, prediabetes, mesenteric mass, Raynaud's disease, Sjogren's, depression     Summary of recent hospitalization  Patient was hospitalized at [facility] from 6/19 to 6/26/2024 for right knee prosthetic joint septic arthritis.  Laboratory evaluation in the emergency department significant for WBC 3.9, hemoglobin 10.3, CRP 22.32, procalcitonin 0.07.  Orthopedics was consulted.  Patient underwent right knee aspiration, synovial fluid grew Streptococcus gorgaii likely due to combat tensionless seeding.  Patient underwent I&D with polyethylene exchange on 6/21/2024.  EBL 50 mL.  Hemoglobin postoperatively dropped, hemoglobin 8.3 prior to discharge.  Patient discharged with Prevena wound VAC and follow-up in 2 weeks with Ortho.  She was started on aspirin 81 mg twice daily for DVT prophylaxis.  ID was consulted and patient was started on vancomycin with ceftriaxone, with plan for 6-week course of IV ceftriaxone.  Follow-up with ID in 3 to 4 weeks.  PTA atorvastatin discontinued due to dementia and age. Discharged to U for physical rehabilitation and medical management.     Today patient is seen for routine follow-up in the TCU.  She is limited historian, answers \"I don't know\" to many of my questions today.  She tells me she does have pain at times.  She denies shortness of breath, issues urinating.  Per nursing nursing documentation last BM 3 days ago.     Reviewed facility EMR including medications, recent nursing progress notes, vital signs.  Discussed plan of care with nursing.      Allergies, and PMH/PSH reviewed in EPIC today.  REVIEW OF " "SYSTEMS:  Limited secondary to cognitive impairment but today pt reports as above in HPI    Objective:   /65   Pulse 64   Temp 97.1  F (36.2  C)   Resp 16   Ht 1.549 m (5' 1\")   Wt 58.1 kg (128 lb)   SpO2 95%   BMI 24.19 kg/m    GENERAL APPEARANCE:  Alert, in NAD  HEENT: normocephalic, moist mucous membranes, nose without drainage or crusting  RESP:  respiratory effort normal, no respiratory distress, Lung sounds clear, patient is on RA  CV: auscultation of heart done, rate and rhythm regular.   ABDOMEN: + bowel sounds, soft, nontender, no grimacing or guarding with palpation.  M/S: no lower extremity edema  SKIN: right knee incision with staples in place- incision well approximated and no signs of infection  NEURO: moves extremities freely  PSYCH: affect and mood normal    Labs done in SNF are in [place] EPIC. Please refer to them using [employer]/Care Everywhere. and Recent labs in EPIC reviewed by me today.     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  Continue IV ceftriaxone 2 g daily through 8/5/2024.  Weekly CBC with differential, BMP, CRP, AST every Tuesday with results to infectious disease.  DVT prophylaxis with aspirin 81 mg twice daily through August 2, then resume 81 mg daily.  Wound care per Ortho.  Therapy as below.  Follow-up with ID per recommendations.  Follow-up with Ortho per recommendations- discussed with nurse manager who is speaking to family today and will confirm follow up appt.     Acute blood loss anemia  Chronic anemia  Baseline hemoglobin 10  Hemoglobin 9.7 on 7/9/2024- " trending up recently  Plan: CBC weekly with ID labs.  Continue ferrous sulfate 325 mg daily.     History of hypertension  SBP mostly 120-140s, occasional higher BP  Plan: Patient no longer on medication management.  SBP goal less than 150 based on age, comorbidities, frailty.  Monitor blood pressure.     Dyslipidemia  PTA statin discontinued inpatient  Plan: Follow-up with PCP.     Dementia  Depression  Plan: Continue fluoxetine 20 mg daily, memantine 50 mg twice daily, Seroquel 25 mg twice daily.  Monitor mood and symptoms. Nursing to assist with cares, meals, medication assistance, activities.     GERD  Plan: Continue pantoprazole 40 mg daily.     Chronic kidney disease stage II  Baseline creatinine 0.6-0.8  Creatinine 0.70 on 7/2/2024  Plan: BMP weekly with ID labs- pending today. Avoid nephrotoxins. Renally dose medications as indicated.     Sjogren's syndrome  Raynaud's disease  Plan: Continue azathioprine 50 mg daily, pilocarpine 5 mg 3 times daily, CSA ophthalmic drops every 12 hours.  Follow-up with rheumatology per recommendations.     Slow transit constipation  Last BM 3 days ago per nursing  Plan: Asked nursing to administer PRN bowel medications today. Continue senna S1 tab twice daily as needed, MiraLAX 17 g daily as needed.  Monitor bowels.     Physical deconditioning  Secondary to recent hospitalization, medical conditions as above  Patient continues to work with therapy  Plan: Encourage participation in physical therapy/occupational therapy for strengthening and deconditioning. Discharge planning per their recommendation. Social work to assist with d/c planning.    MED REC REQUIRED  Post Medication Reconciliation Status:  Medication reconciliation previously completed during another office visit      Disclaimer: This note may contain text created using speech-recognition software and may contain unintended word substitutions.       Electronically signed by: JAYME Godfrey CNP

## 2024-07-09 ENCOUNTER — TRANSITIONAL CARE UNIT VISIT (OUTPATIENT)
Dept: GERIATRICS | Facility: CLINIC | Age: 89
End: 2024-07-09
Payer: COMMERCIAL

## 2024-07-09 DIAGNOSIS — K21.9 GASTROESOPHAGEAL REFLUX DISEASE, UNSPECIFIED WHETHER ESOPHAGITIS PRESENT: ICD-10-CM

## 2024-07-09 DIAGNOSIS — I73.00 RAYNAUD'S DISEASE WITHOUT GANGRENE: ICD-10-CM

## 2024-07-09 DIAGNOSIS — M00.9 PYOGENIC ARTHRITIS OF RIGHT KNEE JOINT, DUE TO UNSPECIFIED ORGANISM (H): Primary | ICD-10-CM

## 2024-07-09 DIAGNOSIS — F32.A DEPRESSION, UNSPECIFIED DEPRESSION TYPE: ICD-10-CM

## 2024-07-09 DIAGNOSIS — R53.81 PHYSICAL DECONDITIONING: ICD-10-CM

## 2024-07-09 DIAGNOSIS — K59.01 SLOW TRANSIT CONSTIPATION: ICD-10-CM

## 2024-07-09 DIAGNOSIS — M35.00 SJOGREN'S SYNDROME, WITH UNSPECIFIED ORGAN INVOLVEMENT (H): ICD-10-CM

## 2024-07-09 DIAGNOSIS — D62 ACUTE BLOOD LOSS ANEMIA: ICD-10-CM

## 2024-07-09 DIAGNOSIS — N18.2 STAGE 2 CHRONIC KIDNEY DISEASE: ICD-10-CM

## 2024-07-09 DIAGNOSIS — D64.9 CHRONIC ANEMIA: ICD-10-CM

## 2024-07-09 DIAGNOSIS — Z86.79 HISTORY OF HYPERTENSION: ICD-10-CM

## 2024-07-09 DIAGNOSIS — F03.90 DEMENTIA, UNSPECIFIED DEMENTIA SEVERITY, UNSPECIFIED DEMENTIA TYPE, UNSPECIFIED WHETHER BEHAVIORAL, PSYCHOTIC, OR MOOD DISTURBANCE OR ANXIETY (H): ICD-10-CM

## 2024-07-09 LAB
ANION GAP SERPL CALCULATED.3IONS-SCNC: 12 MMOL/L (ref 7–15)
AST SERPL W P-5'-P-CCNC: 30 U/L (ref 0–45)
BASOPHILS # BLD AUTO: 0 10E3/UL (ref 0–0.2)
BASOPHILS NFR BLD AUTO: 1 %
BUN SERPL-MCNC: 21.2 MG/DL (ref 8–23)
CALCIUM SERPL-MCNC: 8.5 MG/DL (ref 8.8–10.2)
CHLORIDE SERPL-SCNC: 103 MMOL/L (ref 98–107)
CREAT SERPL-MCNC: 0.74 MG/DL (ref 0.51–0.95)
CRP SERPL-MCNC: 4.76 MG/L
DEPRECATED HCO3 PLAS-SCNC: 26 MMOL/L (ref 22–29)
EGFRCR SERPLBLD CKD-EPI 2021: 77 ML/MIN/1.73M2
EOSINOPHIL # BLD AUTO: 0.2 10E3/UL (ref 0–0.7)
EOSINOPHIL NFR BLD AUTO: 4 %
ERYTHROCYTE [DISTWIDTH] IN BLOOD BY AUTOMATED COUNT: 13.4 % (ref 10–15)
GLUCOSE SERPL-MCNC: 89 MG/DL (ref 70–99)
HCT VFR BLD AUTO: 32.8 % (ref 35–47)
HGB BLD-MCNC: 9.7 G/DL (ref 11.7–15.7)
IMM GRANULOCYTES # BLD: 0 10E3/UL
IMM GRANULOCYTES NFR BLD: 1 %
LYMPHOCYTES # BLD AUTO: 0.4 10E3/UL (ref 0.8–5.3)
LYMPHOCYTES NFR BLD AUTO: 11 %
MCH RBC QN AUTO: 29.8 PG (ref 26.5–33)
MCHC RBC AUTO-ENTMCNC: 29.6 G/DL (ref 31.5–36.5)
MCV RBC AUTO: 101 FL (ref 78–100)
MONOCYTES # BLD AUTO: 0.3 10E3/UL (ref 0–1.3)
MONOCYTES NFR BLD AUTO: 9 %
NEUTROPHILS # BLD AUTO: 2.7 10E3/UL (ref 1.6–8.3)
NEUTROPHILS NFR BLD AUTO: 74 %
NRBC # BLD AUTO: 0 10E3/UL
NRBC BLD AUTO-RTO: 0 /100
PLATELET # BLD AUTO: 244 10E3/UL (ref 150–450)
POTASSIUM SERPL-SCNC: 3.6 MMOL/L (ref 3.4–5.3)
RBC # BLD AUTO: 3.25 10E6/UL (ref 3.8–5.2)
SODIUM SERPL-SCNC: 141 MMOL/L (ref 135–145)
WBC # BLD AUTO: 3.7 10E3/UL (ref 4–11)

## 2024-07-09 PROCEDURE — 99309 SBSQ NF CARE MODERATE MDM 30: CPT | Performed by: NURSE PRACTITIONER

## 2024-07-09 PROCEDURE — 80048 BASIC METABOLIC PNL TOTAL CA: CPT | Performed by: NURSE PRACTITIONER

## 2024-07-09 PROCEDURE — 36415 COLL VENOUS BLD VENIPUNCTURE: CPT | Performed by: NURSE PRACTITIONER

## 2024-07-09 PROCEDURE — 84450 TRANSFERASE (AST) (SGOT): CPT | Performed by: NURSE PRACTITIONER

## 2024-07-09 PROCEDURE — 85025 COMPLETE CBC W/AUTO DIFF WBC: CPT | Performed by: NURSE PRACTITIONER

## 2024-07-09 PROCEDURE — 86140 C-REACTIVE PROTEIN: CPT | Performed by: NURSE PRACTITIONER

## 2024-07-09 PROCEDURE — P9604 ONE-WAY ALLOW PRORATED TRIP: HCPCS | Performed by: NURSE PRACTITIONER

## 2024-07-09 NOTE — PATIENT INSTRUCTIONS
Orders  Chelly Dave  1935  1) please administer as needed bowel medications for constipation today  JAYME Godfrey CNP on 7/9/2024 at 9:17 AM

## 2024-07-09 NOTE — LETTER
" 7/9/2024      Chelly Dave  [id number]  [place] [address]        [facility]    Chief Complaint   Patient presents with     RECHECK     HPI:  Chelly Dave is a 89 year old  (1935), who is being seen today for an episodic care visit at: [facility]  ([facility]) [[id number]].     Past medical history significant for hypertension, dyslipidemia, dementia, GERD, chronic kidney disease, anemia, leukopenia, prediabetes, mesenteric mass, Raynaud's disease, Sjogren's, depression     Summary of recent hospitalization  Patient was hospitalized at [facility] from 6/19 to 6/26/2024 for right knee prosthetic joint septic arthritis.  Laboratory evaluation in the emergency department significant for WBC 3.9, hemoglobin 10.3, CRP 22.32, procalcitonin 0.07.  Orthopedics was consulted.  Patient underwent right knee aspiration, synovial fluid grew Streptococcus gorgaii likely due to combat tensionless seeding.  Patient underwent I&D with polyethylene exchange on 6/21/2024.  EBL 50 mL.  Hemoglobin postoperatively dropped, hemoglobin 8.3 prior to discharge.  Patient discharged with Prevena wound VAC and follow-up in 2 weeks with Ortho.  She was started on aspirin 81 mg twice daily for DVT prophylaxis.  ID was consulted and patient was started on vancomycin with ceftriaxone, with plan for 6-week course of IV ceftriaxone.  Follow-up with ID in 3 to 4 weeks.  PTA atorvastatin discontinued due to dementia and age. Discharged to U for physical rehabilitation and medical management.     Today patient is seen for routine follow-up in the TCU.  She is limited historian, answers \"I don't know\" to many of my questions today.  She tells me she does have pain at times.  She denies shortness of breath, issues urinating.  Per nursing nursing documentation last BM 3 days ago.     Reviewed facility EMR including medications, recent nursing progress notes, vital signs.  Discussed plan of care with " "nursing.      Allergies, and PMH/PSH reviewed in EPIC today.  REVIEW OF SYSTEMS:  Limited secondary to cognitive impairment but today pt reports as above in HPI    Objective:   /65   Pulse 64   Temp 97.1  F (36.2  C)   Resp 16   Ht 1.549 m (5' 1\")   Wt 58.1 kg (128 lb)   SpO2 95%   BMI 24.19 kg/m    GENERAL APPEARANCE:  Alert, in NAD  HEENT: normocephalic, moist mucous membranes, nose without drainage or crusting  RESP:  respiratory effort normal, no respiratory distress, Lung sounds clear, patient is on RA  CV: auscultation of heart done, rate and rhythm regular.   ABDOMEN: + bowel sounds, soft, nontender, no grimacing or guarding with palpation.  M/S: no lower extremity edema  SKIN: right knee incision with staples in place- incision well approximated and no signs of infection  NEURO: moves extremities freely  PSYCH: affect and mood normal    Labs done in SNF are in [facility]. Please refer to them using [facility]/Care Everywhere. and Recent labs in [facility] reviewed by me today.     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  Continue IV ceftriaxone 2 g daily through 8/5/2024.  Weekly CBC with differential, BMP, CRP, AST every Tuesday with results to infectious disease.  DVT prophylaxis with aspirin 81 mg twice daily through August 2, then resume 81 mg daily.  Wound care per Ortho.  Therapy as below.  Follow-up with ID per recommendations.  Follow-up with Ortho per recommendations- discussed with nurse manager who is speaking to family today and will confirm follow up appt.     Acute blood loss " anemia  Chronic anemia  Baseline hemoglobin 10  Hemoglobin 9.7 on 7/9/2024- trending up recently  Plan: CBC weekly with ID labs.  Continue ferrous sulfate 325 mg daily.     History of hypertension  SBP mostly 120-140s, occasional higher BP  Plan: Patient no longer on medication management.  SBP goal less than 150 based on age, comorbidities, frailty.  Monitor blood pressure.     Dyslipidemia  PTA statin discontinued inpatient  Plan: Follow-up with PCP.     Dementia  Depression  Plan: Continue fluoxetine 20 mg daily, memantine 50 mg twice daily, Seroquel 25 mg twice daily.  Monitor mood and symptoms. Nursing to assist with cares, meals, medication assistance, activities.     GERD  Plan: Continue pantoprazole 40 mg daily.     Chronic kidney disease stage II  Baseline creatinine 0.6-0.8  Creatinine 0.70 on 7/2/2024  Plan: BMP weekly with ID labs- pending today. Avoid nephrotoxins. Renally dose medications as indicated.     Sjogren's syndrome  Raynaud's disease  Plan: Continue azathioprine 50 mg daily, pilocarpine 5 mg 3 times daily, CSA ophthalmic drops every 12 hours.  Follow-up with rheumatology per recommendations.     Slow transit constipation  Last BM 3 days ago per nursing  Plan: Asked nursing to administer PRN bowel medications today. Continue senna S1 tab twice daily as needed, MiraLAX 17 g daily as needed.  Monitor bowels.     Physical deconditioning  Secondary to recent hospitalization, medical conditions as above  Patient continues to work with therapy  Plan: Encourage participation in physical therapy/occupational therapy for strengthening and deconditioning. Discharge planning per their recommendation. Social work to assist with d/c planning.    MED REC REQUIRED  Post Medication Reconciliation Status:  Medication reconciliation previously completed during another office visit      Disclaimer: This note may contain text created using speech-recognition software and may contain unintended word  substitutions.       Electronically signed by: JAYME Godfrey CNP         Sincerely,        Sarah Frey, JAYME CNP

## 2024-07-15 ENCOUNTER — LAB REQUISITION (OUTPATIENT)
Dept: LAB | Facility: CLINIC | Age: 89
End: 2024-07-15
Payer: COMMERCIAL

## 2024-07-15 DIAGNOSIS — Z51.81 ENCOUNTER FOR THERAPEUTIC DRUG LEVEL MONITORING: ICD-10-CM

## 2024-07-16 ENCOUNTER — TRANSITIONAL CARE UNIT VISIT (OUTPATIENT)
Dept: GERIATRICS | Facility: CLINIC | Age: 89
End: 2024-07-16
Payer: COMMERCIAL

## 2024-07-16 VITALS
HEIGHT: 61 IN | OXYGEN SATURATION: 91 % | SYSTOLIC BLOOD PRESSURE: 136 MMHG | DIASTOLIC BLOOD PRESSURE: 68 MMHG | BODY MASS INDEX: 23.53 KG/M2 | TEMPERATURE: 97.9 F | WEIGHT: 124.6 LBS | RESPIRATION RATE: 18 BRPM

## 2024-07-16 DIAGNOSIS — D64.9 CHRONIC ANEMIA: ICD-10-CM

## 2024-07-16 DIAGNOSIS — F03.90 DEMENTIA, UNSPECIFIED DEMENTIA SEVERITY, UNSPECIFIED DEMENTIA TYPE, UNSPECIFIED WHETHER BEHAVIORAL, PSYCHOTIC, OR MOOD DISTURBANCE OR ANXIETY (H): ICD-10-CM

## 2024-07-16 DIAGNOSIS — N18.2 STAGE 2 CHRONIC KIDNEY DISEASE: ICD-10-CM

## 2024-07-16 DIAGNOSIS — D62 ACUTE BLOOD LOSS ANEMIA: ICD-10-CM

## 2024-07-16 DIAGNOSIS — R53.81 PHYSICAL DECONDITIONING: ICD-10-CM

## 2024-07-16 DIAGNOSIS — K59.01 SLOW TRANSIT CONSTIPATION: ICD-10-CM

## 2024-07-16 DIAGNOSIS — I73.00 RAYNAUD'S DISEASE WITHOUT GANGRENE: ICD-10-CM

## 2024-07-16 DIAGNOSIS — M00.9 PYOGENIC ARTHRITIS OF RIGHT KNEE JOINT, DUE TO UNSPECIFIED ORGANISM (H): Primary | ICD-10-CM

## 2024-07-16 DIAGNOSIS — Z86.79 HISTORY OF HYPERTENSION: ICD-10-CM

## 2024-07-16 DIAGNOSIS — K21.9 GASTROESOPHAGEAL REFLUX DISEASE, UNSPECIFIED WHETHER ESOPHAGITIS PRESENT: ICD-10-CM

## 2024-07-16 DIAGNOSIS — M35.00 SJOGREN'S SYNDROME, WITH UNSPECIFIED ORGAN INVOLVEMENT (H): ICD-10-CM

## 2024-07-16 DIAGNOSIS — F32.A DEPRESSION, UNSPECIFIED DEPRESSION TYPE: ICD-10-CM

## 2024-07-16 LAB
ANION GAP SERPL CALCULATED.3IONS-SCNC: 12 MMOL/L (ref 7–15)
AST SERPL W P-5'-P-CCNC: 26 U/L (ref 0–45)
BASOPHILS # BLD AUTO: 0 10E3/UL (ref 0–0.2)
BASOPHILS NFR BLD AUTO: 0 %
BUN SERPL-MCNC: 18.4 MG/DL (ref 8–23)
CALCIUM SERPL-MCNC: 8.6 MG/DL (ref 8.8–10.4)
CHLORIDE SERPL-SCNC: 109 MMOL/L (ref 98–107)
CREAT SERPL-MCNC: 0.73 MG/DL (ref 0.51–0.95)
CRP SERPL-MCNC: <3 MG/L
EGFRCR SERPLBLD CKD-EPI 2021: 79 ML/MIN/1.73M2
EOSINOPHIL # BLD AUTO: 0.2 10E3/UL (ref 0–0.7)
EOSINOPHIL NFR BLD AUTO: 6 %
ERYTHROCYTE [DISTWIDTH] IN BLOOD BY AUTOMATED COUNT: 14.1 % (ref 10–15)
GLUCOSE SERPL-MCNC: 97 MG/DL (ref 70–99)
HCO3 SERPL-SCNC: 24 MMOL/L (ref 22–29)
HCT VFR BLD AUTO: 30.1 % (ref 35–47)
HGB BLD-MCNC: 8.8 G/DL (ref 11.7–15.7)
IMM GRANULOCYTES # BLD: 0 10E3/UL
IMM GRANULOCYTES NFR BLD: 0 %
LYMPHOCYTES # BLD AUTO: 0.5 10E3/UL (ref 0.8–5.3)
LYMPHOCYTES NFR BLD AUTO: 18 %
MCH RBC QN AUTO: 29.4 PG (ref 26.5–33)
MCHC RBC AUTO-ENTMCNC: 29.2 G/DL (ref 31.5–36.5)
MCV RBC AUTO: 101 FL (ref 78–100)
MONOCYTES # BLD AUTO: 0.4 10E3/UL (ref 0–1.3)
MONOCYTES NFR BLD AUTO: 13 %
NEUTROPHILS # BLD AUTO: 1.6 10E3/UL (ref 1.6–8.3)
NEUTROPHILS NFR BLD AUTO: 61 %
NRBC # BLD AUTO: 0 10E3/UL
NRBC BLD AUTO-RTO: 0 /100
PLATELET # BLD AUTO: 163 10E3/UL (ref 150–450)
POTASSIUM SERPL-SCNC: 3.7 MMOL/L (ref 3.4–5.3)
RBC # BLD AUTO: 2.99 10E6/UL (ref 3.8–5.2)
SODIUM SERPL-SCNC: 145 MMOL/L (ref 135–145)
WBC # BLD AUTO: 2.6 10E3/UL (ref 4–11)

## 2024-07-16 PROCEDURE — 80048 BASIC METABOLIC PNL TOTAL CA: CPT | Performed by: NURSE PRACTITIONER

## 2024-07-16 PROCEDURE — 84450 TRANSFERASE (AST) (SGOT): CPT | Performed by: NURSE PRACTITIONER

## 2024-07-16 PROCEDURE — P9603 ONE-WAY ALLOW PRORATED MILES: HCPCS | Performed by: NURSE PRACTITIONER

## 2024-07-16 PROCEDURE — 36415 COLL VENOUS BLD VENIPUNCTURE: CPT | Performed by: NURSE PRACTITIONER

## 2024-07-16 PROCEDURE — 99309 SBSQ NF CARE MODERATE MDM 30: CPT | Performed by: NURSE PRACTITIONER

## 2024-07-16 PROCEDURE — 85025 COMPLETE CBC W/AUTO DIFF WBC: CPT | Performed by: NURSE PRACTITIONER

## 2024-07-16 PROCEDURE — 86140 C-REACTIVE PROTEIN: CPT | Performed by: NURSE PRACTITIONER

## 2024-07-16 RX ORDER — AMOXICILLIN 250 MG
1 CAPSULE ORAL 2 TIMES DAILY
Status: SHIPPED
Start: 2024-07-16 | End: 2024-07-24

## 2024-07-16 NOTE — PROGRESS NOTES
[facility] GERIATRICS    Chief Complaint   Patient presents with    RECHECK     HPI:  Chelly Dave is a 89 year old  (1935), who is being seen today for an episodic care visit at: [facility]  ([facility]) [[id number]].     Past medical history significant for hypertension, dyslipidemia, dementia, GERD, chronic kidney disease, anemia, leukopenia, prediabetes, mesenteric mass, Raynaud's disease, Sjogren's, depression     Summary of recent hospitalization  Patient was hospitalized at [facility] from 6/19 to 6/26/2024 for right knee prosthetic joint septic arthritis.  Laboratory evaluation in the emergency department significant for WBC 3.9, hemoglobin 10.3, CRP 22.32, procalcitonin 0.07.  Orthopedics was consulted.  Patient underwent right knee aspiration, synovial fluid grew Streptococcus gorgaii likely due to combat tensionless seeding.  Patient underwent I&D with polyethylene exchange on 6/21/2024.  EBL 50 mL.  Hemoglobin postoperatively dropped, hemoglobin 8.3 prior to discharge.  Patient discharged with Prevena wound VAC and follow-up in 2 weeks with Ortho.  She was started on aspirin 81 mg twice daily for DVT prophylaxis.  ID was consulted and patient was started on vancomycin with ceftriaxone, with plan for 6-week course of IV ceftriaxone.  Follow-up with ID in 3 to 4 weeks.  PTA atorvastatin discontinued due to dementia and age. Discharged to U for physical rehabilitation and medical management.     Today patient was seen for routine follow-up in the TCU.  Patient is limited historian.  She denies any pain at time of visit.  She had staples removed at facility yesterday as directed by Ortho.  Denies shortness of breath, chest pain, dysuria.  Last bowel movement was 4 days ago per nursing documentation.  Patient continues to work with therapy.    Reviewed facility EMR including medications, recent nursing progress notes, vital signs.  Discussed plan of care with  "nursing.    Allergies, and PMH/PSH reviewed in EPIC today.  REVIEW OF SYSTEMS:  Limited secondary to cognitive impairment but today pt reports as above in HPI    Objective:   /68   Temp 97.9  F (36.6  C)   Resp 18   Ht 1.549 m (5' 1\")   Wt 56.5 kg (124 lb 9.6 oz)   SpO2 91%   BMI 23.54 kg/m    GENERAL APPEARANCE:  Alert, in NAD  HEENT: normocephalic, moist mucous membranes, nose without drainage or crusting  RESP:  respiratory effort normal, no respiratory distress, Lung sounds clear, patient is on RA  CV: auscultation of heart done, rate and rhythm regular.   ABDOMEN: + bowel sounds, soft, nontender, no grimacing or guarding with palpation.  M/S:   generalized right lower extremity edema around knee  SKIN: incision well approximated, no signs of infection, steri strips in place  NEURO: moves extremities freely  PSYCH: affect and mood normal      Labs done in SNF [facility]. Please refer to them using [facility]/Care Everywhere. and Recent labs in [facility] reviewed by me today.     Assessment/Plan:  Right knee hematogenous prosthetic joint infection status post I&D with polyethylene exchange on 6/21/2024  Synovial culture grew Streptococcus goronii suspected to be related to hematogenous seeding  CRP 14.73 and WBC 6.2 on 7/2/2024  Patient removed wound vac in the TCU- ortho was updated   Staples removed 7/15/2024  Pain is controlled  Plan: Continue tylenol to 650 TID and once daily PRN, Dilaudid 1 to 2 mg every 4 hours as needed, lidocaine patch.  Continue IV ceftriaxone 2 g daily through 8/5/2024.  Weekly CBC with differential, BMP, CRP, AST every Tuesday with results to infectious disease.  DVT prophylaxis with aspirin 81 mg twice daily through August 2, then resume 81 mg daily.  Wound care per Ortho.  Therapy as below.  Follow-up with ID per recommendations.  Follow-up with Ortho per recommendations     Acute blood loss anemia  Chronic anemia  Baseline hemoglobin 10  Hemoglobin 9.7 on 7/9/2024- " trending up recently  Plan: CBC weekly with ID labs.  Continue ferrous sulfate 325 mg daily.     History of hypertension  SBP mostly 130s- controlled  Plan: Patient no longer on medication management.  SBP goal less than 150 based on age, comorbidities, frailty.  Monitor blood pressure.     Dyslipidemia  PTA statin discontinued inpatient  Plan: Follow-up with PCP.     Dementia  Depression  Plan: Continue fluoxetine 20 mg daily, memantine 50 mg twice daily, Seroquel 25 mg twice daily.  Monitor mood and symptoms. Nursing to assist with cares, meals, medication assistance, activities.     GERD  Plan: Continue pantoprazole 40 mg daily.     Chronic kidney disease stage II  Baseline creatinine 0.6-0.8  Creatinine 0.74 on 7/9/2024  Plan: BMP weekly with ID labs. Avoid nephrotoxins. Renally dose medications as indicated.     Sjogren's syndrome  Raynaud's disease  Plan: Continue azathioprine 50 mg daily, pilocarpine 5 mg 3 times daily, CSA ophthalmic drops every 12 hours.  Follow-up with rheumatology per recommendations.     Slow transit constipation  Last BM 4 days ago  Plan: Change senna s 1 tab BID and nursing to administer miralax today as well.  Monitor bowels.     Physical deconditioning  Secondary to recent hospitalization, medical conditions as above  Patient continues to work with therapy  Plan: Encourage participation in physical therapy/occupational therapy for strengthening and deconditioning. Discharge planning per their recommendation. Social work to assist with d/c planning.       MED REC REQUIRED  Post Medication Reconciliation Status:  Medication reconciliation previously completed during another office visit      Disclaimer: This note may contain text created using speech-recognition software and may contain unintended word substitutions.       Electronically signed by: JAYME Godfrey CNP       Orders  Tyronjennifer Garciamatt  1935  1) Change senna s 1 tab BID schedule for constipation  2) Administer  PRN miralax today  for constipation  Sarah Frey, APRN CNP on 7/16/2024 at 7:53 AM

## 2024-07-18 ENCOUNTER — LAB REQUISITION (OUTPATIENT)
Dept: LAB | Facility: CLINIC | Age: 89
End: 2024-07-18
Payer: COMMERCIAL

## 2024-07-18 DIAGNOSIS — Z51.81 ENCOUNTER FOR THERAPEUTIC DRUG LEVEL MONITORING: ICD-10-CM

## 2024-07-19 LAB
BASOPHILS # BLD AUTO: 0 10E3/UL (ref 0–0.2)
BASOPHILS NFR BLD AUTO: 0 %
EOSINOPHIL # BLD AUTO: 0 10E3/UL (ref 0–0.7)
EOSINOPHIL NFR BLD AUTO: 1 %
ERYTHROCYTE [DISTWIDTH] IN BLOOD BY AUTOMATED COUNT: 14.6 % (ref 10–15)
HCT VFR BLD AUTO: 31.8 % (ref 35–47)
HGB BLD-MCNC: 9.4 G/DL (ref 11.7–15.7)
IMM GRANULOCYTES # BLD: 0 10E3/UL
IMM GRANULOCYTES NFR BLD: 0 %
LYMPHOCYTES # BLD AUTO: 0.5 10E3/UL (ref 0.8–5.3)
LYMPHOCYTES NFR BLD AUTO: 10 %
MCH RBC QN AUTO: 30.1 PG (ref 26.5–33)
MCHC RBC AUTO-ENTMCNC: 29.6 G/DL (ref 31.5–36.5)
MCV RBC AUTO: 102 FL (ref 78–100)
MONOCYTES # BLD AUTO: 0.4 10E3/UL (ref 0–1.3)
MONOCYTES NFR BLD AUTO: 7 %
NEUTROPHILS # BLD AUTO: 4.1 10E3/UL (ref 1.6–8.3)
NEUTROPHILS NFR BLD AUTO: 82 %
NRBC # BLD AUTO: 0 10E3/UL
NRBC BLD AUTO-RTO: 0 /100
PLATELET # BLD AUTO: 189 10E3/UL (ref 150–450)
RBC # BLD AUTO: 3.12 10E6/UL (ref 3.8–5.2)
WBC # BLD AUTO: 5.1 10E3/UL (ref 4–11)

## 2024-07-19 PROCEDURE — 85025 COMPLETE CBC W/AUTO DIFF WBC: CPT | Performed by: NURSE PRACTITIONER

## 2024-07-19 PROCEDURE — 36415 COLL VENOUS BLD VENIPUNCTURE: CPT | Performed by: NURSE PRACTITIONER

## 2024-07-19 PROCEDURE — P9604 ONE-WAY ALLOW PRORATED TRIP: HCPCS | Performed by: NURSE PRACTITIONER

## 2024-07-21 ENCOUNTER — LAB REQUISITION (OUTPATIENT)
Dept: LAB | Facility: CLINIC | Age: 89
End: 2024-07-21
Payer: COMMERCIAL

## 2024-07-21 DIAGNOSIS — R46.89 OTHER SYMPTOMS AND SIGNS INVOLVING APPEARANCE AND BEHAVIOR: ICD-10-CM

## 2024-07-22 ENCOUNTER — LAB REQUISITION (OUTPATIENT)
Dept: LAB | Facility: CLINIC | Age: 89
End: 2024-07-22
Payer: COMMERCIAL

## 2024-07-22 DIAGNOSIS — Z51.81 ENCOUNTER FOR THERAPEUTIC DRUG LEVEL MONITORING: ICD-10-CM

## 2024-07-23 VITALS — WEIGHT: 124 LBS | BODY MASS INDEX: 23.41 KG/M2 | HEIGHT: 61 IN

## 2024-07-23 LAB
ANION GAP SERPL CALCULATED.3IONS-SCNC: 10 MMOL/L (ref 7–15)
AST SERPL W P-5'-P-CCNC: 26 U/L (ref 0–45)
BASOPHILS # BLD AUTO: 0 10E3/UL (ref 0–0.2)
BASOPHILS NFR BLD AUTO: 1 %
BUN SERPL-MCNC: 11.7 MG/DL (ref 8–23)
CALCIUM SERPL-MCNC: 8.4 MG/DL (ref 8.8–10.4)
CHLORIDE SERPL-SCNC: 105 MMOL/L (ref 98–107)
CREAT SERPL-MCNC: 0.71 MG/DL (ref 0.51–0.95)
CRP SERPL-MCNC: 5.73 MG/L
EGFRCR SERPLBLD CKD-EPI 2021: 81 ML/MIN/1.73M2
EOSINOPHIL # BLD AUTO: 0.2 10E3/UL (ref 0–0.7)
EOSINOPHIL NFR BLD AUTO: 7 %
ERYTHROCYTE [DISTWIDTH] IN BLOOD BY AUTOMATED COUNT: 14.8 % (ref 10–15)
GLUCOSE SERPL-MCNC: 86 MG/DL (ref 70–99)
HCO3 SERPL-SCNC: 27 MMOL/L (ref 22–29)
HCT VFR BLD AUTO: 29.5 % (ref 35–47)
HGB BLD-MCNC: 8.8 G/DL (ref 11.7–15.7)
IMM GRANULOCYTES # BLD: 0 10E3/UL
IMM GRANULOCYTES NFR BLD: 1 %
LYMPHOCYTES # BLD AUTO: 0.4 10E3/UL (ref 0.8–5.3)
LYMPHOCYTES NFR BLD AUTO: 16 %
MCH RBC QN AUTO: 30 PG (ref 26.5–33)
MCHC RBC AUTO-ENTMCNC: 29.8 G/DL (ref 31.5–36.5)
MCV RBC AUTO: 101 FL (ref 78–100)
MONOCYTES # BLD AUTO: 0.4 10E3/UL (ref 0–1.3)
MONOCYTES NFR BLD AUTO: 17 %
NEUTROPHILS # BLD AUTO: 1.4 10E3/UL (ref 1.6–8.3)
NEUTROPHILS NFR BLD AUTO: 58 %
NRBC # BLD AUTO: 0 10E3/UL
NRBC BLD AUTO-RTO: 0 /100
PLATELET # BLD AUTO: 180 10E3/UL (ref 150–450)
POTASSIUM SERPL-SCNC: 3.6 MMOL/L (ref 3.4–5.3)
RBC # BLD AUTO: 2.93 10E6/UL (ref 3.8–5.2)
SODIUM SERPL-SCNC: 142 MMOL/L (ref 135–145)
WBC # BLD AUTO: 2.4 10E3/UL (ref 4–11)

## 2024-07-23 PROCEDURE — 36415 COLL VENOUS BLD VENIPUNCTURE: CPT | Performed by: NURSE PRACTITIONER

## 2024-07-23 PROCEDURE — 85004 AUTOMATED DIFF WBC COUNT: CPT | Performed by: NURSE PRACTITIONER

## 2024-07-23 PROCEDURE — 86140 C-REACTIVE PROTEIN: CPT | Performed by: NURSE PRACTITIONER

## 2024-07-23 PROCEDURE — 80048 BASIC METABOLIC PNL TOTAL CA: CPT | Performed by: NURSE PRACTITIONER

## 2024-07-23 PROCEDURE — 84450 TRANSFERASE (AST) (SGOT): CPT | Performed by: NURSE PRACTITIONER

## 2024-07-23 PROCEDURE — 82947 ASSAY GLUCOSE BLOOD QUANT: CPT | Performed by: NURSE PRACTITIONER

## 2024-07-23 NOTE — PROGRESS NOTES
[facility] GERIATRICS    Chief Complaint   Patient presents with    RECHECK     HPI:  Chelly Dave is a 89 year old  (1935), who is being seen today for an episodic care visit at: [facility]  ([facility]) [[id number]].     Past medical history significant for hypertension, dyslipidemia, dementia, GERD, chronic kidney disease, anemia, leukopenia, prediabetes, mesenteric mass, Raynaud's disease, Sjogren's, depression     Summary of recent hospitalization  Patient was hospitalized at [facility] from 6/19 to 6/26/2024 for right knee prosthetic joint septic arthritis.  Laboratory evaluation in the emergency department significant for WBC 3.9, hemoglobin 10.3, CRP 22.32, procalcitonin 0.07.  Orthopedics was consulted.  Patient underwent right knee aspiration, synovial fluid grew Streptococcus gorgaii likely due to combat tensionless seeding.  Patient underwent I&D with polyethylene exchange on 6/21/2024.  EBL 50 mL.  Hemoglobin postoperatively dropped, hemoglobin 8.3 prior to discharge.  Patient discharged with Prevena wound VAC and follow-up in 2 weeks with Ortho.  She was started on aspirin 81 mg twice daily for DVT prophylaxis.  ID was consulted and patient was started on vancomycin with ceftriaxone, with plan for 6-week course of IV ceftriaxone.  Follow-up with ID in 3 to 4 weeks.  PTA atorvastatin discontinued due to dementia and age. Discharged to U for physical rehabilitation and medical management.        Today patient was seen for routine follow-up in the TCU.  Her knee wound dehisced over the weekend.  Per discussion with nurse, she believes that the patient does get into the incision, is open to air.  WOC RN is following, wound does have some drainage today with slough present.  Pictures were sent to patient's Ortho doctor yesterday and they would like nursing to continue the wound care as ordered.  Patient will follow-up with Ortho tomorrow.  Nursing staff and therapy have noticed  "decline in status, she is requiring more assistance with transfers recently and increased weakness.  Patient is very limited historian, does not answer questions for me today. She repeatedly tells me thank you today.  She remains afebrile and hemodynamically stable. Patient had fall at facility on 7/19 with no noted injuries.    Reviewed facility EMR including medications, recent nursing progress notes, vital signs.  Discussed plan of care with nursing.    Allergies, and PMH/PSH reviewed in EPIC today.  REVIEW OF SYSTEMS:  Unobtainable secondary to cognitive impairment.     Objective:   Ht 1.549 m (5' 1\")   Wt 56.2 kg (124 lb)   BMI 23.43 kg/m    GENERAL APPEARANCE:  Alert, in NAD  HEENT: normocephalic, moist mucous membranes, nose without drainage or crusting  RESP:  respiratory effort normal, no respiratory distress, Lung sounds clear, patient is on RA  CV: auscultation of heart done, rate and rhythm regular.  ABDOMEN: + bowel sounds, soft, nontender- no pain to pelvic area with palpation, no grimacing or guarding with palpation.  M/S:generalized right knee swelling   SKIN: incision with dehisced area- slough present and drainage present today  NEURO: moves extremities freely  PSYCH:  affect and mood normal    Labs done in SNF are in [place] EPIC. Please refer to them using EPIC/Care Everywhere. and Recent labs in EPIC reviewed by me today.     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  Continue IV ceftriaxone 2 g daily through 8/5/2024.  Weekly CBC with differential, BMP, CRP, " AST every Tuesday with results to infectious disease.  DVT prophylaxis with aspirin 81 mg twice daily through August 2, then resume 81 mg daily.  Wound care per Ortho.  Therapy as below.  Follow-up with ID per recommendations.  Follow-up with Ortho tomorrow as scheduled     Right knee incision dehiscence  Noted by staff on 7/20, per nursing patient was digging in the incision  Dr. Tracy at [facility] has been updated and pictures were sent to him of the wound by facility- he requests them to continue wound care as currently being used  Wound with slough and some drainage today  Plan: Continue wound care as ordered. Patient currently on ceftriaxone as above. Follow up with ortho tomorrow.    Increased weakness  Physical deconditioning  Secondary to recent hospitalization, medical conditions as above  Patient continues to work with therapy  Patient had fall on 7/19 without injury  Noted to be weaker than previously and requiring more assistance than previously  She is afebrile and hemodynamically stable  WBC 2.4 on 7/24/2024  I don't hear any coughing and lungs are clear  Plan: UA/UC to ensure no infection. Consider further work up if this is negative. Encourage participation in physical therapy/occupational therapy for strengthening and deconditioning. Discharge planning per their recommendation. Social work to assist with d/c planning.    Acute blood loss anemia  Chronic anemia  Baseline hemoglobin 10  Hemoglobin 8.8 on 7/23/2024  Plan: CBC weekly with ID labs.  Continue ferrous sulfate 325 mg daily.     History of hypertension  -140s  Plan: Patient no longer on medication management.  SBP goal less than 150 based on age, comorbidities, frailty.  Monitor blood pressure.     Dyslipidemia  PTA statin discontinued inpatient  Plan: Follow-up with PCP.     Dementia  Depression  Plan: Continue fluoxetine 20 mg daily, memantine 50 mg twice daily, Seroquel 25 mg twice daily, doxepin 10 mg at bedtime.  Monitor mood and  symptoms. Nursing to assist with cares, meals, medication assistance, activities.     GERD  Plan: Continue pantoprazole 40 mg daily.     Chronic kidney disease stage II  Baseline creatinine 0.6-0.8  Creatinine 0.71 on 7/23/2024  Plan: BMP weekly with ID labs. Avoid nephrotoxins. Renally dose medications as indicated.     Sjogren's syndrome  Raynaud's disease  Plan: Continue azathioprine 50 mg daily, pilocarpine 5 mg 3 times daily, CSA ophthalmic drops every 12 hours.  Follow-up with rheumatology per recommendations.     Slow transit constipation  Last BM 2 days ago, going every couple days per nursing documentation  Plan: Increase senna s 2 tab BID. Continue miralax 17 gram daily PRN. Monitor bowels.       MED REC REQUIRED  Post Medication Reconciliation Status:  Medication reconciliation previously completed during another office visit      Disclaimer: This note may contain text created using speech-recognition software and may contain unintended word substitutions.       Electronically signed by: JAYME Godfrey CNP

## 2024-07-24 ENCOUNTER — LAB REQUISITION (OUTPATIENT)
Dept: LAB | Facility: CLINIC | Age: 89
End: 2024-07-24
Payer: COMMERCIAL

## 2024-07-24 ENCOUNTER — TRANSITIONAL CARE UNIT VISIT (OUTPATIENT)
Dept: GERIATRICS | Facility: CLINIC | Age: 89
End: 2024-07-24
Payer: COMMERCIAL

## 2024-07-24 DIAGNOSIS — M00.9 PYOGENIC ARTHRITIS OF RIGHT KNEE JOINT, DUE TO UNSPECIFIED ORGANISM (H): Primary | ICD-10-CM

## 2024-07-24 DIAGNOSIS — I73.00 RAYNAUD'S DISEASE WITHOUT GANGRENE: ICD-10-CM

## 2024-07-24 DIAGNOSIS — F03.90 DEMENTIA, UNSPECIFIED DEMENTIA SEVERITY, UNSPECIFIED DEMENTIA TYPE, UNSPECIFIED WHETHER BEHAVIORAL, PSYCHOTIC, OR MOOD DISTURBANCE OR ANXIETY (H): ICD-10-CM

## 2024-07-24 DIAGNOSIS — Z86.79 HISTORY OF HYPERTENSION: ICD-10-CM

## 2024-07-24 DIAGNOSIS — D64.9 CHRONIC ANEMIA: ICD-10-CM

## 2024-07-24 DIAGNOSIS — N18.2 STAGE 2 CHRONIC KIDNEY DISEASE: ICD-10-CM

## 2024-07-24 DIAGNOSIS — M35.00 SJOGREN'S SYNDROME, WITH UNSPECIFIED ORGAN INVOLVEMENT (H): ICD-10-CM

## 2024-07-24 DIAGNOSIS — R53.81 PHYSICAL DECONDITIONING: ICD-10-CM

## 2024-07-24 DIAGNOSIS — F32.A DEPRESSION, UNSPECIFIED DEPRESSION TYPE: ICD-10-CM

## 2024-07-24 DIAGNOSIS — T81.30XA WOUND DEHISCENCE: ICD-10-CM

## 2024-07-24 DIAGNOSIS — K21.9 GASTROESOPHAGEAL REFLUX DISEASE, UNSPECIFIED WHETHER ESOPHAGITIS PRESENT: ICD-10-CM

## 2024-07-24 DIAGNOSIS — D62 ACUTE BLOOD LOSS ANEMIA: ICD-10-CM

## 2024-07-24 DIAGNOSIS — R46.89 OTHER SYMPTOMS AND SIGNS INVOLVING APPEARANCE AND BEHAVIOR: ICD-10-CM

## 2024-07-24 DIAGNOSIS — R53.1 INCREASED WEAKNESS WHEN AMBULATING: ICD-10-CM

## 2024-07-24 LAB
ALBUMIN UR-MCNC: NEGATIVE MG/DL
APPEARANCE UR: CLEAR
BILIRUB UR QL STRIP: NEGATIVE
COLOR UR AUTO: NORMAL
GLUCOSE UR STRIP-MCNC: NEGATIVE MG/DL
HGB UR QL STRIP: NEGATIVE
KETONES UR STRIP-MCNC: NEGATIVE MG/DL
LEUKOCYTE ESTERASE UR QL STRIP: NEGATIVE
NITRATE UR QL: NEGATIVE
PH UR STRIP: 6.5 [PH] (ref 5–7)
SP GR UR STRIP: 1.01 (ref 1–1.03)
UROBILINOGEN UR STRIP-MCNC: NORMAL MG/DL

## 2024-07-24 PROCEDURE — 81003 URINALYSIS AUTO W/O SCOPE: CPT | Performed by: NURSE PRACTITIONER

## 2024-07-24 PROCEDURE — 87086 URINE CULTURE/COLONY COUNT: CPT | Performed by: NURSE PRACTITIONER

## 2024-07-24 PROCEDURE — 99309 SBSQ NF CARE MODERATE MDM 30: CPT | Performed by: NURSE PRACTITIONER

## 2024-07-24 RX ORDER — AMOXICILLIN 250 MG
2 CAPSULE ORAL 2 TIMES DAILY
Status: SHIPPED
Start: 2024-07-24

## 2024-07-24 NOTE — PATIENT INSTRUCTIONS
Mateusz Dave  1935  1) UA/UC today Diagnosis: acute weakness.  2) Increase senna s 2 tabs BID for constipation  JAYME Godfrey CNP on 7/24/2024 at 9:05 AM

## 2024-07-24 NOTE — LETTER
7/24/2024      Chelly Dave  [id number]  [place] [address]        [facility]    Chief Complaint   Patient presents with     RECHECK     HPI:  Chelly Dave is a 89 year old  (1935), who is being seen today for an episodic care visit at: [facility]  ([facility]) [[id number]].     Past medical history significant for hypertension, dyslipidemia, dementia, GERD, chronic kidney disease, anemia, leukopenia, prediabetes, mesenteric mass, Raynaud's disease, Sjogren's, depression     Summary of recent hospitalization  Patient was hospitalized at [facility] from 6/19 to 6/26/2024 for right knee prosthetic joint septic arthritis.  Laboratory evaluation in the emergency department significant for WBC 3.9, hemoglobin 10.3, CRP 22.32, procalcitonin 0.07.  Orthopedics was consulted.  Patient underwent right knee aspiration, synovial fluid grew Streptococcus gorgaii likely due to combat tensionless seeding.  Patient underwent I&D with polyethylene exchange on 6/21/2024.  EBL 50 mL.  Hemoglobin postoperatively dropped, hemoglobin 8.3 prior to discharge.  Patient discharged with Prevena wound VAC and follow-up in 2 weeks with Ortho.  She was started on aspirin 81 mg twice daily for DVT prophylaxis.  ID was consulted and patient was started on vancomycin with ceftriaxone, with plan for 6-week course of IV ceftriaxone.  Follow-up with ID in 3 to 4 weeks.  PTA atorvastatin discontinued due to dementia and age. Discharged to U for physical rehabilitation and medical management.        Today patient was seen for routine follow-up in the TCU.  Her knee wound dehisced over the weekend.  Per discussion with nurse, she believes that the patient does get into the incision, is open to air.  WOC RN is following, wound does have some drainage today with slough present.  Pictures were sent to patient's Ortho doctor yesterday and they would like nursing to continue the wound care as ordered.  Patient  "will follow-up with Ortho tomorrow.  Nursing staff and therapy have noticed decline in status, she is requiring more assistance with transfers recently and increased weakness.  Patient is very limited historian, does not answer questions for me today. She repeatedly tells me thank you today.  She remains afebrile and hemodynamically stable. Patient had fall at facility on 7/19 with no noted injuries.    Reviewed facility EMR including medications, recent nursing progress notes, vital signs.  Discussed plan of care with nursing.    Allergies, and PMH/PSH reviewed in EPIC today.  REVIEW OF SYSTEMS:  Unobtainable secondary to cognitive impairment.     Objective:   Ht 1.549 m (5' 1\")   Wt 56.2 kg (124 lb)   BMI 23.43 kg/m    GENERAL APPEARANCE:  Alert, in NAD  HEENT: normocephalic, moist mucous membranes, nose without drainage or crusting  RESP:  respiratory effort normal, no respiratory distress, Lung sounds clear, patient is on RA  CV: auscultation of heart done, rate and rhythm regular.  ABDOMEN: + bowel sounds, soft, nontender- no pain to pelvic area with palpation, no grimacing or guarding with palpation.  M/S:generalized right knee swelling   SKIN: incision with dehisced area- slough present and drainage present today  NEURO: moves extremities freely  PSYCH:  affect and mood normal    Labs done in SNF are in [facility]. Please refer to them using [employer]/Care Everywhere. and Recent labs in [facility] reviewed by me today.     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  Continue IV " ceftriaxone 2 g daily through 8/5/2024.  Weekly CBC with differential, BMP, CRP, AST every Tuesday with results to infectious disease.  DVT prophylaxis with aspirin 81 mg twice daily through August 2, then resume 81 mg daily.  Wound care per Ortho.  Therapy as below.  Follow-up with ID per recommendations.  Follow-up with Ortho tomorrow as scheduled     Right knee incision dehiscence  Noted by staff on 7/20, per nursing patient was digging in the incision  Dr. Tracy at [facility] has been updated and pictures were sent to him of the wound by facility- he requests them to continue wound care as currently being used  Wound with slough and some drainage today  Plan: Continue wound care as ordered. Patient currently on ceftriaxone as above. Follow up with ortho tomorrow.    Increased weakness  Physical deconditioning  Secondary to recent hospitalization, medical conditions as above  Patient continues to work with therapy  Patient had fall on 7/19 without injury  Noted to be weaker than previously and requiring more assistance than previously  She is afebrile and hemodynamically stable  WBC 2.4 on 7/24/2024  I don't hear any coughing and lungs are clear  Plan: UA/UC to ensure no infection. Consider further work up if this is negative. Encourage participation in physical therapy/occupational therapy for strengthening and deconditioning. Discharge planning per their recommendation. Social work to assist with d/c planning.    Acute blood loss anemia  Chronic anemia  Baseline hemoglobin 10  Hemoglobin 8.8 on 7/23/2024  Plan: CBC weekly with ID labs.  Continue ferrous sulfate 325 mg daily.     History of hypertension  -140s  Plan: Patient no longer on medication management.  SBP goal less than 150 based on age, comorbidities, frailty.  Monitor blood pressure.     Dyslipidemia  PTA statin discontinued inpatient  Plan: Follow-up with PCP.     Dementia  Depression  Plan: Continue fluoxetine 20 mg daily, memantine 50 mg twice  daily, Seroquel 25 mg twice daily, doxepin 10 mg at bedtime.  Monitor mood and symptoms. Nursing to assist with cares, meals, medication assistance, activities.     GERD  Plan: Continue pantoprazole 40 mg daily.     Chronic kidney disease stage II  Baseline creatinine 0.6-0.8  Creatinine 0.71 on 7/23/2024  Plan: BMP weekly with ID labs. Avoid nephrotoxins. Renally dose medications as indicated.     Sjogren's syndrome  Raynaud's disease  Plan: Continue azathioprine 50 mg daily, pilocarpine 5 mg 3 times daily, CSA ophthalmic drops every 12 hours.  Follow-up with rheumatology per recommendations.     Slow transit constipation  Last BM 2 days ago, going every couple days per nursing documentation  Plan: Increase senna s 2 tab BID. Continue miralax 17 gram daily PRN. Monitor bowels.       MED REC REQUIRED  Post Medication Reconciliation Status:  Medication reconciliation previously completed during another office visit      Disclaimer: This note may contain text created using speech-recognition software and may contain unintended word substitutions.       Electronically signed by: JAYME Godfrey CNP         Sincerely,        JAYME Godfrey CNP

## 2024-07-26 LAB — BACTERIA UR CULT: NO GROWTH

## 2024-07-29 ENCOUNTER — LAB REQUISITION (OUTPATIENT)
Dept: LAB | Facility: CLINIC | Age: 89
End: 2024-07-29
Payer: COMMERCIAL

## 2024-07-29 DIAGNOSIS — U07.1 COVID-19: ICD-10-CM

## 2024-07-29 DIAGNOSIS — Z51.81 ENCOUNTER FOR THERAPEUTIC DRUG LEVEL MONITORING: ICD-10-CM

## 2024-07-29 PROCEDURE — 87635 SARS-COV-2 COVID-19 AMP PRB: CPT | Performed by: NURSE PRACTITIONER

## 2024-07-30 LAB
ANION GAP SERPL CALCULATED.3IONS-SCNC: 10 MMOL/L (ref 7–15)
AST SERPL W P-5'-P-CCNC: 25 U/L (ref 0–45)
BASOPHILS # BLD AUTO: 0 10E3/UL (ref 0–0.2)
BASOPHILS NFR BLD AUTO: 0 %
BUN SERPL-MCNC: 17.4 MG/DL (ref 8–23)
CALCIUM SERPL-MCNC: 8.2 MG/DL (ref 8.8–10.4)
CHLORIDE SERPL-SCNC: 105 MMOL/L (ref 98–107)
CREAT SERPL-MCNC: 0.71 MG/DL (ref 0.51–0.95)
CRP SERPL-MCNC: <3 MG/L
EGFRCR SERPLBLD CKD-EPI 2021: 81 ML/MIN/1.73M2
EOSINOPHIL # BLD AUTO: 0.2 10E3/UL (ref 0–0.7)
EOSINOPHIL NFR BLD AUTO: 6 %
ERYTHROCYTE [DISTWIDTH] IN BLOOD BY AUTOMATED COUNT: 14.8 % (ref 10–15)
GLUCOSE SERPL-MCNC: 76 MG/DL (ref 70–99)
HCO3 SERPL-SCNC: 28 MMOL/L (ref 22–29)
HCT VFR BLD AUTO: 29.7 % (ref 35–47)
HGB BLD-MCNC: 9 G/DL (ref 11.7–15.7)
IMM GRANULOCYTES # BLD: 0 10E3/UL
IMM GRANULOCYTES NFR BLD: 1 %
LYMPHOCYTES # BLD AUTO: 0.3 10E3/UL (ref 0.8–5.3)
LYMPHOCYTES NFR BLD AUTO: 12 %
MCH RBC QN AUTO: 30.3 PG (ref 26.5–33)
MCHC RBC AUTO-ENTMCNC: 30.3 G/DL (ref 31.5–36.5)
MCV RBC AUTO: 100 FL (ref 78–100)
MONOCYTES # BLD AUTO: 0.3 10E3/UL (ref 0–1.3)
MONOCYTES NFR BLD AUTO: 10 %
NEUTROPHILS # BLD AUTO: 1.9 10E3/UL (ref 1.6–8.3)
NEUTROPHILS NFR BLD AUTO: 71 %
NRBC # BLD AUTO: 0 10E3/UL
NRBC BLD AUTO-RTO: 0 /100
PLATELET # BLD AUTO: 150 10E3/UL (ref 150–450)
POTASSIUM SERPL-SCNC: 4.1 MMOL/L (ref 3.4–5.3)
RBC # BLD AUTO: 2.97 10E6/UL (ref 3.8–5.2)
SARS-COV-2 RNA RESP QL NAA+PROBE: NEGATIVE
SODIUM SERPL-SCNC: 143 MMOL/L (ref 135–145)
WBC # BLD AUTO: 2.7 10E3/UL (ref 4–11)

## 2024-07-30 PROCEDURE — 36415 COLL VENOUS BLD VENIPUNCTURE: CPT | Performed by: NURSE PRACTITIONER

## 2024-07-30 PROCEDURE — P9604 ONE-WAY ALLOW PRORATED TRIP: HCPCS | Performed by: NURSE PRACTITIONER

## 2024-07-30 PROCEDURE — 86140 C-REACTIVE PROTEIN: CPT | Performed by: NURSE PRACTITIONER

## 2024-07-30 PROCEDURE — 82947 ASSAY GLUCOSE BLOOD QUANT: CPT | Performed by: NURSE PRACTITIONER

## 2024-07-30 PROCEDURE — 84295 ASSAY OF SERUM SODIUM: CPT | Performed by: NURSE PRACTITIONER

## 2024-07-30 PROCEDURE — 84450 TRANSFERASE (AST) (SGOT): CPT | Performed by: NURSE PRACTITIONER

## 2024-07-30 PROCEDURE — 85048 AUTOMATED LEUKOCYTE COUNT: CPT | Performed by: NURSE PRACTITIONER

## 2024-07-31 ENCOUNTER — TRANSITIONAL CARE UNIT VISIT (OUTPATIENT)
Dept: GERIATRICS | Facility: CLINIC | Age: 89
End: 2024-07-31
Payer: COMMERCIAL

## 2024-07-31 VITALS
TEMPERATURE: 98 F | BODY MASS INDEX: 24.92 KG/M2 | HEART RATE: 88 BPM | HEIGHT: 61 IN | RESPIRATION RATE: 18 BRPM | WEIGHT: 132 LBS | OXYGEN SATURATION: 93 % | SYSTOLIC BLOOD PRESSURE: 142 MMHG | DIASTOLIC BLOOD PRESSURE: 71 MMHG

## 2024-07-31 DIAGNOSIS — R53.81 PHYSICAL DECONDITIONING: ICD-10-CM

## 2024-07-31 DIAGNOSIS — F03.90 DEMENTIA, UNSPECIFIED DEMENTIA SEVERITY, UNSPECIFIED DEMENTIA TYPE, UNSPECIFIED WHETHER BEHAVIORAL, PSYCHOTIC, OR MOOD DISTURBANCE OR ANXIETY (H): ICD-10-CM

## 2024-07-31 DIAGNOSIS — K21.9 GASTROESOPHAGEAL REFLUX DISEASE, UNSPECIFIED WHETHER ESOPHAGITIS PRESENT: ICD-10-CM

## 2024-07-31 DIAGNOSIS — D62 ACUTE BLOOD LOSS ANEMIA: ICD-10-CM

## 2024-07-31 DIAGNOSIS — M00.9 PYOGENIC ARTHRITIS OF RIGHT KNEE JOINT, DUE TO UNSPECIFIED ORGANISM (H): Primary | ICD-10-CM

## 2024-07-31 DIAGNOSIS — M35.00 SJOGREN'S SYNDROME, WITH UNSPECIFIED ORGAN INVOLVEMENT (H): ICD-10-CM

## 2024-07-31 DIAGNOSIS — T81.30XA WOUND DEHISCENCE: ICD-10-CM

## 2024-07-31 DIAGNOSIS — M62.81 GENERALIZED MUSCLE WEAKNESS: ICD-10-CM

## 2024-07-31 DIAGNOSIS — I73.00 RAYNAUD'S DISEASE WITHOUT GANGRENE: ICD-10-CM

## 2024-07-31 DIAGNOSIS — N18.2 STAGE 2 CHRONIC KIDNEY DISEASE: ICD-10-CM

## 2024-07-31 DIAGNOSIS — D64.9 CHRONIC ANEMIA: ICD-10-CM

## 2024-07-31 DIAGNOSIS — F32.A DEPRESSION, UNSPECIFIED DEPRESSION TYPE: ICD-10-CM

## 2024-07-31 DIAGNOSIS — Z86.79 HISTORY OF HYPERTENSION: ICD-10-CM

## 2024-07-31 DIAGNOSIS — K59.01 SLOW TRANSIT CONSTIPATION: ICD-10-CM

## 2024-07-31 PROCEDURE — 99309 SBSQ NF CARE MODERATE MDM 30: CPT | Performed by: NURSE PRACTITIONER

## 2024-07-31 NOTE — PROGRESS NOTES
[facility] GERIATRICS    Chief Complaint   Patient presents with    RECHECK     HPI:  Chelly Dave is a 89 year old  (1935), who is being seen today for an episodic care visit at: [facility]  ([facility]) [[id number]].     Past medical history significant for hypertension, dyslipidemia, dementia, GERD, chronic kidney disease, anemia, leukopenia, prediabetes, mesenteric mass, Raynaud's disease, Sjogren's, depression     Summary of recent hospitalization  Patient was hospitalized at [facility] from 6/19 to 6/26/2024 for right knee prosthetic joint septic arthritis.  Laboratory evaluation in the emergency department significant for WBC 3.9, hemoglobin 10.3, CRP 22.32, procalcitonin 0.07.  Orthopedics was consulted.  Patient underwent right knee aspiration, synovial fluid grew Streptococcus gorgaii likely due to combat tensionless seeding.  Patient underwent I&D with polyethylene exchange on 6/21/2024.  EBL 50 mL.  Hemoglobin postoperatively dropped, hemoglobin 8.3 prior to discharge.  Patient discharged with Prevena wound VAC and follow-up in 2 weeks with Ortho.  She was started on aspirin 81 mg twice daily for DVT prophylaxis.  ID was consulted and patient was started on vancomycin with ceftriaxone, with plan for 6-week course of IV ceftriaxone.  Follow-up with ID in 3 to 4 weeks.  PTA atorvastatin discontinued due to dementia and age. Discharged to [facility] for physical rehabilitation and medical management.      Today patient was seen for routine follow-up in the TCU.  Per discussion with nurse yesterday, patient spitting medications out, not taking all of them.  Discussed with nurse manager and bedside nurse today, who often works with patient, discussed that patient needs to be told these are her medications to take and then she will take them.  Nurse manager also discussed with patient's daughter, who also recommended this.  Discussed with nurse manager patient's wound, looks about stable  "from last week, she is working on trying to get something from therapy to help keep her leg in place when it is elevated in the wheelchair.  Per discussion with bedside nurse, who works frequently with the patient, she feels patient is at her baseline, her cognition does seem to fluctuate at baseline, with more confusion usually in the evenings.  Patient today denies any pain, she denies trouble breathing.  Patient does report \"I don't know\" to several of my questions today.\" Per nursing documentation last bowel movement was 2 days ago.    Reviewed facility EMR including medications, recent nursing progress notes, vital signs.  Discussed plan of care with nursing.    Allergies, and PMH/PSH reviewed in EPIC today.  REVIEW OF SYSTEMS:  Limited secondary to cognitive impairment but today pt reports as above in HPI    Objective:   BP (!) 142/71   Pulse 88   Temp 98  F (36.7  C)   Resp 18   Ht 1.549 m (5' 1\")   Wt 59.9 kg (132 lb)   SpO2 93%   BMI 24.94 kg/m    GENERAL APPEARANCE:  Alert, in NAD  HEENT: normocephalic, moist mucous membranes, nose without drainage or crusting  RESP:  respiratory effort normal, no respiratory distress, Lung sounds clear, patient is on RA  CV: auscultation of heart done, rate and rhythm regular.   ABDOMEN: + bowel sounds, soft, nontender, no grimacing or guarding with palpation.  M/S:   generalized right lower extremity edema with knee immobilizer in place  SKIN: images reviewed in facility EMR- with knee dehiscence at med right knee incision- wound bed mostly slough- no erythema  PSYCH: insight and judgement impaired, memory impaired        Labs done in SNF are in [place] [facility]. Please refer to them using EPIC/Care Everywhere. and Recent labs in [facility] reviewed by me today.     Assessment/Plan:  Right knee hematogenous prosthetic joint infection status post I&D with polyethylene exchange on 6/21/2024  Synovial culture grew Streptococcus goronii suspected to be related to hematogenous " seeding  CRP 14.73 and WBC 6.2 on 7/2/2024  Patient removed wound vac in the TCU- ortho was updated   Staples removed 7/15/2024  Denies pain today, pain is managed  Plan: Continue tylenol to 650 TID and once daily PRN, Dilaudid 1 to 2 mg every 4 hours as needed, lidocaine patch.  Continue IV ceftriaxone 2 g daily through 8/5/2024.  Weekly CBC with differential, BMP, CRP, AST every Tuesday with results to infectious disease.  DVT prophylaxis with aspirin 81 mg twice daily through August 2, then resume 81 mg daily.  Wound care per Ortho.  Therapy as below.  Patient has follow-up with ID today.  Follow-up with Ortho in 2 weeks     Right knee incision dehiscence  Noted by staff on 7/20, per nursing patient was digging in the incision  Patient had follow up with Dr. Tracy on 7/25 and recommended ongoing would care BID with knee immobilizer at all times to Right lower extremity  Reviewed images in facility EMR and wound today looks about the same as last week   Plan: Continue wound care as ordered. Patient currently on ceftriaxone as above. Follow up with ortho in 2 weeks.     Generalized weakness  Physical deconditioning  Secondary to recent hospitalization, medical conditions as above  Patient continues to work with therapy  Patient had fall on 7/19 without injury  Noted to be weaker than previously and requiring more assistance than previously around this time  UA/UC 7/24 negative, she has no respiratory symptoms  She remains afebrile and hemodynamically stable  I believe this could be due to dementia- overall fluctuating with some days better than others  Plan: Encourage participation in physical therapy/occupational therapy for strengthening and deconditioning. Discharge planning per their recommendation. Social work to assist with d/c planning.     Acute blood loss anemia  Chronic anemia  Baseline hemoglobin 10  Hemoglobin 9.0 on 7/30/2024  Plan: CBC weekly with ID labs.  Continue ferrous sulfate 325 mg daily.      History of hypertension  -140s recently  Plan: Patient no longer on medication management.  SBP goal less than 150 based on age, comorbidities, frailty.  Monitor blood pressure.     Dyslipidemia  PTA statin discontinued inpatient  Plan: Follow-up with PCP.     Dementia  Depression  Cognition seems to fluctuate  Plan: Continue fluoxetine 20 mg daily, memantine 50 mg twice daily, Seroquel 25 mg twice daily, doxepin 10 mg at bedtime.  Monitor mood and symptoms. Nursing to assist with cares, meals, medication assistance, activities.     GERD  Plan: Continue pantoprazole 40 mg daily.     Chronic kidney disease stage II  Baseline creatinine 0.6-0.8  Creatinine 0.71 on 7/30/2024  Plan: BMP weekly with ID labs. Avoid nephrotoxins. Renally dose medications as indicated.     Sjogren's syndrome  Raynaud's disease  Plan: Continue azathioprine 50 mg daily, pilocarpine 5 mg 3 times daily, CSA ophthalmic drops every 12 hours.  Follow-up with rheumatology per recommendations.     Slow transit constipation  Last bowel movement 2 days ago, patient has been going every couple of days on average  Plan: Continue senna s 2 tab BID. Continue miralax 17 gram daily PRN. Monitor bowels.      MED REC REQUIRED  Post Medication Reconciliation Status:  Medication reconciliation previously completed during another office visit      Disclaimer: This note may contain text created using speech-recognition software and may contain unintended word substitutions.       Electronically signed by: JAYME Godfrey CNP

## 2024-07-31 NOTE — PATIENT INSTRUCTIONS
Mateusz Dave  1935  1) Skin checks BID under knee immobilizer  Sarah Frey, JAYME CNP on 7/31/2024 at 10:01 AM

## 2024-07-31 NOTE — LETTER
7/31/2024      Chelly Dave  [address]  [place] [facility] [address]        [facility] GERIATRICS    Chief Complaint   Patient presents with     RECHECK     HPI:  Chelly Dave is a 89 year old  (1935), who is being seen today for an episodic care visit at: [facility]  ([facility]) [[id number]].     Past medical history significant for hypertension, dyslipidemia, dementia, GERD, chronic kidney disease, anemia, leukopenia, prediabetes, mesenteric mass, Raynaud's disease, Sjogren's, depression     Summary of recent hospitalization  Patient was hospitalized at [facility] from 6/19 to 6/26/2024 for right knee prosthetic joint septic arthritis.  Laboratory evaluation in the emergency department significant for WBC 3.9, hemoglobin 10.3, CRP 22.32, procalcitonin 0.07.  Orthopedics was consulted.  Patient underwent right knee aspiration, synovial fluid grew Streptococcus gorgaii likely due to combat tensionless seeding.  Patient underwent I&D with polyethylene exchange on 6/21/2024.  EBL 50 mL.  Hemoglobin postoperatively dropped, hemoglobin 8.3 prior to discharge.  Patient discharged with Prevena wound VAC and follow-up in 2 weeks with Ortho.  She was started on aspirin 81 mg twice daily for DVT prophylaxis.  ID was consulted and patient was started on vancomycin with ceftriaxone, with plan for 6-week course of IV ceftriaxone.  Follow-up with ID in 3 to 4 weeks.  PTA atorvastatin discontinued due to dementia and age. Discharged to [facility] for physical rehabilitation and medical management.      Today patient was seen for routine follow-up in the TCU.  Per discussion with nurse yesterday, patient spitting medications out, not taking all of them.  Discussed with nurse manager and bedside nurse today, who often works with patient, discussed that patient needs to be told these are her medications to take and then she will take them.  Nurse manager also discussed with patient's daughter, who also  "recommended this.  Discussed with nurse manager patient's wound, looks about stable from last week, she is working on trying to get something from therapy to help keep her leg in place when it is elevated in the wheelchair.  Per discussion with bedside nurse, who works frequently with the patient, she feels patient is at her baseline, her cognition does seem to fluctuate at baseline, with more confusion usually in the evenings.  Patient today denies any pain, she denies trouble breathing.  Patient does report \"I don't know\" to several of my questions today.\" Per nursing documentation last bowel movement was 2 days ago.    Reviewed facility EMR including medications, recent nursing progress notes, vital signs.  Discussed plan of care with nursing.    Allergies, and PMH/PSH reviewed in EPIC today.  REVIEW OF SYSTEMS:  Limited secondary to cognitive impairment but today pt reports as above in HPI    Objective:   BP (!) 142/71   Pulse 88   Temp 98  F (36.7  C)   Resp 18   Ht 1.549 m (5' 1\")   Wt 59.9 kg (132 lb)   SpO2 93%   BMI 24.94 kg/m    GENERAL APPEARANCE:  Alert, in NAD  HEENT: normocephalic, moist mucous membranes, nose without drainage or crusting  RESP:  respiratory effort normal, no respiratory distress, Lung sounds clear, patient is on RA  CV: auscultation of heart done, rate and rhythm regular.   ABDOMEN: + bowel sounds, soft, nontender, no grimacing or guarding with palpation.  M/S:   generalized right lower extremity edema with knee immobilizer in place  SKIN: images reviewed in facility EMR- with knee dehiscence at med right knee incision- wound bed mostly slough- no erythema  PSYCH: insight and judgement impaired, memory impaired        Labs done in SNF are in [facility] EPIC. Please refer to them using EPIC/Care Everywhere. and Recent labs in EPIC reviewed by me today.     Assessment/Plan:  Right knee hematogenous prosthetic joint infection status post I&D with polyethylene exchange on " 6/21/2024  Synovial culture grew Streptococcus goronii suspected to be related to hematogenous seeding  CRP 14.73 and WBC 6.2 on 7/2/2024  Patient removed wound vac in the TCU- ortho was updated   Staples removed 7/15/2024  Denies pain today, pain is managed  Plan: Continue tylenol to 650 TID and once daily PRN, Dilaudid 1 to 2 mg every 4 hours as needed, lidocaine patch.  Continue IV ceftriaxone 2 g daily through 8/5/2024.  Weekly CBC with differential, BMP, CRP, AST every Tuesday with results to infectious disease.  DVT prophylaxis with aspirin 81 mg twice daily through August 2, then resume 81 mg daily.  Wound care per Ortho.  Therapy as below.  Patient has follow-up with ID today.  Follow-up with Ortho in 2 weeks     Right knee incision dehiscence  Noted by staff on 7/20, per nursing patient was digging in the incision  Patient had follow up with Dr. Tracy on 7/25 and recommended ongoing would care BID with knee immobilizer at all times to Right lower extremity  Reviewed images in facility EMR and wound today looks about the same as last week   Plan: Continue wound care as ordered. Patient currently on ceftriaxone as above. Follow up with ortho in 2 weeks.     Generalized weakness  Physical deconditioning  Secondary to recent hospitalization, medical conditions as above  Patient continues to work with therapy  Patient had fall on 7/19 without injury  Noted to be weaker than previously and requiring more assistance than previously around this time  UA/UC 7/24 negative, she has no respiratory symptoms  She remains afebrile and hemodynamically stable  I believe this could be due to dementia- overall fluctuating with some days better than others  Plan: Encourage participation in physical therapy/occupational therapy for strengthening and deconditioning. Discharge planning per their recommendation. Social work to assist with d/c planning.     Acute blood loss anemia  Chronic anemia  Baseline hemoglobin  10  Hemoglobin 9.0 on 7/30/2024  Plan: CBC weekly with ID labs.  Continue ferrous sulfate 325 mg daily.     History of hypertension  -140s recently  Plan: Patient no longer on medication management.  SBP goal less than 150 based on age, comorbidities, frailty.  Monitor blood pressure.     Dyslipidemia  PTA statin discontinued inpatient  Plan: Follow-up with PCP.     Dementia  Depression  Cognition seems to fluctuate  Plan: Continue fluoxetine 20 mg daily, memantine 50 mg twice daily, Seroquel 25 mg twice daily, doxepin 10 mg at bedtime.  Monitor mood and symptoms. Nursing to assist with cares, meals, medication assistance, activities.     GERD  Plan: Continue pantoprazole 40 mg daily.     Chronic kidney disease stage II  Baseline creatinine 0.6-0.8  Creatinine 0.71 on 7/30/2024  Plan: BMP weekly with ID labs. Avoid nephrotoxins. Renally dose medications as indicated.     Sjogren's syndrome  Raynaud's disease  Plan: Continue azathioprine 50 mg daily, pilocarpine 5 mg 3 times daily, CSA ophthalmic drops every 12 hours.  Follow-up with rheumatology per recommendations.     Slow transit constipation  Last bowel movement 2 days ago, patient has been going every couple of days on average  Plan: Continue senna s 2 tab BID. Continue miralax 17 gram daily PRN. Monitor bowels.      MED REC REQUIRED  Post Medication Reconciliation Status:  Medication reconciliation previously completed during another office visit      Disclaimer: This note may contain text created using speech-recognition software and may contain unintended word substitutions.       Electronically signed by: JAYME Godfrey CNP       Sincerely,        JAYME Godfrey CNP

## 2024-08-06 ENCOUNTER — DISCHARGE SUMMARY NURSING HOME (OUTPATIENT)
Dept: GERIATRICS | Facility: CLINIC | Age: 89
End: 2024-08-06
Payer: COMMERCIAL

## 2024-08-06 VITALS
TEMPERATURE: 97.8 F | DIASTOLIC BLOOD PRESSURE: 84 MMHG | OXYGEN SATURATION: 95 % | RESPIRATION RATE: 16 BRPM | SYSTOLIC BLOOD PRESSURE: 130 MMHG | HEART RATE: 80 BPM

## 2024-08-06 DIAGNOSIS — N18.2 STAGE 2 CHRONIC KIDNEY DISEASE: ICD-10-CM

## 2024-08-06 DIAGNOSIS — D62 ACUTE BLOOD LOSS ANEMIA: ICD-10-CM

## 2024-08-06 DIAGNOSIS — M35.00 SJOGREN'S SYNDROME, WITH UNSPECIFIED ORGAN INVOLVEMENT (H): ICD-10-CM

## 2024-08-06 DIAGNOSIS — Z86.79 HISTORY OF HYPERTENSION: ICD-10-CM

## 2024-08-06 DIAGNOSIS — I73.00 RAYNAUD'S DISEASE WITHOUT GANGRENE: ICD-10-CM

## 2024-08-06 DIAGNOSIS — R53.81 PHYSICAL DECONDITIONING: ICD-10-CM

## 2024-08-06 DIAGNOSIS — D64.9 CHRONIC ANEMIA: ICD-10-CM

## 2024-08-06 DIAGNOSIS — F03.90 DEMENTIA, UNSPECIFIED DEMENTIA SEVERITY, UNSPECIFIED DEMENTIA TYPE, UNSPECIFIED WHETHER BEHAVIORAL, PSYCHOTIC, OR MOOD DISTURBANCE OR ANXIETY (H): ICD-10-CM

## 2024-08-06 DIAGNOSIS — K21.9 GASTROESOPHAGEAL REFLUX DISEASE, UNSPECIFIED WHETHER ESOPHAGITIS PRESENT: ICD-10-CM

## 2024-08-06 DIAGNOSIS — T81.30XA WOUND DEHISCENCE: ICD-10-CM

## 2024-08-06 DIAGNOSIS — M00.9 PYOGENIC ARTHRITIS OF RIGHT KNEE JOINT, DUE TO UNSPECIFIED ORGANISM (H): Primary | ICD-10-CM

## 2024-08-06 DIAGNOSIS — F32.A DEPRESSION, UNSPECIFIED DEPRESSION TYPE: ICD-10-CM

## 2024-08-06 DIAGNOSIS — K59.01 SLOW TRANSIT CONSTIPATION: ICD-10-CM

## 2024-08-06 DIAGNOSIS — M62.81 GENERALIZED MUSCLE WEAKNESS: ICD-10-CM

## 2024-08-06 PROCEDURE — 99316 NF DSCHRG MGMT 30 MIN+: CPT | Performed by: NURSE PRACTITIONER

## 2024-08-06 RX ORDER — AMOXICILLIN 500 MG/1
500 TABLET, FILM COATED ORAL 2 TIMES DAILY
COMMUNITY
End: 2024-09-06

## 2024-08-06 NOTE — PATIENT INSTRUCTIONS
[facility] Discharge Orders    Name: Chelly Dave  : 1935  Planned Discharge Date: 2024  Discharged to: with family home    MEDICAL FOLLOW UP  Follow up with PCP in 1 week  [facility]- 2 weeks from last appointment      FUTURE LABS: BMP due  to be drawn by home care with results to PCP      DISCHARGE MEDICATIONS:  The patient s pharmacy is authorized to dispense a 30-day supply of medications. Refill requests should be directed to the primary provider, Brittany Heredia.   OK to send remaining seroquel with patient at discharge  Current Outpatient Medications   Medication Sig Dispense Refill    acetaminophen (TYLENOL) 325 MG tablet Take 2 tablets (650 mg) by mouth 3 times daily And BID PRN (Patient taking differently: Take 650 mg by mouth 3 times daily And daily PRN)      albuterol (PROVENTIL) (2.5 MG/3ML) 0.083% neb solution Take 2.5 mg by nebulization every 6 hours as needed for shortness of breath, wheezing or cough      amoxicillin (AMOXIL) 500 MG tablet Take 500 mg by mouth 2 times daily Continue for life      aspirin 81 MG EC tablet Take 81 mg by mouth daily      AzaTHIOprine (IMURAN PO) Take 50 mg by mouth daily      Calcium Carb-Cholecalciferol (CALCIUM 500 +D) 500-400 MG-UNIT TABS Take 1 tablet by mouth 2 times daily      Cholecalciferol (VITAMIN D3) 1000 units CAPS Take 1,000 Units by mouth daily      cycloSPORINE (RESTASIS) 0.05 % ophthalmic emulsion Instill 1 drop into both eyes every 12 hours.   No further refills will be given unless you come in for your exam.      DOXEPIN HCL PO Take 10 mg by mouth At Bedtime      ferrous sulfate (IRON) 325 (65 FE) MG tablet Take 1 tablet (325 mg) by mouth daily 100 tablet 0    FLUoxetine (PROZAC) 20 MG capsule Take 20 mg by mouth daily      lidocaine (LIDODERM) 5 % patch Place 1 patch onto the skin daily as needed for moderate pain (12 hours on; 12 hours off. Patient applies to knees or back)      loratadine  (CLARITIN) 10 MG tablet Take 10 mg by mouth daily as needed      memantine (NAMENDA) 5 MG tablet Take 5 mg by mouth 2 times daily      miconazole (MICATIN) 2 % AERP powder Apply topically 2 times daily To sridhar area until redness resolved      ondansetron (ZOFRAN ODT) 4 MG ODT tab Take 1 tablet (4 mg) by mouth every 6 hours as needed for nausea      pantoprazole (PROTONIX) 40 MG EC tablet Take 40 mg by mouth daily      Pilocarpine HCl (SALAGEN PO) Take 5 mg by mouth 3 times daily AM, 1200 & HS      polyethylene glycol (MIRALAX/GLYCOLAX) powder Take 17 g by mouth daily as needed      polyethylene glycol 400 (BLINK TEARS) 0.25 % SOLN ophthalmic solution Place 1-2 drops into both eyes every 2 hours as needed for dry eyes      QUEtiapine (SEROQUEL) 25 MG tablet Take 25 mg by mouth 2 times daily      senna-docusate (SENOKOT-S/PERICOLACE) 8.6-50 MG tablet Take 2 tablets by mouth 2 times daily      blood glucose monitoring (NO BRAND SPECIFIED) test strip USE TO CHECK BLOOD SUGARS EVERY DAY      blood glucose monitoring (SOFTCLIX) lancets USE TO TEST BLOOD SUGARS DAILY      Blood Glucose Monitoring Suppl (FIFTY50 GLUCOSE METER 2.0) w/Device KIT Dispense meter, test strips, lancets covered by pt ins. E11.9 NIDDM type II - Test 1 time/day         SERVICES:  Home Care:  Occupational Therapy, Physical Therapy, Registered Nurse, and Home Health Aide with [facility] care    ADDITIONAL INSTRUCTIONS:  OK to shower but no bathing or soaking until approved by surgeon.   Notify your surgeon if you have increased redness, swelling, tenderness, or drainage at your incision site.   Keep right leg elevated  Keep right knee immobilizer in place at all times, continue skin checks twice daily to assess for breakdown of skin  Weightbearing as tolerated with right lower extremity with walker  Wound care to right knee: Twice daily wet-to-dry dressing changes, use Vashe to cleanse.  Soak gauze with Vashe and lightly pack wound, cover with ABD.   Complete twice daily.  Continue protein supplement of choice once daily to help with wound healing    Sarah Frey, JAYME CNP  This document was electronically signed on August 6, 2024

## 2024-08-06 NOTE — LETTER
8/6/2024      Chelly Dave  [address]  [facility] [address]        [facility] GERIATRICS DISCHARGE SUMMARY  PATIENT'S NAME: Chelly Dave  YOB: 1935  MEDICAL RECORD NUMBER:  [id number]  Place of Service where encounter took place:  [facility]  ([facility]) [[id number]]    PRIMARY CARE PROVIDER AND CLINIC RESPONSIBLE AFTER TRANSFER:   Brittany Heredia, [address] / [facility] [address]     Transferring providers: Sarah Frey, JAYME PEREZ, Carlos Eduardo Costello MD  Recent Hospitalization/ED:  [facility] stay 6/19/2024 to 6/26/2024.  Date of SNF Admission: June 26, 2024  Date of SNF (anticipated) Discharge: 8/8/2024  Discharged to: Home with family  Physical Function: Ambulating 120 feet with front wheel walker contact-guard assist, completed 2 stairs with bilateral handrails min to mod assist, transfers mod to max assist with front wheel walker, mod assist for bed mobility, grooming/hygiene min assist, upper body dressing contact-guard assist, mod assist for lower body dressing, toileting min assist for transfer, mod assist for clothing management, max assist for showering  DME: hospital bed and wheelchair     CODE STATUS/ADVANCE DIRECTIVES DISCUSSION:  No CPR- DNI  ALLERGIES: Lisinopril, Oxycodone, Aspirin buf(cacarb-mgcarb-mgo), and Aspirin buffered    NURSING FACILITY COURSE     Past medical history significant for hypertension, dyslipidemia, dementia, GERD, chronic kidney disease, anemia, leukopenia, prediabetes, mesenteric mass, Raynaud's disease, Sjogren's, depression     Summary of recent hospitalization  Patient was hospitalized at [facility] from 6/19 to 6/26/2024 for right knee prosthetic joint septic arthritis.  Laboratory evaluation in the emergency department significant for WBC 3.9, hemoglobin 10.3, CRP 22.32, procalcitonin 0.07.  Orthopedics was consulted.  Patient underwent right knee aspiration, synovial fluid grew  Streptococcus gorgaii likely due to combat tensionless seeding.  Patient underwent I&D with polyethylene exchange on 6/21/2024.  EBL 50 mL.  Hemoglobin postoperatively dropped, hemoglobin 8.3 prior to discharge.  Patient discharged with Prevena wound VAC and follow-up in 2 weeks with Ortho.  She was started on aspirin 81 mg twice daily for DVT prophylaxis.  ID was consulted and patient was started on vancomycin with ceftriaxone, with plan for 6-week course of IV ceftriaxone.  Follow-up with ID in 3 to 4 weeks.  PTA atorvastatin discontinued due to dementia and age. Discharged to TCU for physical rehabilitation and medical management.       Summary of nursing facility stay:   Today, patient was seen for discharge evaluation in the TCU.  Patient has not had historian.  She does report some pain to her right knee at time of visit.  Otherwise denies any shortness of breath, issues with voiding.  Last bowel movement was 2 days ago.  Patient to follow-up with PCP and Ortho after discharge.  To continue therapy through home care.    Specific problems addressed in the TCU:  Right knee hematogenous prosthetic joint infection status post I&D with polyethylene exchange on 6/21/2024  Synovial culture grew Streptococcus goronii suspected to be related to hematogenous seeding  CRP 14.73 and WBC 6.2 on 7/2/2024  Patient removed wound vac in the TCU- ortho was updated   Staples removed 7/15/2024  Completed course of aspirin DVT prophy  IV antibiotics completed 8/2 and started on amoxicillin BID for life  Reports pain today- using dilaudid infrequently- 1 time in past week  Plan: Discontinue dilaudid. Continue tylenol to 650 TID and once daily PRN and lidocaine patch. Continue amoxicillin BID for lifetime. Wound care per Ortho.  Therapy as below.   Follow-up with Ortho 2 weeks from last visit     Right knee incision dehiscence  Noted by staff on 7/20, per nursing patient was digging in the incision  Patient had follow up with   [facility] on 7/25 and recommended ongoing would care BID with knee immobilizer at all times to Right lower extremity  Reviewed images in facility EMR today, most recent set from last week- no noted concerns per nursing or WOC RN  Plan: Continue wound care as ordered.. Follow up with ortho 2 week from last visit.     Generalized weakness  Physical deconditioning  Secondary to recent hospitalization, medical conditions as above  Patient continues to work with therapy  Patient had fall on 7/19 without injury  Noted to be weaker than previously and requiring more assistance than previously around this time  UA/UC 7/24 negative, she has no respiratory symptoms  I believe this could be due to dementia- overall fluctuating with some days better than others  Plan: Continue therapy through home care. Consider follow up with PCP to discuss dose reduction or stopping of some medications as below.     Acute blood loss anemia  Chronic anemia  Baseline hemoglobin 10  Hemoglobin 9.0 on 7/30/2024  Plan: CBC PRN.  Continue ferrous sulfate 325 mg daily.     History of hypertension  SBP 96-130s  Plan: Patient no longer on medication management.  SBP goal less than 150 based on age, comorbidities, frailty.  Monitor blood pressure.     Dyslipidemia  PTA statin discontinued inpatient  Plan: Follow-up with PCP.     Dementia  Depression  Plan: Cognition seems to fluctuate, consider follow up with PCP to consider dose reduction of some of these medications. Continue fluoxetine 20 mg daily, memantine 50 mg twice daily, Seroquel 25 mg twice daily, doxepin 10 mg at bedtime.  Monitor mood and symptoms.      GERD  Plan: Continue pantoprazole 40 mg daily.     Chronic kidney disease stage II  Baseline creatinine 0.6-0.8  Creatinine 0.71 on 7/30/2024  Plan: BMP through home care in 1 week to ensure stability. Avoid nephrotoxins. Renally dose medications as indicated.     Sjogren's syndrome  Raynaud's disease  Plan: Continue azathioprine 50 mg daily,  pilocarpine 5 mg 3 times daily, CSA ophthalmic drops every 12 hours.  Follow-up with rheumatology per recommendations.     Slow transit constipation  Last bowel movement 2 days ago  Plan: Continue senna s 2 tab BID. Continue miralax 17 gram daily PRN. Monitor bowels.      Discharge Medications:  MED REC REQUIRED  Post Medication Reconciliation Status:  Medication reconciliation previously completed during another office visit    Current Outpatient Medications   Medication Sig Dispense Refill     acetaminophen (TYLENOL) 325 MG tablet Take 2 tablets (650 mg) by mouth 3 times daily And BID PRN (Patient taking differently: Take 650 mg by mouth 3 times daily And daily PRN)       albuterol (PROVENTIL) (2.5 MG/3ML) 0.083% neb solution Take 2.5 mg by nebulization every 6 hours as needed for shortness of breath, wheezing or cough       amoxicillin (AMOXIL) 500 MG tablet Take 500 mg by mouth 2 times daily Continue for life       aspirin 81 MG EC tablet Take 81 mg by mouth daily       AzaTHIOprine (IMURAN PO) Take 50 mg by mouth daily       Calcium Carb-Cholecalciferol (CALCIUM 500 +D) 500-400 MG-UNIT TABS Take 1 tablet by mouth 2 times daily       Cholecalciferol (VITAMIN D3) 1000 units CAPS Take 1,000 Units by mouth daily       cycloSPORINE (RESTASIS) 0.05 % ophthalmic emulsion Instill 1 drop into both eyes every 12 hours.   No further refills will be given unless you come in for your exam.       DOXEPIN HCL PO Take 10 mg by mouth At Bedtime       ferrous sulfate (IRON) 325 (65 FE) MG tablet Take 1 tablet (325 mg) by mouth daily 100 tablet 0     FLUoxetine (PROZAC) 20 MG capsule Take 20 mg by mouth daily       lidocaine (LIDODERM) 5 % patch Place 1 patch onto the skin daily as needed for moderate pain (12 hours on; 12 hours off. Patient applies to knees or back)       loratadine (CLARITIN) 10 MG tablet Take 10 mg by mouth daily as needed       memantine (NAMENDA) 5 MG tablet Take 5 mg by mouth 2 times daily       miconazole  (MICATIN) 2 % AERP powder Apply topically 2 times daily To sridhar area until redness resolved       ondansetron (ZOFRAN ODT) 4 MG ODT tab Take 1 tablet (4 mg) by mouth every 6 hours as needed for nausea       pantoprazole (PROTONIX) 40 MG EC tablet Take 40 mg by mouth daily       Pilocarpine HCl (SALAGEN PO) Take 5 mg by mouth 3 times daily AM, 1200 & HS       polyethylene glycol (MIRALAX/GLYCOLAX) powder Take 17 g by mouth daily as needed       polyethylene glycol 400 (BLINK TEARS) 0.25 % SOLN ophthalmic solution Place 1-2 drops into both eyes every 2 hours as needed for dry eyes       QUEtiapine (SEROQUEL) 25 MG tablet Take 25 mg by mouth 2 times daily       senna-docusate (SENOKOT-S/PERICOLACE) 8.6-50 MG tablet Take 2 tablets by mouth 2 times daily       blood glucose monitoring (NO BRAND SPECIFIED) test strip USE TO CHECK BLOOD SUGARS EVERY DAY       blood glucose monitoring (SOFTCLIX) lancets USE TO TEST BLOOD SUGARS DAILY       Blood Glucose Monitoring Suppl (FIFTY50 GLUCOSE METER 2.0) w/Device KIT Dispense meter, test strips, lancets covered by pt ins. E11.9 NIDDM type II - Test 1 time/day          Controlled medication: OK to send remaining seroquel with patient at discharge    Past Medical History:   Past Medical History:   Diagnosis Date     Arthritis      Cataract      Diabetes mellitus (H)      Gastro-oesophageal reflux disease      Hyperlipidemia      Physical Exam:   Vitals: /84   Pulse 80   Temp 97.8  F (36.6  C)   Resp 16   SpO2 95%   BMI: There is no height or weight on file to calculate BMI.  GENERAL APPEARANCE:  Alert, in NAD  HEENT: normocephalic, moist mucous membranes, nose without drainage or crusting  RESP:  respiratory effort normal, no respiratory distress, Lung sounds clear, patient is on RA  CV: auscultation of heart done, rate and rhythm regular.   ABDOMEN: + bowel sounds, soft, nontender, no grimacing or guarding with palpation.  M/S:   knee immobilizer in place to right  knee  SKIN: Reviewed most recent set of images in facility EMR, no worsening noted by nursing  PSYCH: insight and judgement impaired, memory impaired, affect and mood normal      SNF labs: Labs done in SNF are in [facility]. Please refer to them using [facility]/Care Everywhere. and Recent labs in [facility] reviewed by me today.     DISCHARGE PLAN:  Follow up labs: BMP due 8/12 to be drawn by home care with results to PCP  Medical Follow Up:     Follow up with primary care provider in 1 week  Follow up with specialist ortho   Current [facility] scheduled appointments:  Discharge Services: Home Care:  Occupational Therapy, Physical Therapy, Registered Nurse, and Home Health Aide with [facility]  Discharge Instructions:   OK to shower but no bathing or soaking until approved by surgeon.   Notify your surgeon if you have increased redness, swelling, tenderness, or drainage at your incision site.   Keep right leg elevated  Keep right knee immobilizer in place at all times, continue skin checks twice daily to assess for breakdown of skin  Weightbearing as tolerated with right lower extremity with walker  Wound care to right knee: Twice daily wet-to-dry dressing changes, use Vashe to cleanse.  Soak gauze with Vashe and lightly pack wound, cover with ABD.  Complete twice daily.  Continue protein supplement of choice once daily to help with wound healing    TOTAL DISCHARGE TIME:   Greater than 30 minutes  Electronically signed by:  JAYME Godfrey CNP     Home care Face to Face documentation done in [facility] attached to Home care orders for [facility] GERIATRICS  Face to Face and Medical Necessity Statement for DME Provider visit    Patient: Chelly Dave  Gender: female  YOB: 1935  [facility] Medical Record Number: [id number]  Demographics:  [id number] [facility] [address]  [phone] (home)   Social Security Number: [id number]  Primary Care Provider: Giovanna  Brittany  Insurance: Payor: MEDICA / Plan: MEDICA D[employer] NON/FV PARTNERS / Product Type: Indemnity /     HPI: Chelly Dave is a 89 year old (1935), who is being seen today for a face to face provider visit at [facility]  ([facility]) [[id number]]. Medical necessity statement for DME included.     This patient requires the following: DME Ordered and Medical Necessity Statement     Wheelchair and Hospital bed    Wheelchair Documentation  Size: Patient requires manual 18 inch x 18 inch standard manual wheelchair, with standard armrests, elevating leg rests due to right knee prosthetic joint infection status post I&D with polyethylene exchange with ongoing swelling, standard cushion for comfort and skin integrity.  Patient does not require lap tray and does not use oxygen.  Patient requires physical assistance to propel the wheelchair.  There is someone to assist with propelling wheelchair.  The patient requires physical assistance with MR ADLs including dressing, bathing, grooming, toileting.  The wheelchair is suitable and necessary for the use in the patient's home.  Patient's mobility limitations cannot be safely resolved by using a cane or walker, as patient is inconsistent with functional ambulation, has fair balance, patient requires wheelchair follow with ambulation to safely navigate environment and prevent falls.  Patient currently at most has ambulating 120 feet, but is unable to complete on a daily basis.  The patient's home has adequate access to use a wheelchair.  The use of a manual wheelchair on a regular basis will improve patient's ability to participate in mobility related ADLs in the home, will reduce patient's risk of falls and help patient maintain independence while completing ADLs.  The patient is willing to use a manual wheelchair.  Patient requires assistance to propel the wheelchair due to cognitive impairment and poor upper body strength.  Patient requires elevating  leg rests due to ongoing edema related to right prosthetic joint infection with polyethylene exchange in June/2024.      Semi electric Hospital bed with bilateral half side rails  The patient requires positioning of the body in ways not feasible with an ordinary bed due to pain and swelling of right leg, as well as need to wear a right knee immobilizer 24 hours a day, due to recent right knee prosthetic joint infection status post I&D with polyethylene exchange and now right knee wound dehiscence.  The patient does not require the head of bed to be elevated due to CHF, chronic pulmonary disease or COPD.  The patient does not need traction.  The patient does require a bed height different than a fixed hospital height to help patient with transferring to wheelchair or standing position due to limitation of mobility with use of right knee immobilizer, as well as cognitive impairment.  Patient requires frequent or immediate changes in body position due to pain and swelling.      Pt needing above DME with expected length of need of 99 due to medical necessity associated with following diagnosis:     Pyogenic arthritis of right knee joint, due to unspecified organism (H)  Wound dehiscence  Generalized muscle weakness  Physical deconditioning  Acute blood loss anemia  Chronic anemia  History of hypertension  Dementia, unspecified dementia severity, unspecified dementia type, unspecified whether behavioral, psychotic, or mood disturbance or anxiety (H)  Depression, unspecified depression type  Gastroesophageal reflux disease, unspecified whether esophagitis present  Stage 2 chronic kidney disease  Sjogren's syndrome, with unspecified organ involvement (H24)  Raynaud's disease without gangrene  Slow transit constipation        ELECTRONICALLY SIGNED BY MONTRELL CERTIFIED PROVIDER: JAYME Godfrey CNP    NPI: [id number]  [facility]  [address] [place]  [address]            Sincerely,        JAYME Godfrey CNP

## 2024-08-06 NOTE — PROGRESS NOTES
[facility] GERIATRICS DISCHARGE SUMMARY  PATIENT'S NAME: Chelly Dave  YOB: 1935  MEDICAL RECORD NUMBER:  [id number]  Place of Service where encounter took place:  [facility]  ([facility]) [[id number]]    PRIMARY CARE PROVIDER AND CLINIC RESPONSIBLE AFTER TRANSFER:   Brittany Heredia, [address] / [facility] [address]     Transferring providers: Sarah Frey, JAYME PEREZ, Carlos Eduardo Costello MD  Recent Hospitalization/ED:  [facility] Hospital stay 6/19/2024 to 6/26/2024.  Date of SNF Admission: June 26, 2024  Date of SNF (anticipated) Discharge: 8/8/2024  Discharged to: Home with family  Physical Function: Ambulating 120 feet with front wheel walker contact-guard assist, completed 2 stairs with bilateral handrails min to mod assist, transfers mod to max assist with front wheel walker, mod assist for bed mobility, grooming/hygiene min assist, upper body dressing contact-guard assist, mod assist for lower body dressing, toileting min assist for transfer, mod assist for clothing management, max assist for showering  DME: hospital bed and wheelchair     CODE STATUS/ADVANCE DIRECTIVES DISCUSSION:  No CPR- DNI  ALLERGIES: Lisinopril, Oxycodone, Aspirin buf(cacarb-mgcarb-mgo), and Aspirin buffered    NURSING FACILITY COURSE     Past medical history significant for hypertension, dyslipidemia, dementia, GERD, chronic kidney disease, anemia, leukopenia, prediabetes, mesenteric mass, Raynaud's disease, Sjogren's, depression     Summary of recent hospitalization  Patient was hospitalized at [facility] from 6/19 to 6/26/2024 for right knee prosthetic joint septic arthritis.  Laboratory evaluation in the emergency department significant for WBC 3.9, hemoglobin 10.3, CRP 22.32, procalcitonin 0.07.  Orthopedics was consulted.  Patient underwent right knee aspiration, synovial fluid grew Streptococcus gorgaii likely due to combat tensionless seeding.  Patient underwent  I&D with polyethylene exchange on 6/21/2024.  EBL 50 mL.  Hemoglobin postoperatively dropped, hemoglobin 8.3 prior to discharge.  Patient discharged with Prevena wound VAC and follow-up in 2 weeks with Ortho.  She was started on aspirin 81 mg twice daily for DVT prophylaxis.  ID was consulted and patient was started on vancomycin with ceftriaxone, with plan for 6-week course of IV ceftriaxone.  Follow-up with ID in 3 to 4 weeks.  PTA atorvastatin discontinued due to dementia and age. Discharged to TCU for physical rehabilitation and medical management.       Summary of nursing facility stay:   Today, patient was seen for discharge evaluation in the TCU.  Patient has not had historian.  She does report some pain to her right knee at time of visit.  Otherwise denies any shortness of breath, issues with voiding.  Last bowel movement was 2 days ago.  Patient to follow-up with PCP and Ortho after discharge.  To continue therapy through home care.    Specific problems addressed in the TCU:  Right knee hematogenous prosthetic joint infection status post I&D with polyethylene exchange on 6/21/2024  Synovial culture grew Streptococcus goronii suspected to be related to hematogenous seeding  CRP 14.73 and WBC 6.2 on 7/2/2024  Patient removed wound vac in the TCU- ortho was updated   Staples removed 7/15/2024  Completed course of aspirin DVT prophy  IV antibiotics completed 8/2 and started on amoxicillin BID for life  Reports pain today- using dilaudid infrequently- 1 time in past week  Plan: Discontinue dilaudid. Continue tylenol to 650 TID and once daily PRN and lidocaine patch. Continue amoxicillin BID for lifetime. Wound care per Ortho.  Therapy as below.   Follow-up with Ortho 2 weeks from last visit     Right knee incision dehiscence  Noted by staff on 7/20, per nursing patient was digging in the incision  Patient had follow up with Dr. Tracy on 7/25 and recommended ongoing would care BID with knee immobilizer at all  times to Right lower extremity  Reviewed images in facility EMR today, most recent set from last week- no noted concerns per nursing or WOC RN  Plan: Continue wound care as ordered.. Follow up with ortho 2 week from last visit.     Generalized weakness  Physical deconditioning  Secondary to recent hospitalization, medical conditions as above  Patient continues to work with therapy  Patient had fall on 7/19 without injury  Noted to be weaker than previously and requiring more assistance than previously around this time  UA/UC 7/24 negative, she has no respiratory symptoms  I believe this could be due to dementia- overall fluctuating with some days better than others  Plan: Continue therapy through home care. Consider follow up with PCP to discuss dose reduction or stopping of some medications as below.     Acute blood loss anemia  Chronic anemia  Baseline hemoglobin 10  Hemoglobin 9.0 on 7/30/2024  Plan: CBC PRN.  Continue ferrous sulfate 325 mg daily.     History of hypertension  SBP 96-130s  Plan: Patient no longer on medication management.  SBP goal less than 150 based on age, comorbidities, frailty.  Monitor blood pressure.     Dyslipidemia  PTA statin discontinued inpatient  Plan: Follow-up with PCP.     Dementia  Depression  Plan: Cognition seems to fluctuate, consider follow up with PCP to consider dose reduction of some of these medications. Continue fluoxetine 20 mg daily, memantine 50 mg twice daily, Seroquel 25 mg twice daily, doxepin 10 mg at bedtime.  Monitor mood and symptoms.      GERD  Plan: Continue pantoprazole 40 mg daily.     Chronic kidney disease stage II  Baseline creatinine 0.6-0.8  Creatinine 0.71 on 7/30/2024  Plan: BMP through home care in 1 week to ensure stability. Avoid nephrotoxins. Renally dose medications as indicated.     Sjogren's syndrome  Raynaud's disease  Plan: Continue azathioprine 50 mg daily, pilocarpine 5 mg 3 times daily, CSA ophthalmic drops every 12 hours.  Follow-up with  rheumatology per recommendations.     Slow transit constipation  Last bowel movement 2 days ago  Plan: Continue senna s 2 tab BID. Continue miralax 17 gram daily PRN. Monitor bowels.      Discharge Medications:  MED REC REQUIRED  Post Medication Reconciliation Status:  Medication reconciliation previously completed during another office visit    Current Outpatient Medications   Medication Sig Dispense Refill    acetaminophen (TYLENOL) 325 MG tablet Take 2 tablets (650 mg) by mouth 3 times daily And BID PRN (Patient taking differently: Take 650 mg by mouth 3 times daily And daily PRN)      albuterol (PROVENTIL) (2.5 MG/3ML) 0.083% neb solution Take 2.5 mg by nebulization every 6 hours as needed for shortness of breath, wheezing or cough      amoxicillin (AMOXIL) 500 MG tablet Take 500 mg by mouth 2 times daily Continue for life      aspirin 81 MG EC tablet Take 81 mg by mouth daily      AzaTHIOprine (IMURAN PO) Take 50 mg by mouth daily      Calcium Carb-Cholecalciferol (CALCIUM 500 +D) 500-400 MG-UNIT TABS Take 1 tablet by mouth 2 times daily      Cholecalciferol (VITAMIN D3) 1000 units CAPS Take 1,000 Units by mouth daily      cycloSPORINE (RESTASIS) 0.05 % ophthalmic emulsion Instill 1 drop into both eyes every 12 hours.   No further refills will be given unless you come in for your exam.      DOXEPIN HCL PO Take 10 mg by mouth At Bedtime      ferrous sulfate (IRON) 325 (65 FE) MG tablet Take 1 tablet (325 mg) by mouth daily 100 tablet 0    FLUoxetine (PROZAC) 20 MG capsule Take 20 mg by mouth daily      lidocaine (LIDODERM) 5 % patch Place 1 patch onto the skin daily as needed for moderate pain (12 hours on; 12 hours off. Patient applies to knees or back)      loratadine (CLARITIN) 10 MG tablet Take 10 mg by mouth daily as needed      memantine (NAMENDA) 5 MG tablet Take 5 mg by mouth 2 times daily      miconazole (MICATIN) 2 % AERP powder Apply topically 2 times daily To sridhar area until redness resolved       ondansetron (ZOFRAN ODT) 4 MG ODT tab Take 1 tablet (4 mg) by mouth every 6 hours as needed for nausea      pantoprazole (PROTONIX) 40 MG EC tablet Take 40 mg by mouth daily      Pilocarpine HCl (SALAGEN PO) Take 5 mg by mouth 3 times daily AM, 1200 & HS      polyethylene glycol (MIRALAX/GLYCOLAX) powder Take 17 g by mouth daily as needed      polyethylene glycol 400 (BLINK TEARS) 0.25 % SOLN ophthalmic solution Place 1-2 drops into both eyes every 2 hours as needed for dry eyes      QUEtiapine (SEROQUEL) 25 MG tablet Take 25 mg by mouth 2 times daily      senna-docusate (SENOKOT-S/PERICOLACE) 8.6-50 MG tablet Take 2 tablets by mouth 2 times daily      blood glucose monitoring (NO BRAND SPECIFIED) test strip USE TO CHECK BLOOD SUGARS EVERY DAY      blood glucose monitoring (SOFTCLIX) lancets USE TO TEST BLOOD SUGARS DAILY      Blood Glucose Monitoring Suppl (FIFTY50 GLUCOSE METER 2.0) w/Device KIT Dispense meter, test strips, lancets covered by pt ins. E11.9 NIDDM type II - Test 1 time/day          Controlled medication: OK to send remaining seroquel with patient at discharge    Past Medical History:   Past Medical History:   Diagnosis Date    Arthritis     Cataract     Diabetes mellitus (H)     Gastro-oesophageal reflux disease     Hyperlipidemia      Physical Exam:   Vitals: /84   Pulse 80   Temp 97.8  F (36.6  C)   Resp 16   SpO2 95%   BMI: There is no height or weight on file to calculate BMI.  GENERAL APPEARANCE:  Alert, in NAD  HEENT: normocephalic, moist mucous membranes, nose without drainage or crusting  RESP:  respiratory effort normal, no respiratory distress, Lung sounds clear, patient is on RA  CV: auscultation of heart done, rate and rhythm regular.   ABDOMEN: + bowel sounds, soft, nontender, no grimacing or guarding with palpation.  M/S:   knee immobilizer in place to right knee  SKIN: Reviewed most recent set of images in facility EMR, no worsening noted by nursing  PSYCH: insight and  judgement impaired, memory impaired, affect and mood normal      SNF labs: Labs done in SNF are in [facility]. Please refer to them using [facility]/Care Everywhere. and Recent labs in [facility] reviewed by me today.     DISCHARGE PLAN:  Follow up labs: BMP due 8/12 to be drawn by home care with results to PCP  Medical Follow Up:     Follow up with primary care provider in 1 week  Follow up with specialist ortho   Current [place] scheduled appointments:  Discharge Services: Home Care:  Occupational Therapy, Physical Therapy, Registered Nurse, and Home Health Aide with [facility] home care  Discharge Instructions:   OK to shower but no bathing or soaking until approved by surgeon.   Notify your surgeon if you have increased redness, swelling, tenderness, or drainage at your incision site.   Keep right leg elevated  Keep right knee immobilizer in place at all times, continue skin checks twice daily to assess for breakdown of skin  Weightbearing as tolerated with right lower extremity with walker  Wound care to right knee: Twice daily wet-to-dry dressing changes, use Vashe to cleanse.  Soak gauze with Vashe and lightly pack wound, cover with ABD.  Complete twice daily.  Continue protein supplement of choice once daily to help with wound healing    TOTAL DISCHARGE TIME:   Greater than 30 minutes  Electronically signed by:  JAYME Godfrey CNP     Home care Face to Face documentation done in [facility] attached to Home care orders for [facility].       [facility] GERIATRICS  Face to Face and Medical Necessity Statement for DME Provider visit    Patient: Chelly Dave  Gender: female  YOB: 1935  [facility] Medical Record Number: [id number]  Demographics:  [address] [facility] [address]  [phone] (home)   Social Security Number: [id number]  Primary Care Provider: Brittany Heredia  Insurance: Payor: MEDICA / Plan: MEDICA DUAL SOLUTIONS [employer] NON/FV PARTNERS / Product Type: Indemnity /     HPI:  Chelly Dave is a 89 year old (1935), who is being seen today for a face to face provider visit at [facility]  ([facility]) [[id number]]. Medical necessity statement for DME included.     This patient requires the following: DME Ordered and Medical Necessity Statement     Wheelchair and Hospital bed    Wheelchair Documentation  Size: Patient requires manual 18 inch x 18 inch standard manual wheelchair, with standard armrests, elevating leg rests due to right knee prosthetic joint infection status post I&D with polyethylene exchange with ongoing swelling, standard cushion for comfort and skin integrity.  Patient does not require lap tray and does not use oxygen.  Patient requires physical assistance to propel the wheelchair.  There is someone to assist with propelling wheelchair.  The patient requires physical assistance with MR ADLs including dressing, bathing, grooming, toileting.  The wheelchair is suitable and necessary for the use in the patient's home.  Patient's mobility limitations cannot be safely resolved by using a cane or walker, as patient is inconsistent with functional ambulation, has fair balance, patient requires wheelchair follow with ambulation to safely navigate environment and prevent falls.  Patient currently at most has ambulating 120 feet, but is unable to complete on a daily basis.  The patient's home has adequate access to use a wheelchair.  The use of a manual wheelchair on a regular basis will improve patient's ability to participate in mobility related ADLs in the home, will reduce patient's risk of falls and help patient maintain independence while completing ADLs.  The patient is willing to use a manual wheelchair.  Patient requires assistance to propel the wheelchair due to cognitive impairment and poor upper body strength.  Patient requires elevating leg rests due to ongoing edema related to right prosthetic joint infection with polyethylene exchange in  June/2024.      Semi electric Hospital bed with bilateral half side rails  The patient requires positioning of the body in ways not feasible with an ordinary bed due to pain and swelling of right leg, as well as need to wear a right knee immobilizer 24 hours a day, due to recent right knee prosthetic joint infection status post I&D with polyethylene exchange and now right knee wound dehiscence.  The patient does not require the head of bed to be elevated due to CHF, chronic pulmonary disease or COPD.  The patient does not need traction.  The patient does require a bed height different than a fixed hospital height to help patient with transferring to wheelchair or standing position due to limitation of mobility with use of right knee immobilizer, as well as cognitive impairment.  Patient requires frequent or immediate changes in body position due to pain and swelling.      Pt needing above DME with expected length of need of 99 due to medical necessity associated with following diagnosis:     Pyogenic arthritis of right knee joint, due to unspecified organism (H)  Wound dehiscence  Generalized muscle weakness  Physical deconditioning  Acute blood loss anemia  Chronic anemia  History of hypertension  Dementia, unspecified dementia severity, unspecified dementia type, unspecified whether behavioral, psychotic, or mood disturbance or anxiety (H)  Depression, unspecified depression type  Gastroesophageal reflux disease, unspecified whether esophagitis present  Stage 2 chronic kidney disease  Sjogren's syndrome, with unspecified organ involvement (H24)  Raynaud's disease without gangrene  Slow transit constipation        ELECTRONICALLY SIGNED BY MONTRELL CERTIFIED PROVIDER: JAYME Godfrey CNP    NPI: [id number]  [facility]  [address] [place] [address]

## 2024-08-08 ENCOUNTER — MEDICAL CORRESPONDENCE (OUTPATIENT)
Dept: HEALTH INFORMATION MANAGEMENT | Facility: CLINIC | Age: 89
End: 2024-08-08
Payer: COMMERCIAL

## 2024-08-13 ENCOUNTER — ANESTHESIA EVENT (OUTPATIENT)
Dept: SURGERY | Facility: CLINIC | Age: 89
End: 2024-08-13
Payer: COMMERCIAL

## 2024-08-14 ENCOUNTER — HOSPITAL ENCOUNTER (OUTPATIENT)
Facility: CLINIC | Age: 89
Discharge: NURSING, REHAB OR LONG-TERM CARE | End: 2024-08-19
Attending: ORTHOPAEDIC SURGERY | Admitting: ORTHOPAEDIC SURGERY
Payer: COMMERCIAL

## 2024-08-14 ENCOUNTER — ANESTHESIA (OUTPATIENT)
Dept: SURGERY | Facility: CLINIC | Age: 89
End: 2024-08-14
Payer: COMMERCIAL

## 2024-08-14 DIAGNOSIS — M79.604 RIGHT LEG PAIN: Primary | ICD-10-CM

## 2024-08-14 PROBLEM — S81.001A OPEN WOUND OF RIGHT KNEE: Status: ACTIVE | Noted: 2024-08-14

## 2024-08-14 LAB
FASTING STATUS PATIENT QL REPORTED: NORMAL
GLUCOSE BLDC GLUCOMTR-MCNC: 83 MG/DL (ref 70–99)
GLUCOSE SERPL-MCNC: 89 MG/DL (ref 70–99)
POTASSIUM SERPL-SCNC: 4.1 MMOL/L (ref 3.4–5.3)

## 2024-08-14 PROCEDURE — 250N000011 HC RX IP 250 OP 636: Performed by: STUDENT IN AN ORGANIZED HEALTH CARE EDUCATION/TRAINING PROGRAM

## 2024-08-14 PROCEDURE — 250N000009 HC RX 250: Performed by: NURSE ANESTHETIST, CERTIFIED REGISTERED

## 2024-08-14 PROCEDURE — 84132 ASSAY OF SERUM POTASSIUM: CPT | Performed by: ANESTHESIOLOGY

## 2024-08-14 PROCEDURE — 250N000011 HC RX IP 250 OP 636: Performed by: ORTHOPAEDIC SURGERY

## 2024-08-14 PROCEDURE — 360N000076 HC SURGERY LEVEL 3, PER MIN: Performed by: ORTHOPAEDIC SURGERY

## 2024-08-14 PROCEDURE — 250N000009 HC RX 250: Performed by: ORTHOPAEDIC SURGERY

## 2024-08-14 PROCEDURE — 250N000013 HC RX MED GY IP 250 OP 250 PS 637: Performed by: ORTHOPAEDIC SURGERY

## 2024-08-14 PROCEDURE — 99207 PR NO BILLABLE SERVICE THIS VISIT: CPT | Performed by: HOSPITALIST

## 2024-08-14 PROCEDURE — 250N000011 HC RX IP 250 OP 636: Performed by: NURSE ANESTHETIST, CERTIFIED REGISTERED

## 2024-08-14 PROCEDURE — 82962 GLUCOSE BLOOD TEST: CPT

## 2024-08-14 PROCEDURE — 710N000009 HC RECOVERY PHASE 1, LEVEL 1, PER MIN: Performed by: ORTHOPAEDIC SURGERY

## 2024-08-14 PROCEDURE — 258N000003 HC RX IP 258 OP 636: Performed by: NURSE ANESTHETIST, CERTIFIED REGISTERED

## 2024-08-14 PROCEDURE — 258N000001 HC RX 258: Performed by: ORTHOPAEDIC SURGERY

## 2024-08-14 PROCEDURE — 250N000025 HC SEVOFLURANE, PER MIN: Performed by: ORTHOPAEDIC SURGERY

## 2024-08-14 PROCEDURE — 250N000013 HC RX MED GY IP 250 OP 250 PS 637: Performed by: HOSPITALIST

## 2024-08-14 PROCEDURE — 13160 SEC CLSR SURG WND/DEHSN XTN: CPT | Performed by: NURSE ANESTHETIST, CERTIFIED REGISTERED

## 2024-08-14 PROCEDURE — 120N000001 HC R&B MED SURG/OB

## 2024-08-14 PROCEDURE — 370N000017 HC ANESTHESIA TECHNICAL FEE, PER MIN: Performed by: ORTHOPAEDIC SURGERY

## 2024-08-14 PROCEDURE — 82947 ASSAY GLUCOSE BLOOD QUANT: CPT | Mod: 91 | Performed by: ANESTHESIOLOGY

## 2024-08-14 PROCEDURE — 99100 ANES PT EXTEME AGE<1 YR&>70: CPT | Performed by: NURSE ANESTHETIST, CERTIFIED REGISTERED

## 2024-08-14 PROCEDURE — 36415 COLL VENOUS BLD VENIPUNCTURE: CPT | Performed by: ANESTHESIOLOGY

## 2024-08-14 PROCEDURE — 999N000141 HC STATISTIC PRE-PROCEDURE NURSING ASSESSMENT: Performed by: ORTHOPAEDIC SURGERY

## 2024-08-14 PROCEDURE — 272N000001 HC OR GENERAL SUPPLY STERILE: Performed by: ORTHOPAEDIC SURGERY

## 2024-08-14 PROCEDURE — 13160 SEC CLSR SURG WND/DEHSN XTN: CPT | Performed by: STUDENT IN AN ORGANIZED HEALTH CARE EDUCATION/TRAINING PROGRAM

## 2024-08-14 PROCEDURE — 258N000003 HC RX IP 258 OP 636: Performed by: ANESTHESIOLOGY

## 2024-08-14 RX ORDER — LIDOCAINE 40 MG/G
CREAM TOPICAL
Status: DISCONTINUED | OUTPATIENT
Start: 2024-08-14 | End: 2024-08-19 | Stop reason: HOSPADM

## 2024-08-14 RX ORDER — ACETAMINOPHEN 325 MG/1
650 TABLET ORAL EVERY 4 HOURS PRN
Status: DISCONTINUED | OUTPATIENT
Start: 2024-08-17 | End: 2024-08-19 | Stop reason: HOSPADM

## 2024-08-14 RX ORDER — FENTANYL CITRATE 50 UG/ML
INJECTION, SOLUTION INTRAMUSCULAR; INTRAVENOUS PRN
Status: DISCONTINUED | OUTPATIENT
Start: 2024-08-14 | End: 2024-08-14

## 2024-08-14 RX ORDER — PILOCARPINE HYDROCHLORIDE 5 MG/1
5 TABLET, FILM COATED ORAL 3 TIMES DAILY
Status: DISCONTINUED | OUTPATIENT
Start: 2024-08-14 | End: 2024-08-19 | Stop reason: HOSPADM

## 2024-08-14 RX ORDER — CEFAZOLIN SODIUM/WATER 2 G/20 ML
2 SYRINGE (ML) INTRAVENOUS
Status: COMPLETED | OUTPATIENT
Start: 2024-08-14 | End: 2024-08-14

## 2024-08-14 RX ORDER — SODIUM CHLORIDE 9 MG/ML
INJECTION, SOLUTION INTRAVENOUS CONTINUOUS
Status: DISCONTINUED | OUTPATIENT
Start: 2024-08-14 | End: 2024-08-14 | Stop reason: HOSPADM

## 2024-08-14 RX ORDER — TRANEXAMIC ACID 650 MG/1
1950 TABLET ORAL ONCE
Status: DISCONTINUED | OUTPATIENT
Start: 2024-08-14 | End: 2024-08-14 | Stop reason: HOSPADM

## 2024-08-14 RX ORDER — FENTANYL CITRATE 0.05 MG/ML
50 INJECTION, SOLUTION INTRAMUSCULAR; INTRAVENOUS EVERY 5 MIN PRN
Status: DISCONTINUED | OUTPATIENT
Start: 2024-08-14 | End: 2024-08-14 | Stop reason: HOSPADM

## 2024-08-14 RX ORDER — PROPOFOL 10 MG/ML
INJECTION, EMULSION INTRAVENOUS PRN
Status: DISCONTINUED | OUTPATIENT
Start: 2024-08-14 | End: 2024-08-14

## 2024-08-14 RX ORDER — ONDANSETRON 2 MG/ML
INJECTION INTRAMUSCULAR; INTRAVENOUS PRN
Status: DISCONTINUED | OUTPATIENT
Start: 2024-08-14 | End: 2024-08-14

## 2024-08-14 RX ORDER — SODIUM CHLORIDE, SODIUM LACTATE, POTASSIUM CHLORIDE, CALCIUM CHLORIDE 600; 310; 30; 20 MG/100ML; MG/100ML; MG/100ML; MG/100ML
INJECTION, SOLUTION INTRAVENOUS CONTINUOUS
Status: DISCONTINUED | OUTPATIENT
Start: 2024-08-14 | End: 2024-08-17

## 2024-08-14 RX ORDER — PANTOPRAZOLE SODIUM 40 MG/1
40 TABLET, DELAYED RELEASE ORAL DAILY
Status: DISCONTINUED | OUTPATIENT
Start: 2024-08-15 | End: 2024-08-19 | Stop reason: HOSPADM

## 2024-08-14 RX ORDER — AMOXICILLIN 250 MG
1 CAPSULE ORAL 2 TIMES DAILY
Status: DISCONTINUED | OUTPATIENT
Start: 2024-08-14 | End: 2024-08-19 | Stop reason: HOSPADM

## 2024-08-14 RX ORDER — NALOXONE HYDROCHLORIDE 0.4 MG/ML
0.4 INJECTION, SOLUTION INTRAMUSCULAR; INTRAVENOUS; SUBCUTANEOUS
Status: DISCONTINUED | OUTPATIENT
Start: 2024-08-14 | End: 2024-08-19 | Stop reason: HOSPADM

## 2024-08-14 RX ORDER — DOXEPIN HYDROCHLORIDE 10 MG/1
10 CAPSULE ORAL AT BEDTIME
Status: DISCONTINUED | OUTPATIENT
Start: 2024-08-14 | End: 2024-08-19 | Stop reason: HOSPADM

## 2024-08-14 RX ORDER — ASPIRIN 81 MG/1
81 TABLET ORAL 2 TIMES DAILY
Qty: 60 TABLET | Refills: 0 | Status: SHIPPED | OUTPATIENT
Start: 2024-08-14 | End: 2024-08-16

## 2024-08-14 RX ORDER — DEXAMETHASONE SODIUM PHOSPHATE 4 MG/ML
4 INJECTION, SOLUTION INTRA-ARTICULAR; INTRALESIONAL; INTRAMUSCULAR; INTRAVENOUS; SOFT TISSUE
Status: DISCONTINUED | OUTPATIENT
Start: 2024-08-14 | End: 2024-08-14 | Stop reason: HOSPADM

## 2024-08-14 RX ORDER — AZATHIOPRINE 50 MG/1
50 TABLET ORAL DAILY
Status: DISCONTINUED | OUTPATIENT
Start: 2024-08-15 | End: 2024-08-19 | Stop reason: HOSPADM

## 2024-08-14 RX ORDER — HYDROMORPHONE HCL IN WATER/PF 6 MG/30 ML
0.1 PATIENT CONTROLLED ANALGESIA SYRINGE INTRAVENOUS
Status: DISCONTINUED | OUTPATIENT
Start: 2024-08-14 | End: 2024-08-19 | Stop reason: HOSPADM

## 2024-08-14 RX ORDER — HYDROMORPHONE HCL IN WATER/PF 6 MG/30 ML
0.4 PATIENT CONTROLLED ANALGESIA SYRINGE INTRAVENOUS EVERY 5 MIN PRN
Status: DISCONTINUED | OUTPATIENT
Start: 2024-08-14 | End: 2024-08-14 | Stop reason: HOSPADM

## 2024-08-14 RX ORDER — FENTANYL CITRATE 0.05 MG/ML
25 INJECTION, SOLUTION INTRAMUSCULAR; INTRAVENOUS EVERY 5 MIN PRN
Status: DISCONTINUED | OUTPATIENT
Start: 2024-08-14 | End: 2024-08-14 | Stop reason: HOSPADM

## 2024-08-14 RX ORDER — CEFAZOLIN SODIUM 1 G/3ML
1 INJECTION, POWDER, FOR SOLUTION INTRAMUSCULAR; INTRAVENOUS EVERY 8 HOURS
Status: COMPLETED | OUTPATIENT
Start: 2024-08-14 | End: 2024-08-15

## 2024-08-14 RX ORDER — SODIUM CHLORIDE, SODIUM LACTATE, POTASSIUM CHLORIDE, CALCIUM CHLORIDE 600; 310; 30; 20 MG/100ML; MG/100ML; MG/100ML; MG/100ML
INJECTION, SOLUTION INTRAVENOUS CONTINUOUS
Status: DISCONTINUED | OUTPATIENT
Start: 2024-08-14 | End: 2024-08-14 | Stop reason: HOSPADM

## 2024-08-14 RX ORDER — BISACODYL 10 MG
10 SUPPOSITORY, RECTAL RECTAL DAILY PRN
Status: DISCONTINUED | OUTPATIENT
Start: 2024-08-17 | End: 2024-08-19 | Stop reason: HOSPADM

## 2024-08-14 RX ORDER — ONDANSETRON 4 MG/1
4 TABLET, ORALLY DISINTEGRATING ORAL EVERY 6 HOURS PRN
Status: DISCONTINUED | OUTPATIENT
Start: 2024-08-14 | End: 2024-08-19 | Stop reason: HOSPADM

## 2024-08-14 RX ORDER — METHOCARBAMOL 500 MG/1
500 TABLET, FILM COATED ORAL EVERY 6 HOURS PRN
Status: DISCONTINUED | OUTPATIENT
Start: 2024-08-14 | End: 2024-08-19 | Stop reason: HOSPADM

## 2024-08-14 RX ORDER — HYDROMORPHONE HYDROCHLORIDE 2 MG/1
2 TABLET ORAL EVERY 4 HOURS PRN
Status: DISCONTINUED | OUTPATIENT
Start: 2024-08-14 | End: 2024-08-19 | Stop reason: HOSPADM

## 2024-08-14 RX ORDER — AMOXICILLIN 500 MG/1
500 CAPSULE ORAL 2 TIMES DAILY
Status: DISCONTINUED | OUTPATIENT
Start: 2024-08-15 | End: 2024-08-19 | Stop reason: HOSPADM

## 2024-08-14 RX ORDER — HYDROMORPHONE HCL IN WATER/PF 6 MG/30 ML
0.2 PATIENT CONTROLLED ANALGESIA SYRINGE INTRAVENOUS
Status: DISCONTINUED | OUTPATIENT
Start: 2024-08-14 | End: 2024-08-19 | Stop reason: HOSPADM

## 2024-08-14 RX ORDER — ONDANSETRON 2 MG/ML
4 INJECTION INTRAMUSCULAR; INTRAVENOUS EVERY 6 HOURS PRN
Status: DISCONTINUED | OUTPATIENT
Start: 2024-08-14 | End: 2024-08-19 | Stop reason: HOSPADM

## 2024-08-14 RX ORDER — NALOXONE HYDROCHLORIDE 0.4 MG/ML
0.2 INJECTION, SOLUTION INTRAMUSCULAR; INTRAVENOUS; SUBCUTANEOUS
Status: DISCONTINUED | OUTPATIENT
Start: 2024-08-14 | End: 2024-08-19 | Stop reason: HOSPADM

## 2024-08-14 RX ORDER — PROPOFOL 10 MG/ML
INJECTION, EMULSION INTRAVENOUS CONTINUOUS PRN
Status: DISCONTINUED | OUTPATIENT
Start: 2024-08-14 | End: 2024-08-14

## 2024-08-14 RX ORDER — ACETAMINOPHEN 325 MG/1
975 TABLET ORAL EVERY 8 HOURS
Status: COMPLETED | OUTPATIENT
Start: 2024-08-14 | End: 2024-08-17

## 2024-08-14 RX ORDER — HYDROMORPHONE HCL IN WATER/PF 6 MG/30 ML
0.2 PATIENT CONTROLLED ANALGESIA SYRINGE INTRAVENOUS EVERY 5 MIN PRN
Status: DISCONTINUED | OUTPATIENT
Start: 2024-08-14 | End: 2024-08-14 | Stop reason: HOSPADM

## 2024-08-14 RX ORDER — ONDANSETRON 2 MG/ML
4 INJECTION INTRAMUSCULAR; INTRAVENOUS EVERY 30 MIN PRN
Status: DISCONTINUED | OUTPATIENT
Start: 2024-08-14 | End: 2024-08-14 | Stop reason: HOSPADM

## 2024-08-14 RX ORDER — ONDANSETRON 4 MG/1
4 TABLET, ORALLY DISINTEGRATING ORAL EVERY 30 MIN PRN
Status: DISCONTINUED | OUTPATIENT
Start: 2024-08-14 | End: 2024-08-14 | Stop reason: HOSPADM

## 2024-08-14 RX ORDER — FENTANYL CITRATE 50 UG/ML
25 INJECTION, SOLUTION INTRAMUSCULAR; INTRAVENOUS
Status: DISCONTINUED | OUTPATIENT
Start: 2024-08-14 | End: 2024-08-14 | Stop reason: HOSPADM

## 2024-08-14 RX ORDER — BUPIVACAINE HYDROCHLORIDE 5 MG/ML
INJECTION, SOLUTION PERINEURAL PRN
Status: DISCONTINUED | OUTPATIENT
Start: 2024-08-14 | End: 2024-08-14 | Stop reason: HOSPADM

## 2024-08-14 RX ORDER — CEFAZOLIN SODIUM/WATER 2 G/20 ML
2 SYRINGE (ML) INTRAVENOUS SEE ADMIN INSTRUCTIONS
Status: DISCONTINUED | OUTPATIENT
Start: 2024-08-14 | End: 2024-08-14 | Stop reason: HOSPADM

## 2024-08-14 RX ORDER — NALOXONE HYDROCHLORIDE 0.4 MG/ML
0.1 INJECTION, SOLUTION INTRAMUSCULAR; INTRAVENOUS; SUBCUTANEOUS
Status: DISCONTINUED | OUTPATIENT
Start: 2024-08-14 | End: 2024-08-14 | Stop reason: HOSPADM

## 2024-08-14 RX ORDER — MEMANTINE HYDROCHLORIDE 5 MG/1
5 TABLET ORAL 2 TIMES DAILY
Status: DISCONTINUED | OUTPATIENT
Start: 2024-08-14 | End: 2024-08-19 | Stop reason: HOSPADM

## 2024-08-14 RX ORDER — ACETAMINOPHEN 325 MG/1
975 TABLET ORAL ONCE
Status: COMPLETED | OUTPATIENT
Start: 2024-08-14 | End: 2024-08-14

## 2024-08-14 RX ORDER — LIDOCAINE HYDROCHLORIDE 20 MG/ML
INJECTION, SOLUTION INFILTRATION; PERINEURAL PRN
Status: DISCONTINUED | OUTPATIENT
Start: 2024-08-14 | End: 2024-08-14

## 2024-08-14 RX ORDER — QUETIAPINE FUMARATE 25 MG/1
25 TABLET, FILM COATED ORAL 2 TIMES DAILY
Status: DISCONTINUED | OUTPATIENT
Start: 2024-08-14 | End: 2024-08-19 | Stop reason: HOSPADM

## 2024-08-14 RX ORDER — PROCHLORPERAZINE MALEATE 5 MG
5 TABLET ORAL EVERY 6 HOURS PRN
Status: DISCONTINUED | OUTPATIENT
Start: 2024-08-14 | End: 2024-08-19 | Stop reason: HOSPADM

## 2024-08-14 RX ORDER — ASPIRIN 81 MG/1
81 TABLET ORAL 2 TIMES DAILY
Status: DISCONTINUED | OUTPATIENT
Start: 2024-08-14 | End: 2024-08-15 | Stop reason: ALTCHOICE

## 2024-08-14 RX ORDER — POLYETHYLENE GLYCOL 3350 17 G/17G
17 POWDER, FOR SOLUTION ORAL DAILY
Status: DISCONTINUED | OUTPATIENT
Start: 2024-08-15 | End: 2024-08-19 | Stop reason: HOSPADM

## 2024-08-14 RX ORDER — MAGNESIUM HYDROXIDE 1200 MG/15ML
LIQUID ORAL PRN
Status: DISCONTINUED | OUTPATIENT
Start: 2024-08-14 | End: 2024-08-14 | Stop reason: HOSPADM

## 2024-08-14 RX ADMIN — LIDOCAINE HYDROCHLORIDE 60 MG: 20 INJECTION, SOLUTION INFILTRATION; PERINEURAL at 10:32

## 2024-08-14 RX ADMIN — FENTANYL CITRATE 25 MCG: 50 INJECTION, SOLUTION INTRAMUSCULAR; INTRAVENOUS at 11:47

## 2024-08-14 RX ADMIN — PHENYLEPHRINE HYDROCHLORIDE 100 MCG: 10 INJECTION INTRAVENOUS at 11:01

## 2024-08-14 RX ADMIN — PHENYLEPHRINE HYDROCHLORIDE 100 MCG: 10 INJECTION INTRAVENOUS at 11:07

## 2024-08-14 RX ADMIN — PHENYLEPHRINE HYDROCHLORIDE 100 MCG: 10 INJECTION INTRAVENOUS at 10:53

## 2024-08-14 RX ADMIN — PHENYLEPHRINE HYDROCHLORIDE 100 MCG: 10 INJECTION INTRAVENOUS at 11:17

## 2024-08-14 RX ADMIN — QUETIAPINE FUMARATE 25 MG: 25 TABLET ORAL at 21:41

## 2024-08-14 RX ADMIN — ACETAMINOPHEN 325MG 975 MG: 325 TABLET ORAL at 14:57

## 2024-08-14 RX ADMIN — ONDANSETRON 4 MG: 2 INJECTION INTRAMUSCULAR; INTRAVENOUS at 11:13

## 2024-08-14 RX ADMIN — DOXEPIN HYDROCHLORIDE 10 MG: 10 CAPSULE ORAL at 22:44

## 2024-08-14 RX ADMIN — PILOCARPINE HYDROCHLORIDE 5 MG: 5 TABLET, FILM COATED ORAL at 22:44

## 2024-08-14 RX ADMIN — PROPOFOL 20 MCG/KG/MIN: 10 INJECTION, EMULSION INTRAVENOUS at 10:45

## 2024-08-14 RX ADMIN — FENTANYL CITRATE 25 MCG: 50 INJECTION INTRAMUSCULAR; INTRAVENOUS at 10:52

## 2024-08-14 RX ADMIN — MEMANTINE 5 MG: 5 TABLET ORAL at 22:44

## 2024-08-14 RX ADMIN — PHENYLEPHRINE HYDROCHLORIDE 100 MCG: 10 INJECTION INTRAVENOUS at 10:50

## 2024-08-14 RX ADMIN — PHENYLEPHRINE HYDROCHLORIDE 200 MCG: 10 INJECTION INTRAVENOUS at 10:56

## 2024-08-14 RX ADMIN — PHENYLEPHRINE HYDROCHLORIDE 50 MCG: 10 INJECTION INTRAVENOUS at 10:45

## 2024-08-14 RX ADMIN — FENTANYL CITRATE 25 MCG: 50 INJECTION, SOLUTION INTRAMUSCULAR; INTRAVENOUS at 12:33

## 2024-08-14 RX ADMIN — ACETAMINOPHEN 325MG 975 MG: 325 TABLET ORAL at 21:42

## 2024-08-14 RX ADMIN — ASPIRIN 81 MG: 81 TABLET, COATED ORAL at 21:42

## 2024-08-14 RX ADMIN — FENTANYL CITRATE 50 MCG: 50 INJECTION INTRAMUSCULAR; INTRAVENOUS at 10:33

## 2024-08-14 RX ADMIN — HYDROMORPHONE HYDROCHLORIDE 1 MG: 2 TABLET ORAL at 19:55

## 2024-08-14 RX ADMIN — Medication 2 G: at 09:43

## 2024-08-14 RX ADMIN — SENNOSIDES AND DOCUSATE SODIUM 1 TABLET: 50; 8.6 TABLET ORAL at 21:42

## 2024-08-14 RX ADMIN — FENTANYL CITRATE 25 MCG: 50 INJECTION INTRAMUSCULAR; INTRAVENOUS at 11:39

## 2024-08-14 RX ADMIN — CEFAZOLIN 1 G: 1 INJECTION, POWDER, FOR SOLUTION INTRAMUSCULAR; INTRAVENOUS at 17:26

## 2024-08-14 RX ADMIN — SODIUM CHLORIDE: 9 INJECTION, SOLUTION INTRAVENOUS at 09:35

## 2024-08-14 RX ADMIN — PROPOFOL 80 MG: 10 INJECTION, EMULSION INTRAVENOUS at 10:32

## 2024-08-14 ASSESSMENT — ACTIVITIES OF DAILY LIVING (ADL)
ADLS_ACUITY_SCORE: 22.25
ADLS_ACUITY_SCORE: 22.25
ADLS_ACUITY_SCORE: 29.25
ADLS_ACUITY_SCORE: 22.25
ADLS_ACUITY_SCORE: 28.25
ADLS_ACUITY_SCORE: 22.25
ADLS_ACUITY_SCORE: 29.25
ADLS_ACUITY_SCORE: 27.25
ADLS_ACUITY_SCORE: 29.25
ADLS_ACUITY_SCORE: 29.25

## 2024-08-14 NOTE — PLAN OF CARE
Goal Outcome Evaluation:  Procedure: Procedure(s):  RIGHT KNEE SUPERFICIAL IRRIGATION AND DEBRIDEMENT  Pt A&OX4, VSS on RA except slightly elevated BP. Dressing CDI. Pureed thin diet. Base line dementia.  PIV infusing 75 mL/hr.Attendant at bed side. Will continue to monitor.

## 2024-08-14 NOTE — ANESTHESIA PREPROCEDURE EVALUATION
Anesthesia Pre-Procedure Evaluation    Patient: Chelly Dave   MRN: [id number] : 1935        Procedure : Procedure(s):  RIGHT KNEE SUPERFICIAL IRRIGATION AND DEBRIDEMENT          Past Medical History:   Diagnosis Date    Arthritis     Cataract     Diabetes mellitus (H)     Gastro-oesophageal reflux disease     Hyperlipidemia       Past Surgical History:   Procedure Laterality Date    ARTHROPLASTY REVISION KNEE Right 2024    Procedure: Right knee irrigation and debridement with polyethylene exchange;  Surgeon: Billy Tracy MD;  Location: [facility] OR    COLONOSCOPY      LAPAROTOMY EXPLORATORY N/A 2018    Procedure: LAPAROTOMY EXPLORATORY;  Exploratory Laparotomy with Resection of Messenteric Mass and associated small Bowel  Anesthesia Block;  Surgeon: Ahsan Nicolas MD;  Location: [facility] OR    ORTHOPEDIC SURGERY      right knee replacement    PICC INSERTION Left 2018    5Fr - 46cm (5cm external), basilic vein, low SVC      Allergies   Allergen Reactions    Lisinopril     Oxycodone      Other reaction(s): Confusion    Aspirin Buf(Cacarb-Mgcarb-Mgo)      Other reaction(s): GI Upset  81 mg works well for her    Aspirin Buffered Unknown     Other reaction(s): GI Upset 81 mg works well for her      Social History     Tobacco Use    Smoking status: Never    Smokeless tobacco: Never   Substance Use Topics    Alcohol use: No      Wt Readings from Last 1 Encounters:   24 56.3 kg (124 lb 3.2 oz)        Anesthesia Evaluation   Pt has had prior anesthetic.     No history of anesthetic complications       ROS/MED HX  ENT/Pulmonary:  - neg pulmonary ROS     Neurologic:     (+)   dementia,                             Cardiovascular:     (+)  hypertension- -   -  - -             fainting (syncope).                         METS/Exercise Tolerance: 4 - Raking leaves, gardening    Hematologic:     (+)      anemia,          Musculoskeletal:       GI/Hepatic:     (+) GERD, Asymptomatic on  medication,                  Renal/Genitourinary:       Endo:     (+)  type II DM,   Not using insulin,                 Psychiatric/Substance Use:       Infectious Disease:       Malignancy:       Other:  - neg other ROS          Physical Exam    Airway        Mallampati: II   TM distance: > 3 FB   Neck ROM: full   Mouth opening: > 3 cm    Respiratory Devices and Support         Dental       (+) Edentulous      Cardiovascular   cardiovascular exam normal       Rhythm and rate: regular and normal     Pulmonary   pulmonary exam normal        breath sounds clear to auscultation           OUTSIDE LABS:  CBC:   Lab Results   Component Value Date    WBC 2.7 (L) 07/30/2024    WBC 2.4 (L) 07/23/2024    HGB 9.0 (L) 07/30/2024    HGB 8.8 (L) 07/23/2024    HCT 29.7 (L) 07/30/2024    HCT 29.5 (L) 07/23/2024     07/30/2024     07/23/2024     BMP:   Lab Results   Component Value Date     07/30/2024     07/23/2024    POTASSIUM 4.1 07/30/2024    POTASSIUM 3.6 07/23/2024    CHLORIDE 105 07/30/2024    CHLORIDE 105 07/23/2024    CO2 28 07/30/2024    CO2 27 07/23/2024    BUN 17.4 07/30/2024    BUN 11.7 07/23/2024    CR 0.71 07/30/2024    CR 0.71 07/23/2024    GLC 76 07/30/2024    GLC 86 07/23/2024     COAGS:   Lab Results   Component Value Date    INR 1.20 (H) 10/17/2011     POC:   Lab Results   Component Value Date     (H) 06/22/2018     HEPATIC:   Lab Results   Component Value Date    ALBUMIN 2.6 (L) 06/23/2018    PROTTOTAL 7.2 06/23/2018    ALT 13 06/23/2018    AST 25 07/30/2024    ALKPHOS 50 06/23/2018    BILITOTAL 0.3 06/23/2018     OTHER:   Lab Results   Component Value Date    PH 7.44 06/22/2018    LACT 1.2 06/23/2018    A1C 5.6 08/11/2016    ROBSON 8.2 (L) 07/30/2024    PHOS 4.5 06/21/2018    MAG 1.9 06/25/2018    LIPASE 220 06/20/2018    TSH 2.16 12/12/2014       Anesthesia Plan    ASA Status:  3    NPO Status:  NPO Appropriate    Anesthesia Type: General.     - Airway: LMA   Induction: Propofol.  "  Maintenance: Balanced.        Consents    Anesthesia Plan(s) and associated risks, benefits, and realistic alternatives discussed. Questions answered and patient/representative(s) expressed understanding.     - Discussed: Risks, Benefits and Alternatives for BOTH SEDATION and the PROCEDURE were discussed     - Discussed with:  Patient      - Extended Intubation/Ventilatory Support Discussed: No.      - Patient is DNR/DNI Status: No     Use of blood products discussed: No .     Postoperative Care    Pain management: Multi-modal analgesia.   PONV prophylaxis: Ondansetron (or other 5HT-3), Background Propofol Infusion     Comments:               Cindy Good MD    I have reviewed the pertinent notes and labs in the chart from the past 30 days and (re)examined the patient.  Any updates or changes from those notes are reflected in this note.             # Drug Induced Platelet Defect: home medication list includes an antiplatelet medication  # Overweight: Estimated body mass index is 25.96 kg/m  as calculated from the following:    Height as of this encounter: 1.473 m (4' 10\").    Weight as of this encounter: 56.3 kg (124 lb 3.2 oz).      "

## 2024-08-14 NOTE — ANESTHESIA PROCEDURE NOTES
Airway       Patient location during procedure: OR  Staff -        Other Anesthesia Staff: Frankie Luo       Performed By: SRNA  Consent for Airway        Urgency: elective  Indications and Patient Condition       Indications for airway management: sridhar-procedural       Induction type:intravenous       Mask difficulty assessment: 1 - vent by mask    Final Airway Details       Final airway type: supraglottic airway    Supraglottic Airway Details        Type: LMA       Brand: I-Gel       LMA size: 4    Post intubation assessment        Placement verified by: capnometry, equal breath sounds and chest rise        Number of attempts at approach: 1       Number of other approaches attempted: 0       Secured with: commercial tube medina and tape       Ease of procedure: easy       Dentition: Intact and Unchanged

## 2024-08-14 NOTE — ANESTHESIA CARE TRANSFER NOTE
Patient: Chelly Dave    Procedure: Procedure(s):  RIGHT KNEE SUPERFICIAL IRRIGATION AND DEBRIDEMENT       Diagnosis: Wound disruption, post-op, skin [T81.31XA]  Diagnosis Additional Information: No value filed.    Anesthesia Type:   General     Note:    Oropharynx: oropharynx clear of all foreign objects and spontaneously breathing  Level of Consciousness: drowsy  Oxygen Supplementation: face mask  Level of Supplemental Oxygen (L/min / FiO2): 6  Independent Airway: airway patency satisfactory and stable  Dentition: dentition unchanged  Vital Signs Stable: post-procedure vital signs reviewed and stable  Report to RN Given: handoff report given  Patient transferred to: PACU    Handoff Report: Identifed the Patient, Identified the Reponsible Provider, Reviewed the pertinent medical history, Discussed the surgical course, Reviewed Intra-OP anesthesia mangement and issues during anesthesia, Set expectations for post-procedure period and Allowed opportunity for questions and acknowledgement of understanding      Vitals:  Vitals Value Taken Time   /88 08/14/24 1142   Temp     Pulse 81 08/14/24 1145   Resp 9 08/14/24 1145   SpO2 100 % 08/14/24 1144   Vitals shown include unfiled device data.    Electronically Signed By: JAYME Funes CRNA  August 14, 2024  11:46 AM

## 2024-08-14 NOTE — ANESTHESIA POSTPROCEDURE EVALUATION
Patient: Chelly Dave    Procedure: Procedure(s):  RIGHT KNEE SUPERFICIAL IRRIGATION AND DEBRIDEMENT       Anesthesia Type:  General    Note:  Disposition: Outpatient   Postop Pain Control: Uneventful            Sign Out: Well controlled pain   PONV: No   Neuro/Psych: Uneventful            Sign Out: Acceptable/Baseline neuro status   Airway/Respiratory: Uneventful            Sign Out: Acceptable/Baseline resp. status   CV/Hemodynamics: Uneventful            Sign Out: Acceptable CV status; No obvious hypovolemia; No obvious fluid overload   Other NRE: NONE   DID A NON-ROUTINE EVENT OCCUR? No           Last vitals:  Vitals Value Taken Time   /62 08/14/24 1300   Temp 36.2  C (97.2  F) 08/14/24 1245   Pulse 72 08/14/24 1305   Resp 9 08/14/24 1305   SpO2 96 % 08/14/24 1306   Vitals shown include unfiled device data.    Electronically Signed By: Cindy Good MD  August 14, 2024  1:40 PM

## 2024-08-14 NOTE — OP NOTE
OPERATIVE NOTE    Name: Chelly Dave  MRN: [id number]  Age: 89 year old    YOB: 1935    Date of Procedure: 8/14/2024      Pre-operative Diagnosis:   Right knee superficial wound (1cm x 0.5cm x 0.5cm)    Post-operative Diagnosis:    Right knee superficial wound (1cm x 0.5cm x 0.5cm)    Procedure:   Right knee superficial irrigation and debridement    Assistant:  YOHANNES Cherry     A skilled first assistant was necessary for this procedure for assistance with patient positioning, prepping, draping, surgical visualization, wound closure, and application of the dressing.     Anesthesia:  1. General    Complications:  None    Estimated blood loss:   50cc     Transfusions:  None    Fluids:  Per anesthesia    Specimens:  None    Drain:  None      Indications:   Chelly is a very pleasant 89-year-old female with a history of dementia who I met on trauma call on June 20, 2024 diagnosed her with an acute hematogenous prosthetic joint infection that I treated with irrigation debridement with polyethylene exchange June 21, 2024.  She initially did well after surgery but she removed her Prevena incisional wound VAC on postoperative day 2.  When I saw her in clinic 2 weeks after surgery, her incision was healing well but she did have a small scab that her daughter, that she continued to pick at.  I was contacted by her transitional care unit stating that the carrier was concerned about her wound.  We brought her back to clinic and she did have a superficial dehiscence along the midportion of the incision.  The remainder of the incision was well-healed.  Her daughter did not think she noticed any new or increased pain.  No fevers or chills.  Since the wound was superficial, I elected to try and treated with wound care and a knee immobilizer.  She keep the knee immobilizer in place and wound care did not seem to be progressing.  Therefore, when I saw her in clinic on August 8, 2024 I recommended proceeding  to the operating room for superficial irrigation and debridement.  I discussed with the patient's daughter that I planned to place her into a posterior slab splint use a another Prevena incisional wound VAC postoperatively.  After discussing the risk and benefits of both operative and nonoperative management, decision made to proceed with surgery in the form of superficial irrigation and debridement.    Informed Consent:  Preoperatively, the risks, benefits, and possible complications of the procedure were discussed. The risks discussed included but were not limited to wound healing problems, infection, persistent pain, bleeding, neurovascular damage, thromboembolic disease, and medical complications related anesthesia such as heart attack, stroke, even death.    Procedural Pause:   The patient's correct identity, side, site, and procedure to be performed was verified.  Intravenous antibiotics were administered prior to skin incision.    Description of Procedure:   Chelly Dave was brought to the operating room.  The patient was subsequent transferred to the operating room table.  All bony prominences were well-padded.  General anesthesia was utilized.  The right lower extremity was then prepped and draped in routine sterile fashion.  A procedural pause was performed as described above.  The patient's previous incision was utilized and I excised the area of the incision that dehisced.  I extended the incision proximal and distal.  I examined the quadriceps tendon and the arthrotomy.  The quadriceps tendon and arthrotomy was intact.  The wound did not involve the joint.  Therefore, I proceeded to irrigate the wound with 3 L normal saline.  I debrided any necrotic or fibrotic appearing tissue.  I then irrigated the wound with 500 cc of Betadine saline solution.  I then proceeded to closure.  The subcutaneous tissue was closed with 2-0 Monocryl and the skin was closed with staples.  A Prevena incisional wound VAC  was placed.  A well-padded posterior slab splint was placed to keep the patient in extension and prevent her from removing the wound VAC again.  The patient woke up from anesthesia without issue and transferred to the PACU in stable condition.  All sponge and needle counts were correct.      Post-operative Plan:  Activity: Weightbearing as tolerated  Antibiotics: Weight-based Ancef x2 doses   DVT: Aspirin 81 mg twice daily  Wound: Posterior slab splint and Prevena incisional wound VAC for 2 weeks  Disposition: Inpatient      Billy Tracy MD  [facility]  Phone: [phone]  Fax: [phone]

## 2024-08-15 ENCOUNTER — APPOINTMENT (OUTPATIENT)
Dept: PHYSICAL THERAPY | Facility: CLINIC | Age: 89
End: 2024-08-15
Attending: ORTHOPAEDIC SURGERY
Payer: COMMERCIAL

## 2024-08-15 LAB
GLUCOSE BLDC GLUCOMTR-MCNC: 101 MG/DL (ref 70–99)
GLUCOSE BLDC GLUCOMTR-MCNC: 60 MG/DL (ref 70–99)
GLUCOSE BLDC GLUCOMTR-MCNC: 78 MG/DL (ref 70–99)
GLUCOSE BLDC GLUCOMTR-MCNC: 81 MG/DL (ref 70–99)
HGB BLD-MCNC: 9.3 G/DL (ref 11.7–15.7)

## 2024-08-15 PROCEDURE — 36415 COLL VENOUS BLD VENIPUNCTURE: CPT | Performed by: ORTHOPAEDIC SURGERY

## 2024-08-15 PROCEDURE — 250N000011 HC RX IP 250 OP 636: Performed by: ORTHOPAEDIC SURGERY

## 2024-08-15 PROCEDURE — 250N000013 HC RX MED GY IP 250 OP 250 PS 637: Performed by: ORTHOPAEDIC SURGERY

## 2024-08-15 PROCEDURE — 99244 OFF/OP CNSLTJ NEW/EST MOD 40: CPT | Performed by: STUDENT IN AN ORGANIZED HEALTH CARE EDUCATION/TRAINING PROGRAM

## 2024-08-15 PROCEDURE — 250N000013 HC RX MED GY IP 250 OP 250 PS 637: Performed by: HOSPITALIST

## 2024-08-15 PROCEDURE — 999N000111 HC STATISTIC OT IP EVAL DEFER

## 2024-08-15 PROCEDURE — 97162 PT EVAL MOD COMPLEX 30 MIN: CPT | Mod: GP | Performed by: PHYSICAL THERAPIST

## 2024-08-15 PROCEDURE — 85018 HEMOGLOBIN: CPT | Performed by: ORTHOPAEDIC SURGERY

## 2024-08-15 PROCEDURE — 120N000001 HC R&B MED SURG/OB

## 2024-08-15 PROCEDURE — 97530 THERAPEUTIC ACTIVITIES: CPT | Mod: GP | Performed by: PHYSICAL THERAPIST

## 2024-08-15 PROCEDURE — 258N000003 HC RX IP 258 OP 636: Performed by: ORTHOPAEDIC SURGERY

## 2024-08-15 RX ORDER — ASPIRIN 81 MG/1
81 TABLET, CHEWABLE ORAL 2 TIMES DAILY
Status: DISCONTINUED | OUTPATIENT
Start: 2024-08-15 | End: 2024-08-19 | Stop reason: HOSPADM

## 2024-08-15 RX ORDER — NICOTINE POLACRILEX 4 MG
15-30 LOZENGE BUCCAL
Status: DISCONTINUED | OUTPATIENT
Start: 2024-08-15 | End: 2024-08-19 | Stop reason: HOSPADM

## 2024-08-15 RX ORDER — DEXTROSE MONOHYDRATE 25 G/50ML
25-50 INJECTION, SOLUTION INTRAVENOUS
Status: DISCONTINUED | OUTPATIENT
Start: 2024-08-15 | End: 2024-08-19 | Stop reason: HOSPADM

## 2024-08-15 RX ORDER — CEFAZOLIN SODIUM 1 G/3ML
1 INJECTION, POWDER, FOR SOLUTION INTRAMUSCULAR; INTRAVENOUS EVERY 8 HOURS
Status: COMPLETED | OUTPATIENT
Start: 2024-08-15 | End: 2024-08-15

## 2024-08-15 RX ADMIN — ASPIRIN 81 MG CHEWABLE TABLET 81 MG: 81 TABLET CHEWABLE at 20:42

## 2024-08-15 RX ADMIN — SODIUM CHLORIDE, POTASSIUM CHLORIDE, SODIUM LACTATE AND CALCIUM CHLORIDE: 600; 310; 30; 20 INJECTION, SOLUTION INTRAVENOUS at 01:08

## 2024-08-15 RX ADMIN — SENNOSIDES AND DOCUSATE SODIUM 1 TABLET: 50; 8.6 TABLET ORAL at 09:05

## 2024-08-15 RX ADMIN — PILOCARPINE HYDROCHLORIDE 5 MG: 5 TABLET, FILM COATED ORAL at 20:42

## 2024-08-15 RX ADMIN — AMOXICILLIN 500 MG: 500 CAPSULE ORAL at 20:42

## 2024-08-15 RX ADMIN — PILOCARPINE HYDROCHLORIDE 5 MG: 5 TABLET, FILM COATED ORAL at 06:58

## 2024-08-15 RX ADMIN — CEFAZOLIN 1 G: 1 INJECTION, POWDER, FOR SOLUTION INTRAMUSCULAR; INTRAVENOUS at 01:05

## 2024-08-15 RX ADMIN — ACETAMINOPHEN 325MG 975 MG: 325 TABLET ORAL at 20:42

## 2024-08-15 RX ADMIN — ACETAMINOPHEN 325MG 975 MG: 325 TABLET ORAL at 12:45

## 2024-08-15 RX ADMIN — ASPIRIN 81 MG: 81 TABLET, COATED ORAL at 09:05

## 2024-08-15 RX ADMIN — PANTOPRAZOLE SODIUM 40 MG: 40 TABLET, DELAYED RELEASE ORAL at 09:05

## 2024-08-15 RX ADMIN — POLYETHYLENE GLYCOL 3350 17 G: 17 POWDER, FOR SOLUTION ORAL at 09:06

## 2024-08-15 RX ADMIN — MEMANTINE 5 MG: 5 TABLET ORAL at 09:05

## 2024-08-15 RX ADMIN — QUETIAPINE FUMARATE 25 MG: 25 TABLET ORAL at 09:05

## 2024-08-15 RX ADMIN — CEFAZOLIN 1 G: 1 INJECTION, POWDER, FOR SOLUTION INTRAMUSCULAR; INTRAVENOUS at 09:05

## 2024-08-15 RX ADMIN — QUETIAPINE FUMARATE 25 MG: 25 TABLET ORAL at 20:41

## 2024-08-15 RX ADMIN — AMOXICILLIN 500 MG: 500 CAPSULE ORAL at 09:05

## 2024-08-15 RX ADMIN — FLUOXETINE HYDROCHLORIDE 20 MG: 20 CAPSULE ORAL at 09:05

## 2024-08-15 RX ADMIN — PILOCARPINE HYDROCHLORIDE 5 MG: 5 TABLET, FILM COATED ORAL at 12:45

## 2024-08-15 RX ADMIN — ACETAMINOPHEN 325MG 975 MG: 325 TABLET ORAL at 05:28

## 2024-08-15 RX ADMIN — SENNOSIDES AND DOCUSATE SODIUM 1 TABLET: 50; 8.6 TABLET ORAL at 20:42

## 2024-08-15 RX ADMIN — MEMANTINE 5 MG: 5 TABLET ORAL at 20:42

## 2024-08-15 RX ADMIN — CEFAZOLIN 1 G: 1 INJECTION, POWDER, FOR SOLUTION INTRAMUSCULAR; INTRAVENOUS at 16:26

## 2024-08-15 RX ADMIN — DOXEPIN HYDROCHLORIDE 10 MG: 10 CAPSULE ORAL at 21:17

## 2024-08-15 ASSESSMENT — ACTIVITIES OF DAILY LIVING (ADL)
ADLS_ACUITY_SCORE: 29.25
ADLS_ACUITY_SCORE: 32.25
ADLS_ACUITY_SCORE: 27.25
ADLS_ACUITY_SCORE: 32.25
ADLS_ACUITY_SCORE: 27.25
ADLS_ACUITY_SCORE: 32.25
ADLS_ACUITY_SCORE: 27.25
ADLS_ACUITY_SCORE: 32.25
ADLS_ACUITY_SCORE: 29.25
ADLS_ACUITY_SCORE: 32.25
ADLS_ACUITY_SCORE: 27.25
ADLS_ACUITY_SCORE: 29.25
ADLS_ACUITY_SCORE: 27.25
ADLS_ACUITY_SCORE: 32.25

## 2024-08-15 NOTE — PROGRESS NOTES
PROGRESS NOTE    SUBJECTIVE  Chelly Dave is status post right knee superficial irrigation and debridement on August 14, 2024. No acute events overnight. Afebrile with stable vitals.  She is doing well this morning.  She required a bedside sitter overnight because she kept picking at her IV and dressing.  Pain seems to be well-controlled.  She did not sleep.  She has not worked with therapy yet.  She will need TCU placement.    PHYSICAL EXAM  General: No acute distress. Alert and oriented to person, time and place.  Pulmonary: Breathing comfortably on room air.   Cardiac: Bilateral PT pulses are equal and symmetric. Regular rate and rhythm.   Musculoskeletal: Prevena incisional wound VAC is holding suction.  No output in canister.  Neurological: Patient is able to fire quadriceps, tibialis anterior, extensor hallucis longus and gastrocnemius. Sensation intact to light touch in bilateral L3-S1 distributions.     ASSESSMENT/PLAN  #1 Status post right knee superficial irrigation debridement on August 14, 2024    Chelly Dave is status post the above procedure.  She is doing well.  TCU placement appreciate social work's assistance with this.  Once she has placement, she is ready to discharge.    Activity: Weightbearing as tolerated. Activity as tolerated. No precautions. Continue to work with PT while in hospital.   Antibiotics: Weight-based Ancef x2 doses postoperatively or until discharge.  Cultures: None  Diet: Advance as tolerated. Goal diet: Purée  Dressing: Posterior slab splint for 2 weeks.  Prevena incisional wound VAC for 2 weeks.  DVT Prophylaxis: Aspirin 81mg BID for 6 weeks  Pain medication: Multimodal pain regimen with ice, elevation, Tylenol 1000mg Q6H scheduled, Celebrex 100mg BID, Oxycodone 5-10mg Q4 PRN    Disposition: TCU.  When she has placement she is ready to discharge.

## 2024-08-15 NOTE — PROGRESS NOTES
"   08/15/24 0900   Appointment Info   Signing Clinician's Name / Credentials (PT) Huma Mendoza, PT, DPT   Rehab Comments (PT) WBAT, no prec; lives w/dtr, has 2 FABIOLA; baseline dementia; d/c recently from TCU with hospital bed and manual w/c;  Per [facility] d/c notes: \"Ambulating 120 feet with front wheel walker contact-guard assist, completed 2 stairs with bilateral handrails min to mod assist, transfers mod to max assist with front wheel walker, mod assist for bed mobility, grooming/hygiene min assist, upper body dressing contact-guard assist, mod assist for lower body dressing, toileting min assist for transfer, mod assist for clothing management, max assist for showering\"   Living Environment   People in Home child(tram), adult   Current Living Arrangements house   Home Accessibility stairs to enter home   Number of Stairs, Main Entrance 2   Stair Railings, Main Entrance railings on both sides of stairs   Transportation Anticipated family or friend will provide;agency   Self-Care   Usual Activity Tolerance moderate   Current Activity Tolerance fair   Fall history within last six months yes   Number of times patient has fallen within last six months   (Unknown, pt is poor historian w/dementia)   General Information   Onset of Illness/Injury or Date of Surgery 08/14/24   Referring Physician Billy Tracy MD   Patient/Family Therapy Goals Statement (PT) Feel better   Pertinent History of Current Problem (include personal factors and/or comorbidities that impact the POC) 89 year old female with PMH of Dementia, HLD, DM, depression anxiety who was admitted on 8/14/2024 for planned right knee superficail I & D with Dr. Tracy   Existing Precautions/Restrictions fall   General Observations WBAT RLE no prec   Cognition   Affect/Mental Status (Cognition) confused   Orientation Status (Cognition) disoriented to;place;situation;time   Follows Commands (Cognition) follows one-step commands;0-24% accuracy   Cognitive " Status Comments Baseline dementia, difficult to assess   Pain Assessment   Patient Currently in Pain Yes, see Vital Sign flowsheet   Integumentary/Edema   Integumentary/Edema Comments RLE wrappped, no breakthrough bleeding   Posture    Posture Forward head position;Protracted shoulders;Kyphosis   Range of Motion (ROM)   Range of Motion ROM deficits secondary to surgical procedure;ROM deficits secondary to pain   Strength (Manual Muscle Testing)   Strength (Manual Muscle Testing) Deficits observed during functional mobility   Bed Mobility   Comment, (Bed Mobility) mod A x 2   Transfers   Comment, (Transfers) mod A x 2   Gait/Stairs (Locomotion)   Comment, (Gait/Stairs) mod A x 2   Balance   Balance Comments Needs B UE support   Clinical Impression   Criteria for Skilled Therapeutic Intervention Yes, treatment indicated   PT Diagnosis (PT) Impaired safety and indep w/fn mob   Influenced by the following impairments Cognition, strength, balance, activity tolerance, cardiovac endurance   Functional limitations due to impairments Transfers, amb, stairs   Clinical Presentation (PT Evaluation Complexity) evolving   Clinical Presentation Rationale Multiple affecting comorbidtiies, assessment incl strength, balance, fn mob. Evolving prsesentation   Clinical Decision Making (Complexity) moderate complexity   Planned Therapy Interventions (PT) balance training;bed mobility training;gait training;patient/family education;ROM (range of motion);stair training;strengthening   Risk & Benefits of therapy have been explained evaluation/treatment results reviewed;care plan/treatment goals reviewed;risks/benefits reviewed;current/potential barriers reviewed;participants voiced agreement with care plan;participants included;patient;daughter   PT Total Evaluation Time   PT Eval, Moderate Complexity Minutes (85050) 5   Physical Therapy Goals   PT Frequency 5x/week   PT Predicted Duration/Target Date for Goal Attainment 08/25/24   PT Goals  "Bed Mobility;Transfers;Gait;Stairs   PT: Bed Mobility Minimal assist   PT: Transfers Minimal assist   PT: Gait Minimal assist;10 feet   PT: Stairs 2 stairs;Minimal assist;Moderate assist   Interventions   Interventions Quick Adds Therapeutic Activity;Gait Training   Therapeutic Activity   Therapeutic Activities: dynamic activities to improve functional performance Minutes (73875) 45   Symptoms Noted During/After Treatment Fatigue;Increased pain   Treatment Detail/Skilled Intervention Pt confused throughout. Sitter present. Completed sup>sit with mod A x 2 an cues for sequencing. SIt>stand from bedside and pivoting to recliner with mod-max A x 2. Pt needing physical asist to bring R LE under her, keep her hands on the walker. Once sitting in recliner, completed amb with very close chair follow, transport chair. Completed short distances with MD present and standing in front of pt. Pt did have some better stepping once she started going, but overall she needs heavy assisance of 2 persons for safety with a close chair follow. She remained confused. She was very polite and endorsing \"thank you\" at end of session. Left sitting in recliner with chair alarm on.   Gait Training   Distance in Feet 10', 12', 6'   PT Discharge Planning   PT Plan Progress transfers, amb w/close chair follow   PT Discharge Recommendation (DC Rec) Transitional Care Facility   PT Rationale for DC Rec Pt is high falls risk, she requires Ax2 for transfers and short distance ambulation. She was not safe to trial stairs (has 2 steps to access home env). Anticipate pt will need Memory Care TCU prior to home d/c to reduce falls risk and caregiver burden. If d/c home, pt would require 24/7 Ax2 with all fn mobility.   PT Brief overview of current status Nursing staff please use AdriannaaSteady Ax2 or overhead patricia pending worsening cognition or incr pain   Total Session Time   Timed Code Treatment Minutes 45   Total Session Time (sum of timed and untimed " services) 50       [facility]  OUTPATIENT PHYSICAL THERAPY EVALUATION  PLAN OF TREATMENT FOR OUTPATIENT REHABILITATION  (COMPLETE FOR INITIAL CLAIMS ONLY)  Patient's Last Name, First Name, M.I.  YOB: 1935  Chelly Dave                           Provider's Name  [facility] Medical Record No.  [id number]                             Onset Date:  08/14/24   Start of Care Date:   8/14/24   Type:     _X_PT   ___OT   ___SLP Medical Diagnosis:       Knee I&D          PT Diagnosis:  Impaired safety and indep w/fn mob Visits from SOC:  1     See note for plan of treatment, functional goals and certification details    I CERTIFY THE NEED FOR THESE SERVICES FURNISHED UNDER        THIS PLAN OF TREATMENT AND WHILE UNDER MY CARE     (Physician co-signature of this document indicates review and certification of the therapy plan).

## 2024-08-15 NOTE — PLAN OF CARE
OT: Order received, chart reviewed and discussed with care team. Per discussion with PT, patient recently discharged to home with daughter from TCU and has daughter assisting with all I/ADL's at baseline. OT not indicated due to having no skilled OT needs in inpatient acute care as daughter can continue to assist w/ ADL's. Defer discharge recommendations to care team. Will complete OT orders.

## 2024-08-15 NOTE — PROGRESS NOTES
Brief Hospitalist Note:    Consult received. Chart reviewed. Discussed with nursing. Patient needs med rec completed. She has been confused and restless this evening, has severe dementia at baseline. Med rec completed, including resumption of PTA BID seroquel, also will hold PTA azathioprine for now. Full consult to follow in AM. Please do not hesitate to call in the meantime if behaviors do not improve with resumption of PTA medications or with any other questions or concerns.    Juan F Rubin MD

## 2024-08-15 NOTE — PROGRESS NOTES
Order- Nurse to advance diet. Per patient daughters, patient is on pureed diet due to no teeth. She is in the process of getting dentures pending insurance. Advance diet to pureed with thin liquid per patient daughter request.

## 2024-08-15 NOTE — PROGRESS NOTES
"CLINICAL NUTRITION SERVICES  -  ASSESSMENT NOTE    RECOMMENDATIONS FOR MD/PROVIDER TO ORDER:   Ordered 4oz Ensure Enlive TID at meals   Malnutrition:   % Weight Loss:  Weight loss does not meet criteria for malnutrition  % Intake:  No decreased intake noted  Subcutaneous Fat Loss:  Orbital region mild depletion, Upper arm region mild to moderate depletion   Muscle Loss:  Temporal region mild-moderate depletion and Clavicle bone region moderate depletion  Fluid Retention:  None noted    Malnutrition Diagnosis: Moderate malnutrition  In Context of:  Acute illness or injury     REASON FOR ASSESSMENT  Chelly Dave is a 89 year old female seen by Registered Dietitian for Admission Nutrition Risk Screen for answering \"yes\" to recently losing weight without trying (2--13#) and \"yes\" to recently eating poorly d/t a decrease in appetite.    PMH of dementia, HLD, DM, Sjogren's disease. Presented for planned right knee superficail I&D with Dr. Tracy.    NUTRITION HISTORY    - Visited patient this afternoon however unable to get any nutrition history as patient has dementia and is currently only alert to self.  - Patient was seen by RD previously in June 2024. She did not meet malnutrition criteria at that time and was receiving 4 oz Ensure at mealtimes. Per RD note, \"Daughter in room notes her sister does a majority of cares for their mother, that she requires feeding, and that she generally has a good appetite, but may decline eating/meals at times (not consistently).\"    CURRENT NUTRITION ORDERS  Diet Order: Pureed; Liquidized/Moderately Thick (level 3)    Current Intake/Tolerance:  - Per nursing assistant in room, appetite is great. She ate 100% yesterday and is eating well today.  - 100% intakes documented since admit per nursing flowsheets  - Will send Ensure Enlive given recent wt loss and to increase protein intake.    NUTRITION FOCUSED PHYSICAL ASSESSMENT FOR DIAGNOSING MALNUTRITION)  Yes         Observed:  " "  Muscle wasting (refer to documentation in Malnutrition section) and Subcutaneous fat loss (refer to documentation in Malnutrition section)    ANTHROPOMETRICS  Height: 4' 10\"  Weight: 56.3 kg, 124 lbs 3.2 oz  Body mass index is 25.96 kg/m .  Weight Status:  Overweight BMI 25-29.9  IBW: 43.2 kg  % IBW: 130%  Weight History: wt trending down, 8.5% loss in 2 months  Wt Readings from Last 10 Encounters:   08/14/24 56.3 kg (124 lb 3.2 oz)   07/31/24 59.9 kg (132 lb)   07/23/24 56.2 kg (124 lb)   07/16/24 56.5 kg (124 lb 9.6 oz)   07/08/24 58.1 kg (128 lb)   07/05/24 58.1 kg (128 lb)   06/30/24 61.2 kg (135 lb)   06/27/24 61.2 kg (135 lb)   06/20/24 61.5 kg (135 lb 9.3 oz)   09/13/19 63.5 kg (140 lb)     Per Care Everywhere:  59.3 kg (130 lb 12.8 oz) 10/16/2023    LABS  Labs reviewed    MEDICATIONS  Miralax, senokot, lactated ringers @ 75 mL/hr    ASSESSED NUTRITION NEEDS PER APPROVED PRACTICE GUIDELINES:  Dosing Weight 46.5 kg (adjusted)  Estimated Energy Needs: 9349-2009 kcals (25-30 Kcal/Kg)  Justification: maintenance  Estimated Protein Needs: 56-70 grams protein (1.2-1.5 g pro/Kg)  Justification: preservation of lean body mass  Estimated Fluid Needs: 9546-2099 mL (1 mL/Kcal)  Justification: maintenance or per MD    NUTRITION DIAGNOSIS:  Unintended weight loss related to unknown etiology as evidenced by 8.5% loss in 2 months    NUTRITION INTERVENTIONS  Recommendations / Nutrition Prescription  See above    Implementation  Nutrition education: Not appropriate at this time due to patient condition  Medical Food Supplement - ordered    Nutrition Goals  Patient to consume >75% of TID meals    MONITORING AND EVALUATION:  Progress towards goals will be monitored and evaluated per protocol and Practice Guidelines    Jenny Serna RD, LD  Clinical Dietitian - [facility]  "

## 2024-08-15 NOTE — UTILIZATION REVIEW
Inpatient to outpatient Procedure  Admission Status; Secondary Review Determination       Under the authority of the Utilization Management Committee, the utilization review process indicated a secondary review on the above patient. The review outcome is based on review of the medical records, discussions with staff, and applying clinical experience noted on the date of the review.     (x) Outpatient Status with extended recovery is appropriate - This patient does not meet hospital inpatient criteria. If this patient's primary payer is Medicare and was admitted as an inpatient, Condition Code 44 should be used and patient status changed to outpatient recovery.    RATIONALE FOR DETERMINATION   89 years old female underwent right knee superficial wound I &D . Patient was admitted to the hospital after the procedure. The procedure is not on the CMS inpatient list. No documented complications or unexpected recovery. Patient can be safely  monitored for bleeding and recover in outpatient/extended recovery setting.   The severity of illness, intensity of service provided, expected LOS and risk for adverse outcome doesn't meet inpatient hospital admission. Dr Tracy notified of this determination.    This document was produced using voice recognition software.     The information on this document is developed by the utilization review team in order for the business office to ensure compliance. This only denotes the appropriateness of proper admission status and does not reflect the quality of care rendered.   The definitions of Inpatient Status and Observation Status used in making the determination above are those provided in the CMS Coverage Manual, Chapter 1 and Chapter 6, section 70.4.     Sincerely,   Jl Damian MD    Utilization Review  Physician Advisor  [facility].

## 2024-08-15 NOTE — PLAN OF CARE
Goal Outcome Evaluation:         Date/Time: 08/15/2024 ([id number])    Trauma/Ortho/Medical (Choose one): Ortho    Diagnosis: Right knee superficial I and D.  POD#:1  Mental Status:Alert to self  Activity/dangle: Up with assist of 2/gb/ gris  Diet: Pureed diet with thin liquid  Pain: Tylenol  Quiroz/Voiding: Incontinent, external catheter  Tele/Restraints/Iso: No  02/LDA: IV-SL  D/C Date: Pending  Other Info: Sitter @ bedside, pulling IV lines and wound vac, had BM x2 today.

## 2024-08-15 NOTE — PROGRESS NOTES
MD Notification    Notified Person: MD    Notified Person Name: Isrrael Venegas    Notification Date/Time: 8/14/24 2037    Notification Interaction: Phone    Purpose of Notification: To resume PTA meds of the patient due to patient's restlessness.    Orders Received: Hospitalist consult  put in place    Comments:

## 2024-08-15 NOTE — CONSULTS
Care Management Initial Consult    General Information  Assessment completed with: Children, Dtr Brenda and other dtr  Type of CM/SW Visit: Initial Assessment    Primary Care Provider verified and updated as needed: Yes   Readmission within the last 30 days:        Reason for Consult: discharge planning  Advance Care Planning:            Communication Assessment  Patient's communication style: spoken language (non-English)    Hearing Difficulty or Deaf: no   Wear Glasses or Blind: yes    Cognitive  Cognitive/Neuro/Behavioral: .WDL except  Level of Consciousness: other (see comments) (Sleeping)  Arousal Level: opens eyes spontaneously  Orientation: disoriented x 4  Mood/Behavior: restless  Best Language: 0 - No aphasia  Speech: illogical    Living Environment:   People in home: child(tram), adult (daughter Brenda, grand kids-adult and son in law)  Xiomara daughter  Current living Arrangements: house      Able to return to prior arrangements: no  Living Arrangement Comments: Patient will need to be  able to ambulate to the bathroom    Family/Social Support:  Care provided by: child(tram)  Provides care for: no one, unable/limited ability to care for self  Marital Status: Single  Children          Description of Support System: Supportive, Involved    Support Assessment: Adequate family and caregiver support, Adequate social supports    Current Resources:   Patient receiving home care services: No     Community Resources: None  Equipment currently used at home: grab bar, tub/shower, grab bar, toilet, wheelchair, manual, walker, rolling  Supplies currently used at home:      Employment/Financial:  Employment Status: disabled        Financial Concerns:             Does the patient's insurance plan have a 3 day qualifying hospital stay waiver?  No    Lifestyle & Psychosocial Needs:  Social Determinants of Health     Food Insecurity: No Food Insecurity (10/6/2022)    Received from [facility]     Food Insecurity     Worried About Running Out of Food in the Last Year: 1   Depression: At risk (10/16/2023)    Received from [facility]    PHQ-2     PHQ-2 TOTAL SCORE: 6   Housing Stability: Low Risk  (10/6/2022)    Received from [facility]    Housing Stability     Unable to Pay for Housing in the Last Year: 1   Tobacco Use: Low Risk  (8/14/2024)    Patient History     Smoking Tobacco Use: Never     Smokeless Tobacco Use: Never     Passive Exposure: Not on file   Financial Resource Strain: Low Risk  (10/6/2022)    Received from [facility]    Financial Resource Strain     Difficulty of Paying Living Expenses: 3     Difficulty of Paying Living Expenses: Not on file   Alcohol Use: Not on file   Transportation Needs: No Transportation Needs (10/6/2022)    Received from [facility]    Transportation Needs     Lack of Transportation (Medical): 1   Physical Activity: Not on file   Interpersonal Safety: Not on file   Stress: Not on file   Social Connections: Socially Integrated (10/6/2022)    Received from [facility]    Social Connections     Frequency of Communication with Friends and Family: 0   Health Literacy: Not on file       Functional Status:  Prior to admission patient needed assistance:              Mental Health Status:          Chemical Dependency Status:                Values/Beliefs:  Spiritual, Cultural Beliefs, Adventism Practices, Values that affect care:                 Additional Information:  Writer met with pts two dtrs at bedside; patient  sleeping during conversation.  Introduced self and  role in discharge planning.  Xiomara is dtr who lives with pt and cares for her the most.  There are two other sisters who also cares for patient.  PT rec TCU and they would profer patient return to [facility].  They have already called there and have  been told they will take thepatient back.  Writer encouraged more TCU referral choices.  [facility] is second choice.  Two referrals sent and will revisit if more choices are needed.    CTS will continue to follow.    Misty [place] RN

## 2024-08-15 NOTE — CONSULTS
[facility]  Consult Note - Hospitalist Service     Date of Admission:  8/14/2024  Consult Requested by: Dr. Billy Tracy  Reason for Consult: Medical Mgmt   PRIMARY CARE PROVIDER:    Brittany Heredia    Assessment & Plan   Chelly Dave is a 89 year old female with PMH of Dementia, HLD, DM, depression anxiety who was admitted on 8/14/2024 for planned right knee superficail I & D with Dr. Tracy.    Hospitalsit team was consulted for medical management of chronic medical problems.     S/p Right Knee Superficial Irrigation and Debridement 8/14/24   Hx of Septic arthritis with Strep Gordonii s/p I&D and modular exchange 6/21/24  Patient was admitted at [facility] for septic arthritis of her right knee in June 2024.  She had presented with right knee pain and an arthrocentesis was done.  Cultures grew out Streptococcus gordonii.  Patient was initially started on IV vancomycin and ceftriaxone.  ID was consulted and she was transition to ceftriaxone alone.  ID recommended 6 weeks of IV antibiotics (ending 8/2/24).  Patient was ultimately discharged to a TCU after her admission.  She was discharged from her TCU on 8/8/2024.  Per orthopedic note 8/14/24: She initially did well after surgery but she removed her Prevena incisional wound VAC on postoperative day 2. When I saw her in clinic 2 weeks after surgery, her incision was healing well but she did have a small scab that her daughter, that she continued to pick at. I was contacted by her transitional care unit stating that the carrier was concerned about her wound. We brought her back to clinic and she did have a superficial dehiscence along the midportion of the incision. The remainder of the incision was well-healed. Her daughter did not think she noticed any new or increased pain. No fevers or chills. Since the wound was superficial, I elected to try and treated with wound care and a knee immobilizer. She keep the knee immobilizer in  "place and wound care did not seem to be progressing. Therefore, when I saw her in clinic on August 8, 2024 I recommended proceeding to the operating room for superficial irrigation and debridement.     - Post-op cares per Ortho   - DVT ppx: ASA 81mg BID   - PT/OT     - Hgb: 9.3     Depression/Anxiety   - Continue PTA Doxepin 10mg, Fluoxetine 20mg, Seroquel 25mg bID      Dementia  Per 6/2024 discharge summery: The patient's language as well as cultural barrier makes her dementia and confusion worse.    - Continue Memantine 5mg BID      Hyperlipidemia:  Liptor was stopped at last hospitalization      Diabetes   Not on medication  - Continue to monitor     Sjogren's disease  - Continue PTA azathioprine, pilocarpine.      Clinically Significant Risk Factors                            # Overweight: Estimated body mass index is 25.96 kg/m  as calculated from the following:    Height as of this encounter: 1.473 m (4' 10\").    Weight as of this encounter: 56.3 kg (124 lb 3.2 oz)., PRESENT ON ADMISSION           Diet: Discharge Instruction - Regular Diet Adult  Advance Diet as Tolerated: Regular Diet Adult; Dysphagia Diet Level 1: Pureed; Liquidized/Moderately Thick (level 3)     DVT Prophylaxis: Asa    Quiroz Catheter: Not present  Lines: None     Cardiac Monitoring: None  Code Status:  Defer to primary team     Disposition Plan       Expected Discharge Date: 08/16/2024      Destination: home        Entered: Lesley Garcia MD 08/15/2024, 1:34 PM        Dispo: Patient is medically stable from a hospitalilst point of view. Will leave the ultimately discharge date to the primary service. We will continue to follow along while patient is admitted.     Medically Ready for Discharge: Anticipated Today      Lesley Garcia MD  [facility]  Securely message with the Vocera Web Console (learn more here)  Text page via [employer] " Paging/Directory  ________________________________________________________________    Chief Complaint     Right knee wound     History is obtained from the EMR.      History of Present Illness   Chelly Dave is a 89 year old female with PMH of Dementia, HLD, DM, depression anxiety who was admitted on 8/14/2024 for planned right knee superficail I & D with Dr. Tracy.    Hospitalsit team was consulted for medical management of chronic medical problems.     Patient was admitted at [facility] for septic arthritis of her right knee in June 2024.  She had presented with right knee pain and an arthrocentesis was done.  Cultures grew out Streptococcus gordonii.  Patient was initially started on IV vancomycin and ceftriaxone.  ID was consulted and she was transition to ceftriaxone alone.  ID recommended 6 weeks of IV antibiotics (ending 8/2/24).  Patient was ultimately discharged to a TCU after her admission.  She was discharged from her TCU on 8/8/2024.  Per orthopedic note 8/14/24: She initially did well after surgery but she removed her Prevena incisional wound VAC on postoperative day 2. When I saw her in clinic 2 weeks after surgery, her incision was healing well but she did have a small scab that her daughter, that she continued to pick at. I was contacted by her transitional care unit stating that the carrier was concerned about her wound. We brought her back to clinic and she did have a superficial dehiscence along the midportion of the incision. The remainder of the incision was well-healed. Her daughter did not think she noticed any new or increased pain. No fevers or chills. Since the wound was superficial, I elected to try and treated with wound care and a knee immobilizer. She keep the knee immobilizer in place and wound care did not seem to be progressing. Therefore, when I saw her in clinic on August 8, 2024 I recommended proceeding to the operating room for superficial irrigation and debridement.      This morning the patient was seen working with PT. She was able to participate well. She stated that her pain was okay. Voiced no other concerns.       Past Medical History    I have reviewed this patient's medical history and updated it with pertinent information if needed.   Past Medical History:   Diagnosis Date    Arthritis     Cataract     Diabetes mellitus (H)     Gastro-oesophageal reflux disease     Hyperlipidemia        Medications   Prior to Admission Medications   Prescriptions Last Dose Informant Patient Reported? Taking?   AzaTHIOprine (IMURAN PO) 8/13/2024 Pharmacy Yes Yes   Sig: Take 50 mg by mouth daily   Blood Glucose Monitoring Suppl (FIFTY50 GLUCOSE METER 2.0) w/Device KIT   Yes No   Sig: Dispense meter, test strips, lancets covered by pt ins. E11.9 NIDDM type II - Test 1 time/day   Calcium Carb-Cholecalciferol (CALCIUM 500 +D) 500-400 MG-UNIT TABS 8/13/2024 Pharmacy Yes Yes   Sig: Take 1 tablet by mouth 2 times daily   Cholecalciferol (VITAMIN D3) 1000 units CAPS Past Month  Yes Yes   Sig: Take 1,000 Units by mouth daily   DOXEPIN HCL PO 8/13/2024 Pharmacy Yes Yes   Sig: Take 10 mg by mouth At Bedtime   FLUoxetine (PROZAC) 20 MG capsule 8/13/2024  Yes Yes   Sig: Take 20 mg by mouth daily   Pilocarpine HCl (SALAGEN PO) 8/13/2024 Pharmacy Yes Yes   Sig: Take 5 mg by mouth 3 times daily AM, 1200 & HS   QUEtiapine (SEROQUEL) 25 MG tablet 8/13/2024  Yes Yes   Sig: Take 25 mg by mouth 2 times daily   acetaminophen (TYLENOL) 325 MG tablet 8/14/2024  No Yes   Sig: Take 2 tablets (650 mg) by mouth 3 times daily And BID PRN   Patient taking differently: Take 650 mg by mouth 3 times daily And daily PRN   albuterol (PROVENTIL) (2.5 MG/3ML) 0.083% neb solution Past Week  Yes Yes   Sig: Take 2.5 mg by nebulization every 6 hours as needed for shortness of breath, wheezing or cough   amoxicillin (AMOXIL) 500 MG tablet 8/13/2024  Yes Yes   Sig: Take 500 mg by mouth 2 times daily Continue for life   aspirin 81  MG EC tablet Past Month  Yes Yes   Sig: Take 81 mg by mouth daily   blood glucose monitoring (NO BRAND SPECIFIED) test strip   Yes No   Sig: USE TO CHECK BLOOD SUGARS EVERY DAY   blood glucose monitoring (SOFTCLIX) lancets   Yes No   Sig: USE TO TEST BLOOD SUGARS DAILY   cycloSPORINE (RESTASIS) 0.05 % ophthalmic emulsion 8/13/2024  Yes Yes   Sig: Instill 1 drop into both eyes every 12 hours.   No further refills will be given unless you come in for your exam.   ferrous sulfate (IRON) 325 (65 FE) MG tablet 8/13/2024  No Yes   Sig: Take 1 tablet (325 mg) by mouth daily   lidocaine (LIDODERM) 5 % patch Past Week Daughter Yes Yes   Sig: Place 1 patch onto the skin daily as needed for moderate pain (12 hours on; 12 hours off. Patient applies to knees or back)   loratadine (CLARITIN) 10 MG tablet Past Month  Yes Yes   Sig: Take 10 mg by mouth daily as needed   memantine (NAMENDA) 5 MG tablet 8/13/2024  Yes Yes   Sig: Take 5 mg by mouth 2 times daily   miconazole (MICATIN) 2 % AERP powder Unknown  Yes Yes   Sig: Apply topically 2 times daily To sridhar area until redness resolved   ondansetron (ZOFRAN ODT) 4 MG ODT tab Unknown  No Yes   Sig: Take 1 tablet (4 mg) by mouth every 6 hours as needed for nausea   pantoprazole (PROTONIX) 40 MG EC tablet 8/13/2024  Yes Yes   Sig: Take 40 mg by mouth daily   polyethylene glycol (MIRALAX/GLYCOLAX) powder Past Week  Yes Yes   Sig: Take 17 g by mouth daily as needed   polyethylene glycol 400 (BLINK TEARS) 0.25 % SOLN ophthalmic solution 8/13/2024 Daughter Yes Yes   Sig: Place 1-2 drops into both eyes every 2 hours as needed for dry eyes   senna-docusate (SENOKOT-S/PERICOLACE) 8.6-50 MG tablet Past Week  No Yes   Sig: Take 2 tablets by mouth 2 times daily      Facility-Administered Medications: None     Allergies   Allergies   Allergen Reactions    Lisinopril     Oxycodone      Other reaction(s): Confusion    Aspirin Buf(Cacarb-Mgcarb-Mgo)      Other reaction(s): GI Upset  81 mg works  well for her    Aspirin Buffered Unknown     Other reaction(s): GI Upset 81 mg works well for her       Physical Exam   Vital Signs: Temp: 98.5  F (36.9  C) Temp src: Axillary BP: 118/80 Pulse: 90   Resp: 18 SpO2: 97 % O2 Device: None (Room air)    Weight: 124 lbs 3.2 oz    Constitutional: Awake, alert, cooperative, no apparent distress.    ENT: Normocephalic, without obvious abnormality, atraumatic, oral pharynx with moist mucus membranes  Pulmonary: Clear to auscultation bilaterally, no crackles or wheezing.  Cardiovascular: Regular rate and rhythm, normal S1 and S2  GI: Normal bowel sounds, soft, non-distended, non-tender.    Skin/Integumen: Visualized skin appeared clear.    Psych:  Alert and oriented x 1.   Extremities: Right knee in splint      Medical Decision Making       60 MINUTES SPENT BY ME on the date of service doing chart review, history, exam, documentation & further activities per the note.         Data   Results for orders placed or performed during the hospital encounter of 08/14/24 (from the past 24 hour(s))   Glucose by meter   Result Value Ref Range    GLUCOSE BY METER POCT 60 (L) 70 - 99 mg/dL   Glucose by meter   Result Value Ref Range    GLUCOSE BY METER POCT 78 70 - 99 mg/dL   Glucose by meter   Result Value Ref Range    GLUCOSE BY METER POCT 81 70 - 99 mg/dL   Hemoglobin   Result Value Ref Range    Hemoglobin 9.3 (L) 11.7 - 15.7 g/dL   Glucose by meter   Result Value Ref Range    GLUCOSE BY METER POCT 101 (H) 70 - 99 mg/dL

## 2024-08-15 NOTE — PLAN OF CARE
Goal Outcome Evaluation:      Plan of Care Reviewed With: patient    Overall Patient Progress: improvingOverall Patient Progress: improving      Date/Time:8/14/24 @ [id number]    Trauma/Ortho/Medical (Choose one) Ortho    Diagnosis: R knee superficial I and D  POD#:1  Mental Status: Alert to self, confused  Activity/dangle: Stayed in bed the entire shift  Diet: Level 1 pureed, moderately thickened liquid level 3  Pain:Managed with PRN Dilaudid and scheduled Tylenol  Quiroz/Voiding:External catheter  Tele/Restraints/Iso:N/A  02/LDA:On RA. LR running at 75ml/hr on the L arm.  D/C Date:TBD  Other Info:Elevated BP. Dressing CDI. Has bedside attendant.Hospitalist ff. Blood sugar 60, drank 240 ml orange juice. Hospitalist notified. Latest blood sugar of 101.

## 2024-08-16 ENCOUNTER — APPOINTMENT (OUTPATIENT)
Dept: PHYSICAL THERAPY | Facility: CLINIC | Age: 89
End: 2024-08-16
Attending: ORTHOPAEDIC SURGERY
Payer: COMMERCIAL

## 2024-08-16 LAB
GLUCOSE BLDC GLUCOMTR-MCNC: 85 MG/DL (ref 70–99)
HGB BLD-MCNC: 9.7 G/DL (ref 11.7–15.7)

## 2024-08-16 PROCEDURE — 250N000012 HC RX MED GY IP 250 OP 636 PS 637: Performed by: STUDENT IN AN ORGANIZED HEALTH CARE EDUCATION/TRAINING PROGRAM

## 2024-08-16 PROCEDURE — 250N000013 HC RX MED GY IP 250 OP 250 PS 637: Performed by: ORTHOPAEDIC SURGERY

## 2024-08-16 PROCEDURE — 97530 THERAPEUTIC ACTIVITIES: CPT | Mod: GP | Performed by: PHYSICAL THERAPIST

## 2024-08-16 PROCEDURE — 250N000013 HC RX MED GY IP 250 OP 250 PS 637: Performed by: HOSPITALIST

## 2024-08-16 PROCEDURE — 85018 HEMOGLOBIN: CPT | Performed by: ORTHOPAEDIC SURGERY

## 2024-08-16 PROCEDURE — 36415 COLL VENOUS BLD VENIPUNCTURE: CPT | Performed by: ORTHOPAEDIC SURGERY

## 2024-08-16 PROCEDURE — 99215 OFFICE O/P EST HI 40 MIN: CPT | Performed by: STUDENT IN AN ORGANIZED HEALTH CARE EDUCATION/TRAINING PROGRAM

## 2024-08-16 RX ORDER — HYDROMORPHONE HYDROCHLORIDE 2 MG/1
1 TABLET ORAL EVERY 4 HOURS PRN
Qty: 30 TABLET | Refills: 0 | Status: SHIPPED | OUTPATIENT
Start: 2024-08-16

## 2024-08-16 RX ORDER — ACETAMINOPHEN 500 MG
500-1000 TABLET ORAL EVERY 6 HOURS PRN
Qty: 40 TABLET | Refills: 2 | Status: SHIPPED | OUTPATIENT
Start: 2024-08-16 | End: 2024-08-20

## 2024-08-16 RX ORDER — PANTOPRAZOLE SODIUM 40 MG/1
40 TABLET, DELAYED RELEASE ORAL DAILY
Qty: 30 TABLET | Refills: 1 | Status: SHIPPED | OUTPATIENT
Start: 2024-08-16

## 2024-08-16 RX ORDER — ASPIRIN 81 MG/1
81 TABLET ORAL 2 TIMES DAILY
Qty: 60 TABLET | Refills: 0 | Status: SHIPPED | OUTPATIENT
Start: 2024-08-16

## 2024-08-16 RX ADMIN — POLYETHYLENE GLYCOL 3350 17 G: 17 POWDER, FOR SOLUTION ORAL at 09:33

## 2024-08-16 RX ADMIN — PILOCARPINE HYDROCHLORIDE 5 MG: 5 TABLET, FILM COATED ORAL at 22:02

## 2024-08-16 RX ADMIN — ACETAMINOPHEN 325MG 975 MG: 325 TABLET ORAL at 21:43

## 2024-08-16 RX ADMIN — MEMANTINE 5 MG: 5 TABLET ORAL at 22:02

## 2024-08-16 RX ADMIN — QUETIAPINE FUMARATE 25 MG: 25 TABLET ORAL at 21:44

## 2024-08-16 RX ADMIN — FLUOXETINE HYDROCHLORIDE 20 MG: 20 CAPSULE ORAL at 09:33

## 2024-08-16 RX ADMIN — SENNOSIDES AND DOCUSATE SODIUM 1 TABLET: 50; 8.6 TABLET ORAL at 09:33

## 2024-08-16 RX ADMIN — DOXEPIN HYDROCHLORIDE 10 MG: 10 CAPSULE ORAL at 22:01

## 2024-08-16 RX ADMIN — AMOXICILLIN 500 MG: 500 CAPSULE ORAL at 09:36

## 2024-08-16 RX ADMIN — PILOCARPINE HYDROCHLORIDE 5 MG: 5 TABLET, FILM COATED ORAL at 06:04

## 2024-08-16 RX ADMIN — QUETIAPINE FUMARATE 25 MG: 25 TABLET ORAL at 09:33

## 2024-08-16 RX ADMIN — ASPIRIN 81 MG CHEWABLE TABLET 81 MG: 81 TABLET CHEWABLE at 21:44

## 2024-08-16 RX ADMIN — ASPIRIN 81 MG CHEWABLE TABLET 81 MG: 81 TABLET CHEWABLE at 09:33

## 2024-08-16 RX ADMIN — ACETAMINOPHEN 325MG 975 MG: 325 TABLET ORAL at 13:18

## 2024-08-16 RX ADMIN — PANTOPRAZOLE SODIUM 40 MG: 40 TABLET, DELAYED RELEASE ORAL at 09:33

## 2024-08-16 RX ADMIN — PILOCARPINE HYDROCHLORIDE 5 MG: 5 TABLET, FILM COATED ORAL at 13:18

## 2024-08-16 RX ADMIN — MEMANTINE 5 MG: 5 TABLET ORAL at 09:37

## 2024-08-16 RX ADMIN — ACETAMINOPHEN 325MG 975 MG: 325 TABLET ORAL at 06:03

## 2024-08-16 RX ADMIN — AZATHIOPRINE 50 MG: 50 TABLET ORAL at 09:37

## 2024-08-16 RX ADMIN — AMOXICILLIN 500 MG: 500 CAPSULE ORAL at 22:02

## 2024-08-16 RX ADMIN — SENNOSIDES AND DOCUSATE SODIUM 1 TABLET: 50; 8.6 TABLET ORAL at 21:44

## 2024-08-16 ASSESSMENT — ACTIVITIES OF DAILY LIVING (ADL)
ADLS_ACUITY_SCORE: 32.25
ADLS_ACUITY_SCORE: 32.25
ADLS_ACUITY_SCORE: 30.25
ADLS_ACUITY_SCORE: 32.25
ADLS_ACUITY_SCORE: 30.25
ADLS_ACUITY_SCORE: 32.25
ADLS_ACUITY_SCORE: 30.25
ADLS_ACUITY_SCORE: 32.25
ADLS_ACUITY_SCORE: 30.25

## 2024-08-16 NOTE — PROGRESS NOTES
Care Management Follow Up    Length of Stay (days): 2    Expected Discharge Date: 08/18/2024     Concerns to be Addressed: discharge planning     Patient plan of care discussed at interdisciplinary rounds: Yes    Anticipated Discharge Disposition: Transitional Care     Anticipated Discharge Services:    Anticipated Discharge DME:      Patient/family educated on Medicare website which has current facility and service quality ratings: yes  Education Provided on the Discharge Plan:    Patient/Family in Agreement with the Plan: yes    Referrals Placed by CM/SW: Post Acute Facilities  Private pay costs discussed: Not applicable    Additional Information:  Call from CM with Beth to update that patient has a care coordinator [facility] and can be reached at [phone]. Patient had EW but that was closed. SW can reach out to Uma with questions or concerns.    1515: [facility] can accept patient tomorrow, Saturday 8/17/24 around 1200.     Orders fax to TCU at [phone] and phone to TCU at [phone].     LELE Martinez

## 2024-08-16 NOTE — DISCHARGE SUMMARY
Physician Discharge Summary     Patient ID:  Chelly Dave  [id number]  89 year old  1935    Admit date: 8/14/2024    Discharge date and time: 8/16/24     Admitting Physician: Billy Tracy MD     Discharge Physician: Dr. Tracy    Admission Diagnoses: Wound disruption, post-op, skin [T81.31XA]    Discharge Diagnoses: Status post right knee superficial irrigation and debridement    Admission Condition: good    Discharged Condition: stable    Indication for Admission: Right knee superficial wound irrigation and debridement    Hospital Course:   The patient was admitted for the above procedure on the above date. The patient tolerated the procedure well. The patient's hospital course was unremarkable and the patient progressed as expected. Pain was adequately controlled on oral pain medication. The patient was tolerating a general diet. The patient passed physical therapy without issue. The patient remained hemodynamically stable throughout the admission. Please refer to the final progress note for discharge physical exam. The patient met all discharge criteria and discharged in a stable condition. The patient has our contact information and understands reasons to contact our team or return to the Emergency Department.       Consults: None    Significant Diagnostic Studies: None    Treatments: Right knee superficial irrigation and debridement    Discharge Exam:  Please refer to progress note from 8/16/24    Disposition: TCC    Patient Instructions:   Current Discharge Medication List        START taking these medications    Details   !! aspirin 81 MG EC tablet Take 1 tablet (81 mg) by mouth 2 times daily  Qty: 60 tablet, Refills: 0    Associated Diagnoses: Right leg pain      HYDROmorphone (DILAUDID) 2 MG tablet Take 0.5 tablets (1 mg) by mouth every 4 hours as needed for breakthrough pain  Qty: 30 tablet, Refills: 0    Associated Diagnoses: Right leg pain       !! - Potential duplicate medications found.  Please discuss with provider.        CONTINUE these medications which have CHANGED    Details   acetaminophen (TYLENOL) 500 MG tablet Take 1-2 tablets (500-1,000 mg) by mouth every 6 hours as needed for mild pain  Qty: 40 tablet, Refills: 2    Associated Diagnoses: Right leg pain      !! pantoprazole (PROTONIX) 40 MG EC tablet Take 1 tablet (40 mg) by mouth daily  Qty: 30 tablet, Refills: 1    Associated Diagnoses: Right leg pain       !! - Potential duplicate medications found. Please discuss with provider.        CONTINUE these medications which have NOT CHANGED    Details   albuterol (PROVENTIL) (2.5 MG/3ML) 0.083% neb solution Take 2.5 mg by nebulization every 6 hours as needed for shortness of breath, wheezing or cough      amoxicillin (AMOXIL) 500 MG tablet Take 500 mg by mouth 2 times daily Continue for life      !! aspirin 81 MG EC tablet Take 81 mg by mouth daily      AzaTHIOprine (IMURAN PO) Take 50 mg by mouth daily      Calcium Carb-Cholecalciferol (CALCIUM 500 +D) 500-400 MG-UNIT TABS Take 1 tablet by mouth 2 times daily      Cholecalciferol (VITAMIN D3) 1000 units CAPS Take 1,000 Units by mouth daily      cycloSPORINE (RESTASIS) 0.05 % ophthalmic emulsion Instill 1 drop into both eyes every 12 hours.   No further refills will be given unless you come in for your exam.      DOXEPIN HCL PO Take 10 mg by mouth At Bedtime      ferrous sulfate (IRON) 325 (65 FE) MG tablet Take 1 tablet (325 mg) by mouth daily  Qty: 100 tablet, Refills: 0    Associated Diagnoses: Iron deficiency anemia, unspecified iron deficiency anemia type      FLUoxetine (PROZAC) 20 MG capsule Take 20 mg by mouth daily      lidocaine (LIDODERM) 5 % patch Place 1 patch onto the skin daily as needed for moderate pain (12 hours on; 12 hours off. Patient applies to knees or back)      loratadine (CLARITIN) 10 MG tablet Take 10 mg by mouth daily as needed      memantine (NAMENDA) 5 MG tablet Take 5 mg by mouth 2 times daily      miconazole  (MICATIN) 2 % AERP powder Apply topically 2 times daily To sridhar area until redness resolved      ondansetron (ZOFRAN ODT) 4 MG ODT tab Take 1 tablet (4 mg) by mouth every 6 hours as needed for nausea      !! pantoprazole (PROTONIX) 40 MG EC tablet Take 40 mg by mouth daily      Pilocarpine HCl (SALAGEN PO) Take 5 mg by mouth 3 times daily AM, 1200 & HS      polyethylene glycol (MIRALAX/GLYCOLAX) powder Take 17 g by mouth daily as needed      polyethylene glycol 400 (BLINK TEARS) 0.25 % SOLN ophthalmic solution Place 1-2 drops into both eyes every 2 hours as needed for dry eyes      QUEtiapine (SEROQUEL) 25 MG tablet Take 25 mg by mouth 2 times daily      senna-docusate (SENOKOT-S/PERICOLACE) 8.6-50 MG tablet Take 2 tablets by mouth 2 times daily    Associated Diagnoses: Pyogenic arthritis of right knee joint, due to unspecified organism (H)      blood glucose monitoring (NO BRAND SPECIFIED) test strip USE TO CHECK BLOOD SUGARS EVERY DAY      blood glucose monitoring (SOFTCLIX) lancets USE TO TEST BLOOD SUGARS DAILY      Blood Glucose Monitoring Suppl (FIFTY50 GLUCOSE METER 2.0) w/Device KIT Dispense meter, test strips, lancets covered by pt ins. E11.9 NIDDM type II - Test 1 time/day       !! - Potential duplicate medications found. Please discuss with provider.        Activity: activity as tolerated  Diet: regular diet  Wound Care: Splint for 2 weeks, Prevena for 2 weeks    Follow-up with Dr. Tracy in 2 weeks.    Signed:  Billy Tracy MD  8/16/2024  6:49 AM

## 2024-08-16 NOTE — PLAN OF CARE
[id number]:  Alert to self.  Sitter at bedside.  Up in chair most of shift, assist of 2 and ceiling lift to get back into bed, attempted rigo-steady but patient was unable to perform.  Incontinent of urine, purewick in place.  Denies pain.  Prevena wound vac to surgical wound.

## 2024-08-16 NOTE — PROGRESS NOTES
Care Management Follow Up    Length of Stay (days): 2    Expected Discharge Date: 08/18/2024     Concerns to be Addressed: discharge planning     Patient plan of care discussed at interdisciplinary rounds: Yes    Anticipated Discharge Disposition: Transitional Care     Anticipated Discharge Services:    Anticipated Discharge DME:      Patient/family educated on Medicare website which has current facility and service quality ratings: yes  Education Provided on the Discharge Plan:    Patient/Family in Agreement with the Plan: yes    Referrals Placed by CM/SW: Post Acute Facilities  Private pay costs discussed:     Additional Information:  SW confirmed intake time with Teodora at [facility] and set up medical ride by stevie for patient at discharge to [facility]. SW spoke to patient daughterCee to confirm that patient discharge. Patient daughter-Cee requested room number which was confirmed by Teodora at [facility] as room 121.    Date of ride: 8/17/2024 Time of ride: [id number]  Method of ride: stevie   [facility]  [phone]  From: [facility] room 2315   To: [facility], phone [phone]  Weight: 124 pounds, no infection precautions      Addendum: Brought to SW attention that patient has a sitter. Ride has been canceled. Per Teodora at [facility] patient/family has also called to refuse the room. Teodora does not anticipate a room opening in the preferred so SW will need additional choices from family.     Addendum 7692: SW spoke to niharika Mike and patient family would in fact like the open bed at [facility] TCU. SW informed her of the sitter and will revisit when family is present tomorrow.     Marga Alberto, LELE  [facility]  Inpatient Care Management

## 2024-08-16 NOTE — PLAN OF CARE
Goal Outcome Evaluation:      Plan of Care Reviewed With: patient    Overall Patient Progress: improvingOverall Patient Progress: improving        Date/Time:8/15/24 @ 9116-4692    Trauma/Ortho/Medical (Choose one) Ortho    Diagnosis: R knee superficial I and D  POD#:2  Mental Status: Alert to self, confused  Activity/dangle: Stayed in bed the entire shift  Diet:pureed diet level 4, thin liquids level 0  Pain:Managed with scheduled Tylenol.  oley/Voiding:External catheter  Tele/Restraints/Iso:N/A  02/LDA:On RA. PIV SL on the L arm. Wound vac  D/C Date:TBD  Other Info:Dressing CDI. Has bedside attendant.Hospitalist ff.

## 2024-08-16 NOTE — UTILIZATION REVIEW
Concurrent stay review; Secondary Review Determination - [facility]        Under the authority of the Utilization Management Committee, the utilization review process indicated a secondary review on the above patient.  The review outcome is based on review of the medical records, discussions with staff, and applying clinical experience noted on the date of the review.        (x) Outpatient half-way status is appropriate       RATIONALE FOR DETERMINATION:   89 years old female underwent right knee superficial wound I &D . Patient was admitted to the hospital after the procedure. The procedure was not on the CMS inpatient list. No documented complications or unexpected recovery.  Patient felt ready for discharge per medical and surgical team.   Awaiting safe disposition location acceptance.  Current plan is TCU/rehab discharge, remaining in the hospital currently only for placement reasons.          Patient delayed discharge is related to disposition, there is no medical necessity for inpatient admission at the time of this review. If there is a change in patient status, please resend for review.    The information on this document is developed by the utilization review team in order for the business office to ensure compliance.  This only denotes the appropriateness of proper admission status and does not reflect the quality of care rendered.       The definitions of Inpatient Status and Observation Status used in making the determination above are those provided in the CMS Coverage Manual, Chapter 1 and Chapter 6, section 70.4.       Sincerely,    Quinten Martin, DO

## 2024-08-16 NOTE — PROGRESS NOTES
[facility]  Hospitalist Progress Note    Assessment & Plan   Chelly Dave is a 89 year old female with PMH of Dementia, HLD, DM, depression anxiety who was admitted on 8/14/2024 for planned right knee superficail I & D with Dr. Tracy.     Hospitalsit team was consulted for medical management of chronic medical problems.      S/p Right Knee Superficial Irrigation and Debridement 8/14/24   Hx of Septic arthritis with Strep Gordonii s/p I&D and modular exchange 6/21/24  Patient was admitted at [facility] for septic arthritis of her right knee in June 2024.  She had presented with right knee pain and an arthrocentesis was done.  Cultures grew out Streptococcus gordonii.  Patient was initially started on IV vancomycin and ceftriaxone.  ID was consulted and she was transition to ceftriaxone alone.  ID recommended 6 weeks of IV antibiotics (ending 8/2/24).  Patient was ultimately discharged to a TCU after her admission.  She was discharged from her TCU on 8/8/2024.  Per orthopedic note 8/14/24: She initially did well after surgery but she removed her Prevena incisional wound VAC on postoperative day 2. When I saw her in clinic 2 weeks after surgery, her incision was healing well but she did have a small scab that her daughter, that she continued to pick at. I was contacted by her transitional care unit stating that the carrier was concerned about her wound. We brought her back to clinic and she did have a superficial dehiscence along the midportion of the incision. The remainder of the incision was well-healed. Her daughter did not think she noticed any new or increased pain. No fevers or chills. Since the wound was superficial, I elected to try and treated with wound care and a knee immobilizer. She keep the knee immobilizer in place and wound care did not seem to be progressing. Therefore, when I saw her in clinic on August 8, 2024 I recommended proceeding to the operating room for  "superficial irrigation and debridement.      - Post-op cares per Ortho   - DVT ppx: ASA 81mg BID   - PT/OT      - Hgb: 9.3 > 9.7     Depression/Anxiety   - Continue PTA Doxepin 10mg, Fluoxetine 20mg, Seroquel 25mg BID      Dementia  Per 6/2024 discharge summery: The patient's language as well as cultural barrier makes her dementia and confusion worse.    - Continue Memantine 5mg BID   - Redirect as needed  - Maintain regular night/day sleep/wake cycle.  - Avoid medications that can exacerbate confusion.     Hyperlipidemia:  Liptor was stopped at last hospitalization      Diabetes   Not on medication  - Continue to monitor      Sjogren's disease  - Continue PTA azathioprine, pilocarpine.    Clinically Significant Risk Factors                            # Overweight: Estimated body mass index is 25.96 kg/m  as calculated from the following:    Height as of this encounter: 1.473 m (4' 10\").    Weight as of this encounter: 56.3 kg (124 lb 3.2 oz)., PRESENT ON ADMISSION  # Moderate Malnutrition: based on nutrition assessment, PRESENT ON ADMISSION                  Diet: Snacks/Supplements Adult: Ensure Enlive; With Meals  Pureed Diet (level 4) Thin Liquids (level 0)  Diet     DVT Prophylaxis: Asa   Quiroz Catheter: Not present  Lines: None     Cardiac Monitoring: None  Code Status:  Previous code status was DNR/DNI per chart review     Disposition Plan       Expected Discharge Date: 08/18/2024      Destination: inpatient rehabilitation facility        Entered: Lesley Garcia MD 08/16/2024, 12:10 PM       Family Updated: Yes spoke to family at bedside, 8/16/24     Care Team Updated: Yes    Disposition: Patient is medically stable from a hospitalilst point of view. Will leave the ultimately discharge date to the primary service. We will continue to follow along.      Medically Ready for Discharge: Anticipated Today        Lesley Garcia MD  Hospitalist Service   [facility]  Securely " message with the Vocera Web Console (learn more here)         Medical Decision Making       40 MINUTES SPENT BY ME on the date of service doing chart review, history, exam, documentation & further activities per the note.           Interval History     No acute overnight events.    This morning the patient states that she is fines. No complaints of pain. Family at bedside.     -Data reviewed today: I reviewed all new labs and imaging results over the last 24 hours.    Physical Exam   Temp: 97.2  F (36.2  C) Temp src: Axillary BP: (!) 156/92 Pulse: 94   Resp: 15 SpO2: 98 % O2 Device: None (Room air)    Vitals:    08/14/24 0925   Weight: 56.3 kg (124 lb 3.2 oz)     Vital Signs with Ranges  Temp:  [97.2  F (36.2  C)-97.9  F (36.6  C)] 97.2  F (36.2  C)  Pulse:  [89-94] 94  Resp:  [15-20] 15  BP: (114-156)/(63-92) 156/92  SpO2:  [92 %-98 %] 98 %  I/O last 3 completed shifts:  In: 1250 [P.O.:300; I.V.:950]  Out: 3200 [Urine:3200]      Constitutional: Awake, alert, cooperative, no apparent distress.    ENT: Normocephalic, without obvious abnormality, atraumatic, oral pharynx with moist mucus membranes  Pulmonary: Clear to auscultation bilaterally, no crackles or wheezing.  Cardiovascular: Regular rate and rhythm, normal S1 and S2  GI: Normal bowel sounds, soft, non-distended, non-tender.    Skin/Integumen: Visualized skin appeared clear.    Psych:  Alert and oriented x 1.   Extremities: Right knee in splint         Medications   Current Facility-Administered Medications   Medication Dose Route Frequency Provider Last Rate Last Admin    lactated ringers infusion   Intravenous Continuous Billy Tracy MD   Stopped at 08/15/24 1400     Current Facility-Administered Medications   Medication Dose Route Frequency Provider Last Rate Last Admin    acetaminophen (TYLENOL) tablet 975 mg  975 mg Oral Q8H Billy Tracy MD   975 mg at 08/16/24 0603    amoxicillin (AMOXIL) capsule 500 mg  500 mg Oral BID Juan F Rubin  MD Gui   500 mg at 08/16/24 0936    aspirin (ASA) chewable tablet 81 mg  81 mg Oral BID Billy Tracy MD   81 mg at 08/16/24 0933    azaTHIOprine (IMURAN) tablet 50 mg  50 mg Oral Daily Lesley Garcia MD   50 mg at 08/16/24 0937    doxepin (SINEquan) capsule 10 mg  10 mg Oral At Bedtime Juan F Rubin MD   10 mg at 08/15/24 2117    FLUoxetine (PROzac) capsule 20 mg  20 mg Oral Daily Juan F Rubin MD   20 mg at 08/16/24 0933    memantine (NAMENDA) tablet 5 mg  5 mg Oral BID Juan F Rubin MD   5 mg at 08/16/24 0937    pantoprazole (PROTONIX) EC tablet 40 mg  40 mg Oral Daily Juan F Rubin MD   40 mg at 08/16/24 0933    pilocarpine (SALAGEN) tablet 5 mg  5 mg Oral TID Juan F Rubin MD   5 mg at 08/16/24 0604    polyethylene glycol (MIRALAX) Packet 17 g  17 g Oral Daily Billy Tracy MD   17 g at 08/16/24 0933    QUEtiapine (SEROquel) tablet 25 mg  25 mg Oral BID Juan F Rubin MD   25 mg at 08/16/24 0933    senna-docusate (SENOKOT-S/PERICOLACE) 8.6-50 MG per tablet 1 tablet  1 tablet Oral BID Billy Tracy MD   1 tablet at 08/16/24 0933    sodium chloride (PF) 0.9% PF flush 3 mL  3 mL Intracatheter Q8H Billy Tracy MD   3 mL at 08/16/24 0604       Data   Recent Labs   Lab 08/16/24  0818 08/16/24  0558 08/15/24  0658 08/15/24  0655 08/15/24  0622 08/14/24  1213 08/14/24  1000   HGB 9.7*  --   --  9.3*  --   --   --    POTASSIUM  --   --   --   --   --   --  4.1   GLC  --  85 101*  --  81   < > 89    < > = values in this interval not displayed.       No results found for this or any previous visit (from the past 24 hour(s)).

## 2024-08-16 NOTE — PROGRESS NOTES
Patient vital signs are at baseline: Yes  Patient able to ambulate as they were prior to admission or with assist devices provided by therapies during their stay:  Yes  Patient MUST void prior to discharge:  Yes  Patient able to tolerate oral intake:  Yes  Pain has adequate pain control using Oral analgesics:  Yes  Does patient have an identified :  Yes  Has goal D/C date and time been discussed with patient:  Yes    Pt Alert to self, baseline dementia. VSS On RA. Pureed diet w/ thin liquids. Takes pills crushed w/ applesauce. Up 2A w/ sera steady, up in chair, incontinent, purewick in place. L PIV SL. R knee dressing CDI, CONOR CMS, attached to prevena wound vac with no output.Denies pain at rest, controlled w/ scheduled Tylenol. Discharge to TCU pending placement, Ortho signed off. Continue plan of Care.

## 2024-08-16 NOTE — PROGRESS NOTES
PROGRESS NOTE    SUBJECTIVE  Chelly Dave is status post right knee superficial irrigation and debridement on August 14, 2024. No acute events overnight. Afebrile with stable vitals.  She is doing well this morning.  Pain seems to be well-controlled.  She is resting comfortably in bed.  We are waiting for placement.  Once we have placement, she can discharge home    PHYSICAL EXAM  General: No acute distress. Alert and oriented to person, time and place.  Pulmonary: Breathing comfortably on room air.   Cardiac: Bilateral PT pulses are equal and symmetric. Regular rate and rhythm.   Musculoskeletal: Prevena incisional wound VAC is holding suction.  No output in canister.  Neurological: Patient is able to fire quadriceps, tibialis anterior, extensor hallucis longus and gastrocnemius. Sensation intact to light touch in bilateral L3-S1 distributions.     ASSESSMENT/PLAN  #1 Status post right knee superficial irrigation debridement on August 14, 2024    Chelly Dave is status post the above procedure.  She is doing well.  TCU placement appreciate social work's assistance with this.  Once she has placement, she is ready to discharge.    Activity: Weightbearing as tolerated. Activity as tolerated. No precautions. Continue to work with PT while in hospital.   Antibiotics: Weight-based Ancef x2 doses postoperatively or until discharge.  Cultures: None  Diet: Advance as tolerated. Goal diet: Purée  Dressing: Posterior slab splint for 2 weeks.  Prevena incisional wound VAC for 2 weeks.  DVT Prophylaxis: Aspirin 81mg BID for 6 weeks  Pain medication: Multimodal pain regimen with ice, elevation, Tylenol 1000mg Q6H scheduled, Celebrex 100mg BID, Oxycodone 5-10mg Q4 PRN    Disposition: TCU.  When she has placement she is ready to discharge.

## 2024-08-17 PROCEDURE — 250N000013 HC RX MED GY IP 250 OP 250 PS 637: Performed by: ORTHOPAEDIC SURGERY

## 2024-08-17 PROCEDURE — 99215 OFFICE O/P EST HI 40 MIN: CPT | Performed by: STUDENT IN AN ORGANIZED HEALTH CARE EDUCATION/TRAINING PROGRAM

## 2024-08-17 PROCEDURE — 250N000013 HC RX MED GY IP 250 OP 250 PS 637: Performed by: HOSPITALIST

## 2024-08-17 PROCEDURE — 250N000012 HC RX MED GY IP 250 OP 636 PS 637: Performed by: STUDENT IN AN ORGANIZED HEALTH CARE EDUCATION/TRAINING PROGRAM

## 2024-08-17 RX ORDER — CYCLOSPORINE 0.5 MG/ML
1 EMULSION OPHTHALMIC 2 TIMES DAILY
Status: DISCONTINUED | OUTPATIENT
Start: 2024-08-17 | End: 2024-08-19 | Stop reason: HOSPADM

## 2024-08-17 RX ORDER — CARBOXYMETHYLCELLULOSE SODIUM 5 MG/ML
1 SOLUTION/ DROPS OPHTHALMIC
Status: DISCONTINUED | OUTPATIENT
Start: 2024-08-17 | End: 2024-08-17

## 2024-08-17 RX ADMIN — PANTOPRAZOLE SODIUM 40 MG: 40 TABLET, DELAYED RELEASE ORAL at 08:32

## 2024-08-17 RX ADMIN — ASPIRIN 81 MG CHEWABLE TABLET 81 MG: 81 TABLET CHEWABLE at 08:32

## 2024-08-17 RX ADMIN — HYDROMORPHONE HYDROCHLORIDE 1 MG: 2 TABLET ORAL at 01:04

## 2024-08-17 RX ADMIN — FLUOXETINE HYDROCHLORIDE 20 MG: 20 CAPSULE ORAL at 08:32

## 2024-08-17 RX ADMIN — AMOXICILLIN 500 MG: 500 CAPSULE ORAL at 08:33

## 2024-08-17 RX ADMIN — AMOXICILLIN 500 MG: 500 CAPSULE ORAL at 20:29

## 2024-08-17 RX ADMIN — MEMANTINE 5 MG: 5 TABLET ORAL at 20:29

## 2024-08-17 RX ADMIN — DOXEPIN HYDROCHLORIDE 10 MG: 10 CAPSULE ORAL at 21:15

## 2024-08-17 RX ADMIN — PILOCARPINE HYDROCHLORIDE 5 MG: 5 TABLET, FILM COATED ORAL at 08:32

## 2024-08-17 RX ADMIN — AZATHIOPRINE 50 MG: 50 TABLET ORAL at 08:32

## 2024-08-17 RX ADMIN — SENNOSIDES AND DOCUSATE SODIUM 1 TABLET: 50; 8.6 TABLET ORAL at 20:30

## 2024-08-17 RX ADMIN — MEMANTINE 5 MG: 5 TABLET ORAL at 08:33

## 2024-08-17 RX ADMIN — CYCLOSPORINE 1 DROP: 0.5 EMULSION OPHTHALMIC at 20:30

## 2024-08-17 RX ADMIN — QUETIAPINE FUMARATE 25 MG: 25 TABLET ORAL at 08:32

## 2024-08-17 RX ADMIN — QUETIAPINE FUMARATE 25 MG: 25 TABLET ORAL at 20:30

## 2024-08-17 RX ADMIN — PILOCARPINE HYDROCHLORIDE 5 MG: 5 TABLET, FILM COATED ORAL at 20:29

## 2024-08-17 RX ADMIN — SENNOSIDES AND DOCUSATE SODIUM 1 TABLET: 50; 8.6 TABLET ORAL at 08:32

## 2024-08-17 RX ADMIN — ACETAMINOPHEN 325MG 975 MG: 325 TABLET ORAL at 06:01

## 2024-08-17 RX ADMIN — ASPIRIN 81 MG CHEWABLE TABLET 81 MG: 81 TABLET CHEWABLE at 20:30

## 2024-08-17 RX ADMIN — PILOCARPINE HYDROCHLORIDE 5 MG: 5 TABLET, FILM COATED ORAL at 11:22

## 2024-08-17 ASSESSMENT — ACTIVITIES OF DAILY LIVING (ADL)
ADLS_ACUITY_SCORE: 30.25
ADLS_ACUITY_SCORE: 31.25
ADLS_ACUITY_SCORE: 31.25
ADLS_ACUITY_SCORE: 33.25
ADLS_ACUITY_SCORE: 30.25
ADLS_ACUITY_SCORE: 33.25
ADLS_ACUITY_SCORE: 30.25
ADLS_ACUITY_SCORE: 33.25
ADLS_ACUITY_SCORE: 33.25
ADLS_ACUITY_SCORE: 31.25
ADLS_ACUITY_SCORE: 31.25
ADLS_ACUITY_SCORE: 30.25
ADLS_ACUITY_SCORE: 30.25
ADLS_ACUITY_SCORE: 33.25
ADLS_ACUITY_SCORE: 30.25
ADLS_ACUITY_SCORE: 33.25
ADLS_ACUITY_SCORE: 31.25
ADLS_ACUITY_SCORE: 30.25
ADLS_ACUITY_SCORE: 33.25
ADLS_ACUITY_SCORE: 30.25
ADLS_ACUITY_SCORE: 33.25
ADLS_ACUITY_SCORE: 31.25
ADLS_ACUITY_SCORE: 30.25

## 2024-08-17 NOTE — PROGRESS NOTES
Date/Time: 08/17/2024 [id number]     Trauma/Ortho/Medical (Choose one): Ortho     Diagnosis: Right knee superficial I and D.  POD#:3  Mental Status:Alert to self, Confused  Activity/dangle: Up with assist of 2 GB and sarateady  Diet: Pureed diet with thin Liquid  Pain: Schedule Tylenol, PRN Dilaudid given for pain management   Quiroz/Voiding: Incontinent, Pure wick  Tele/Restraints/Iso: No  02/LDA: IV-SL  D/C Date: Pending TCU Placement  Other Info: Dressing and Ace wrap CDI. Wound Vac in place. Sitter at bedside. Large BM x1 during the shift.

## 2024-08-17 NOTE — PROGRESS NOTES
[facility]  Hospitalist Progress Note    Assessment & Plan   Chelly Dave is a 89 year old female with PMH of Dementia, HLD, DM, depression anxiety who was admitted on 8/14/2024 for planned right knee superficail I & D with Dr. Tracy.     Hospitalsit team was consulted for medical management of chronic medical problems.      S/p Right Knee Superficial Irrigation and Debridement 8/14/24   Hx of Septic arthritis with Strep Gordonii s/p I&D and modular exchange 6/21/24  Patient was admitted at [facility] for septic arthritis of her right knee in June 2024.  She had presented with right knee pain and an arthrocentesis was done.  Cultures grew out Streptococcus gordonii.  Patient was initially started on IV vancomycin and ceftriaxone.  ID was consulted and she was transition to ceftriaxone alone.  ID recommended 6 weeks of IV antibiotics (ending 8/2/24).  Patient was ultimately discharged to a TCU after her admission.  She was discharged from her TCU on 8/8/2024.  Per orthopedic note 8/14/24: She initially did well after surgery but she removed her Prevena incisional wound VAC on postoperative day 2. When I saw her in clinic 2 weeks after surgery, her incision was healing well but she did have a small scab that her daughter, that she continued to pick at. I was contacted by her transitional care unit stating that the carrier was concerned about her wound. We brought her back to clinic and she did have a superficial dehiscence along the midportion of the incision. The remainder of the incision was well-healed. Her daughter did not think she noticed any new or increased pain. No fevers or chills. Since the wound was superficial, I elected to try and treated with wound care and a knee immobilizer. She keep the knee immobilizer in place and wound care did not seem to be progressing. Therefore, when I saw her in clinic on August 8, 2024 I recommended proceeding to the operating room for  "superficial irrigation and debridement.      - Post-op cares per Ortho   - DVT ppx: ASA 81mg BID   - PT/OT      - Hgb: 9.3 > 9.7     Depression/Anxiety   - Continue PTA Doxepin 10mg, Fluoxetine 20mg, Seroquel 25mg BID      Dementia  Per 6/2024 discharge summery: The patient's language as well as cultural barrier makes her dementia and confusion worse.    - Continue Memantine 5mg BID     - Redirect as needed  - Maintain regular night/day sleep/wake cycle.  - Avoid medications that can exacerbate confusion.     - Patient has been needing a sitter when family is not present due to patient picking at wound vac and splint. Patient has not shown signs of agitation or aggression. Spoke to nursing that there is not much to do for this as her picking is not new for her. She was picking at her surgical site prior to admission prompting this admission. Recommended moving closer to the nurses station and trying to discontinue the sitter when able. With sitter present this may cause problems with placement.     Hyperlipidemia:  Liptor was stopped at last hospitalization      Diabetes   Not on medication  - Continue to monitor      Sjogren's disease  - Continue PTA azathioprine, pilocarpine.    Clinically Significant Risk Factors                            # Overweight: Estimated body mass index is 25.96 kg/m  as calculated from the following:    Height as of this encounter: 1.473 m (4' 10\").    Weight as of this encounter: 56.3 kg (124 lb 3.2 oz)., PRESENT ON ADMISSION  # Moderate Malnutrition: based on nutrition assessment, PRESENT ON ADMISSION                  Diet: Snacks/Supplements Adult: Ensure Enlive; With Meals  Pureed Diet (level 4) Thin Liquids (level 0)  Diet     DVT Prophylaxis: Defer to primary service   Quiroz Catheter: Not present  Lines: None     Cardiac Monitoring: None  Code Status:  Previous code status was DNR/DNI per chart review     Disposition Plan       Expected Discharge Date: 08/19/2024      Destination: " inpatient rehabilitation facility        Entered: Lesley Garcia MD 08/17/2024, 11:28 AM     Care Team Updated: Yes     Disposition: Patient is medically stable from a hospitalilst point of view. Hospitalist team will sign off. Orders have been placed in the discharge navigator. Please reach out to our team if any further assistance is needed.       Medically Ready for Discharge: Ready Now        Lesley Garcia MD  Hospitalist Service   [facility]  Securely message with the Vocera Web Console (learn more here)         Medical Decision Making       40 MINUTES SPENT BY ME on the date of service doing chart review, history, exam, documentation & further activities per the note.           Interval History     No acute overnight events.    This morning the patient was sitting up in the chair sleeping. She easily awakens to voice. States that she is doing fine.     Was not picking at lines. Sitter at bedside.    -Data reviewed today: I reviewed all new labs and imaging results over the last 24 hours.     Physical Exam   Temp: 98.2  F (36.8  C) Temp src: Axillary BP: 101/76 Pulse: 84   Resp: 17 SpO2: 95 % O2 Device: None (Room air)    Vitals:    08/14/24 0925   Weight: 56.3 kg (124 lb 3.2 oz)     Vital Signs with Ranges  Temp:  [98.1  F (36.7  C)-98.6  F (37  C)] 98.2  F (36.8  C)  Pulse:  [70-93] 84  Resp:  [15-18] 17  BP: (101-162)/(64-78) 101/76  SpO2:  [93 %-98 %] 95 %  I/O last 3 completed shifts:  In: 720 [P.O.:720]  Out: 2400 [Urine:2400]      Constitutional: Awake, alert, cooperative, no apparent distress.    ENT: Normocephalic, without obvious abnormality, atraumatic, oral pharynx with moist mucus membranes  Pulmonary: Clear to auscultation bilaterally, no crackles or wheezing.  Cardiovascular: Regular rate and rhythm, normal S1 and S2  GI: Normal bowel sounds, soft, non-distended, non-tender.    Skin/Integumen: Visualized skin appeared clear.    Psych:  Alert and oriented x  1.   Extremities: Right knee in splint, wound vac in place       Medications   Current Facility-Administered Medications   Medication Dose Route Frequency Provider Last Rate Last Admin     Current Facility-Administered Medications   Medication Dose Route Frequency Provider Last Rate Last Admin    amoxicillin (AMOXIL) capsule 500 mg  500 mg Oral BID Juan F Rubin MD   500 mg at 08/17/24 0833    aspirin (ASA) chewable tablet 81 mg  81 mg Oral BID Billy Tracy MD   81 mg at 08/17/24 0832    azaTHIOprine (IMURAN) tablet 50 mg  50 mg Oral Daily Lesley Garcia MD   50 mg at 08/17/24 0832    doxepin (SINEquan) capsule 10 mg  10 mg Oral At Bedtime Juan F Rubin MD   10 mg at 08/16/24 2201    FLUoxetine (PROzac) capsule 20 mg  20 mg Oral Daily Juan F Rubin MD   20 mg at 08/17/24 0832    memantine (NAMENDA) tablet 5 mg  5 mg Oral BID Juan F Rubin MD   5 mg at 08/17/24 0833    pantoprazole (PROTONIX) EC tablet 40 mg  40 mg Oral Daily Juan F Rubin MD   40 mg at 08/17/24 0832    pilocarpine (SALAGEN) tablet 5 mg  5 mg Oral TID Juan F Rubin MD   5 mg at 08/17/24 1122    polyethylene glycol (MIRALAX) Packet 17 g  17 g Oral Daily Billy Tracy MD   17 g at 08/16/24 0933    QUEtiapine (SEROquel) tablet 25 mg  25 mg Oral BID Juan F Rubin MD   25 mg at 08/17/24 0832    senna-docusate (SENOKOT-S/PERICOLACE) 8.6-50 MG per tablet 1 tablet  1 tablet Oral BID Billy Tracy MD   1 tablet at 08/17/24 0832    sodium chloride (PF) 0.9% PF flush 3 mL  3 mL Intracatheter Q8H Billy Tracy MD   3 mL at 08/17/24 1123       Data   Recent Labs   Lab 08/16/24  0818 08/16/24  0558 08/15/24  0658 08/15/24  0655 08/15/24  0622 08/14/24  1213 08/14/24  1000   HGB 9.7*  --   --  9.3*  --   --   --    POTASSIUM  --   --   --   --   --   --  4.1   GLC  --  85 101*  --  81   < > 89    < > = values in this interval not displayed.       No results  found for this or any previous visit (from the past 24 hour(s)).

## 2024-08-17 NOTE — PROGRESS NOTES
Patient vital signs are at baseline: Yes  Patient able to ambulate as they were prior to admission or with assist devices provided by therapies during their stay:  Yes  Patient MUST void prior to discharge:  Yes  Patient able to tolerate oral intake:  Yes  Pain has adequate pain control using Oral analgesics:  Yes  Does patient have an identified :  Yes  Has goal D/C date and time been discussed with patient:  Yes     Pt Alert to self, baseline dementia. VSS On RA. Pureed diet w/ thin liquids. Takes pills crushed w/ pudding. Up 2A w/ sera steady, up in chair this afternoon. Voiding in purewick. L PIV SL. R knee dressing CDI, CONOR CMS, attached to prevena wound vac with no output. Denies pain at rest, controlled w/ scheduled Tylenol. Discharge to TCU pending placement. Continue plan of Care.

## 2024-08-18 PROCEDURE — 250N000012 HC RX MED GY IP 250 OP 636 PS 637: Performed by: STUDENT IN AN ORGANIZED HEALTH CARE EDUCATION/TRAINING PROGRAM

## 2024-08-18 PROCEDURE — 250N000013 HC RX MED GY IP 250 OP 250 PS 637: Performed by: HOSPITALIST

## 2024-08-18 PROCEDURE — 250N000013 HC RX MED GY IP 250 OP 250 PS 637: Performed by: ORTHOPAEDIC SURGERY

## 2024-08-18 RX ADMIN — FLUOXETINE HYDROCHLORIDE 20 MG: 20 CAPSULE ORAL at 08:48

## 2024-08-18 RX ADMIN — QUETIAPINE FUMARATE 25 MG: 25 TABLET ORAL at 20:33

## 2024-08-18 RX ADMIN — AMOXICILLIN 500 MG: 500 CAPSULE ORAL at 20:33

## 2024-08-18 RX ADMIN — CYCLOSPORINE 1 DROP: 0.5 EMULSION OPHTHALMIC at 08:48

## 2024-08-18 RX ADMIN — CYCLOSPORINE 1 DROP: 0.5 EMULSION OPHTHALMIC at 20:32

## 2024-08-18 RX ADMIN — PILOCARPINE HYDROCHLORIDE 5 MG: 5 TABLET, FILM COATED ORAL at 12:12

## 2024-08-18 RX ADMIN — PILOCARPINE HYDROCHLORIDE 5 MG: 5 TABLET, FILM COATED ORAL at 06:37

## 2024-08-18 RX ADMIN — MEMANTINE 5 MG: 5 TABLET ORAL at 08:49

## 2024-08-18 RX ADMIN — ASPIRIN 81 MG CHEWABLE TABLET 81 MG: 81 TABLET CHEWABLE at 20:33

## 2024-08-18 RX ADMIN — AMOXICILLIN 500 MG: 500 CAPSULE ORAL at 08:50

## 2024-08-18 RX ADMIN — AZATHIOPRINE 50 MG: 50 TABLET ORAL at 08:49

## 2024-08-18 RX ADMIN — PILOCARPINE HYDROCHLORIDE 5 MG: 5 TABLET, FILM COATED ORAL at 20:33

## 2024-08-18 RX ADMIN — PANTOPRAZOLE SODIUM 40 MG: 40 TABLET, DELAYED RELEASE ORAL at 08:48

## 2024-08-18 RX ADMIN — DOXEPIN HYDROCHLORIDE 10 MG: 10 CAPSULE ORAL at 21:30

## 2024-08-18 RX ADMIN — SENNOSIDES AND DOCUSATE SODIUM 1 TABLET: 50; 8.6 TABLET ORAL at 08:48

## 2024-08-18 RX ADMIN — QUETIAPINE FUMARATE 25 MG: 25 TABLET ORAL at 08:48

## 2024-08-18 RX ADMIN — SENNOSIDES AND DOCUSATE SODIUM 1 TABLET: 50; 8.6 TABLET ORAL at 20:33

## 2024-08-18 RX ADMIN — ASPIRIN 81 MG CHEWABLE TABLET 81 MG: 81 TABLET CHEWABLE at 08:48

## 2024-08-18 RX ADMIN — MEMANTINE 5 MG: 5 TABLET ORAL at 20:33

## 2024-08-18 ASSESSMENT — ACTIVITIES OF DAILY LIVING (ADL)
ADLS_ACUITY_SCORE: 33.25
ADLS_ACUITY_SCORE: 31.25
ADLS_ACUITY_SCORE: 33.25
ADLS_ACUITY_SCORE: 31.25
ADLS_ACUITY_SCORE: 31.25
ADLS_ACUITY_SCORE: 33.25
ADLS_ACUITY_SCORE: 31.25
ADLS_ACUITY_SCORE: 33.25

## 2024-08-18 NOTE — PROGRESS NOTES
Care Management Follow Up    Length of Stay (days): 2    Expected Discharge Date: 08/19/2024     Concerns to be Addressed: discharge planning     Patient plan of care discussed at interdisciplinary rounds: Yes    Anticipated Discharge Disposition: Transitional Care     Anticipated Discharge Services:  Therapy  Anticipated Discharge DME:  N/A    Patient/family educated on Medicare website which has current facility and service quality ratings: yes  Education Provided on the Discharge Plan:  N/A  Patient/Family in Agreement with the Plan: yes    Referrals Placed by CM/SW: Post Acute Facilities  Private pay costs discussed: Not applicable    Additional Information:  Call placed and message left for [facility] admissions and a Teams message also left for admissions to inquire as to whether they would be able to accept patient today.  Awaiting a return call.    Will continue to follow.      JENNIFER Mcdonald, [facility]    [phone]  [facility]         Addendum:  D:  Received a message back from Teodora from [facility].  Teodora states that they no longer have a bed available for patient as they were told that the family was not interested in the room that they had available so they filled the room.  That was the only the available bed that they had.  Call placed to update patient's daughter Cee to get additional choices.  Cee is asking for a referral to be sent to be sent to [facility] as she has been there in the past.  Referral sent, via the discharge navigator, to check bed availability.  P:  Will continue to follow.        JENNIFER Mcdonald, [facility]    [phone]  [facility]

## 2024-08-18 NOTE — PROGRESS NOTES
POD4: R knee I&D  Alert, oriented to self, responds when spoken to, baseline dementia. No s/s of distress. VSS, room air. Dressing to RLE intact, Prevena WV intact, no o/p noted. Turned and repositioned with assist of 1-2. Incontinent of B/B, purewick in place, voiding adequately. PIV saline locked. Discharge pending TCU placement.

## 2024-08-18 NOTE — PLAN OF CARE
[id number]:  Alert to self.  Denies pain.  Turned and repositioned.  Patient refused to eat dinner, did drink some of the ensure.  Prevena wound vac.  Incontinent of urine, purewick in place.  Discharge pending TCU.

## 2024-08-19 ENCOUNTER — APPOINTMENT (OUTPATIENT)
Dept: CT IMAGING | Facility: CLINIC | Age: 89
End: 2024-08-19
Attending: EMERGENCY MEDICINE
Payer: COMMERCIAL

## 2024-08-19 ENCOUNTER — HOSPITAL ENCOUNTER (EMERGENCY)
Facility: CLINIC | Age: 89
Discharge: NURSING, REHAB OR LONG-TERM CARE | End: 2024-08-20
Attending: EMERGENCY MEDICINE | Admitting: EMERGENCY MEDICINE
Payer: COMMERCIAL

## 2024-08-19 ENCOUNTER — DOCUMENTATION ONLY (OUTPATIENT)
Dept: GERIATRICS | Facility: CLINIC | Age: 89
End: 2024-08-19
Payer: COMMERCIAL

## 2024-08-19 VITALS
TEMPERATURE: 98.2 F | OXYGEN SATURATION: 97 % | HEIGHT: 58 IN | DIASTOLIC BLOOD PRESSURE: 76 MMHG | BODY MASS INDEX: 26.07 KG/M2 | RESPIRATION RATE: 18 BRPM | SYSTOLIC BLOOD PRESSURE: 136 MMHG | HEART RATE: 94 BPM | WEIGHT: 124.2 LBS

## 2024-08-19 DIAGNOSIS — S09.90XA CLOSED HEAD INJURY, INITIAL ENCOUNTER: ICD-10-CM

## 2024-08-19 DIAGNOSIS — S01.81XA FACIAL LACERATION, INITIAL ENCOUNTER: ICD-10-CM

## 2024-08-19 PROCEDURE — 250N000012 HC RX MED GY IP 250 OP 636 PS 637: Performed by: STUDENT IN AN ORGANIZED HEALTH CARE EDUCATION/TRAINING PROGRAM

## 2024-08-19 PROCEDURE — 250N000013 HC RX MED GY IP 250 OP 250 PS 637: Performed by: ORTHOPAEDIC SURGERY

## 2024-08-19 PROCEDURE — 70450 CT HEAD/BRAIN W/O DYE: CPT

## 2024-08-19 PROCEDURE — 12001 RPR S/N/AX/GEN/TRNK 2.5CM/<: CPT

## 2024-08-19 PROCEDURE — 250N000013 HC RX MED GY IP 250 OP 250 PS 637: Performed by: HOSPITALIST

## 2024-08-19 PROCEDURE — 99284 EMERGENCY DEPT VISIT MOD MDM: CPT | Mod: 25

## 2024-08-19 PROCEDURE — 72125 CT NECK SPINE W/O DYE: CPT

## 2024-08-19 RX ADMIN — SENNOSIDES AND DOCUSATE SODIUM 1 TABLET: 50; 8.6 TABLET ORAL at 09:13

## 2024-08-19 RX ADMIN — CYCLOSPORINE 1 DROP: 0.5 EMULSION OPHTHALMIC at 09:14

## 2024-08-19 RX ADMIN — MEMANTINE 5 MG: 5 TABLET ORAL at 09:13

## 2024-08-19 RX ADMIN — ASPIRIN 81 MG CHEWABLE TABLET 81 MG: 81 TABLET CHEWABLE at 09:13

## 2024-08-19 RX ADMIN — QUETIAPINE FUMARATE 25 MG: 25 TABLET ORAL at 09:13

## 2024-08-19 RX ADMIN — PILOCARPINE HYDROCHLORIDE 5 MG: 5 TABLET, FILM COATED ORAL at 06:13

## 2024-08-19 RX ADMIN — PILOCARPINE HYDROCHLORIDE 5 MG: 5 TABLET, FILM COATED ORAL at 11:57

## 2024-08-19 RX ADMIN — AZATHIOPRINE 50 MG: 50 TABLET ORAL at 09:13

## 2024-08-19 RX ADMIN — AMOXICILLIN 500 MG: 500 CAPSULE ORAL at 09:13

## 2024-08-19 RX ADMIN — FLUOXETINE HYDROCHLORIDE 20 MG: 20 CAPSULE ORAL at 09:13

## 2024-08-19 RX ADMIN — PANTOPRAZOLE SODIUM 40 MG: 40 TABLET, DELAYED RELEASE ORAL at 09:13

## 2024-08-19 ASSESSMENT — ACTIVITIES OF DAILY LIVING (ADL)
ADLS_ACUITY_SCORE: 31.25
ADLS_ACUITY_SCORE: 20.25
ADLS_ACUITY_SCORE: 20.25
ADLS_ACUITY_SCORE: 31.25

## 2024-08-19 NOTE — PROGRESS NOTES
Care Management Discharge Note    Discharge Date: 08/19/2024       Discharge Disposition: Transitional Care    Discharge Services:      Discharge DME:      Discharge Transportation: family or friend will provide    Private pay costs discussed: private room/amenity fees and transportation costs    Does the patient's insurance plan have a 3 day qualifying hospital stay waiver?  No    PAS Confirmation Code: [id number]  Patient/family educated on Medicare website which has current facility and service quality ratings: yes    Education Provided on the Discharge Plan:    Persons Notified of Discharge Plans: yes  Patient/Family in Agreement with the Plan: yes    Handoff Referral Completed: No    Additional Information:  Patient accepted to [facility]. Writer asked if the bed is private or shared. PAS completed. Patient is medically ready for discharge today.     PAS-RR    D: Per DHS regulation, SW completed and submitted PAS-RR to [place] [employer] on [employer]ging Direct Connect via the Senior LinkAge Line.  PAS-RR confirmation # is : [id number]    I: SW spoke with patient and they are aware a PAS-RR has been submitted.  SW reviewed with patient that they may be contacted for a follow up appointment within 10 days of hospital discharge if their SNF stay is < 30 days.  Contact information for [facility] Line was also provided.    A: patient verbalized understanding.    P: Further questions may be directed to [facility] LinkAge Line at #[phone], option #4 for PAS-RR staff.    Writer spoke to daughter Cee who would like to accept the private room for $46/day. Writer updated Manjula at [facility] and messaged Dr. Tracy for discharge order. Cee confirmed that a ride is needed for transport. She is aware of potential costs as this was discussed last week with previous SW.     Call to [facility] and stretcher set up for supervision due to dementia today between [phone]. PCS completed and faxed. Orders received for discharge and sent to [facility]. Call  to Cee to confirm discharge time of ride, message left.      LELE Martinez

## 2024-08-19 NOTE — PROVIDER NOTIFICATION
MD Notification    Notified Person: MD    Notified Person Name: Dr. Tracy    Notification Date/Time: 08/19/2024 @ 1032    Notification Interaction: Phone- voicemail    Purpose of Notification:  Please update CODE status.     Orders Received:     Comments:

## 2024-08-19 NOTE — PROGRESS NOTES
PROGRESS NOTE    SUBJECTIVE  Chelly Dave is status post right knee superficial irrigation and debridement on August 14, 2024. No acute events overnight. Afebrile with stable vitals.  She is doing well this morning.  Pain is well controlled. Dressing intact and wound vac holding suction. Awaiting placement.     PHYSICAL EXAM  General: No acute distress. Alert and oriented to person, time and place.  Pulmonary: Breathing comfortably on room air.   Cardiac: Bilateral PT pulses are equal and symmetric. Regular rate and rhythm.   Musculoskeletal: Prevena incisional wound VAC is holding suction.  No output in canister.  Neurological: Patient is able to fire quadriceps, tibialis anterior, extensor hallucis longus and gastrocnemius. Sensation intact to light touch in bilateral L3-S1 distributions.     ASSESSMENT/PLAN  #1 Status post right knee superficial irrigation debridement on August 14, 2024    Chelly Dave is status post the above procedure.  She is doing well.  Awaiting placement.     Activity: Weightbearing as tolerated. Activity as tolerated. No precautions. Continue to work with PT while in hospital.   Antibiotics: Weight-based Ancef x2 doses postoperatively or until discharge.  Cultures: None  Diet: Advance as tolerated. Goal diet: Purée  Dressing: Posterior slab splint for 2 weeks.  Prevena incisional wound VAC for 2 weeks.  DVT Prophylaxis: Aspirin 81mg BID for 6 weeks  Pain medication: Multimodal pain regimen with ice, elevation, Tylenol 1000mg Q6H scheduled, Celebrex 100mg BID, Oxycodone 5-10mg Q4 PRN    Disposition: TCU.  When she has placement she is ready to discharge.

## 2024-08-19 NOTE — PLAN OF CARE
Discharge Note    Patient discharged to TCU via transportation service  accompanied by no family/friend .  IV: Discontinued  Prescriptions  printed and sent in packet .   Belongings reviewed and sent with patient, discussed with Brenda.   Home medications returned to patient: Yes  Equipment sent with: patient.   family verbalizes understanding of discharge instructions. AVS given to  sent with pt to TCU .    Discussed with daughter Brenda about transfer. Questions answered. Sent home medications with pt in belongings bag as well as preveena charging cord.

## 2024-08-19 NOTE — PROGRESS NOTES
POD5: R knee I and D.  Alert, oriented to self, baseline dementia.  VSS, room air. Dressing to R knee intact, wound vac in place, no o/p noted.No s/s of distress. Incontinent of B/B, purewick in place.  Turned and repo with assist of 2. PIV saline locked. Discharge pending TCU placement.

## 2024-08-20 ENCOUNTER — LAB REQUISITION (OUTPATIENT)
Dept: LAB | Facility: CLINIC | Age: 89
End: 2024-08-20
Payer: COMMERCIAL

## 2024-08-20 ENCOUNTER — TRANSITIONAL CARE UNIT VISIT (OUTPATIENT)
Dept: GERIATRICS | Facility: CLINIC | Age: 89
End: 2024-08-20
Payer: COMMERCIAL

## 2024-08-20 VITALS
DIASTOLIC BLOOD PRESSURE: 81 MMHG | TEMPERATURE: 97.3 F | SYSTOLIC BLOOD PRESSURE: 144 MMHG | WEIGHT: 135.8 LBS | BODY MASS INDEX: 28.51 KG/M2 | RESPIRATION RATE: 18 BRPM | HEIGHT: 58 IN | OXYGEN SATURATION: 94 % | HEART RATE: 80 BPM

## 2024-08-20 VITALS
OXYGEN SATURATION: 96 % | HEART RATE: 74 BPM | SYSTOLIC BLOOD PRESSURE: 143 MMHG | DIASTOLIC BLOOD PRESSURE: 76 MMHG | TEMPERATURE: 97.8 F | RESPIRATION RATE: 18 BRPM

## 2024-08-20 DIAGNOSIS — F03.C0 SEVERE DEMENTIA, UNSPECIFIED DEMENTIA TYPE, UNSPECIFIED WHETHER BEHAVIORAL, PSYCHOTIC, OR MOOD DISTURBANCE OR ANXIETY (H): ICD-10-CM

## 2024-08-20 DIAGNOSIS — D64.9 ANEMIA, UNSPECIFIED: ICD-10-CM

## 2024-08-20 DIAGNOSIS — T81.30XA WOUND DEHISCENCE: Primary | ICD-10-CM

## 2024-08-20 DIAGNOSIS — S01.81XD FACIAL LACERATION, SUBSEQUENT ENCOUNTER: ICD-10-CM

## 2024-08-20 DIAGNOSIS — R13.10 DYSPHAGIA, UNSPECIFIED TYPE: ICD-10-CM

## 2024-08-20 DIAGNOSIS — F03.A0 UNSPECIFIED DEMENTIA, MILD, WITHOUT BEHAVIORAL DISTURBANCE, PSYCHOTIC DISTURBANCE, MOOD DISTURBANCE, AND ANXIETY (H): ICD-10-CM

## 2024-08-20 DIAGNOSIS — M00.9 PYOGENIC ARTHRITIS OF RIGHT KNEE JOINT, DUE TO UNSPECIFIED ORGANISM (H): ICD-10-CM

## 2024-08-20 DIAGNOSIS — Z11.1 ENCOUNTER FOR SCREENING FOR RESPIRATORY TUBERCULOSIS: ICD-10-CM

## 2024-08-20 DIAGNOSIS — R53.81 PHYSICAL DECONDITIONING: ICD-10-CM

## 2024-08-20 DIAGNOSIS — D62 ACUTE BLOOD LOSS ANEMIA: ICD-10-CM

## 2024-08-20 DIAGNOSIS — F32.A DEPRESSION, UNSPECIFIED DEPRESSION TYPE: ICD-10-CM

## 2024-08-20 DIAGNOSIS — Z98.890 STATUS POST INCISION AND DRAINAGE: ICD-10-CM

## 2024-08-20 PROCEDURE — 99310 SBSQ NF CARE HIGH MDM 45: CPT | Performed by: NURSE PRACTITIONER

## 2024-08-20 RX ORDER — ACETAMINOPHEN 500 MG
1000 TABLET ORAL 3 TIMES DAILY
Qty: 90 TABLET | Refills: 0 | Status: SHIPPED | OUTPATIENT
Start: 2024-08-20

## 2024-08-20 RX ORDER — ACETAMINOPHEN 500 MG
500 TABLET ORAL EVERY 6 HOURS PRN
COMMUNITY
End: 2024-08-20 | Stop reason: DRUGHIGH

## 2024-08-20 ASSESSMENT — ACTIVITIES OF DAILY LIVING (ADL)
ADLS_ACUITY_SCORE: 20.25
ADLS_ACUITY_SCORE: 20.25

## 2024-08-20 NOTE — ED TRIAGE NOTES
Unwitnessed fall at [facility]. Found at 8:30pm, was last checked on at 7pm. Lac to head.  Hx dementia.

## 2024-08-20 NOTE — ED NOTES
Bed: ED01  Expected date:   Expected time:   Means of arrival:   Comments:  Naomie [address] 89F, fall head inj

## 2024-08-20 NOTE — LETTER
8/20/2024      Chelly Dave  [address]  [place] [address]        [facility] GERI[facility]    PRIMARY CARE PROVIDER AND CLINIC:  Brittany Heredia, [address] / [facility] [address]  Chief Complaint   Patient presents with     [facility] Medical Record Number:  [id number]  Place of Service where encounter took place:  [facility] ([facility]) [[id number]]    Chelly Dave  is a 89 year old  (1935), admitted to the above facility from  [facility]. Hospital stay 8/14 through 8/19/24..       HPI:      PMH: dementia, HLD, DM, depression anxiety     Hx septic arthritis of right knee in 06/2024. Wound cultures + Streptococcus gordonii. Started on IV vancomycin and ceftriaxone. ID consulted, transitioned to ceftriaxone alone x 6 weeks. Discharged from TCU on 8/8/24.     Admitted to [facility] 8/14-8/19/24 due to planned s/p right knee superficial wound irrigation and debridement with Dr. Tracy.  Plan to continue prevena wound vac x 2 week and follow-up outpatient.    Transferred to [facility] TCU on 8/19/24.    Evaluated at [facility] ED on 8/19/24 due to fall w/facial laceration, requiring sutures.       Today's concerns:    During exam, patient seen resting in bed. HPI limited due to dementia. Reports doing well, denies pain or concerns today. PTA lives w/family. Admits to good appetite. Denies constipation, diarrhea. Denies chest pain, SOB, headache, syncope.      CODE STATUS/ADVANCE DIRECTIVES DISCUSSION:  DNR/DNI  ALLERGIES:   Allergies   Allergen Reactions     Lisinopril      Oxycodone      Other reaction(s): Confusion     Aspirin Buf(Cacarb-Mgcarb-Mgo)      Other reaction(s): GI Upset  81 mg works well for her     Aspirin Buffered Unknown     Other reaction(s): GI Upset 81 mg works well for her      PAST MEDICAL HISTORY:   Past Medical History:   Diagnosis Date     Arthritis      Cataract      Diabetes mellitus (H)      Gastro-oesophageal reflux  disease      Hyperlipidemia       PAST SURGICAL HISTORY:   has a past surgical history that includes orthopedic surgery; colonoscopy; Laparotomy exploratory (N/A, 6/22/2018); picc insertion (Left, 06/25/2018); Arthroplasty revision knee (Right, 6/21/2024); and Irrigation and debridement knee, combined (Right, 8/14/2024).  FAMILY HISTORY: family history is not on file.  SOCIAL HISTORY:   reports that she has never smoked. She has never used smokeless tobacco. She reports that she does not drink alcohol and does not use drugs.  Patient's living condition: lives with family, adult children     Post Discharge Medication Reconciliation Status:   MED REC REQUIRED  Post Medication Reconciliation Status: discharge medications reconciled and changed, per note/orders       Current Outpatient Medications   Medication Sig Dispense Refill     acetaminophen (TYLENOL) 500 MG tablet Take 500 mg by mouth every 6 hours as needed for mild pain       albuterol (PROVENTIL) (2.5 MG/3ML) 0.083% neb solution Take 2.5 mg by nebulization every 6 hours as needed for shortness of breath, wheezing or cough       amoxicillin (AMOXIL) 500 MG tablet Take 500 mg by mouth 2 times daily Continue for life       aspirin 81 MG EC tablet Take 1 tablet (81 mg) by mouth 2 times daily 60 tablet 0     AzaTHIOprine (IMURAN PO) Take 50 mg by mouth daily       blood glucose monitoring (NO BRAND SPECIFIED) test strip USE TO CHECK BLOOD SUGARS EVERY DAY       blood glucose monitoring (SOFTCLIX) lancets USE TO TEST BLOOD SUGARS DAILY       Blood Glucose Monitoring Suppl (FIFTY50 GLUCOSE METER 2.0) w/Device KIT Dispense meter, test strips, lancets covered by pt ins. E11.9 NIDDM type II - Test 1 time/day       Calcium Carb-Cholecalciferol (CALCIUM 500 +D) 500-400 MG-UNIT TABS Take 1 tablet by mouth 2 times daily       Cholecalciferol (VITAMIN D3) 1000 units CAPS Take 1,000 Units by mouth daily       cycloSPORINE (RESTASIS) 0.05 % ophthalmic emulsion Instill 1 drop  into both eyes every 12 hours.   No further refills will be given unless you come in for your exam.       DOXEPIN HCL PO Take 10 mg by mouth At Bedtime       ferrous sulfate (IRON) 325 (65 FE) MG tablet Take 1 tablet (325 mg) by mouth daily 100 tablet 0     FLUoxetine (PROZAC) 20 MG capsule Take 20 mg by mouth daily       HYDROmorphone (DILAUDID) 2 MG tablet Take 0.5 tablets (1 mg) by mouth every 4 hours as needed for breakthrough pain 30 tablet 0     lidocaine (LIDODERM) 5 % patch Place 1 patch onto the skin daily as needed for moderate pain (12 hours on; 12 hours off. Patient applies to knees or back)       loratadine (CLARITIN) 10 MG tablet Take 10 mg by mouth daily as needed       memantine (NAMENDA) 5 MG tablet Take 5 mg by mouth 2 times daily       miconazole (MICATIN) 2 % AERP powder Apply topically 2 times daily To sridhar area until redness resolved       ondansetron (ZOFRAN ODT) 4 MG ODT tab Take 1 tablet (4 mg) by mouth every 6 hours as needed for nausea       pantoprazole (PROTONIX) 40 MG EC tablet Take 1 tablet (40 mg) by mouth daily 30 tablet 1     pantoprazole (PROTONIX) 40 MG EC tablet Take 40 mg by mouth daily       Pilocarpine HCl (SALAGEN PO) Take 5 mg by mouth 3 times daily AM, 1200 & HS       polyethylene glycol (MIRALAX/GLYCOLAX) powder Take 17 g by mouth daily as needed       polyethylene glycol 400 (BLINK TEARS) 0.25 % SOLN ophthalmic solution Place 1 drop into both eyes every 2 hours as needed for dry eyes       QUEtiapine (SEROQUEL) 25 MG tablet Take 25 mg by mouth 2 times daily       senna-docusate (SENOKOT-S/PERICOLACE) 8.6-50 MG tablet Take 2 tablets by mouth 2 times daily       acetaminophen (TYLENOL) 500 MG tablet Take 1-2 tablets (500-1,000 mg) by mouth every 6 hours as needed for mild pain 40 tablet 2     No current facility-administered medications for this visit.       ROS:  Limited secondary to cognitive impairment but today pt reports no concerns    Vitals:  BP (!) 144/81   Pulse  "80   Temp 97.3  F (36.3  C)   Resp 18   Ht 1.473 m (4' 10\")   Wt 61.6 kg (135 lb 12.8 oz)   SpO2 94%   BMI 28.38 kg/m    Exam:  GENERAL APPEARANCE:  Alert, in no distress, oriented, cooperative  ENT:  Mouth and posterior oropharynx normal, moist mucous membranes, [place]  EYES:  EOM, conjunctivae, lids, pupils and irises normal, PERRL  RESP:  respiratory effort and palpation of chest normal, lungs clear to auscultation , no respiratory distress  CV:  regular rate and rhythm, no murmur, rub, or gallop, no edema  ABDOMEN:  normal bowel sounds, soft, nontender, no guarding or rebound  M/S:   Gait and station abnormal , resting in bed.   SKIN:  RLE w/ACE wrap loose, prevena vac intact. Large ecchymosis to L eye. Laceration to L forehead, proximal to eyebrow, bandage intact.  NEURO:   Cranial nerves 2-12 are normal tested and grossly at patient's baseline, Examination of sensation by touch normal  PSYCH:  oriented to self, memory impaired     Wt Readings from Last 4 Encounters:   08/20/24 61.6 kg (135 lb 12.8 oz)   08/14/24 56.3 kg (124 lb 3.2 oz)   07/31/24 59.9 kg (132 lb)   07/23/24 56.2 kg (124 lb)       Lab/Diagnostic data:  Recent labs in [facility] reviewed by me today.   Most Recent 3 CBC's:  Recent Labs   Lab Test 08/16/24  0818 08/15/24  0655 07/30/24  0758 07/23/24  0740 07/19/24  0810   WBC  --   --  2.7* 2.4* 5.1   HGB 9.7* 9.3* 9.0* 8.8* 9.4*   MCV  --   --  100 101* 102*   PLT  --   --  150 180 189     Most Recent 3 BMP's:  Recent Labs   Lab Test 08/16/24  0558 08/15/24  0658 08/15/24  0622 08/14/24  1213 08/14/24  1000 07/30/24  0758 07/23/24  0740 07/16/24  0600   NA  --   --   --   --   --  143 142 145   POTASSIUM  --   --   --   --  4.1 4.1 3.6 3.7   CHLORIDE  --   --   --   --   --  105 105 109*   CO2  --   --   --   --   --  28 27 24   BUN  --   --   --   --   --  17.4 11.7 18.4   CR  --   --   --   --   --  0.71 0.71 0.73   ANIONGAP  --   --   --   --   --  10 10 12   ROBSON  --   --   --   --   --  8.2* " 8.4* 8.6*   GLC 85 101* 81   < > 89 76 86 97    < > = values in this interval not displayed.       TSH   Date Value Ref Range Status   12/12/2014 2.16 0.40 - 4.00 mU/L Final     Comment:     Effective 7/30/2014, the reference range for this assay has changed to reflect   new instrumentation/methodology.         Lab Results   Component Value Date    A1C 5.6 08/11/2016    A1C 5.7 10/18/2011       Last A1c 5.4% on 5/20/24      Ferritin   Date Value Ref Range Status   10/23/2017 79 8 - 252 ng/mL Final     Iron   Date Value Ref Range Status   10/23/2017 19 (L) 35 - 180 ug/dL Final     Iron Binding Cap   Date Value Ref Range Status   10/23/2017 202 (L) 240 - 430 ug/dL Final         ASSESSMENT/PLAN:    (T81.30XA) Wound dehiscence  (primary encounter diagnosis)  (Z98.890) Status post incision and drainage  (M00.9) Pyogenic arthritis of right knee joint, due to unspecified organism (H)  (R53.81) Physical deconditioning  Comment: Hx septic arthritis with Strep Goronii s/p I&D and modular exchange on 6/21/24. postop wound dehiscence, s/p R knee superficial I&D on 8/14/24 with Dr. Tracy.  Ongoing physical deconditioning. PTA lives w/family.   Plan:   - Discontinue current tylenol orders  - Add scheduled tylenol 1000mg TID dx pain  - Check CBC, CMP on 8/21/24 dx dementia, s/p I&D, anemia  - Check PVR TID, if PVR > 300cc then straight cath. If straight cath x 2, then place montenegro catheter. Dx urinary retention  - Continue ASA 81mg BID x 6 weeks, then take PTA ASA 81mg every day starting on 9/26/24  - WBAT  - Continue prevena wound vac and posterior slab splint x 2 weeks  - Patient to follow-up with Dr. Tracy on 8/29/24  - Continue PT, OT  - SW following for discharge planning. Goal is to discharge home.  - Reviewed patient status and treatment plan with Brenda (daughter)    (D62) Acute blood loss anemia  Comment: Postop anemia  Plan:   - Check CBC on 8/21/24    (F03.C0) Severe dementia, unspecified dementia type, unspecified  whether behavioral, psychotic, or mood disturbance or anxiety (H)  (F32.A) Depression, unspecified depression type  (R13.10) Dysphagia, unspecified type  Comment: Chronic dementia with anxiety and depression. Chronic dysphagia.   Plan:   - Continue DD1 w/regular diet  - Requires assistance w/meals  - No pork or beef requested by family  - ST to eval/treat  - Dietician following  - Continue Doxepin 10mg, Fluoxetine 20mg, Seroquel 25mg BID   - Continue memantine   - Monitor changes in mood or behaviors    (S01.81XD) Facial laceration, subsequent encounter  Comment: Acute L sided facial laceration r/t fall on 8/19/24, required sutures in ED.  Plan:   - Remove sutures in 1 week    Sjogren's disease  Comment: Chronic   Plan:   - Continue PTA azathioprine, pilocarpine.       Orders:  - No pork or beef  - Requires assistance w/meals  - ST to eval/treat dx dysphagia  - Remove sutures to R forehead on 8/27/24  - Continue ASA 81mg BID through 9/25/24 dx DVT prophylaxis, then take ASA 81mg every day dx   - Discontinue current tylenol orders  - Add scheduled tylenol 1000mg TID dx pain  - Check CBC, CMP on 8/21/24 dx dementia, s/p I&D, anemia  - Check PVR TID, if PVR > 300cc then straight cath. If straight cath x 2, then place montenegro catheter. Dx urinary retention        Total time spent during today's visit was 50 mins including patient visit and review of past records. Time spent reviewing patient status and treatment plan with Brenda (daughter).    Electronically signed by:  JAYME Casper CNP           Sincerely,        JAYME Casper CNP

## 2024-08-20 NOTE — ED NOTES
Spoke with unit supervisor at [facility] at [phone] and let them know patient will be coming back. Transport arranged.

## 2024-08-20 NOTE — ED PROVIDER NOTES
Emergency Department Note      History of Present Illness     Chief Complaint   Fall    HPI   Chelly Dave is a 89 year old female with a history of Dementia, hyperlipidemia and hypertension who presents to the ED following a fall. Patient had an unwitnessed fall at a care facility. She was found at 2030. Last checked in on at 1900. In the fall, she sustained a laceration to her head. Patient not complaining of pain anywhere. Last tetanus was in 2020.     Independent Historian   Son and Daughter as detailed above.    Review of External Notes   none    Past Medical History   Medical History and Problem List   Pyogenic arthritis  Major depressive disorder  Prediabetes  Allergic rhinitis  Dementia  Microalbuminuria  Sjorgen's syndrome  Pancytopenia  Pulmonary nodule  Venous insufficiency  Dry eye syndrome  Pseudophakia  Hyperlipidemia  Osteopenia  Astigmatism presbyopia  Hypertension  GERD  Raynaud's disease    Medications   Pilocarpine  Aspirin 81 mg  Azathioprine  Pantoprazole  Memantine  Quetiapine  Ferrous sulfate  Atorvastatin  Fluoxetine  Trazodone  Doxepin    Surgical History   Knee arthroplasty  Colonoscopy  Exploratory laparotomy  Orthopedic surgery  PICC insertion    Physical Exam   Patient Vitals for the past 24 hrs:   Temp Pulse Resp SpO2   08/19/24 2212 97.8  F (36.6  C) 86 18 96 %     Physical Exam  Nursing note and vitals reviewed.  Constitutional:  Appears well-developed and well-nourished.   HENT:   Head:    2.0 cm linear lac to left temple region.  No bone tenderness to facial bones or skull.  Eyes appear normal.  Mouth/Throat:   Oropharynx is clear and moist. No oropharyngeal exudate.   Eyes:    Pupils are equal, round, and reactive to light.   Neck:    Non tender neck.  Normal range of motion. Neck supple.      No tracheal deviation present. No thyromegaly present.   Cardiovascular:  Normal rate, regular rhythm, no murmur   Pulmonary/Chest: Non tender ribs/clavicles.  Breath sounds are clear  and equal without wheezes or crackles.  Abdominal:   Soft. Bowel sounds are normal. Exhibits no distension and      no mass. There is no tenderness.      There is no rebound and no guarding.   Musculoskeletal:  Exhibits no edema. Non tender arms/legs/back.  Lymphadenopathy:  No cervical adenopathy.   Neurological:   Alert.  Moving all extremities equally.  Dementia.  Skin:    Skin is warm and dry. No rash noted. No pallor.       Diagnostics   Lab Results   Labs Ordered and Resulted from Time of ED Arrival to Time of ED Departure - No data to display    Imaging   CT Head w/o Contrast   Final Result   IMPRESSION:   HEAD CT:   1.  No CT evidence for acute intracranial process.   2.  Brain atrophy and presumed chronic microvascular ischemic changes as above.      CERVICAL SPINE CT:   1.  No CT evidence for acute fracture or post traumatic subluxation.      CT Cervical Spine w/o Contrast   Final Result   IMPRESSION:   HEAD CT:   1.  No CT evidence for acute intracranial process.   2.  Brain atrophy and presumed chronic microvascular ischemic changes as above.      CERVICAL SPINE CT:   1.  No CT evidence for acute fracture or post traumatic subluxation.        Independent Interpretation   CT Head: No intracranial hemorrhage.    ED Course    Medications Administered   Medications - No data to display    Procedures     Laceration Repair      Procedure: Laceration Repair    Indication: Laceration    Consent: Verbal    Tetanus status reviewed    Location:  Scalp     Length: 2.0 cm    Preparation: Irrigation with Sterile Saline.    Anesthesia/Sedation: Bupivacaine - 0.5%      Treatment/Exploration: Wound explored, no foreign bodies found     Closure: The wound was closed with one layer. Skin/superficial layer was closed with 3 x 5-0 Nylon using Interrupted sutures.  and Tissue Adhesive.    Patient Status: The patient tolerated the procedure well: Yes. There were no complications.    Discussion of Management   None    ED Course    ED Course as of 08/19/24 2136   Mon Aug 19, 2024   2132 I obtained history and examined the patient as noted above.         Additional Documentation  None    Medical Decision Making / Diagnosis   CMS Diagnoses: None    MIPS       None    MDM   Chelly Dave is a 89 year old female with a history of dementia who had an unwitnessed fall with resulting facial laceration which I repaired with sutures.  Instructions were given to have sutures removed in 7 days follow-up in clinic.  Head CT was performed and did not show any sign of intracranial bleed or skull fracture.  She is mentating at her baseline.  CT cervical spine did not show any fracture either and she is moving all extremities.  I felt she could be safely discharged to home and family was told to have her return if she develops altered mental status, vomiting or other concerning problems or signs of wound infection.    Disposition   The patient was discharged.     Diagnosis     ICD-10-CM    1. Closed head injury, initial encounter  S09.90XA       2. Facial laceration, initial encounter  S01.81XA            Discharge Medications   New Prescriptions    No medications on file     Scribe Disclosure:  I, Aristides Garcia, am serving as a scribe at 9:41 PM on 8/19/2024 to document services personally performed by Ivet Sewell MD based on my observations and the provider's statements to me.        Ivet Sewell MD  08/20/24 0014

## 2024-08-20 NOTE — PROGRESS NOTES
[facility] GERIATRICS    PRIMARY CARE PROVIDER AND CLINIC:  Brittany Heredia, [address] / [facility] [address]  Chief Complaint   Patient presents with    [facility] Medical Record Number:  [id number]  Place of Service where encounter took place:  [place] [facility] ([facility]) [[id number]]    Chelly Dave  is a 89 year old  (1935), admitted to the above facility from  [facility]. Hospital stay 8/14 through 8/19/24..       HPI:      PMH: dementia, HLD, DM, depression anxiety     Hx septic arthritis of right knee in 06/2024. Wound cultures + Streptococcus gordonii. Started on IV vancomycin and ceftriaxone. ID consulted, transitioned to ceftriaxone alone x 6 weeks. Discharged from [facility] on 8/8/24.     Admitted to [facility] 8/14-8/19/24 due to planned s/p right knee superficial wound irrigation and debridement with Dr. Tracy.  Plan to continue prevena wound vac x 2 week and follow-up outpatient.    [place] [facility] [place]24.    Evaluated at [facility] ED on 8/19/24 due to fall w/facial laceration, requiring sutures.       Today's concerns:    During exam, patient seen resting in bed. HPI limited due to dementia. Reports doing well, denies pain or concerns today. PTA lives w/family. Admits to good appetite. Denies constipation, diarrhea. Denies chest pain, SOB, headache, syncope.      CODE STATUS/ADVANCE DIRECTIVES DISCUSSION:  DNR/DNI  ALLERGIES:   Allergies   Allergen Reactions    Lisinopril     Oxycodone      Other reaction(s): Confusion    Aspirin Buf(Cacarb-Mgcarb-Mgo)      Other reaction(s): GI Upset  81 mg works well for her    Aspirin Buffered Unknown     Other reaction(s): GI Upset 81 mg works well for her      PAST MEDICAL HISTORY:   Past Medical History:   Diagnosis Date    Arthritis     Cataract     Diabetes mellitus (H)     Gastro-oesophageal reflux disease     Hyperlipidemia       PAST SURGICAL HISTORY:   has a past surgical history  that includes orthopedic surgery; colonoscopy; Laparotomy exploratory (N/A, 6/22/2018); picc insertion (Left, 06/25/2018); Arthroplasty revision knee (Right, 6/21/2024); and Irrigation and debridement knee, combined (Right, 8/14/2024).  FAMILY HISTORY: family history is not on file.  SOCIAL HISTORY:   reports that she has never smoked. She has never used smokeless tobacco. She reports that she does not drink alcohol and does not use drugs.  Patient's living condition: lives with family, adult children     Post Discharge Medication Reconciliation Status:   MED REC REQUIRED  Post Medication Reconciliation Status: discharge medications reconciled and changed, per note/orders       Current Outpatient Medications   Medication Sig Dispense Refill    acetaminophen (TYLENOL) 500 MG tablet Take 500 mg by mouth every 6 hours as needed for mild pain      albuterol (PROVENTIL) (2.5 MG/3ML) 0.083% neb solution Take 2.5 mg by nebulization every 6 hours as needed for shortness of breath, wheezing or cough      amoxicillin (AMOXIL) 500 MG tablet Take 500 mg by mouth 2 times daily Continue for life      aspirin 81 MG EC tablet Take 1 tablet (81 mg) by mouth 2 times daily 60 tablet 0    AzaTHIOprine (IMURAN PO) Take 50 mg by mouth daily      blood glucose monitoring (NO BRAND SPECIFIED) test strip USE TO CHECK BLOOD SUGARS EVERY DAY      blood glucose monitoring (SOFTCLIX) lancets USE TO TEST BLOOD SUGARS DAILY      Blood Glucose Monitoring Suppl (FIFTY50 GLUCOSE METER 2.0) w/Device KIT Dispense meter, test strips, lancets covered by pt ins. E11.9 NIDDM type II - Test 1 time/day      Calcium Carb-Cholecalciferol (CALCIUM 500 +D) 500-400 MG-UNIT TABS Take 1 tablet by mouth 2 times daily      Cholecalciferol (VITAMIN D3) 1000 units CAPS Take 1,000 Units by mouth daily      cycloSPORINE (RESTASIS) 0.05 % ophthalmic emulsion Instill 1 drop into both eyes every 12 hours.   No further refills will be given unless you come in for your exam.  "     DOXEPIN HCL PO Take 10 mg by mouth At Bedtime      ferrous sulfate (IRON) 325 (65 FE) MG tablet Take 1 tablet (325 mg) by mouth daily 100 tablet 0    FLUoxetine (PROZAC) 20 MG capsule Take 20 mg by mouth daily      HYDROmorphone (DILAUDID) 2 MG tablet Take 0.5 tablets (1 mg) by mouth every 4 hours as needed for breakthrough pain 30 tablet 0    lidocaine (LIDODERM) 5 % patch Place 1 patch onto the skin daily as needed for moderate pain (12 hours on; 12 hours off. Patient applies to knees or back)      loratadine (CLARITIN) 10 MG tablet Take 10 mg by mouth daily as needed      memantine (NAMENDA) 5 MG tablet Take 5 mg by mouth 2 times daily      miconazole (MICATIN) 2 % AERP powder Apply topically 2 times daily To sridhar area until redness resolved      ondansetron (ZOFRAN ODT) 4 MG ODT tab Take 1 tablet (4 mg) by mouth every 6 hours as needed for nausea      pantoprazole (PROTONIX) 40 MG EC tablet Take 1 tablet (40 mg) by mouth daily 30 tablet 1    pantoprazole (PROTONIX) 40 MG EC tablet Take 40 mg by mouth daily      Pilocarpine HCl (SALAGEN PO) Take 5 mg by mouth 3 times daily AM, 1200 & HS      polyethylene glycol (MIRALAX/GLYCOLAX) powder Take 17 g by mouth daily as needed      polyethylene glycol 400 (BLINK TEARS) 0.25 % SOLN ophthalmic solution Place 1 drop into both eyes every 2 hours as needed for dry eyes      QUEtiapine (SEROQUEL) 25 MG tablet Take 25 mg by mouth 2 times daily      senna-docusate (SENOKOT-S/PERICOLACE) 8.6-50 MG tablet Take 2 tablets by mouth 2 times daily      acetaminophen (TYLENOL) 500 MG tablet Take 1-2 tablets (500-1,000 mg) by mouth every 6 hours as needed for mild pain 40 tablet 2     No current facility-administered medications for this visit.       ROS:  Limited secondary to cognitive impairment but today pt reports no concerns    Vitals:  BP (!) 144/81   Pulse 80   Temp 97.3  F (36.3  C)   Resp 18   Ht 1.473 m (4' 10\")   Wt 61.6 kg (135 lb 12.8 oz)   SpO2 94%   BMI 28.38 " kg/m    Exam:  GENERAL APPEARANCE:  Alert, in no distress, oriented, cooperative  ENT:  Mouth and posterior oropharynx normal, moist mucous membranes, [place]  EYES:  EOM, conjunctivae, lids, pupils and irises normal, PERRL  RESP:  respiratory effort and palpation of chest normal, lungs clear to auscultation , no respiratory distress  CV:  regular rate and rhythm, no murmur, rub, or gallop, no edema  ABDOMEN:  normal bowel sounds, soft, nontender, no guarding or rebound  M/S:   Gait and station abnormal , resting in bed.   SKIN:  RLE w/ACE wrap loose, prevena vac intact. Large ecchymosis to L eye. Laceration to L forehead, proximal to eyebrow, bandage intact.  NEURO:   Cranial nerves 2-12 are normal tested and grossly at patient's baseline, Examination of sensation by touch normal  PSYCH:  oriented to self, memory impaired     Wt Readings from Last 4 Encounters:   08/20/24 61.6 kg (135 lb 12.8 oz)   08/14/24 56.3 kg (124 lb 3.2 oz)   07/31/24 59.9 kg (132 lb)   07/23/24 56.2 kg (124 lb)       Lab/Diagnostic data:  Recent labs in [facility] reviewed by me today.   Most Recent 3 CBC's:  Recent Labs   Lab Test 08/16/24  0818 08/15/24  0655 07/30/24  0758 07/23/24  0740 07/19/24  0810   WBC  --   --  2.7* 2.4* 5.1   HGB 9.7* 9.3* 9.0* 8.8* 9.4*   MCV  --   --  100 101* 102*   PLT  --   --  150 180 189     Most Recent 3 BMP's:  Recent Labs   Lab Test 08/16/24  0558 08/15/24  0658 08/15/24  0622 08/14/24  1213 08/14/24  1000 07/30/24  0758 07/23/24  0740 07/16/24  0600   NA  --   --   --   --   --  143 142 145   POTASSIUM  --   --   --   --  4.1 4.1 3.6 3.7   CHLORIDE  --   --   --   --   --  105 105 109*   CO2  --   --   --   --   --  28 27 24   BUN  --   --   --   --   --  17.4 11.7 18.4   CR  --   --   --   --   --  0.71 0.71 0.73   ANIONGAP  --   --   --   --   --  10 10 12   ROBSON  --   --   --   --   --  8.2* 8.4* 8.6*   GLC 85 101* 81   < > 89 76 86 97    < > = values in this interval not displayed.       TSH   Date Value Ref  Range Status   12/12/2014 2.16 0.40 - 4.00 mU/L Final     Comment:     Effective 7/30/2014, the reference range for this assay has changed to reflect   new instrumentation/methodology.         Lab Results   Component Value Date    A1C 5.6 08/11/2016    A1C 5.7 10/18/2011       Last A1c 5.4% on 5/20/24      Ferritin   Date Value Ref Range Status   10/23/2017 79 8 - 252 ng/mL Final     Iron   Date Value Ref Range Status   10/23/2017 19 (L) 35 - 180 ug/dL Final     Iron Binding Cap   Date Value Ref Range Status   10/23/2017 202 (L) 240 - 430 ug/dL Final         ASSESSMENT/PLAN:    (T81.30XA) Wound dehiscence  (primary encounter diagnosis)  (Z98.890) Status post incision and drainage  (M00.9) Pyogenic arthritis of right knee joint, due to unspecified organism (H)  (R53.81) Physical deconditioning  Comment: Hx septic arthritis with Strep Goronii s/p I&D and modular exchange on 6/21/24. postop wound dehiscence, s/p R knee superficial I&D on 8/14/24 with Dr. Tracy.  Ongoing physical deconditioning. PTA lives w/family.   Plan:   - Discontinue current tylenol orders  - Add scheduled tylenol 1000mg TID dx pain  - Check CBC, CMP on 8/21/24 dx dementia, s/p I&D, anemia  - Check PVR TID, if PVR > 300cc then straight cath. If straight cath x 2, then place montenegro catheter. Dx urinary retention  - Continue ASA 81mg BID x 6 weeks, then take PTA ASA 81mg every day starting on 9/26/24  - WBAT  - Continue prevena wound vac and posterior slab splint x 2 weeks  - Patient to follow-up with Dr. Tracy on 8/29/24  - Continue PT, OT  - SW following for discharge planning. Goal is to discharge home.  - Reviewed patient status and treatment plan with Brenda (daughter)    (D62) Acute blood loss anemia  Comment: Postop anemia  Plan:   - Check CBC on 8/21/24    (F03.C0) Severe dementia, unspecified dementia type, unspecified whether behavioral, psychotic, or mood disturbance or anxiety (H)  (F32.A) Depression, unspecified depression  type  (R13.10) Dysphagia, unspecified type  Comment: Chronic dementia with anxiety and depression. Chronic dysphagia.   Plan:   - Continue DD1 w/regular diet  - Requires assistance w/meals  - No pork or beef requested by family  - ST to eval/treat  - Dietician following  - Continue Doxepin 10mg, Fluoxetine 20mg, Seroquel 25mg BID   - Continue memantine   - Monitor changes in mood or behaviors    (S01.81XD) Facial laceration, subsequent encounter  Comment: Acute L sided facial laceration r/t fall on 8/19/24, required sutures in ED.  Plan:   - Remove sutures in 1 week    Sjogren's disease  Comment: Chronic   Plan:   - Continue PTA azathioprine, pilocarpine.       Orders:  - No pork or beef  - Requires assistance w/meals  - ST to eval/treat dx dysphagia  - Remove sutures to R forehead on 8/27/24  - Continue ASA 81mg BID through 9/25/24 dx DVT prophylaxis, then take ASA 81mg every day dx   - Discontinue current tylenol orders  - Add scheduled tylenol 1000mg TID dx pain  - Check CBC, CMP on 8/21/24 dx dementia, s/p I&D, anemia  - Check PVR TID, if PVR > 300cc then straight cath. If straight cath x 2, then place montenegro catheter. Dx urinary retention        Total time spent during today's visit was 50 mins including patient visit and review of past records. Time spent reviewing patient status and treatment plan with Brenda (daughter).    Electronically signed by:  JAYME Casper CNP

## 2024-08-20 NOTE — PLAN OF CARE
Physical Therapy Discharge Summary    Reason for therapy discharge:    Discharged to transitional care facility.    Progress towards therapy goal(s). See goals on Care Plan in [facility] electronic health record for goal details.  Goals partially met.  Barriers to achieving goals:   discharge from facility.    Therapy recommendation(s):    Continued therapy is recommended.  Rationale/Recommendations:  To improve IND with mobility as pt below baseline at this time.

## 2024-08-21 LAB
ALBUMIN SERPL BCG-MCNC: 3 G/DL (ref 3.5–5.2)
ALP SERPL-CCNC: 92 U/L (ref 40–150)
ALT SERPL W P-5'-P-CCNC: 19 U/L (ref 0–50)
ANION GAP SERPL CALCULATED.3IONS-SCNC: 8 MMOL/L (ref 7–15)
AST SERPL W P-5'-P-CCNC: 37 U/L (ref 0–45)
BILIRUB SERPL-MCNC: 0.3 MG/DL
BUN SERPL-MCNC: 26.4 MG/DL (ref 8–23)
CALCIUM SERPL-MCNC: 8.5 MG/DL (ref 8.8–10.4)
CHLORIDE SERPL-SCNC: 103 MMOL/L (ref 98–107)
CREAT SERPL-MCNC: 0.92 MG/DL (ref 0.51–0.95)
EGFRCR SERPLBLD CKD-EPI 2021: 60 ML/MIN/1.73M2
ERYTHROCYTE [DISTWIDTH] IN BLOOD BY AUTOMATED COUNT: 14.6 % (ref 10–15)
GLUCOSE SERPL-MCNC: 98 MG/DL (ref 70–99)
HCO3 SERPL-SCNC: 27 MMOL/L (ref 22–29)
HCT VFR BLD AUTO: 31.8 % (ref 35–47)
HGB BLD-MCNC: 9.7 G/DL (ref 11.7–15.7)
MCH RBC QN AUTO: 30.7 PG (ref 26.5–33)
MCHC RBC AUTO-ENTMCNC: 30.5 G/DL (ref 31.5–36.5)
MCV RBC AUTO: 101 FL (ref 78–100)
PLATELET # BLD AUTO: 214 10E3/UL (ref 150–450)
POTASSIUM SERPL-SCNC: 3.9 MMOL/L (ref 3.4–5.3)
PROT SERPL-MCNC: 6.9 G/DL (ref 6.4–8.3)
RBC # BLD AUTO: 3.16 10E6/UL (ref 3.8–5.2)
SODIUM SERPL-SCNC: 138 MMOL/L (ref 135–145)
WBC # BLD AUTO: 4.1 10E3/UL (ref 4–11)

## 2024-08-21 PROCEDURE — 80053 COMPREHEN METABOLIC PANEL: CPT | Performed by: NURSE PRACTITIONER

## 2024-08-21 PROCEDURE — P9604 ONE-WAY ALLOW PRORATED TRIP: HCPCS | Performed by: NURSE PRACTITIONER

## 2024-08-21 PROCEDURE — 86481 TB AG RESPONSE T-CELL SUSP: CPT | Performed by: NURSE PRACTITIONER

## 2024-08-21 PROCEDURE — 85027 COMPLETE CBC AUTOMATED: CPT | Performed by: NURSE PRACTITIONER

## 2024-08-21 PROCEDURE — 36415 COLL VENOUS BLD VENIPUNCTURE: CPT | Performed by: NURSE PRACTITIONER

## 2024-08-21 PROCEDURE — 84450 TRANSFERASE (AST) (SGOT): CPT | Performed by: NURSE PRACTITIONER

## 2024-08-23 ENCOUNTER — DOCUMENTATION ONLY (OUTPATIENT)
Dept: OTHER | Facility: CLINIC | Age: 89
End: 2024-08-23
Payer: COMMERCIAL

## 2024-08-23 LAB
GAMMA INTERFERON BACKGROUND BLD IA-ACNC: 0.09 IU/ML
M TB IFN-G BLD-IMP: NEGATIVE
M TB IFN-G CD4+ BCKGRND COR BLD-ACNC: 3.27 IU/ML
MITOGEN IGNF BCKGRD COR BLD-ACNC: -0.01 IU/ML
MITOGEN IGNF BCKGRD COR BLD-ACNC: 0 IU/ML
QUANTIFERON MITOGEN: 3.36 IU/ML
QUANTIFERON NIL TUBE: 0.09 IU/ML
QUANTIFERON TB1 TUBE: 0.09 IU/ML
QUANTIFERON TB2 TUBE: 0.08

## 2024-08-25 VITALS
TEMPERATURE: 97.2 F | HEIGHT: 58 IN | BODY MASS INDEX: 28.51 KG/M2 | DIASTOLIC BLOOD PRESSURE: 74 MMHG | WEIGHT: 135.8 LBS | HEART RATE: 87 BPM | RESPIRATION RATE: 18 BRPM | OXYGEN SATURATION: 99 % | SYSTOLIC BLOOD PRESSURE: 132 MMHG

## 2024-08-25 NOTE — PROGRESS NOTES
"[facility] GERIATRICS    Chief Complaint   Patient presents with    RECHECK     HPI:  Chelly Dave is a 89 year old  (1935), who is being seen today for an episodic care visit at: [facility] ([facility] [[id number]].       Today's concern is:     During exam, patient seen sitting in wheelchair. HPI limited due to dementia. Patient picking at RLE dressing/ACE wrap. Denies pain today. Admits to good appetite. Denies chest pain, SOB, headache, syncope.      Allergies, and PMH/PSH reviewed in EPIC today.    REVIEW OF SYSTEMS:  Limited secondary to cognitive impairment but today pt reports no concerns      Objective:   /74   Pulse 87   Temp 97.2  F (36.2  C)   Resp 18   Ht 1.473 m (4' 10\")   Wt 61.6 kg (135 lb 12.8 oz)   SpO2 99%   BMI 28.38 kg/m    GENERAL APPEARANCE:  Alert, in no distress, oriented, cooperative  RESP:  respiratory distress  CV:  no edema  M/S:   Gait and station abnormal, sitting in wheelchair.  SKIN:  RLE w/ACE wrap loose, prevena vac intact. Large ecchymosis to L eye. Laceration to L forehead, proximal to eyebrow, bandage intact.  NEURO:   Cranial nerves 2-12 are normal tested and grossly at patient's baseline, Examination of sensation by touch normal  PSYCH:  oriented to self, memory impaired     Wt Readings from Last 4 Encounters:   08/25/24 61.6 kg (135 lb 12.8 oz)   08/20/24 61.6 kg (135 lb 12.8 oz)   08/14/24 56.3 kg (124 lb 3.2 oz)   07/31/24 59.9 kg (132 lb)       Recent labs in [facility] reviewed by me today.   Most Recent 3 CBC's:  Recent Labs   Lab Test 08/21/24  0934 08/16/24  0818 08/15/24  0655 07/30/24  0758 07/23/24  0740   WBC 4.1  --   --  2.7* 2.4*   HGB 9.7* 9.7* 9.3* 9.0* 8.8*   *  --   --  100 101*     --   --  150 180     Most Recent 3 BMP's:  Recent Labs   Lab Test 08/21/24  0934 08/16/24  0558 08/15/24  0658 08/14/24  1213 08/14/24  1000 07/30/24  0758 07/23/24  0740     --   --   --   --  143 142   POTASSIUM 3.9  -- "   --   --  4.1 4.1 3.6   CHLORIDE 103  --   --   --   --  105 105   CO2 27  --   --   --   --  28 27   BUN 26.4*  --   --   --   --  17.4 11.7   CR 0.92  --   --   --   --  0.71 0.71   ANIONGAP 8  --   --   --   --  10 10   ROBSON 8.5*  --   --   --   --  8.2* 8.4*   GLC 98 85 101*   < > 89 76 86    < > = values in this interval not displayed.     Most Recent 2 LFT's:  Recent Labs   Lab Test 08/21/24  0934 07/30/24  0758 07/02/24  0622 06/23/18  2334   AST 37 25   < > 22   ALT 19  --   --  13   ALKPHOS 92  --   --  50   BILITOTAL 0.3  --   --  0.3    < > = values in this interval not displayed.     Liver Function Studies -   Recent Labs   Lab Test 08/21/24  0934   PROTTOTAL 6.9   ALBUMIN 3.0*   BILITOTAL 0.3   ALKPHOS 92   AST 37   ALT 19         Assessment/Plan:    (T81.30XA) Wound dehiscence  (primary encounter diagnosis)  (Z98.890) Status post incision and drainage  (M00.9) Pyogenic arthritis of right knee joint, due to unspecified organism (H)  (R53.81) Physical deconditioning  Comment: Hx septic arthritis with Strep Goronii s/p I&D and modular exchange on 6/21/24. postop wound dehiscence, s/p R knee superficial I&D on 8/14/24 with Dr. Tracy.  Ongoing physical deconditioning. PTA lives w/family. Assist of 2 with transfers and cares. Pt needs help with feeding.  Plan:   - Continue tylenol TID  - Continue ASA 81mg BID x 6 weeks, then take PTA ASA 81mg every day starting on 9/26/24  - WBAT  - Continue prevena wound vac and posterior slab splint x 2 weeks  - Patient to follow-up with Dr. Tracy on 8/29/24  - Continue PT, OT  - SW following for discharge planning. Goal is to discharge home.    (F03.C0) Severe dementia, unspecified dementia type, unspecified whether behavioral, psychotic, or mood disturbance or anxiety (H)  (F32.A) Depression, unspecified depression type  (R13.10) Dysphagia, unspecified type  Comment: Chronic dementia with anxiety and depression. Chronic dysphagia.   Plan:   - Continue DD1 w/regular  diet  - Requires assistance w/meals  - Dietician and ST following  - Continue Doxepin 10mg, Fluoxetine 20mg, Seroquel 25mg BID   - Continue memantine   - Monitor changes in mood or behaviors     (S01.81XD) Facial laceration, subsequent encounter  Comment: Acute L sided facial laceration r/t fall on 8/19/24, required sutures in ED.  Plan:   - Remove sutures on 8/27/24        Electronically signed by: JAYME Casper CNP

## 2024-08-26 ENCOUNTER — TRANSITIONAL CARE UNIT VISIT (OUTPATIENT)
Dept: GERIATRICS | Facility: CLINIC | Age: 89
End: 2024-08-26
Payer: COMMERCIAL

## 2024-08-26 DIAGNOSIS — R13.10 DYSPHAGIA, UNSPECIFIED TYPE: ICD-10-CM

## 2024-08-26 DIAGNOSIS — T81.30XA WOUND DEHISCENCE: Primary | ICD-10-CM

## 2024-08-26 DIAGNOSIS — F32.A DEPRESSION, UNSPECIFIED DEPRESSION TYPE: ICD-10-CM

## 2024-08-26 DIAGNOSIS — Z98.890 STATUS POST INCISION AND DRAINAGE: ICD-10-CM

## 2024-08-26 DIAGNOSIS — M00.9 PYOGENIC ARTHRITIS OF RIGHT KNEE JOINT, DUE TO UNSPECIFIED ORGANISM (H): ICD-10-CM

## 2024-08-26 DIAGNOSIS — R53.81 PHYSICAL DECONDITIONING: ICD-10-CM

## 2024-08-26 DIAGNOSIS — S01.81XD FACIAL LACERATION, SUBSEQUENT ENCOUNTER: ICD-10-CM

## 2024-08-26 DIAGNOSIS — F03.C0 SEVERE DEMENTIA, UNSPECIFIED DEMENTIA TYPE, UNSPECIFIED WHETHER BEHAVIORAL, PSYCHOTIC, OR MOOD DISTURBANCE OR ANXIETY (H): ICD-10-CM

## 2024-08-26 PROCEDURE — 99309 SBSQ NF CARE MODERATE MDM 30: CPT | Performed by: NURSE PRACTITIONER

## 2024-09-05 VITALS
TEMPERATURE: 97.4 F | HEIGHT: 58 IN | SYSTOLIC BLOOD PRESSURE: 132 MMHG | OXYGEN SATURATION: 97 % | BODY MASS INDEX: 25.48 KG/M2 | WEIGHT: 121.4 LBS | RESPIRATION RATE: 18 BRPM | DIASTOLIC BLOOD PRESSURE: 75 MMHG | HEART RATE: 82 BPM

## 2024-09-05 NOTE — PROGRESS NOTES
[facility] GERIATRICS DISCHARGE SUMMARY    PATIENT'S NAME: Chelly Dave  YOB: 1935  MEDICAL RECORD NUMBER:  [id number]  Place of Service where encounter took place:  [facility] ([facility]) [[id number]]    PRIMARY CARE PROVIDER AND CLINIC RESPONSIBLE AFTER TRANSFER:   Brittany Heredia, [address] / [facility][1] [id number]    Non-FMG Provider     Transferring providers: JAYME Casper CNP; Sunday Kramer MD  Recent Hospitalization/ED:  [facility] Hospital stay 814/24 to 8/19/24.  Date of SNF Admission:  8/19/24  Date of SNF (anticipated) Discharge:  9/10/24  Discharged to: previous independent home  Cognitive Scores:  Not tested  Physical Function:   Transfers: MAX OF 2 TO MOD  Bed Mobility: MAX  Ambulation: 40ft CGA 2WW  UB Dressing: MIN TO MOD  LB Dressing: MAX  Toileting: MOD TO MAX  DME: Walker    CODE STATUS/ADVANCE DIRECTIVES DISCUSSION:  No CPR- Do NOT Intubate   ALLERGIES: Lisinopril, Oxycodone, Aspirin buf(cacarb-mgcarb-mgo), and Aspirin buffered      NURSING FACILITY COURSE     Medication Changes/Rationale:   Continue ASA 81mg BID x 6 weeks, then take PTA ASA 81mg every day starting on 9/26/24  Changed tylenol to 1000mg TID       Summary of nursing facility stay:     PMH: dementia, HLD, DM, depression anxiety      Hx septic arthritis of right knee in 06/2024. Wound cultures + Streptococcus gordonii. Started on IV vancomycin and ceftriaxone. ID consulted, transitioned to ceftriaxone alone x 6 weeks. Discharged from TCU on 8/8/24.      Admitted to [facility] 8/14-8/19/24 due to planned s/p right knee superficial wound irrigation and debridement with Dr. Tracy.  Plan to continue prevena wound vac x 2 week and follow-up outpatient.     Transferred to [facility] on 8/19/24.     Evaluated at [facility] ED on 8/19/24 due to fall w/facial laceration, requiring sutures.       (T81.30XA) Wound dehiscence  (primary encounter diagnosis)  (Z98.890)  Status post incision and drainage  (M00.9) Pyogenic arthritis of right knee joint, due to unspecified organism (H)  (R53.81) Physical deconditioning  Comment: Hx septic arthritis with Strep Goronii s/p I&D and modular exchange on 6/21/24. postop wound dehiscence, s/p R knee superficial I&D on 8/14/24 with Dr. Tracy.  Ongoing physical deconditioning. PTA lives w/family. Ambulates short distances w/walker. Requires assistance with ADLs.   Plan:   - Continue tylenol 1000mg TID dx pain  - Continue ASA 81mg BID x 6 weeks, then take PTA ASA 81mg every day starting on 9/26/24  - Continue amoxicillin 500mg BID indefinitely  - Last Ortho appt with Dr. Tracy on 8/29/24, removes staples and wound vac dc'd. Aquacel dressing placed on R knee x 2 week with plan to follow-up in clinic. Ok to weight bear to RLE as tolerated. Patient to follow-up with Dr. Tracy as directed  - Patient discharging home, as well as [facility]. home care services, including home PT, OT, RN.     (D62) Acute blood loss anemia  Comment: Postop anemia. Hgb stable.  Plan:   - Monitor with PCP     (F03.C0) Severe dementia, unspecified dementia type, unspecified whether behavioral, psychotic, or mood disturbance or anxiety (H)  (F32.A) Depression, unspecified depression type  (R13.10) Dysphagia, unspecified type  Comment: Chronic dementia with anxiety and depression. Chronic dysphagia.   Plan:   - Continue DD1 w/regular diet  - Requires assistance w/meals  - Continue Doxepin 10mg, Fluoxetine 20mg, Seroquel 25mg BID   - Continue memantine   - Monitor changes in mood or behaviors     Sjogren's disease  Comment: Chronic   Plan:   - Continue PTA azathioprine, pilocarpine.      Discharge Medications:  MED REC REQUIRED  Post Medication Reconciliation Status: discharge medications reconciled and changed, per note/orders     Current Outpatient Medications   Medication Sig Dispense Refill    acetaminophen (TYLENOL) 500 MG tablet Take 2 tablets (1,000 mg) by  mouth 3 times daily 90 tablet 0    albuterol (PROVENTIL) (2.5 MG/3ML) 0.083% neb solution Take 2.5 mg by nebulization every 6 hours as needed for shortness of breath, wheezing or cough      amoxicillin (AMOXIL) 500 MG tablet Take 500 mg by mouth 2 times daily Continue for life      aspirin 81 MG EC tablet Take 1 tablet (81 mg) by mouth 2 times daily 60 tablet 0    AzaTHIOprine (IMURAN PO) Take 50 mg by mouth daily      blood glucose monitoring (NO BRAND SPECIFIED) test strip USE TO CHECK BLOOD SUGARS EVERY DAY      blood glucose monitoring (SOFTCLIX) lancets USE TO TEST BLOOD SUGARS DAILY      Blood Glucose Monitoring Suppl (FIFTY50 GLUCOSE METER 2.0) w/Device KIT Dispense meter, test strips, lancets covered by pt ins. E11.9 NIDDM type II - Test 1 time/day      Calcium Carb-Cholecalciferol (CALCIUM 500 +D) 500-400 MG-UNIT TABS Take 1 tablet by mouth 2 times daily      Cholecalciferol (VITAMIN D3) 1000 units CAPS Take 1,000 Units by mouth daily      cycloSPORINE (RESTASIS) 0.05 % ophthalmic emulsion Instill 1 drop into both eyes every 12 hours.   No further refills will be given unless you come in for your exam.      DOXEPIN HCL PO Take 10 mg by mouth At Bedtime      ferrous sulfate (IRON) 325 (65 FE) MG tablet Take 1 tablet (325 mg) by mouth daily 100 tablet 0    FLUoxetine (PROZAC) 20 MG capsule Take 20 mg by mouth daily      HYDROmorphone (DILAUDID) 2 MG tablet Take 0.5 tablets (1 mg) by mouth every 4 hours as needed for breakthrough pain 30 tablet 0    lidocaine (LIDODERM) 5 % patch Place 1 patch onto the skin daily as needed for moderate pain (12 hours on; 12 hours off. Patient applies to knees or back)      loratadine (CLARITIN) 10 MG tablet Take 10 mg by mouth daily as needed      memantine (NAMENDA) 5 MG tablet Take 5 mg by mouth 2 times daily      miconazole (MICATIN) 2 % AERP powder Apply topically 2 times daily To sridhar area until redness resolved      ondansetron (ZOFRAN ODT) 4 MG ODT tab Take 1 tablet (4  "mg) by mouth every 6 hours as needed for nausea      pantoprazole (PROTONIX) 40 MG EC tablet Take 1 tablet (40 mg) by mouth daily 30 tablet 1    Pilocarpine HCl (SALAGEN PO) Take 5 mg by mouth 3 times daily AM, 1200 & HS      polyethylene glycol (MIRALAX/GLYCOLAX) powder Take 17 g by mouth daily as needed      polyethylene glycol 400 (BLINK TEARS) 0.25 % SOLN ophthalmic solution Place 1 drop into both eyes every 2 hours as needed for dry eyes      QUEtiapine (SEROQUEL) 25 MG tablet Take 25 mg by mouth 2 times daily      senna-docusate (SENOKOT-S/PERICOLACE) 8.6-50 MG tablet Take 2 tablets by mouth 2 times daily        Controlled medications:   Medication: dilaudid , 10 tabs given to patient at the time of discharge to take home     Past Medical History:   Past Medical History:   Diagnosis Date    Arthritis     Cataract     Diabetes mellitus (H)     Gastro-oesophageal reflux disease     Hyperlipidemia      Physical Exam:   Vitals: /75   Pulse 82   Temp 97.4  F (36.3  C)   Resp 18   Ht 1.473 m (4' 10\")   Wt 55.1 kg (121 lb 6.4 oz)   SpO2 97%   BMI 25.37 kg/m    BMI: Body mass index is 25.37 kg/m .  GENERAL APPEARANCE:  Alert, in no distress, oriented, cooperative  RESP: no respiratory distress  CV:  no edema  M/S:   Gait and station abnormal , resting in bed.   SKIN:  RLE w/ACE intact.   NEURO:   Cranial nerves 2-12 are normal tested and grossly at patient's baseline, Examination of sensation by touch normal  PSYCH:  oriented to self, memory impaired        SNF labs: Recent labs in [facility] reviewed by me today.   Most Recent 3 CBC's:  Recent Labs   Lab Test 08/21/24  0934 08/16/24  0818 08/15/24  0655 07/30/24  0758 07/23/24  0740   WBC 4.1  --   --  2.7* 2.4*   HGB 9.7* 9.7* 9.3* 9.0* 8.8*   *  --   --  100 101*     --   --  150 180     Most Recent 3 BMP's:  Recent Labs   Lab Test 08/21/24  0934 08/16/24  0558 08/15/24  0658 08/14/24  1213 08/14/24  1000 07/30/24  0758 07/23/24  0740     " --   --   --   --  143 142   POTASSIUM 3.9  --   --   --  4.1 4.1 3.6   CHLORIDE 103  --   --   --   --  105 105   CO2 27  --   --   --   --  28 27   BUN 26.4*  --   --   --   --  17.4 11.7   CR 0.92  --   --   --   --  0.71 0.71   ANIONGAP 8  --   --   --   --  10 10   ROBSON 8.5*  --   --   --   --  8.2* 8.4*   GLC 98 85 101*   < > 89 76 86    < > = values in this interval not displayed.     Most Recent 2 LFT's:  Recent Labs   Lab Test 08/21/24  0934 07/30/24  0758 07/02/24  0622 06/23/18  2334   AST 37 25   < > 22   ALT 19  --   --  13   ALKPHOS 92  --   --  50   BILITOTAL 0.3  --   --  0.3    < > = values in this interval not displayed.         DISCHARGE PLAN:    Follow up labs: No labs orders/due      Medical Follow Up:      Follow up with primary care provider in 1-2 weeks  Follow up with specialist - Dr. Tracy (Ortho) in 1 week       Discharge Services: Home Care:  Occupational Therapy, Physical Therapy, Registered Nurse, and From:  [employer].      Discharge Instructions Verbalized to Patient at Discharge:   Wound care keep dressing intact until follow-up with Ortho.   Notify your surgeon if you have increased redness, swelling, tenderness, or drainage at your incision site.   Notify PCP if you have a fever greater than 100.5 degrees.   24-hour supervision is recommended for safety.           TOTAL DISCHARGE TIME:   Greater than 30 minutes    Electronically signed by:  JAYME Casper CNP

## 2024-09-06 ENCOUNTER — DISCHARGE SUMMARY NURSING HOME (OUTPATIENT)
Dept: GERIATRICS | Facility: CLINIC | Age: 89
End: 2024-09-06
Payer: COMMERCIAL

## 2024-09-06 DIAGNOSIS — M35.00 SJOGREN'S SYNDROME, WITH UNSPECIFIED ORGAN INVOLVEMENT (H): ICD-10-CM

## 2024-09-06 DIAGNOSIS — R13.10 DYSPHAGIA, UNSPECIFIED TYPE: ICD-10-CM

## 2024-09-06 DIAGNOSIS — F03.C0 SEVERE DEMENTIA, UNSPECIFIED DEMENTIA TYPE, UNSPECIFIED WHETHER BEHAVIORAL, PSYCHOTIC, OR MOOD DISTURBANCE OR ANXIETY (H): ICD-10-CM

## 2024-09-06 DIAGNOSIS — R53.81 PHYSICAL DECONDITIONING: ICD-10-CM

## 2024-09-06 DIAGNOSIS — D62 ACUTE BLOOD LOSS ANEMIA: ICD-10-CM

## 2024-09-06 DIAGNOSIS — Z98.890 STATUS POST INCISION AND DRAINAGE: ICD-10-CM

## 2024-09-06 DIAGNOSIS — M00.9 PYOGENIC ARTHRITIS OF RIGHT KNEE JOINT, DUE TO UNSPECIFIED ORGANISM (H): Primary | ICD-10-CM

## 2024-09-06 DIAGNOSIS — T81.30XA WOUND DEHISCENCE: ICD-10-CM

## 2024-09-06 DIAGNOSIS — F32.A DEPRESSION, UNSPECIFIED DEPRESSION TYPE: ICD-10-CM

## 2024-09-06 PROCEDURE — 99316 NF DSCHRG MGMT 30 MIN+: CPT | Performed by: NURSE PRACTITIONER

## 2024-09-06 RX ORDER — AMOXICILLIN 500 MG/1
500 TABLET, FILM COATED ORAL 2 TIMES DAILY
Qty: 60 TABLET | Refills: 0 | Status: SHIPPED | OUTPATIENT
Start: 2024-09-06

## 2024-09-06 NOTE — LETTER
9/6/2024      Chelly Dave  [id number]  [place] [address]        [facility] GERIATRICS DISCHARGE SUMMARY    PATIENT'S NAME: Chelly Dave  YOB: 1935  MEDICAL RECORD NUMBER:  [id number]  Place of Service where encounter took place:  [facility] ([facility]) [[id number]]    PRIMARY CARE PROVIDER AND CLINIC RESPONSIBLE AFTER TRANSFER:   Brittany Heredia, [address] / [facility] [address]    Non-FMG Provider     Transferring providers: JAYME Casper CNP; Sunday Kramer MD  Recent Hospitalization/ED:  [facility] Hospital stay 814/24 to 8/19/24.  Date of SNF Admission:  8/19/24  Date of SNF (anticipated) Discharge:  9/10/24  Discharged to: previous independent home  Cognitive Scores:  Not tested  Physical Function:   Transfers: MAX OF 2 TO MOD  Bed Mobility: MAX  Ambulation: 40ft CGA 2WW  UB Dressing: MIN TO MOD  LB Dressing: MAX  Toileting: MOD TO MAX  DME: Walker    CODE STATUS/ADVANCE DIRECTIVES DISCUSSION:  No CPR- Do NOT Intubate   ALLERGIES: Lisinopril, Oxycodone, Aspirin buf(cacarb-mgcarb-mgo), and Aspirin buffered      NURSING FACILITY COURSE     Medication Changes/Rationale:   Continue ASA 81mg BID x 6 weeks, then take PTA ASA 81mg every day starting on 9/26/24  Changed tylenol to 1000mg TID       Summary of nursing facility stay:     PMH: dementia, HLD, DM, depression anxiety      Hx septic arthritis of right knee in 06/2024. Wound cultures + Streptococcus gordonii. Started on IV vancomycin and ceftriaxone. ID consulted, transitioned to ceftriaxone alone x 6 weeks. Discharged from TCU on 8/8/24.      Admitted to [facility] 8/14-8/19/24 due to planned s/p right knee superficial wound irrigation and debridement with Dr. Tracy.  Plan to continue prevena wound vac x 2 week and follow-up outpatient.     Transferred to [facility] on 8/19/24.     Evaluated at [facility] ED on 8/19/24 due to fall w/facial laceration, requiring sutures.        (T81.30XA) Wound dehiscence  (primary encounter diagnosis)  (Z98.890) Status post incision and drainage  (M00.9) Pyogenic arthritis of right knee joint, due to unspecified organism (H)  (R53.81) Physical deconditioning  Comment: Hx septic arthritis with Strep Goronii s/p I&D and modular exchange on 6/21/24. postop wound dehiscence, s/p R knee superficial I&D on 8/14/24 with Dr. Tracy.  Ongoing physical deconditioning. PTA lives w/family. Ambulates short distances w/walker. Requires assistance with ADLs.   Plan:   - Continue tylenol 1000mg TID dx pain  - Continue ASA 81mg BID x 6 weeks, then take PTA ASA 81mg every day starting on 9/26/24  - Continue amoxicillin 500mg BID indefinitely  - Last Ortho appt with Dr. Tracy on 8/29/24, removes staples and wound vac dc'd. Aquacel dressing placed on R knee x 2 week with plan to follow-up in clinic. Ok to weight bear to RLE as tolerated. Patient to follow-up with Dr. Tracy as directed  - Patient discharging home, as well as [facility]. home care services, including home PT, OT, RN.     (D62) Acute blood loss anemia  Comment: Postop anemia. Hgb stable.  Plan:   - Monitor with PCP     (F03.C0) Severe dementia, unspecified dementia type, unspecified whether behavioral, psychotic, or mood disturbance or anxiety (H)  (F32.A) Depression, unspecified depression type  (R13.10) Dysphagia, unspecified type  Comment: Chronic dementia with anxiety and depression. Chronic dysphagia.   Plan:   - Continue DD1 w/regular diet  - Requires assistance w/meals  - Continue Doxepin 10mg, Fluoxetine 20mg, Seroquel 25mg BID   - Continue memantine   - Monitor changes in mood or behaviors     Sjogren's disease  Comment: Chronic   Plan:   - Continue PTA azathioprine, pilocarpine.      Discharge Medications:  MED REC REQUIRED  Post Medication Reconciliation Status: discharge medications reconciled and changed, per note/orders     Current Outpatient Medications   Medication Sig Dispense  Refill     acetaminophen (TYLENOL) 500 MG tablet Take 2 tablets (1,000 mg) by mouth 3 times daily 90 tablet 0     albuterol (PROVENTIL) (2.5 MG/3ML) 0.083% neb solution Take 2.5 mg by nebulization every 6 hours as needed for shortness of breath, wheezing or cough       amoxicillin (AMOXIL) 500 MG tablet Take 500 mg by mouth 2 times daily Continue for life       aspirin 81 MG EC tablet Take 1 tablet (81 mg) by mouth 2 times daily 60 tablet 0     AzaTHIOprine (IMURAN PO) Take 50 mg by mouth daily       blood glucose monitoring (NO BRAND SPECIFIED) test strip USE TO CHECK BLOOD SUGARS EVERY DAY       blood glucose monitoring (SOFTCLIX) lancets USE TO TEST BLOOD SUGARS DAILY       Blood Glucose Monitoring Suppl (FIFTY50 GLUCOSE METER 2.0) w/Device KIT Dispense meter, test strips, lancets covered by pt ins. E11.9 NIDDM type II - Test 1 time/day       Calcium Carb-Cholecalciferol (CALCIUM 500 +D) 500-400 MG-UNIT TABS Take 1 tablet by mouth 2 times daily       Cholecalciferol (VITAMIN D3) 1000 units CAPS Take 1,000 Units by mouth daily       cycloSPORINE (RESTASIS) 0.05 % ophthalmic emulsion Instill 1 drop into both eyes every 12 hours.   No further refills will be given unless you come in for your exam.       DOXEPIN HCL PO Take 10 mg by mouth At Bedtime       ferrous sulfate (IRON) 325 (65 FE) MG tablet Take 1 tablet (325 mg) by mouth daily 100 tablet 0     FLUoxetine (PROZAC) 20 MG capsule Take 20 mg by mouth daily       HYDROmorphone (DILAUDID) 2 MG tablet Take 0.5 tablets (1 mg) by mouth every 4 hours as needed for breakthrough pain 30 tablet 0     lidocaine (LIDODERM) 5 % patch Place 1 patch onto the skin daily as needed for moderate pain (12 hours on; 12 hours off. Patient applies to knees or back)       loratadine (CLARITIN) 10 MG tablet Take 10 mg by mouth daily as needed       memantine (NAMENDA) 5 MG tablet Take 5 mg by mouth 2 times daily       miconazole (MICATIN) 2 % AERP powder Apply topically 2 times daily  "To sridhar area until redness resolved       ondansetron (ZOFRAN ODT) 4 MG ODT tab Take 1 tablet (4 mg) by mouth every 6 hours as needed for nausea       pantoprazole (PROTONIX) 40 MG EC tablet Take 1 tablet (40 mg) by mouth daily 30 tablet 1     Pilocarpine HCl (SALAGEN PO) Take 5 mg by mouth 3 times daily AM, 1200 & HS       polyethylene glycol (MIRALAX/GLYCOLAX) powder Take 17 g by mouth daily as needed       polyethylene glycol 400 (BLINK TEARS) 0.25 % SOLN ophthalmic solution Place 1 drop into both eyes every 2 hours as needed for dry eyes       QUEtiapine (SEROQUEL) 25 MG tablet Take 25 mg by mouth 2 times daily       senna-docusate (SENOKOT-S/PERICOLACE) 8.6-50 MG tablet Take 2 tablets by mouth 2 times daily        Controlled medications:   Medication: dilaudid , 10 tabs given to patient at the time of discharge to take home     Past Medical History:   Past Medical History:   Diagnosis Date     Arthritis      Cataract      Diabetes mellitus (H)      Gastro-oesophageal reflux disease      Hyperlipidemia      Physical Exam:   Vitals: /75   Pulse 82   Temp 97.4  F (36.3  C)   Resp 18   Ht 1.473 m (4' 10\")   Wt 55.1 kg (121 lb 6.4 oz)   SpO2 97%   BMI 25.37 kg/m    BMI: Body mass index is 25.37 kg/m .  GENERAL APPEARANCE:  Alert, in no distress, oriented, cooperative  RESP: no respiratory distress  CV:  no edema  M/S:   Gait and station abnormal , resting in bed.   SKIN:  RLE w/ACE intact.   NEURO:   Cranial nerves 2-12 are normal tested and grossly at patient's baseline, Examination of sensation by touch normal  PSYCH:  oriented to self, memory impaired        SNF labs: Recent labs in [facility] reviewed by me today.   Most Recent 3 CBC's:  Recent Labs   Lab Test 08/21/24  0934 08/16/24  0818 08/15/24  0655 07/30/24  0758 07/23/24  0740   WBC 4.1  --   --  2.7* 2.4*   HGB 9.7* 9.7* 9.3* 9.0* 8.8*   *  --   --  100 101*     --   --  150 180     Most Recent 3 BMP's:  Recent Labs   Lab Test " 24  0934 24  0558 08/15/24  0658 24  1213 24  1000 24  0758 24  0740     --   --   --   --  143 142   POTASSIUM 3.9  --   --   --  4.1 4.1 3.6   CHLORIDE 103  --   --   --   --  105 105   CO2 27  --   --   --   --  28 27   BUN 26.4*  --   --   --   --  17.4 11.7   CR 0.92  --   --   --   --  0.71 0.71   ANIONGAP 8  --   --   --   --  10 10   ROBSON 8.5*  --   --   --   --  8.2* 8.4*   GLC 98 85 101*   < > 89 76 86    < > = values in this interval not displayed.     Most Recent 2 LFT's:  Recent Labs   Lab Test 24  0934 24  0758 24  0622 18  2334   AST 37 25   < > 22   ALT 19  --   --  13   ALKPHOS 92  --   --  50   BILITOTAL 0.3  --   --  0.3    < > = values in this interval not displayed.         DISCHARGE PLAN:    Follow up labs: No labs orders/due      Medical Follow Up:      Follow up with primary care provider in 1-2 weeks  Follow up with specialist - Dr. Tracy (Ortho) in 1 week       Discharge Services: Home Care:  Occupational Therapy, Physical Therapy, Registered Nurse, and From:  [employer].      Discharge Instructions Verbalized to Patient at Discharge:   Wound care keep dressing intact until follow-up with Ortho.   Notify your surgeon if you have increased redness, swelling, tenderness, or drainage at your incision site.   Notify PCP if you have a fever greater than 100.5 degrees.   24-hour supervision is recommended for safety.           TOTAL DISCHARGE TIME:   Greater than 30 minutes    Electronically signed by:  JAYME Casper [facility] Discharge Orders    Name: Chelly Dave  : 1935  Planned Discharge Date: 9/10/24  Discharged to: previous independent home with family      MEDICAL FOLLOW UP              Follow up with primary care provider in 1-2 weeks  Follow up with specialist - Dr. Tracy (Ortho) in 1 week       FUTURE LABS: No labs orders/due    ORDER CHANGES:  Continue ASA  81mg BID x 6 weeks, then take PTA ASA 81mg every day starting on 9/26/24  Changed tylenol to 1000mg TID     DISCHARGE MEDICATIONS:  The patient s pharmacy is authorized to dispense a 30-day supply of medications. Refill requests should be directed to the primary provider, Brittany Heredia.   At discharge, the facility may send patient's remaining supply of controlled substances, specifically dilaudid, #10 tabs.    Current Outpatient Medications   Medication Sig Dispense Refill     acetaminophen (TYLENOL) 500 MG tablet Take 2 tablets (1,000 mg) by mouth 3 times daily 90 tablet 0     albuterol (PROVENTIL) (2.5 MG/3ML) 0.083% neb solution Take 2.5 mg by nebulization every 6 hours as needed for shortness of breath, wheezing or cough       amoxicillin (AMOXIL) 500 MG tablet Take 1 tablet (500 mg) by mouth 2 times daily. Continue for life 60 tablet 0     aspirin 81 MG EC tablet Take 1 tablet (81 mg) by mouth 2 times daily 60 tablet 0     AzaTHIOprine (IMURAN PO) Take 50 mg by mouth daily       blood glucose monitoring (NO BRAND SPECIFIED) test strip USE TO CHECK BLOOD SUGARS EVERY DAY       blood glucose monitoring (SOFTCLIX) lancets USE TO TEST BLOOD SUGARS DAILY       Blood Glucose Monitoring Suppl (FIFTY50 GLUCOSE METER 2.0) w/Device KIT Dispense meter, test strips, lancets covered by pt ins. E11.9 NIDDM type II - Test 1 time/day       Calcium Carb-Cholecalciferol (CALCIUM 500 +D) 500-400 MG-UNIT TABS Take 1 tablet by mouth 2 times daily       Cholecalciferol (VITAMIN D3) 1000 units CAPS Take 1,000 Units by mouth daily       cycloSPORINE (RESTASIS) 0.05 % ophthalmic emulsion Instill 1 drop into both eyes every 12 hours.   No further refills will be given unless you come in for your exam.       DOXEPIN HCL PO Take 10 mg by mouth At Bedtime       ferrous sulfate (IRON) 325 (65 FE) MG tablet Take 1 tablet (325 mg) by mouth daily 100 tablet 0     FLUoxetine (PROZAC) 20 MG capsule Take 20 mg by mouth daily       HYDROmorphone  (DILAUDID) 2 MG tablet Take 0.5 tablets (1 mg) by mouth every 4 hours as needed for breakthrough pain 30 tablet 0     lidocaine (LIDODERM) 5 % patch Place 1 patch onto the skin daily as needed for moderate pain (12 hours on; 12 hours off. Patient applies to knees or back)       loratadine (CLARITIN) 10 MG tablet Take 10 mg by mouth daily as needed       memantine (NAMENDA) 5 MG tablet Take 5 mg by mouth 2 times daily       miconazole (MICATIN) 2 % AERP powder Apply topically 2 times daily To sridhar area until redness resolved       ondansetron (ZOFRAN ODT) 4 MG ODT tab Take 1 tablet (4 mg) by mouth every 6 hours as needed for nausea       pantoprazole (PROTONIX) 40 MG EC tablet Take 1 tablet (40 mg) by mouth daily 30 tablet 1     Pilocarpine HCl (SALAGEN PO) Take 5 mg by mouth 3 times daily AM, 1200 & HS       polyethylene glycol (MIRALAX/GLYCOLAX) powder Take 17 g by mouth daily as needed       polyethylene glycol 400 (BLINK TEARS) 0.25 % SOLN ophthalmic solution Place 1 drop into both eyes every 2 hours as needed for dry eyes       QUEtiapine (SEROQUEL) 25 MG tablet Take 25 mg by mouth 2 times daily       senna-docusate (SENOKOT-S/PERICOLACE) 8.6-50 MG tablet Take 2 tablets by mouth 2 times daily         SERVICES:  Home Care:  Occupational Therapy, Physical Therapy, Registered Nurse and From:  [employer] [facility].     ADDITIONAL INSTRUCTIONS:  Wound care keep dressing intact until follow-up with Ortho.   Notify your surgeon if you have increased redness, swelling, tenderness, or drainage at your incision site.   Notify PCP if you have a fever greater than 100.5 degrees.   24-hour supervision is recommended for safety.       JAYME Casper CNP  This document was electronically signed on September 6, 2024          Sincerely,        JAYME Casper CNP

## 2024-09-06 NOTE — PROGRESS NOTES
[facility] Discharge Orders    Name: Chelly Dave  : 1935  Planned Discharge Date: 9/10/24  Discharged to: previous independent home with family      MEDICAL FOLLOW UP              Follow up with primary care provider in 1-2 weeks  Follow up with specialist - Dr. Tracy (Ortho) in 1 week       FUTURE LABS: No labs orders/due    ORDER CHANGES:  Continue ASA 81mg BID x 6 weeks, then take PTA ASA 81mg every day starting on 24  Changed tylenol to 1000mg TID     DISCHARGE MEDICATIONS:  The patient s pharmacy is authorized to dispense a 30-day supply of medications. Refill requests should be directed to the primary provider, Brittany Heredia.   At discharge, the facility may send patient's remaining supply of controlled substances, specifically dilaudid, #10 tabs.    Current Outpatient Medications   Medication Sig Dispense Refill    acetaminophen (TYLENOL) 500 MG tablet Take 2 tablets (1,000 mg) by mouth 3 times daily 90 tablet 0    albuterol (PROVENTIL) (2.5 MG/3ML) 0.083% neb solution Take 2.5 mg by nebulization every 6 hours as needed for shortness of breath, wheezing or cough      amoxicillin (AMOXIL) 500 MG tablet Take 1 tablet (500 mg) by mouth 2 times daily. Continue for life 60 tablet 0    aspirin 81 MG EC tablet Take 1 tablet (81 mg) by mouth 2 times daily 60 tablet 0    AzaTHIOprine (IMURAN PO) Take 50 mg by mouth daily      blood glucose monitoring (NO BRAND SPECIFIED) test strip USE TO CHECK BLOOD SUGARS EVERY DAY      blood glucose monitoring (SOFTCLIX) lancets USE TO TEST BLOOD SUGARS DAILY      Blood Glucose Monitoring Suppl (FIFTY50 GLUCOSE METER 2.0) w/Device KIT Dispense meter, test strips, lancets covered by pt ins. 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   No further refills will be given unless you come in for your exam.      DOXEPIN HCL PO Take 10 mg by mouth At Bedtime      ferrous sulfate (IRON) 325 (65 FE) MG tablet Take 1 tablet (325 mg) by mouth daily 100 tablet 0    FLUoxetine (PROZAC) 20 MG capsule Take 20 mg by mouth daily      HYDROmorphone (DILAUDID) 2 MG tablet Take 0.5 tablets (1 mg) by mouth every 4 hours as needed for breakthrough pain 30 tablet 0    lidocaine (LIDODERM) 5 % patch Place 1 patch onto the skin daily as needed for moderate pain (12 hours on; 12 hours off. Patient applies to knees or back)      loratadine (CLARITIN) 10 MG tablet Take 10 mg by mouth daily as needed      memantine (NAMENDA) 5 MG tablet Take 5 mg by mouth 2 times daily      miconazole (MICATIN) 2 % AERP powder Apply topically 2 times daily To sridhar area until redness resolved      ondansetron (ZOFRAN ODT) 4 MG ODT tab Take 1 tablet (4 mg) by mouth every 6 hours as needed for nausea      pantoprazole (PROTONIX) 40 MG EC tablet Take 1 tablet (40 mg) by mouth daily 30 tablet 1    Pilocarpine HCl (SALAGEN PO) Take 5 mg by mouth 3 times daily AM, 1200 & HS      polyethylene glycol (MIRALAX/GLYCOLAX) powder Take 17 g by mouth daily as needed      polyethylene glycol 400 (BLINK TEARS) 0.25 % SOLN ophthalmic solution Place 1 drop into both eyes every 2 hours as needed for dry eyes      QUEtiapine (SEROQUEL) 25 MG tablet Take 25 mg by mouth 2 times daily      senna-docusate (SENOKOT-S/PERICOLACE) 8.6-50 MG tablet Take 2 tablets by mouth 2 times daily         SERVICES:  Home Care:  Occupational Therapy, Physical Therapy, Registered Nurse and From:  [facility].     ADDITIONAL INSTRUCTIONS:  Wound care keep dressing intact until follow-up with Ortho.   Notify your surgeon if you have increased redness, swelling, tenderness, or drainage at your incision site.   Notify PCP if you have a fever greater than 100.5  degrees.   24-hour supervision is recommended for safety.       JAYME Casper CNP  This document was electronically signed on September 6, 2024

## 2024-09-07 ENCOUNTER — LAB REQUISITION (OUTPATIENT)
Dept: LAB | Facility: CLINIC | Age: 89
End: 2024-09-07
Payer: COMMERCIAL

## 2024-09-07 DIAGNOSIS — R30.0 DYSURIA: ICD-10-CM

## 2024-09-07 PROCEDURE — 87086 URINE CULTURE/COLONY COUNT: CPT | Performed by: NURSE PRACTITIONER

## 2024-09-09 ENCOUNTER — LAB REQUISITION (OUTPATIENT)
Dept: LAB | Facility: CLINIC | Age: 89
End: 2024-09-09
Payer: COMMERCIAL

## 2024-09-09 ENCOUNTER — TRANSITIONAL CARE UNIT VISIT (OUTPATIENT)
Dept: GERIATRICS | Facility: CLINIC | Age: 89
End: 2024-09-09
Payer: COMMERCIAL

## 2024-09-09 VITALS
SYSTOLIC BLOOD PRESSURE: 117 MMHG | BODY MASS INDEX: 25.53 KG/M2 | TEMPERATURE: 97.5 F | DIASTOLIC BLOOD PRESSURE: 64 MMHG | WEIGHT: 121.6 LBS | RESPIRATION RATE: 18 BRPM | OXYGEN SATURATION: 100 % | HEIGHT: 58 IN | HEART RATE: 74 BPM

## 2024-09-09 DIAGNOSIS — D64.9 ANEMIA, UNSPECIFIED: ICD-10-CM

## 2024-09-09 DIAGNOSIS — D64.9 CHRONIC ANEMIA: ICD-10-CM

## 2024-09-09 DIAGNOSIS — R33.9 URINARY RETENTION: Primary | ICD-10-CM

## 2024-09-09 DIAGNOSIS — R30.0 DYSURIA: ICD-10-CM

## 2024-09-09 DIAGNOSIS — F03.C0 SEVERE DEMENTIA, UNSPECIFIED DEMENTIA TYPE, UNSPECIFIED WHETHER BEHAVIORAL, PSYCHOTIC, OR MOOD DISTURBANCE OR ANXIETY (H): ICD-10-CM

## 2024-09-09 LAB — BACTERIA UR CULT: NO GROWTH

## 2024-09-09 PROCEDURE — 99309 SBSQ NF CARE MODERATE MDM 30: CPT | Performed by: NURSE PRACTITIONER

## 2024-09-09 NOTE — PATIENT INSTRUCTIONS
[facility]   2024     Name: Chelly Dave   : 1935       Orders:  - Check CBC, CMP, iron level, iron saturation, ferritin level, vitamin B12, folic acid level on 9/10/24 dx anemia, urinary retention  - Collect UA/UC. If unable to void, will need to collect UA/UC via straight cath. Dx urinary retention.  - Check PVR TID. If PVR > 300cc, then replace montenegro catheter and update provider dx urinary retention.         Electronically signed by   JAYME Casper CNP on 2024 at 2:20 PM

## 2024-09-09 NOTE — PROGRESS NOTES
"[facility] GERIATRICS    Chief Complaint   Patient presents with    RECHECK     HPI:  Chelly Dave is a 89 year old  (1935), who is being seen today for an episodic care visit at: [facility] ([facility]) [[id number]].       Today's concern is:     RN reports over the weekend, family reported patient was having dysuria and concerned about dehydration. PVR check 400+ml on Sunday and catheter was placed. UA/UC ordered.    During exam, patient seen sitting in wheelchair. HPI limited due to dementia. Reports doing well, denies pain or discomforts today. Admits to good appetite. Denies chest pain, SOB, headache, syncope.      Allergies, and PMH/PSH reviewed in EPIC today.    REVIEW OF SYSTEMS:  Limited secondary to cognitive impairment but today pt reports no concerns    Objective:   /64   Pulse 74   Temp 97.5  F (36.4  C)   Resp 18   Ht 1.473 m (4' 10\")   Wt 55.2 kg (121 lb 9.6 oz)   SpO2 100%   BMI 25.41 kg/m    GENERAL APPEARANCE:  Alert, in no distress, oriented, cooperative  RESP:  respiratory distress  CV:  no edema  M/S:   Gait and station abnormal, sitting in wheelchair.  : Quiroz intact w/adequate output  NEURO:   Cranial nerves 2-12 are normal tested and grossly at patient's baseline, Examination of sensation by touch normal  PSYCH:  oriented to self, memory impaired     Wt Readings from Last 4 Encounters:   09/09/24 55.2 kg (121 lb 9.6 oz)   09/05/24 55.1 kg (121 lb 6.4 oz)   08/25/24 61.6 kg (135 lb 12.8 oz)   08/20/24 61.6 kg (135 lb 12.8 oz)     Recent labs in [facility] reviewed by me today.   Most Recent 3 CBC's:  Recent Labs   Lab Test 08/21/24  0934 08/16/24  0818 08/15/24  0655 07/30/24  0758 07/23/24  0740   WBC 4.1  --   --  2.7* 2.4*   HGB 9.7* 9.7* 9.3* 9.0* 8.8*   *  --   --  100 101*     --   --  150 180     Most Recent 3 BMP's:  Recent Labs   Lab Test 08/21/24  0934 08/16/24  0558 08/15/24  0658 08/14/24  1213 08/14/24  1000 07/30/24  0758 " 07/23/24  0740     --   --   --   --  143 142   POTASSIUM 3.9  --   --   --  4.1 4.1 3.6   CHLORIDE 103  --   --   --   --  105 105   CO2 27  --   --   --   --  28 27   BUN 26.4*  --   --   --   --  17.4 11.7   CR 0.92  --   --   --   --  0.71 0.71   ANIONGAP 8  --   --   --   --  10 10   ROBSON 8.5*  --   --   --   --  8.2* 8.4*   GLC 98 85 101*   < > 89 76 86    < > = values in this interval not displayed.     Ferritin   Date Value Ref Range Status   10/23/2017 79 8 - 252 ng/mL Final     Iron   Date Value Ref Range Status   10/23/2017 19 (L) 35 - 180 ug/dL Final     Iron Binding Cap   Date Value Ref Range Status   10/23/2017 202 (L) 240 - 430 ug/dL Final         Assessment/Plan:    (R33.9) Urinary retention  (primary encounter diagnosis)  Comment: Acute urinary retention over the weekend, PVR 400cc and RN placed montenegro catheter.   Plan:   - UC negatitve  - Check CBC, CMP, iron level, iron saturation, ferritin level, vitamin B12, folic acid level on 9/10/24 dx anemia, urinary retention  - Check PVR TID. If PVR > 300cc, then replace montenegro catheter and update provider dx urinary retention.     (D64.9) Chronic anemia  Comment: Chronic  Plan:   - Check CBC, iron level, iron saturation, ferritin level, vitamin B12, folic acid level on 9/10/24    (F03.C0) Severe dementia, unspecified dementia type, unspecified whether behavioral, psychotic, or mood disturbance or anxiety (H)  Comment: Chronic dementia  Plan:   - SW following for discharge planning. Plan to discharge home w/family tomorrow. Discussed w/SW, plan to recheck labs in the morning.  UPDATE 9/10/24: Left VM with Cee (daughter) to review today's labs and answer questions about today's discharge.      Orders:  - Check CBC, CMP, iron level, iron saturation, ferritin level, vitamin B12, folic acid level on 9/10/24 dx anemia, urinary retention  - Check PVR TID. If PVR > 300cc, then replace montenegro catheter and update provider dx urinary retention.          Electronically signed by: JAYME Casper CNP

## 2024-09-10 LAB
ALBUMIN SERPL BCG-MCNC: 3 G/DL (ref 3.5–5.2)
ALP SERPL-CCNC: 104 U/L (ref 40–150)
ALT SERPL W P-5'-P-CCNC: 36 U/L (ref 0–50)
ANION GAP SERPL CALCULATED.3IONS-SCNC: 9 MMOL/L (ref 7–15)
AST SERPL W P-5'-P-CCNC: 36 U/L (ref 0–45)
BILIRUB SERPL-MCNC: <0.2 MG/DL
BUN SERPL-MCNC: 23.9 MG/DL (ref 8–23)
CALCIUM SERPL-MCNC: 8.7 MG/DL (ref 8.8–10.4)
CHLORIDE SERPL-SCNC: 103 MMOL/L (ref 98–107)
CREAT SERPL-MCNC: 0.89 MG/DL (ref 0.51–0.95)
EGFRCR SERPLBLD CKD-EPI 2021: 62 ML/MIN/1.73M2
FERRITIN SERPL-MCNC: 530 NG/ML (ref 11–328)
GLUCOSE SERPL-MCNC: 106 MG/DL (ref 70–99)
HCO3 SERPL-SCNC: 26 MMOL/L (ref 22–29)
HOLD SPECIMEN: NORMAL
IRON BINDING CAPACITY (ROCHE): 206 UG/DL (ref 240–430)
IRON SATN MFR SERPL: 24 % (ref 15–46)
IRON SERPL-MCNC: 50 UG/DL (ref 37–145)
POTASSIUM SERPL-SCNC: 4.6 MMOL/L (ref 3.4–5.3)
PROT SERPL-MCNC: 7 G/DL (ref 6.4–8.3)
SODIUM SERPL-SCNC: 138 MMOL/L (ref 135–145)
VIT B12 SERPL-MCNC: 689 PG/ML (ref 232–1245)

## 2024-09-10 PROCEDURE — 82728 ASSAY OF FERRITIN: CPT | Mod: ORL | Performed by: NURSE PRACTITIONER

## 2024-09-10 PROCEDURE — 83550 IRON BINDING TEST: CPT | Mod: ORL | Performed by: NURSE PRACTITIONER

## 2024-09-10 PROCEDURE — 80053 COMPREHEN METABOLIC PANEL: CPT | Mod: ORL | Performed by: NURSE PRACTITIONER

## 2024-09-10 PROCEDURE — P9604 ONE-WAY ALLOW PRORATED TRIP: HCPCS | Mod: ORL | Performed by: NURSE PRACTITIONER

## 2024-09-10 PROCEDURE — 36415 COLL VENOUS BLD VENIPUNCTURE: CPT | Mod: ORL | Performed by: NURSE PRACTITIONER

## 2024-09-10 PROCEDURE — 82607 VITAMIN B-12: CPT | Mod: ORL | Performed by: NURSE PRACTITIONER

## 2024-11-07 DIAGNOSIS — M00.9 PYOGENIC ARTHRITIS OF RIGHT KNEE JOINT, DUE TO UNSPECIFIED ORGANISM (H): ICD-10-CM

## 2024-11-07 RX ORDER — AMOXICILLIN 500 MG/1
TABLET, FILM COATED ORAL
Qty: 60 TABLET | Refills: 0 | OUTPATIENT
Start: 2024-11-07

## (undated) DEVICE — SU SILK 0 TIE 6X30" A306H

## (undated) DEVICE — SU STRATAFIX PDS PLUS 1 CT-1 12" SXPP1A443

## (undated) DEVICE — DRSG STERI STRIP 1/2X4" R1547

## (undated) DEVICE — SOL ADH LIQUID BENZOIN SWAB 0.6ML C1544

## (undated) DEVICE — LINEN ORTHO ACL PACK 5447

## (undated) DEVICE — DRSG PREVENA NEGATIVE PRESSURE WND 13CM SGL LF PRE1101US

## (undated) DEVICE — CAST PLASTER SPLINT 5X30" 7395

## (undated) DEVICE — SOL WATER IRRIG 1000ML BOTTLE 2F7114

## (undated) DEVICE — SU VICRYL 3-0 TIE 12X18" J904T

## (undated) DEVICE — DRSG AQUACEL AG HYDROFIBER  3.5X10" 422605

## (undated) DEVICE — SU VICRYL 2-0 SH 27" UND J417H

## (undated) DEVICE — ESU LIGASURE IMPACT OPEN SEALER/DVDR CVD LG JAW LF4418

## (undated) DEVICE — SOL NACL 0.9% IRRIG 1000ML BOTTLE 2F7124

## (undated) DEVICE — LINEN FULL SHEET 5511

## (undated) DEVICE — STPL 80 X 3.8MM GIA8038S

## (undated) DEVICE — DRSG WND NEGATIVE PRESSURE PREVENA 20CM PRE1055US.S

## (undated) DEVICE — SET HANDPIECE INTERPULSE W/COAXIAL FAN SPRAY TIP 0210118000

## (undated) DEVICE — WIPES FOLEY CARE SURESTEP PROVON DFC100

## (undated) DEVICE — PREP SKIN SCRUB TRAY 4461A

## (undated) DEVICE — PUMP UNIT NEGATIVE PRESSURE PREVENA PLUS PRE4010.S

## (undated) DEVICE — CAST PADDING 6" STERILE 9046S

## (undated) DEVICE — SU SILK 2-0 TIE 12X30" A305H

## (undated) DEVICE — PREP TECHNI-CARE CHLOROXYLENOL 3% 4OZ BOTTLE C222-4ZWO

## (undated) DEVICE — LINEN TOWEL PACK X6 WHITE 5487

## (undated) DEVICE — STPL RELOAD 80 X 3.8MM GIA8038L

## (undated) DEVICE — DRAPE STERI U 1015

## (undated) DEVICE — GLOVE BIOGEL PI SZ 7.5 40875

## (undated) DEVICE — LINEN TOWEL PACK X30 5481

## (undated) DEVICE — GOWN IMPERVIOUS SPECIALTY XL/XLONG 39049

## (undated) DEVICE — PACK AB HYST II

## (undated) DEVICE — ESU GROUND PAD ADULT W/CORD E7507

## (undated) DEVICE — DRSG XEROFORM 5X9" 8884431605

## (undated) DEVICE — SU PROLENE 1 CTX 30" 8455H

## (undated) DEVICE — SPONGE KITTNER 30-101

## (undated) DEVICE — TOURNIQUET SGL  BLADDER 30"X4" BLUE 5921030135

## (undated) DEVICE — SU MONOCRYL 2-0 CT-1 36" UND Y945H

## (undated) DEVICE — ESU PENCIL SMOKE EVAC W/ROCKER SWITCH 0703-047-000

## (undated) DEVICE — Device

## (undated) DEVICE — SOL BACTISURE WOUND LAVAGE 1L BAG 00-8887-002-00

## (undated) DEVICE — GLOVE BIOGEL PI MICRO INDICATOR UNDERGLOVE SZ 7.5 48975

## (undated) DEVICE — GLOVE PROTEXIS POWDER FREE SMT 7.5  2D72PT75X

## (undated) DEVICE — SU VICRYL 2-0 TIE 54" J615H

## (undated) DEVICE — SU VICRYL 2-0 REEL 54" UND J286G

## (undated) DEVICE — STPL SKIN 35W 6.9MM  PXW35

## (undated) DEVICE — HOOD FLYTE W/PEELAWAY 408-800-100

## (undated) DEVICE — DRAPE IOBAN INCISE 23X17" 6650EZ

## (undated) DEVICE — SU PDS II 1 CT MONOFIL Z353H

## (undated) DEVICE — SU SILK 3-0 SH CR 8X18" C013D

## (undated) DEVICE — PACK TOTAL KNEE SOP15TKFSD

## (undated) DEVICE — PACK TOTAL KNEE BOXED LATEX FREE PO15TKFCT

## (undated) DEVICE — BAG CLEAR TRASH 1.3M 39X33" P4040C

## (undated) DEVICE — SUCTION IRR SYSTEM W/O TIP INTERPULSE HANDPIECE 0210-100-000

## (undated) DEVICE — SU VICRYL 4-0 RB-1 27" J304

## (undated) DEVICE — GLOVE PROTEXIS BLUE W/NEU-THERA 8.0  2D73EB80

## (undated) DEVICE — DRSG MEDIPORE 3 1/2X13 3/4" 3573

## (undated) DEVICE — SU SILK 4-0 TIE 12X30" A303H

## (undated) DEVICE — SUCTION MANIFOLD NEPTUNE 2 SYS 4 PORT 0702-020-000

## (undated) DEVICE — SU PDS II 0 TP-1 60" Z991G

## (undated) DEVICE — SOL NACL 0.9% IRRIG 3000ML BAG 2B7477

## (undated) DEVICE — SU SILK 3-0 TIE 12X30" A304H

## (undated) DEVICE — GLOVE BIOGEL PI MICRO INDICATOR UNDERGLOVE SZ 8.0 48980

## (undated) DEVICE — GLOVE BIOGEL PI SZ 8.0 40880

## (undated) DEVICE — SUCTION SLEEVE NEPTUNE 2 165MM 0703-005-165

## (undated) DEVICE — BNDG ELASTIC 6" DBL LENGTH UNSTERILE 6611-16

## (undated) RX ORDER — FENTANYL CITRATE 50 UG/ML
INJECTION, SOLUTION INTRAMUSCULAR; INTRAVENOUS
Status: DISPENSED
Start: 2024-08-14

## (undated) RX ORDER — CEFAZOLIN SODIUM/WATER 2 G/20 ML
SYRINGE (ML) INTRAVENOUS
Status: DISPENSED
Start: 2024-06-21

## (undated) RX ORDER — ONDANSETRON 2 MG/ML
INJECTION INTRAMUSCULAR; INTRAVENOUS
Status: DISPENSED
Start: 2018-06-22

## (undated) RX ORDER — ONDANSETRON 2 MG/ML
INJECTION INTRAMUSCULAR; INTRAVENOUS
Status: DISPENSED
Start: 2024-08-14

## (undated) RX ORDER — FENTANYL CITRATE 50 UG/ML
INJECTION, SOLUTION INTRAMUSCULAR; INTRAVENOUS
Status: DISPENSED
Start: 2018-06-22

## (undated) RX ORDER — FENTANYL CITRATE 50 UG/ML
INJECTION, SOLUTION INTRAMUSCULAR; INTRAVENOUS
Status: DISPENSED
Start: 2024-06-21

## (undated) RX ORDER — DEXTROSE MONOHYDRATE 25 G/50ML
INJECTION, SOLUTION INTRAVENOUS
Status: DISPENSED
Start: 2018-06-22

## (undated) RX ORDER — PHENYLEPHRINE HCL IN 0.9% NACL 1 MG/10 ML
SYRINGE (ML) INTRAVENOUS
Status: DISPENSED
Start: 2018-06-22

## (undated) RX ORDER — ETOMIDATE 2 MG/ML
INJECTION INTRAVENOUS
Status: DISPENSED
Start: 2024-06-21

## (undated) RX ORDER — BUPIVACAINE HYDROCHLORIDE 5 MG/ML
INJECTION, SOLUTION EPIDURAL; INTRACAUDAL
Status: DISPENSED
Start: 2024-08-14

## (undated) RX ORDER — LIDOCAINE HYDROCHLORIDE 10 MG/ML
INJECTION, SOLUTION EPIDURAL; INFILTRATION; INTRACAUDAL; PERINEURAL
Status: DISPENSED
Start: 2024-06-21

## (undated) RX ORDER — CEFAZOLIN SODIUM 1 G/3ML
INJECTION, POWDER, FOR SOLUTION INTRAMUSCULAR; INTRAVENOUS
Status: DISPENSED
Start: 2018-06-22

## (undated) RX ORDER — PROPOFOL 10 MG/ML
INJECTION, EMULSION INTRAVENOUS
Status: DISPENSED
Start: 2024-06-21

## (undated) RX ORDER — LIDOCAINE HYDROCHLORIDE 10 MG/ML
INJECTION, SOLUTION EPIDURAL; INFILTRATION; INTRACAUDAL; PERINEURAL
Status: DISPENSED
Start: 2018-06-24

## (undated) RX ORDER — LIDOCAINE HYDROCHLORIDE 10 MG/ML
INJECTION, SOLUTION EPIDURAL; INFILTRATION; INTRACAUDAL; PERINEURAL
Status: DISPENSED
Start: 2018-06-25

## (undated) RX ORDER — PROPOFOL 10 MG/ML
INJECTION, EMULSION INTRAVENOUS
Status: DISPENSED
Start: 2018-06-22

## (undated) RX ORDER — BUPIVACAINE HYDROCHLORIDE 5 MG/ML
INJECTION, SOLUTION EPIDURAL; INTRACAUDAL
Status: DISPENSED
Start: 2024-06-21

## (undated) RX ORDER — ALBUMIN, HUMAN INJ 5% 5 %
SOLUTION INTRAVENOUS
Status: DISPENSED
Start: 2018-06-22

## (undated) RX ORDER — TRANEXAMIC ACID 10 MG/ML
INJECTION, SOLUTION INTRAVENOUS
Status: DISPENSED
Start: 2024-06-21

## (undated) RX ORDER — FENTANYL CITRATE-0.9 % NACL/PF 10 MCG/ML
PLASTIC BAG, INJECTION (ML) INTRAVENOUS
Status: DISPENSED
Start: 2024-06-21

## (undated) RX ORDER — CEFAZOLIN SODIUM 2 G/100ML
INJECTION, SOLUTION INTRAVENOUS
Status: DISPENSED
Start: 2018-06-22

## (undated) RX ORDER — GLYCOPYRROLATE 0.2 MG/ML
INJECTION, SOLUTION INTRAMUSCULAR; INTRAVENOUS
Status: DISPENSED
Start: 2024-06-21

## (undated) RX ORDER — VANCOMYCIN HYDROCHLORIDE 1 G/20ML
INJECTION, POWDER, LYOPHILIZED, FOR SOLUTION INTRAVENOUS
Status: DISPENSED
Start: 2024-06-21

## (undated) RX ORDER — FENTANYL CITRATE 0.05 MG/ML
INJECTION, SOLUTION INTRAMUSCULAR; INTRAVENOUS
Status: DISPENSED
Start: 2024-08-14